# Patient Record
Sex: FEMALE | Employment: UNEMPLOYED | URBAN - METROPOLITAN AREA
[De-identification: names, ages, dates, MRNs, and addresses within clinical notes are randomized per-mention and may not be internally consistent; named-entity substitution may affect disease eponyms.]

---

## 2020-01-01 ENCOUNTER — APPOINTMENT (OUTPATIENT)
Dept: CT IMAGING | Facility: CLINIC | Age: 55
DRG: 003 | End: 2020-01-01
Attending: NURSE PRACTITIONER
Payer: COMMERCIAL

## 2020-01-01 ENCOUNTER — APPOINTMENT (OUTPATIENT)
Dept: GENERAL RADIOLOGY | Facility: CLINIC | Age: 55
DRG: 003 | End: 2020-01-01
Attending: COLON & RECTAL SURGERY
Payer: COMMERCIAL

## 2020-01-01 ENCOUNTER — ANESTHESIA (OUTPATIENT)
Dept: SURGERY | Facility: CLINIC | Age: 55
DRG: 003 | End: 2020-01-01
Payer: COMMERCIAL

## 2020-01-01 ENCOUNTER — APPOINTMENT (OUTPATIENT)
Dept: GENERAL RADIOLOGY | Facility: CLINIC | Age: 55
DRG: 003 | End: 2020-01-01
Attending: ANESTHESIOLOGY
Payer: COMMERCIAL

## 2020-01-01 ENCOUNTER — APPOINTMENT (OUTPATIENT)
Dept: GENERAL RADIOLOGY | Facility: CLINIC | Age: 55
DRG: 003 | End: 2020-01-01
Attending: INTERNAL MEDICINE
Payer: COMMERCIAL

## 2020-01-01 ENCOUNTER — APPOINTMENT (OUTPATIENT)
Dept: CT IMAGING | Facility: CLINIC | Age: 55
DRG: 003 | End: 2020-01-01
Attending: PHYSICIAN ASSISTANT
Payer: COMMERCIAL

## 2020-01-01 ENCOUNTER — ANESTHESIA EVENT (OUTPATIENT)
Dept: SURGERY | Facility: CLINIC | Age: 55
DRG: 003 | End: 2020-01-01
Payer: COMMERCIAL

## 2020-01-01 ENCOUNTER — HOSPITAL ENCOUNTER (INPATIENT)
Facility: CLINIC | Age: 55
LOS: 29 days | DRG: 003 | End: 2020-10-09
Attending: NURSE PRACTITIONER | Admitting: INTERNAL MEDICINE
Payer: COMMERCIAL

## 2020-01-01 ENCOUNTER — APPOINTMENT (OUTPATIENT)
Dept: CT IMAGING | Facility: CLINIC | Age: 55
DRG: 003 | End: 2020-01-01
Attending: HOSPITALIST
Payer: COMMERCIAL

## 2020-01-01 ENCOUNTER — APPOINTMENT (OUTPATIENT)
Dept: CT IMAGING | Facility: CLINIC | Age: 55
DRG: 003 | End: 2020-01-01
Attending: INTERNAL MEDICINE
Payer: COMMERCIAL

## 2020-01-01 ENCOUNTER — APPOINTMENT (OUTPATIENT)
Dept: CARDIOLOGY | Facility: CLINIC | Age: 55
DRG: 003 | End: 2020-01-01
Attending: INTERNAL MEDICINE
Payer: COMMERCIAL

## 2020-01-01 ENCOUNTER — APPOINTMENT (OUTPATIENT)
Dept: CT IMAGING | Facility: CLINIC | Age: 55
DRG: 003 | End: 2020-01-01
Attending: COLON & RECTAL SURGERY
Payer: COMMERCIAL

## 2020-01-01 ENCOUNTER — HOSPITAL ENCOUNTER (EMERGENCY)
Facility: CLINIC | Age: 55
Discharge: HOME | End: 2020-05-27

## 2020-01-01 ENCOUNTER — APPOINTMENT (OUTPATIENT)
Dept: GENERAL RADIOLOGY | Facility: CLINIC | Age: 55
DRG: 003 | End: 2020-01-01
Attending: HOSPITALIST
Payer: COMMERCIAL

## 2020-01-01 DIAGNOSIS — A41.9 SEVERE SEPSIS (H): ICD-10-CM

## 2020-01-01 DIAGNOSIS — R65.20 SEVERE SEPSIS (H): ICD-10-CM

## 2020-01-01 DIAGNOSIS — K66.8 PNEUMOPERITONEUM: ICD-10-CM

## 2020-01-01 DIAGNOSIS — E87.1 HYPONATREMIA: ICD-10-CM

## 2020-01-01 LAB
ABO + RH BLD: NORMAL
ACANTHOCYTES BLD QL SMEAR: SLIGHT
ALBUMIN SERPL-MCNC: 1 G/DL (ref 3.4–5)
ALBUMIN SERPL-MCNC: 1.1 G/DL (ref 3.4–5)
ALBUMIN SERPL-MCNC: 1.2 G/DL (ref 3.4–5)
ALBUMIN SERPL-MCNC: 1.3 G/DL (ref 3.4–5)
ALBUMIN SERPL-MCNC: 1.5 G/DL (ref 3.4–5)
ALBUMIN SERPL-MCNC: 1.6 G/DL (ref 3.4–5)
ALBUMIN SERPL-MCNC: 1.8 G/DL (ref 3.4–5)
ALBUMIN SERPL-MCNC: 1.9 G/DL (ref 3.4–5)
ALBUMIN SERPL-MCNC: 1.9 G/DL (ref 3.4–5)
ALBUMIN SERPL-MCNC: 2 G/DL (ref 3.4–5)
ALBUMIN SERPL-MCNC: 2.1 G/DL (ref 3.4–5)
ALBUMIN SERPL-MCNC: 2.1 G/DL (ref 3.4–5)
ALBUMIN SERPL-MCNC: 2.3 G/DL (ref 3.4–5)
ALBUMIN SERPL-MCNC: 2.3 G/DL (ref 3.4–5)
ALBUMIN SERPL-MCNC: 2.4 G/DL (ref 3.4–5)
ALBUMIN SERPL-MCNC: 2.6 G/DL (ref 3.4–5)
ALBUMIN SERPL-MCNC: 2.9 G/DL (ref 3.4–5)
ALBUMIN SERPL-MCNC: 3 G/DL (ref 3.4–5)
ALBUMIN SERPL-MCNC: 3.2 G/DL (ref 3.4–5)
ALBUMIN SERPL-MCNC: 3.3 G/DL (ref 3.4–5)
ALBUMIN SERPL-MCNC: 3.7 G/DL (ref 3.4–5)
ALBUMIN UR-MCNC: 10 MG/DL
ALBUMIN UR-MCNC: 30 MG/DL
ALP SERPL-CCNC: 120 U/L (ref 40–150)
ALP SERPL-CCNC: 136 U/L (ref 40–150)
ALP SERPL-CCNC: 154 U/L (ref 40–150)
ALP SERPL-CCNC: 162 U/L (ref 40–150)
ALP SERPL-CCNC: 162 U/L (ref 40–150)
ALP SERPL-CCNC: 198 U/L (ref 40–150)
ALP SERPL-CCNC: 200 U/L (ref 40–150)
ALP SERPL-CCNC: 203 U/L (ref 40–150)
ALP SERPL-CCNC: 204 U/L (ref 40–150)
ALP SERPL-CCNC: 204 U/L (ref 40–150)
ALP SERPL-CCNC: 210 U/L (ref 40–150)
ALP SERPL-CCNC: 217 U/L (ref 40–150)
ALP SERPL-CCNC: 232 U/L (ref 40–150)
ALP SERPL-CCNC: 232 U/L (ref 40–150)
ALP SERPL-CCNC: 239 U/L (ref 40–150)
ALP SERPL-CCNC: 259 U/L (ref 40–150)
ALP SERPL-CCNC: 261 U/L (ref 40–150)
ALP SERPL-CCNC: 268 U/L (ref 40–150)
ALP SERPL-CCNC: 290 U/L (ref 40–150)
ALP SERPL-CCNC: 325 U/L (ref 40–150)
ALP SERPL-CCNC: 338 U/L (ref 40–150)
ALP SERPL-CCNC: 355 U/L (ref 40–150)
ALP SERPL-CCNC: 355 U/L (ref 40–150)
ALP SERPL-CCNC: 49 U/L (ref 40–150)
ALP SERPL-CCNC: 52 U/L (ref 40–150)
ALP SERPL-CCNC: 60 U/L (ref 40–150)
ALP SERPL-CCNC: 64 U/L (ref 40–150)
ALP SERPL-CCNC: 81 U/L (ref 40–150)
ALT SERPL W P-5'-P-CCNC: 10 U/L (ref 0–50)
ALT SERPL W P-5'-P-CCNC: 102 U/L (ref 0–50)
ALT SERPL W P-5'-P-CCNC: 11 U/L (ref 0–50)
ALT SERPL W P-5'-P-CCNC: 11 U/L (ref 0–50)
ALT SERPL W P-5'-P-CCNC: 14 U/L (ref 0–50)
ALT SERPL W P-5'-P-CCNC: 17 U/L (ref 0–50)
ALT SERPL W P-5'-P-CCNC: 17 U/L (ref 0–50)
ALT SERPL W P-5'-P-CCNC: 18 U/L (ref 0–50)
ALT SERPL W P-5'-P-CCNC: 24 U/L (ref 0–50)
ALT SERPL W P-5'-P-CCNC: 34 U/L (ref 0–50)
ALT SERPL W P-5'-P-CCNC: 39 U/L (ref 0–50)
ALT SERPL W P-5'-P-CCNC: 40 U/L (ref 0–50)
ALT SERPL W P-5'-P-CCNC: 46 U/L (ref 0–50)
ALT SERPL W P-5'-P-CCNC: 46 U/L (ref 0–50)
ALT SERPL W P-5'-P-CCNC: 47 U/L (ref 0–50)
ALT SERPL W P-5'-P-CCNC: 50 U/L (ref 0–50)
ALT SERPL W P-5'-P-CCNC: 54 U/L (ref 0–50)
ALT SERPL W P-5'-P-CCNC: 56 U/L (ref 0–50)
ALT SERPL W P-5'-P-CCNC: 58 U/L (ref 0–50)
ALT SERPL W P-5'-P-CCNC: 61 U/L (ref 0–50)
ALT SERPL W P-5'-P-CCNC: 73 U/L (ref 0–50)
ALT SERPL W P-5'-P-CCNC: 87 U/L (ref 0–50)
ALT SERPL W P-5'-P-CCNC: 88 U/L (ref 0–50)
ALT SERPL W P-5'-P-CCNC: 96 U/L (ref 0–50)
ALT SERPL W P-5'-P-CCNC: 99 U/L (ref 0–50)
AMORPH CRY #/AREA URNS HPF: ABNORMAL /HPF
ANION GAP SERPL CALCULATED.3IONS-SCNC: 10 MMOL/L (ref 3–14)
ANION GAP SERPL CALCULATED.3IONS-SCNC: 11 MMOL/L (ref 3–14)
ANION GAP SERPL CALCULATED.3IONS-SCNC: 13 MMOL/L (ref 3–14)
ANION GAP SERPL CALCULATED.3IONS-SCNC: 14 MMOL/L (ref 3–14)
ANION GAP SERPL CALCULATED.3IONS-SCNC: 14 MMOL/L (ref 3–14)
ANION GAP SERPL CALCULATED.3IONS-SCNC: 15 MMOL/L (ref 3–14)
ANION GAP SERPL CALCULATED.3IONS-SCNC: 16 MMOL/L (ref 3–14)
ANION GAP SERPL CALCULATED.3IONS-SCNC: 19 MMOL/L (ref 3–14)
ANION GAP SERPL CALCULATED.3IONS-SCNC: 2 MMOL/L (ref 3–14)
ANION GAP SERPL CALCULATED.3IONS-SCNC: 4 MMOL/L (ref 3–14)
ANION GAP SERPL CALCULATED.3IONS-SCNC: 5 MMOL/L (ref 3–14)
ANION GAP SERPL CALCULATED.3IONS-SCNC: 5 MMOL/L (ref 3–14)
ANION GAP SERPL CALCULATED.3IONS-SCNC: 6 MMOL/L (ref 3–14)
ANION GAP SERPL CALCULATED.3IONS-SCNC: 7 MMOL/L (ref 3–14)
ANION GAP SERPL CALCULATED.3IONS-SCNC: 8 MMOL/L (ref 3–14)
ANION GAP SERPL CALCULATED.3IONS-SCNC: 8 MMOL/L (ref 3–14)
ANION GAP SERPL CALCULATED.3IONS-SCNC: 9 MMOL/L (ref 3–14)
ANISOCYTOSIS BLD QL SMEAR: SLIGHT
APPEARANCE UR: ABNORMAL
APPEARANCE UR: CLEAR
APTT PPP: 32 SEC (ref 22–37)
APTT PPP: 40 SEC (ref 22–37)
APTT PPP: 42 SEC (ref 22–37)
APTT PPP: 43 SEC (ref 22–37)
APTT PPP: <20 SEC (ref 22–37)
AST SERPL W P-5'-P-CCNC: 114 U/L (ref 0–45)
AST SERPL W P-5'-P-CCNC: 148 U/L (ref 0–45)
AST SERPL W P-5'-P-CCNC: 178 U/L (ref 0–45)
AST SERPL W P-5'-P-CCNC: 188 U/L (ref 0–45)
AST SERPL W P-5'-P-CCNC: 188 U/L (ref 0–45)
AST SERPL W P-5'-P-CCNC: 23 U/L (ref 0–45)
AST SERPL W P-5'-P-CCNC: 27 U/L (ref 0–45)
AST SERPL W P-5'-P-CCNC: 27 U/L (ref 0–45)
AST SERPL W P-5'-P-CCNC: 30 U/L (ref 0–45)
AST SERPL W P-5'-P-CCNC: 33 U/L (ref 0–45)
AST SERPL W P-5'-P-CCNC: 37 U/L (ref 0–45)
AST SERPL W P-5'-P-CCNC: 38 U/L (ref 0–45)
AST SERPL W P-5'-P-CCNC: 39 U/L (ref 0–45)
AST SERPL W P-5'-P-CCNC: 41 U/L (ref 0–45)
AST SERPL W P-5'-P-CCNC: 42 U/L (ref 0–45)
AST SERPL W P-5'-P-CCNC: 43 U/L (ref 0–45)
AST SERPL W P-5'-P-CCNC: 49 U/L (ref 0–45)
AST SERPL W P-5'-P-CCNC: 50 U/L (ref 0–45)
AST SERPL W P-5'-P-CCNC: 51 U/L (ref 0–45)
AST SERPL W P-5'-P-CCNC: 52 U/L (ref 0–45)
AST SERPL W P-5'-P-CCNC: 53 U/L (ref 0–45)
AST SERPL W P-5'-P-CCNC: 56 U/L (ref 0–45)
AST SERPL W P-5'-P-CCNC: 60 U/L (ref 0–45)
AST SERPL W P-5'-P-CCNC: 65 U/L (ref 0–45)
AST SERPL W P-5'-P-CCNC: 68 U/L (ref 0–45)
AST SERPL W P-5'-P-CCNC: 72 U/L (ref 0–45)
AST SERPL W P-5'-P-CCNC: 93 U/L (ref 0–45)
BACTERIA SPEC CULT: ABNORMAL
BACTERIA SPEC CULT: NO GROWTH
BASE DEFICIT BLDA-SCNC: 1.1 MMOL/L
BASE DEFICIT BLDA-SCNC: 1.6 MMOL/L
BASE DEFICIT BLDA-SCNC: 2.5 MMOL/L
BASE DEFICIT BLDA-SCNC: 2.8 MMOL/L
BASE DEFICIT BLDA-SCNC: 22.9 MMOL/L
BASE DEFICIT BLDA-SCNC: 3.1 MMOL/L
BASE DEFICIT BLDA-SCNC: 3.2 MMOL/L
BASE DEFICIT BLDA-SCNC: 4.6 MMOL/L
BASE DEFICIT BLDA-SCNC: 4.7 MMOL/L
BASE DEFICIT BLDA-SCNC: 4.8 MMOL/L
BASE DEFICIT BLDA-SCNC: 5.9 MMOL/L
BASE DEFICIT BLDA-SCNC: 7 MMOL/L
BASE DEFICIT BLDA-SCNC: 7 MMOL/L
BASE DEFICIT BLDA-SCNC: 7.1 MMOL/L
BASE DEFICIT BLDA-SCNC: 7.1 MMOL/L
BASE DEFICIT BLDA-SCNC: 7.3 MMOL/L
BASE DEFICIT BLDA-SCNC: 7.7 MMOL/L
BASE DEFICIT BLDA-SCNC: 8 MMOL/L
BASE DEFICIT BLDA-SCNC: 8.8 MMOL/L
BASE DEFICIT BLDV-SCNC: 1.4 MMOL/L
BASE DEFICIT BLDV-SCNC: 20 MMOL/L
BASE DEFICIT BLDV-SCNC: 3.6 MMOL/L
BASE DEFICIT BLDV-SCNC: 3.8 MMOL/L
BASE DEFICIT BLDV-SCNC: 4 MMOL/L
BASE DEFICIT BLDV-SCNC: 6.1 MMOL/L
BASE DEFICIT BLDV-SCNC: NORMAL MMOL/L
BASE EXCESS BLDV CALC-SCNC: NORMAL MMOL/L
BASOPHILS # BLD AUTO: 0 10E9/L (ref 0–0.2)
BASOPHILS NFR BLD AUTO: 0 %
BASOPHILS NFR BLD AUTO: 0.1 %
BILIRUB DIRECT SERPL-MCNC: 1.2 MG/DL (ref 0–0.2)
BILIRUB SERPL-MCNC: 0.9 MG/DL (ref 0.2–1.3)
BILIRUB SERPL-MCNC: 1 MG/DL (ref 0.2–1.3)
BILIRUB SERPL-MCNC: 1.2 MG/DL (ref 0.2–1.3)
BILIRUB SERPL-MCNC: 1.3 MG/DL (ref 0.2–1.3)
BILIRUB SERPL-MCNC: 1.6 MG/DL (ref 0.2–1.3)
BILIRUB SERPL-MCNC: 1.6 MG/DL (ref 0.2–1.3)
BILIRUB SERPL-MCNC: 1.7 MG/DL (ref 0.2–1.3)
BILIRUB SERPL-MCNC: 1.8 MG/DL (ref 0.2–1.3)
BILIRUB SERPL-MCNC: 1.9 MG/DL (ref 0.2–1.3)
BILIRUB SERPL-MCNC: 1.9 MG/DL (ref 0.2–1.3)
BILIRUB SERPL-MCNC: 2 MG/DL (ref 0.2–1.3)
BILIRUB SERPL-MCNC: 2 MG/DL (ref 0.2–1.3)
BILIRUB SERPL-MCNC: 2.2 MG/DL (ref 0.2–1.3)
BILIRUB SERPL-MCNC: 2.3 MG/DL (ref 0.2–1.3)
BILIRUB SERPL-MCNC: 2.4 MG/DL (ref 0.2–1.3)
BILIRUB SERPL-MCNC: 2.4 MG/DL (ref 0.2–1.3)
BILIRUB SERPL-MCNC: 2.6 MG/DL (ref 0.2–1.3)
BILIRUB SERPL-MCNC: 2.7 MG/DL (ref 0.2–1.3)
BILIRUB SERPL-MCNC: 2.8 MG/DL (ref 0.2–1.3)
BILIRUB SERPL-MCNC: 3 MG/DL (ref 0.2–1.3)
BILIRUB SERPL-MCNC: 3.1 MG/DL (ref 0.2–1.3)
BILIRUB SERPL-MCNC: 3.3 MG/DL (ref 0.2–1.3)
BILIRUB SERPL-MCNC: 3.4 MG/DL (ref 0.2–1.3)
BILIRUB SERPL-MCNC: 3.5 MG/DL (ref 0.2–1.3)
BILIRUB SERPL-MCNC: 3.6 MG/DL (ref 0.2–1.3)
BILIRUB SERPL-MCNC: 3.9 MG/DL (ref 0.2–1.3)
BILIRUB SERPL-MCNC: 4.1 MG/DL (ref 0.2–1.3)
BILIRUB UR QL STRIP: NEGATIVE
BILIRUB UR QL STRIP: NEGATIVE
BITE CELLS BLD QL SMEAR: SLIGHT
BLD GP AB INVEST PLASRBC-IMP: NORMAL
BLD GP AB SCN SERPL QL ELUTION: NORMAL
BLD GP AB SCN SERPL QL: NORMAL
BLD PROD TYP BPU: NORMAL
BLD UNIT ID BPU: 0
BLOOD BANK CMNT PATIENT-IMP: NORMAL
BLOOD PRODUCT CODE: NORMAL
BPU ID: NORMAL
BUN SERPL-MCNC: 100 MG/DL (ref 7–30)
BUN SERPL-MCNC: 100 MG/DL (ref 7–30)
BUN SERPL-MCNC: 101 MG/DL (ref 7–30)
BUN SERPL-MCNC: 101 MG/DL (ref 7–30)
BUN SERPL-MCNC: 102 MG/DL (ref 7–30)
BUN SERPL-MCNC: 12 MG/DL (ref 7–30)
BUN SERPL-MCNC: 15 MG/DL (ref 7–30)
BUN SERPL-MCNC: 16 MG/DL (ref 7–30)
BUN SERPL-MCNC: 19 MG/DL (ref 7–30)
BUN SERPL-MCNC: 22 MG/DL (ref 7–30)
BUN SERPL-MCNC: 27 MG/DL (ref 7–30)
BUN SERPL-MCNC: 3 MG/DL (ref 7–30)
BUN SERPL-MCNC: 3 MG/DL (ref 7–30)
BUN SERPL-MCNC: 32 MG/DL (ref 7–30)
BUN SERPL-MCNC: 4 MG/DL (ref 7–30)
BUN SERPL-MCNC: 4 MG/DL (ref 7–30)
BUN SERPL-MCNC: 47 MG/DL (ref 7–30)
BUN SERPL-MCNC: 5 MG/DL (ref 7–30)
BUN SERPL-MCNC: 5 MG/DL (ref 7–30)
BUN SERPL-MCNC: 6 MG/DL (ref 7–30)
BUN SERPL-MCNC: 63 MG/DL (ref 7–30)
BUN SERPL-MCNC: 69 MG/DL (ref 7–30)
BUN SERPL-MCNC: 7 MG/DL (ref 7–30)
BUN SERPL-MCNC: 70 MG/DL (ref 7–30)
BUN SERPL-MCNC: 75 MG/DL (ref 7–30)
BUN SERPL-MCNC: 75 MG/DL (ref 7–30)
BUN SERPL-MCNC: 82 MG/DL (ref 7–30)
BUN SERPL-MCNC: 83 MG/DL (ref 7–30)
BUN SERPL-MCNC: 84 MG/DL (ref 7–30)
BUN SERPL-MCNC: 85 MG/DL (ref 7–30)
BUN SERPL-MCNC: 9 MG/DL (ref 7–30)
BUN SERPL-MCNC: 93 MG/DL (ref 7–30)
BUN SERPL-MCNC: 93 MG/DL (ref 7–30)
BUN SERPL-MCNC: 95 MG/DL (ref 7–30)
BUN SERPL-MCNC: 99 MG/DL (ref 7–30)
BURR CELLS BLD QL SMEAR: ABNORMAL
CA-I BLD-MCNC: 4.1 MG/DL (ref 4.4–5.2)
CA-I BLD-MCNC: 4.8 MG/DL (ref 4.4–5.2)
CA-I SERPL ISE-MCNC: 4.5 MG/DL (ref 4.4–5.2)
CALCIUM SERPL-MCNC: 14 MG/DL (ref 8.5–10.1)
CALCIUM SERPL-MCNC: 6.1 MG/DL (ref 8.5–10.1)
CALCIUM SERPL-MCNC: 6.1 MG/DL (ref 8.5–10.1)
CALCIUM SERPL-MCNC: 6.2 MG/DL (ref 8.5–10.1)
CALCIUM SERPL-MCNC: 6.2 MG/DL (ref 8.5–10.1)
CALCIUM SERPL-MCNC: 6.3 MG/DL (ref 8.5–10.1)
CALCIUM SERPL-MCNC: 6.3 MG/DL (ref 8.5–10.1)
CALCIUM SERPL-MCNC: 6.4 MG/DL (ref 8.5–10.1)
CALCIUM SERPL-MCNC: 6.4 MG/DL (ref 8.5–10.1)
CALCIUM SERPL-MCNC: 6.5 MG/DL (ref 8.5–10.1)
CALCIUM SERPL-MCNC: 6.6 MG/DL (ref 8.5–10.1)
CALCIUM SERPL-MCNC: 6.7 MG/DL (ref 8.5–10.1)
CALCIUM SERPL-MCNC: 6.8 MG/DL (ref 8.5–10.1)
CALCIUM SERPL-MCNC: 6.8 MG/DL (ref 8.5–10.1)
CALCIUM SERPL-MCNC: 6.9 MG/DL (ref 8.5–10.1)
CALCIUM SERPL-MCNC: 6.9 MG/DL (ref 8.5–10.1)
CALCIUM SERPL-MCNC: 7 MG/DL (ref 8.5–10.1)
CALCIUM SERPL-MCNC: 7 MG/DL (ref 8.5–10.1)
CALCIUM SERPL-MCNC: 7.1 MG/DL (ref 8.5–10.1)
CALCIUM SERPL-MCNC: 7.2 MG/DL (ref 8.5–10.1)
CALCIUM SERPL-MCNC: 7.5 MG/DL (ref 8.5–10.1)
CALCIUM SERPL-MCNC: 7.6 MG/DL (ref 8.5–10.1)
CALCIUM SERPL-MCNC: 7.7 MG/DL (ref 8.5–10.1)
CALCIUM SERPL-MCNC: 7.9 MG/DL (ref 8.5–10.1)
CALCIUM SERPL-MCNC: 8.1 MG/DL (ref 8.5–10.1)
CALCIUM SERPL-MCNC: 8.2 MG/DL (ref 8.5–10.1)
CALCIUM SERPL-MCNC: 8.2 MG/DL (ref 8.5–10.1)
CALCIUM SERPL-MCNC: 8.3 MG/DL (ref 8.5–10.1)
CALCIUM SERPL-MCNC: 8.4 MG/DL (ref 8.5–10.1)
CALCIUM SERPL-MCNC: 8.8 MG/DL (ref 8.5–10.1)
CALCIUM SERPL-MCNC: 9 MG/DL (ref 8.5–10.1)
CALCIUM SERPL-MCNC: 9.1 MG/DL (ref 8.5–10.1)
CHLORIDE SERPL-SCNC: 102 MMOL/L (ref 94–109)
CHLORIDE SERPL-SCNC: 104 MMOL/L (ref 94–109)
CHLORIDE SERPL-SCNC: 104 MMOL/L (ref 94–109)
CHLORIDE SERPL-SCNC: 106 MMOL/L (ref 94–109)
CHLORIDE SERPL-SCNC: 108 MMOL/L (ref 94–109)
CHLORIDE SERPL-SCNC: 108 MMOL/L (ref 94–109)
CHLORIDE SERPL-SCNC: 110 MMOL/L (ref 94–109)
CHLORIDE SERPL-SCNC: 111 MMOL/L (ref 94–109)
CHLORIDE SERPL-SCNC: 112 MMOL/L (ref 94–109)
CHLORIDE SERPL-SCNC: 113 MMOL/L (ref 94–109)
CHLORIDE SERPL-SCNC: 114 MMOL/L (ref 94–109)
CHLORIDE SERPL-SCNC: 115 MMOL/L (ref 94–109)
CHLORIDE SERPL-SCNC: 115 MMOL/L (ref 94–109)
CHLORIDE SERPL-SCNC: 116 MMOL/L (ref 94–109)
CHLORIDE SERPL-SCNC: 117 MMOL/L (ref 94–109)
CHLORIDE SERPL-SCNC: 117 MMOL/L (ref 94–109)
CHLORIDE SERPL-SCNC: 118 MMOL/L (ref 94–109)
CHLORIDE SERPL-SCNC: 119 MMOL/L (ref 94–109)
CHLORIDE SERPL-SCNC: 120 MMOL/L (ref 94–109)
CHLORIDE SERPL-SCNC: 120 MMOL/L (ref 94–109)
CHLORIDE SERPL-SCNC: 122 MMOL/L (ref 94–109)
CHLORIDE SERPL-SCNC: 79 MMOL/L (ref 94–109)
CHLORIDE SERPL-SCNC: 92 MMOL/L (ref 94–109)
CHLORIDE SERPL-SCNC: 93 MMOL/L (ref 94–109)
CHLORIDE SERPL-SCNC: 93 MMOL/L (ref 94–109)
CHLORIDE SERPL-SCNC: 96 MMOL/L (ref 94–109)
CK SERPL-CCNC: 158 U/L (ref 30–225)
CO2 BLD-SCNC: 16 MMOL/L (ref 21–28)
CO2 BLD-SCNC: 20 MMOL/L (ref 21–28)
CO2 BLD-SCNC: 22 MMOL/L (ref 21–28)
CO2 SERPL-SCNC: 12 MMOL/L (ref 20–32)
CO2 SERPL-SCNC: 13 MMOL/L (ref 20–32)
CO2 SERPL-SCNC: 14 MMOL/L (ref 20–32)
CO2 SERPL-SCNC: 14 MMOL/L (ref 20–32)
CO2 SERPL-SCNC: 16 MMOL/L (ref 20–32)
CO2 SERPL-SCNC: 16 MMOL/L (ref 20–32)
CO2 SERPL-SCNC: 17 MMOL/L (ref 20–32)
CO2 SERPL-SCNC: 18 MMOL/L (ref 20–32)
CO2 SERPL-SCNC: 18 MMOL/L (ref 20–32)
CO2 SERPL-SCNC: 19 MMOL/L (ref 20–32)
CO2 SERPL-SCNC: 20 MMOL/L (ref 20–32)
CO2 SERPL-SCNC: 21 MMOL/L (ref 20–32)
CO2 SERPL-SCNC: 22 MMOL/L (ref 20–32)
CO2 SERPL-SCNC: 23 MMOL/L (ref 20–32)
CO2 SERPL-SCNC: 24 MMOL/L (ref 20–32)
CO2 SERPL-SCNC: 24 MMOL/L (ref 20–32)
CO2 SERPL-SCNC: 25 MMOL/L (ref 20–32)
CO2 SERPL-SCNC: 25 MMOL/L (ref 20–32)
CO2 SERPL-SCNC: 26 MMOL/L (ref 20–32)
COLOR UR AUTO: YELLOW
COLOR UR AUTO: YELLOW
COPATH REPORT: NORMAL
CREAT FLD-MCNC: 0.4 MG/DL
CREAT SERPL-MCNC: 0.28 MG/DL (ref 0.52–1.04)
CREAT SERPL-MCNC: 0.3 MG/DL (ref 0.52–1.04)
CREAT SERPL-MCNC: 0.35 MG/DL (ref 0.52–1.04)
CREAT SERPL-MCNC: 0.35 MG/DL (ref 0.52–1.04)
CREAT SERPL-MCNC: 0.36 MG/DL (ref 0.52–1.04)
CREAT SERPL-MCNC: 0.37 MG/DL (ref 0.52–1.04)
CREAT SERPL-MCNC: 0.39 MG/DL (ref 0.52–1.04)
CREAT SERPL-MCNC: 0.39 MG/DL (ref 0.52–1.04)
CREAT SERPL-MCNC: 0.4 MG/DL (ref 0.52–1.04)
CREAT SERPL-MCNC: 0.41 MG/DL (ref 0.52–1.04)
CREAT SERPL-MCNC: 0.42 MG/DL (ref 0.52–1.04)
CREAT SERPL-MCNC: 0.43 MG/DL (ref 0.52–1.04)
CREAT SERPL-MCNC: 0.46 MG/DL (ref 0.52–1.04)
CREAT SERPL-MCNC: 0.46 MG/DL (ref 0.52–1.04)
CREAT SERPL-MCNC: 0.52 MG/DL (ref 0.52–1.04)
CREAT SERPL-MCNC: 0.69 MG/DL (ref 0.52–1.04)
CREAT SERPL-MCNC: 0.75 MG/DL (ref 0.52–1.04)
CREAT SERPL-MCNC: 0.77 MG/DL (ref 0.52–1.04)
CREAT SERPL-MCNC: 0.77 MG/DL (ref 0.52–1.04)
CREAT SERPL-MCNC: 0.8 MG/DL (ref 0.52–1.04)
CREAT SERPL-MCNC: 0.83 MG/DL (ref 0.52–1.04)
CREAT SERPL-MCNC: 0.87 MG/DL (ref 0.52–1.04)
CREAT SERPL-MCNC: 0.88 MG/DL (ref 0.52–1.04)
CREAT SERPL-MCNC: 0.94 MG/DL (ref 0.52–1.04)
CREAT SERPL-MCNC: 0.95 MG/DL (ref 0.52–1.04)
CREAT SERPL-MCNC: 0.98 MG/DL (ref 0.52–1.04)
CREAT SERPL-MCNC: 1 MG/DL (ref 0.52–1.04)
CREAT SERPL-MCNC: 1.04 MG/DL (ref 0.52–1.04)
CREAT SERPL-MCNC: 1.06 MG/DL (ref 0.52–1.04)
CREAT SERPL-MCNC: 1.22 MG/DL (ref 0.52–1.04)
CREAT SERPL-MCNC: 1.34 MG/DL (ref 0.52–1.04)
CREAT SERPL-MCNC: 1.4 MG/DL (ref 0.52–1.04)
CREAT SERPL-MCNC: 1.63 MG/DL (ref 0.52–1.04)
CREAT SERPL-MCNC: 1.66 MG/DL (ref 0.52–1.04)
CREAT SERPL-MCNC: 1.72 MG/DL (ref 0.52–1.04)
CREAT SERPL-MCNC: 1.82 MG/DL (ref 0.52–1.04)
CREAT SERPL-MCNC: 1.97 MG/DL (ref 0.52–1.04)
CREAT SERPL-MCNC: 2.05 MG/DL (ref 0.52–1.04)
CREAT SERPL-MCNC: 2.06 MG/DL (ref 0.52–1.04)
CREAT SERPL-MCNC: 2.06 MG/DL (ref 0.52–1.04)
CREAT SERPL-MCNC: 2.09 MG/DL (ref 0.52–1.04)
CREAT SERPL-MCNC: 2.12 MG/DL (ref 0.52–1.04)
CREAT SERPL-MCNC: 2.12 MG/DL (ref 0.52–1.04)
CREAT SERPL-MCNC: 2.13 MG/DL (ref 0.52–1.04)
CREAT SERPL-MCNC: 2.14 MG/DL (ref 0.52–1.04)
CREAT UR-MCNC: 22 MG/DL
CRP SERPL-MCNC: 65.4 MG/L (ref 0–8)
DAT C3-SP REAG RBC QL: NORMAL
DAT IGG-SP REAG RBC-IMP: NORMAL
DAT IGG-SP REAG RBC-IMP: NORMAL
DAT POLY-SP REAG RBC QL: NORMAL
DIFFERENTIAL METHOD BLD: ABNORMAL
DOHLE BOD BLD QL SMEAR: PRESENT
EOSINOPHIL # BLD AUTO: 0 10E9/L (ref 0–0.7)
EOSINOPHIL # BLD AUTO: 0 10E9/L (ref 0–0.7)
EOSINOPHIL # BLD AUTO: 0.4 10E9/L (ref 0–0.7)
EOSINOPHIL # BLD AUTO: 0.9 10E9/L (ref 0–0.7)
EOSINOPHIL # BLD AUTO: 0.9 10E9/L (ref 0–0.7)
EOSINOPHIL # BLD AUTO: 1 10E9/L (ref 0–0.7)
EOSINOPHIL NFR BLD AUTO: 0 %
EOSINOPHIL NFR BLD AUTO: 0 %
EOSINOPHIL NFR BLD AUTO: 1.9 %
EOSINOPHIL NFR BLD AUTO: 10 %
EOSINOPHIL NFR BLD AUTO: 4 %
EOSINOPHIL NFR BLD AUTO: 6 %
ERYTHROCYTE [DISTWIDTH] IN BLOOD BY AUTOMATED COUNT: 12.3 % (ref 10–15)
ERYTHROCYTE [DISTWIDTH] IN BLOOD BY AUTOMATED COUNT: 12.4 % (ref 10–15)
ERYTHROCYTE [DISTWIDTH] IN BLOOD BY AUTOMATED COUNT: 12.5 % (ref 10–15)
ERYTHROCYTE [DISTWIDTH] IN BLOOD BY AUTOMATED COUNT: 12.6 % (ref 10–15)
ERYTHROCYTE [DISTWIDTH] IN BLOOD BY AUTOMATED COUNT: 13.1 % (ref 10–15)
ERYTHROCYTE [DISTWIDTH] IN BLOOD BY AUTOMATED COUNT: 13.2 % (ref 10–15)
ERYTHROCYTE [DISTWIDTH] IN BLOOD BY AUTOMATED COUNT: 13.3 % (ref 10–15)
ERYTHROCYTE [DISTWIDTH] IN BLOOD BY AUTOMATED COUNT: 14.1 % (ref 10–15)
ERYTHROCYTE [DISTWIDTH] IN BLOOD BY AUTOMATED COUNT: 14.4 % (ref 10–15)
ERYTHROCYTE [DISTWIDTH] IN BLOOD BY AUTOMATED COUNT: 14.5 % (ref 10–15)
ERYTHROCYTE [DISTWIDTH] IN BLOOD BY AUTOMATED COUNT: 14.5 % (ref 10–15)
ERYTHROCYTE [DISTWIDTH] IN BLOOD BY AUTOMATED COUNT: 14.7 % (ref 10–15)
ERYTHROCYTE [DISTWIDTH] IN BLOOD BY AUTOMATED COUNT: 14.9 % (ref 10–15)
ERYTHROCYTE [DISTWIDTH] IN BLOOD BY AUTOMATED COUNT: 15.4 % (ref 10–15)
ERYTHROCYTE [DISTWIDTH] IN BLOOD BY AUTOMATED COUNT: 15.5 % (ref 10–15)
ERYTHROCYTE [DISTWIDTH] IN BLOOD BY AUTOMATED COUNT: 15.7 % (ref 10–15)
ERYTHROCYTE [DISTWIDTH] IN BLOOD BY AUTOMATED COUNT: 15.8 % (ref 10–15)
ERYTHROCYTE [DISTWIDTH] IN BLOOD BY AUTOMATED COUNT: 15.9 % (ref 10–15)
ERYTHROCYTE [DISTWIDTH] IN BLOOD BY AUTOMATED COUNT: 16.1 % (ref 10–15)
ERYTHROCYTE [DISTWIDTH] IN BLOOD BY AUTOMATED COUNT: 16.2 % (ref 10–15)
ERYTHROCYTE [DISTWIDTH] IN BLOOD BY AUTOMATED COUNT: 16.3 % (ref 10–15)
ERYTHROCYTE [DISTWIDTH] IN BLOOD BY AUTOMATED COUNT: 16.4 % (ref 10–15)
ERYTHROCYTE [DISTWIDTH] IN BLOOD BY AUTOMATED COUNT: 16.5 % (ref 10–15)
ERYTHROCYTE [DISTWIDTH] IN BLOOD BY AUTOMATED COUNT: 16.5 % (ref 10–15)
ERYTHROCYTE [DISTWIDTH] IN BLOOD BY AUTOMATED COUNT: 16.6 % (ref 10–15)
ERYTHROCYTE [DISTWIDTH] IN BLOOD BY AUTOMATED COUNT: 16.7 % (ref 10–15)
ERYTHROCYTE [DISTWIDTH] IN BLOOD BY AUTOMATED COUNT: 16.8 % (ref 10–15)
ERYTHROCYTE [DISTWIDTH] IN BLOOD BY AUTOMATED COUNT: 17 % (ref 10–15)
ERYTHROCYTE [DISTWIDTH] IN BLOOD BY AUTOMATED COUNT: 17 % (ref 10–15)
ERYTHROCYTE [DISTWIDTH] IN BLOOD BY AUTOMATED COUNT: 17.1 % (ref 10–15)
ERYTHROCYTE [DISTWIDTH] IN BLOOD BY AUTOMATED COUNT: 17.4 % (ref 10–15)
ETHANOL SERPL-MCNC: <0.01 G/DL
FIBRINOGEN PPP-MCNC: 176 MG/DL (ref 200–420)
FIBRINOGEN PPP-MCNC: 529 MG/DL (ref 200–420)
FOLATE SERPL-MCNC: 7.6 NG/ML
FRACT EXCRET NA UR+SERPL-RTO: 0.5 %
GFR SERPL CREATININE-BSD FRML MDRD: 25 ML/MIN/{1.73_M2}
GFR SERPL CREATININE-BSD FRML MDRD: 26 ML/MIN/{1.73_M2}
GFR SERPL CREATININE-BSD FRML MDRD: 28 ML/MIN/{1.73_M2}
GFR SERPL CREATININE-BSD FRML MDRD: 31 ML/MIN/{1.73_M2}
GFR SERPL CREATININE-BSD FRML MDRD: 33 ML/MIN/{1.73_M2}
GFR SERPL CREATININE-BSD FRML MDRD: 34 ML/MIN/{1.73_M2}
GFR SERPL CREATININE-BSD FRML MDRD: 35 ML/MIN/{1.73_M2}
GFR SERPL CREATININE-BSD FRML MDRD: 42 ML/MIN/{1.73_M2}
GFR SERPL CREATININE-BSD FRML MDRD: 44 ML/MIN/{1.73_M2}
GFR SERPL CREATININE-BSD FRML MDRD: 49 ML/MIN/{1.73_M2}
GFR SERPL CREATININE-BSD FRML MDRD: 59 ML/MIN/{1.73_M2}
GFR SERPL CREATININE-BSD FRML MDRD: 60 ML/MIN/{1.73_M2}
GFR SERPL CREATININE-BSD FRML MDRD: 63 ML/MIN/{1.73_M2}
GFR SERPL CREATININE-BSD FRML MDRD: 64 ML/MIN/{1.73_M2}
GFR SERPL CREATININE-BSD FRML MDRD: 67 ML/MIN/{1.73_M2}
GFR SERPL CREATININE-BSD FRML MDRD: 68 ML/MIN/{1.73_M2}
GFR SERPL CREATININE-BSD FRML MDRD: 73 ML/MIN/{1.73_M2}
GFR SERPL CREATININE-BSD FRML MDRD: 75 ML/MIN/{1.73_M2}
GFR SERPL CREATININE-BSD FRML MDRD: 78 ML/MIN/{1.73_M2}
GFR SERPL CREATININE-BSD FRML MDRD: 82 ML/MIN/{1.73_M2}
GFR SERPL CREATININE-BSD FRML MDRD: 86 ML/MIN/{1.73_M2}
GFR SERPL CREATININE-BSD FRML MDRD: 86 ML/MIN/{1.73_M2}
GFR SERPL CREATININE-BSD FRML MDRD: 89 ML/MIN/{1.73_M2}
GFR SERPL CREATININE-BSD FRML MDRD: >90 ML/MIN/{1.73_M2}
GLUCOSE BLDC GLUCOMTR-MCNC: 100 MG/DL (ref 70–99)
GLUCOSE BLDC GLUCOMTR-MCNC: 101 MG/DL (ref 70–99)
GLUCOSE BLDC GLUCOMTR-MCNC: 102 MG/DL (ref 70–99)
GLUCOSE BLDC GLUCOMTR-MCNC: 102 MG/DL (ref 70–99)
GLUCOSE BLDC GLUCOMTR-MCNC: 103 MG/DL (ref 70–99)
GLUCOSE BLDC GLUCOMTR-MCNC: 104 MG/DL (ref 70–99)
GLUCOSE BLDC GLUCOMTR-MCNC: 104 MG/DL (ref 70–99)
GLUCOSE BLDC GLUCOMTR-MCNC: 106 MG/DL (ref 70–99)
GLUCOSE BLDC GLUCOMTR-MCNC: 106 MG/DL (ref 70–99)
GLUCOSE BLDC GLUCOMTR-MCNC: 108 MG/DL (ref 70–99)
GLUCOSE BLDC GLUCOMTR-MCNC: 108 MG/DL (ref 70–99)
GLUCOSE BLDC GLUCOMTR-MCNC: 109 MG/DL (ref 70–99)
GLUCOSE BLDC GLUCOMTR-MCNC: 110 MG/DL (ref 70–99)
GLUCOSE BLDC GLUCOMTR-MCNC: 110 MG/DL (ref 70–99)
GLUCOSE BLDC GLUCOMTR-MCNC: 111 MG/DL (ref 70–99)
GLUCOSE BLDC GLUCOMTR-MCNC: 112 MG/DL (ref 70–99)
GLUCOSE BLDC GLUCOMTR-MCNC: 113 MG/DL (ref 70–99)
GLUCOSE BLDC GLUCOMTR-MCNC: 114 MG/DL (ref 70–99)
GLUCOSE BLDC GLUCOMTR-MCNC: 115 MG/DL (ref 70–99)
GLUCOSE BLDC GLUCOMTR-MCNC: 116 MG/DL (ref 70–99)
GLUCOSE BLDC GLUCOMTR-MCNC: 117 MG/DL (ref 70–99)
GLUCOSE BLDC GLUCOMTR-MCNC: 119 MG/DL (ref 70–99)
GLUCOSE BLDC GLUCOMTR-MCNC: 121 MG/DL (ref 70–99)
GLUCOSE BLDC GLUCOMTR-MCNC: 122 MG/DL (ref 70–99)
GLUCOSE BLDC GLUCOMTR-MCNC: 122 MG/DL (ref 70–99)
GLUCOSE BLDC GLUCOMTR-MCNC: 123 MG/DL (ref 70–99)
GLUCOSE BLDC GLUCOMTR-MCNC: 124 MG/DL (ref 70–99)
GLUCOSE BLDC GLUCOMTR-MCNC: 125 MG/DL (ref 70–99)
GLUCOSE BLDC GLUCOMTR-MCNC: 126 MG/DL (ref 70–99)
GLUCOSE BLDC GLUCOMTR-MCNC: 127 MG/DL (ref 70–99)
GLUCOSE BLDC GLUCOMTR-MCNC: 128 MG/DL (ref 70–99)
GLUCOSE BLDC GLUCOMTR-MCNC: 129 MG/DL (ref 70–99)
GLUCOSE BLDC GLUCOMTR-MCNC: 130 MG/DL (ref 70–99)
GLUCOSE BLDC GLUCOMTR-MCNC: 131 MG/DL (ref 70–99)
GLUCOSE BLDC GLUCOMTR-MCNC: 132 MG/DL (ref 70–99)
GLUCOSE BLDC GLUCOMTR-MCNC: 133 MG/DL (ref 70–99)
GLUCOSE BLDC GLUCOMTR-MCNC: 134 MG/DL (ref 70–99)
GLUCOSE BLDC GLUCOMTR-MCNC: 135 MG/DL (ref 70–99)
GLUCOSE BLDC GLUCOMTR-MCNC: 136 MG/DL (ref 70–99)
GLUCOSE BLDC GLUCOMTR-MCNC: 137 MG/DL (ref 70–99)
GLUCOSE BLDC GLUCOMTR-MCNC: 138 MG/DL (ref 70–99)
GLUCOSE BLDC GLUCOMTR-MCNC: 139 MG/DL (ref 70–99)
GLUCOSE BLDC GLUCOMTR-MCNC: 140 MG/DL (ref 70–99)
GLUCOSE BLDC GLUCOMTR-MCNC: 141 MG/DL (ref 70–99)
GLUCOSE BLDC GLUCOMTR-MCNC: 142 MG/DL (ref 70–99)
GLUCOSE BLDC GLUCOMTR-MCNC: 143 MG/DL (ref 70–99)
GLUCOSE BLDC GLUCOMTR-MCNC: 144 MG/DL (ref 70–99)
GLUCOSE BLDC GLUCOMTR-MCNC: 145 MG/DL (ref 70–99)
GLUCOSE BLDC GLUCOMTR-MCNC: 146 MG/DL (ref 70–99)
GLUCOSE BLDC GLUCOMTR-MCNC: 146 MG/DL (ref 70–99)
GLUCOSE BLDC GLUCOMTR-MCNC: 147 MG/DL (ref 70–99)
GLUCOSE BLDC GLUCOMTR-MCNC: 147 MG/DL (ref 70–99)
GLUCOSE BLDC GLUCOMTR-MCNC: 149 MG/DL (ref 70–99)
GLUCOSE BLDC GLUCOMTR-MCNC: 149 MG/DL (ref 70–99)
GLUCOSE BLDC GLUCOMTR-MCNC: 150 MG/DL (ref 70–99)
GLUCOSE BLDC GLUCOMTR-MCNC: 153 MG/DL (ref 70–99)
GLUCOSE BLDC GLUCOMTR-MCNC: 154 MG/DL (ref 70–99)
GLUCOSE BLDC GLUCOMTR-MCNC: 154 MG/DL (ref 70–99)
GLUCOSE BLDC GLUCOMTR-MCNC: 157 MG/DL (ref 70–99)
GLUCOSE BLDC GLUCOMTR-MCNC: 158 MG/DL (ref 70–99)
GLUCOSE BLDC GLUCOMTR-MCNC: 159 MG/DL (ref 70–99)
GLUCOSE BLDC GLUCOMTR-MCNC: 160 MG/DL (ref 70–99)
GLUCOSE BLDC GLUCOMTR-MCNC: 161 MG/DL (ref 70–99)
GLUCOSE BLDC GLUCOMTR-MCNC: 162 MG/DL (ref 70–99)
GLUCOSE BLDC GLUCOMTR-MCNC: 163 MG/DL (ref 70–99)
GLUCOSE BLDC GLUCOMTR-MCNC: 165 MG/DL (ref 70–99)
GLUCOSE BLDC GLUCOMTR-MCNC: 166 MG/DL (ref 70–99)
GLUCOSE BLDC GLUCOMTR-MCNC: 167 MG/DL (ref 70–99)
GLUCOSE BLDC GLUCOMTR-MCNC: 168 MG/DL (ref 70–99)
GLUCOSE BLDC GLUCOMTR-MCNC: 169 MG/DL (ref 70–99)
GLUCOSE BLDC GLUCOMTR-MCNC: 170 MG/DL (ref 70–99)
GLUCOSE BLDC GLUCOMTR-MCNC: 170 MG/DL (ref 70–99)
GLUCOSE BLDC GLUCOMTR-MCNC: 171 MG/DL (ref 70–99)
GLUCOSE BLDC GLUCOMTR-MCNC: 177 MG/DL (ref 70–99)
GLUCOSE BLDC GLUCOMTR-MCNC: 178 MG/DL (ref 70–99)
GLUCOSE BLDC GLUCOMTR-MCNC: 179 MG/DL (ref 70–99)
GLUCOSE BLDC GLUCOMTR-MCNC: 179 MG/DL (ref 70–99)
GLUCOSE BLDC GLUCOMTR-MCNC: 180 MG/DL (ref 70–99)
GLUCOSE BLDC GLUCOMTR-MCNC: 182 MG/DL (ref 70–99)
GLUCOSE BLDC GLUCOMTR-MCNC: 185 MG/DL (ref 70–99)
GLUCOSE BLDC GLUCOMTR-MCNC: 196 MG/DL (ref 70–99)
GLUCOSE BLDC GLUCOMTR-MCNC: 200 MG/DL (ref 70–99)
GLUCOSE BLDC GLUCOMTR-MCNC: 202 MG/DL (ref 70–99)
GLUCOSE BLDC GLUCOMTR-MCNC: 214 MG/DL (ref 70–99)
GLUCOSE BLDC GLUCOMTR-MCNC: 229 MG/DL (ref 70–99)
GLUCOSE BLDC GLUCOMTR-MCNC: 70 MG/DL (ref 70–99)
GLUCOSE BLDC GLUCOMTR-MCNC: 71 MG/DL (ref 70–99)
GLUCOSE BLDC GLUCOMTR-MCNC: 72 MG/DL (ref 70–99)
GLUCOSE BLDC GLUCOMTR-MCNC: 81 MG/DL (ref 70–99)
GLUCOSE BLDC GLUCOMTR-MCNC: 85 MG/DL (ref 70–99)
GLUCOSE BLDC GLUCOMTR-MCNC: 85 MG/DL (ref 70–99)
GLUCOSE BLDC GLUCOMTR-MCNC: 86 MG/DL (ref 70–99)
GLUCOSE BLDC GLUCOMTR-MCNC: 88 MG/DL (ref 70–99)
GLUCOSE BLDC GLUCOMTR-MCNC: 88 MG/DL (ref 70–99)
GLUCOSE BLDC GLUCOMTR-MCNC: 89 MG/DL (ref 70–99)
GLUCOSE BLDC GLUCOMTR-MCNC: 89 MG/DL (ref 70–99)
GLUCOSE BLDC GLUCOMTR-MCNC: 90 MG/DL (ref 70–99)
GLUCOSE BLDC GLUCOMTR-MCNC: 92 MG/DL (ref 70–99)
GLUCOSE BLDC GLUCOMTR-MCNC: 92 MG/DL (ref 70–99)
GLUCOSE BLDC GLUCOMTR-MCNC: 93 MG/DL (ref 70–99)
GLUCOSE BLDC GLUCOMTR-MCNC: 94 MG/DL (ref 70–99)
GLUCOSE BLDC GLUCOMTR-MCNC: 94 MG/DL (ref 70–99)
GLUCOSE BLDC GLUCOMTR-MCNC: 95 MG/DL (ref 70–99)
GLUCOSE BLDC GLUCOMTR-MCNC: 96 MG/DL (ref 70–99)
GLUCOSE BLDC GLUCOMTR-MCNC: 97 MG/DL (ref 70–99)
GLUCOSE BLDC GLUCOMTR-MCNC: 99 MG/DL (ref 70–99)
GLUCOSE SERPL-MCNC: 103 MG/DL (ref 70–99)
GLUCOSE SERPL-MCNC: 109 MG/DL (ref 70–99)
GLUCOSE SERPL-MCNC: 110 MG/DL (ref 70–99)
GLUCOSE SERPL-MCNC: 112 MG/DL (ref 70–99)
GLUCOSE SERPL-MCNC: 116 MG/DL (ref 70–99)
GLUCOSE SERPL-MCNC: 117 MG/DL (ref 70–99)
GLUCOSE SERPL-MCNC: 120 MG/DL (ref 70–99)
GLUCOSE SERPL-MCNC: 120 MG/DL (ref 70–99)
GLUCOSE SERPL-MCNC: 121 MG/DL (ref 70–99)
GLUCOSE SERPL-MCNC: 124 MG/DL (ref 70–99)
GLUCOSE SERPL-MCNC: 125 MG/DL (ref 70–99)
GLUCOSE SERPL-MCNC: 125 MG/DL (ref 70–99)
GLUCOSE SERPL-MCNC: 126 MG/DL (ref 70–99)
GLUCOSE SERPL-MCNC: 127 MG/DL (ref 70–99)
GLUCOSE SERPL-MCNC: 129 MG/DL (ref 70–99)
GLUCOSE SERPL-MCNC: 129 MG/DL (ref 70–99)
GLUCOSE SERPL-MCNC: 130 MG/DL (ref 70–99)
GLUCOSE SERPL-MCNC: 132 MG/DL (ref 70–99)
GLUCOSE SERPL-MCNC: 135 MG/DL (ref 70–99)
GLUCOSE SERPL-MCNC: 137 MG/DL (ref 70–99)
GLUCOSE SERPL-MCNC: 137 MG/DL (ref 70–99)
GLUCOSE SERPL-MCNC: 138 MG/DL (ref 70–99)
GLUCOSE SERPL-MCNC: 140 MG/DL (ref 70–99)
GLUCOSE SERPL-MCNC: 142 MG/DL (ref 70–99)
GLUCOSE SERPL-MCNC: 142 MG/DL (ref 70–99)
GLUCOSE SERPL-MCNC: 143 MG/DL (ref 70–99)
GLUCOSE SERPL-MCNC: 145 MG/DL (ref 70–99)
GLUCOSE SERPL-MCNC: 148 MG/DL (ref 70–99)
GLUCOSE SERPL-MCNC: 156 MG/DL (ref 70–99)
GLUCOSE SERPL-MCNC: 172 MG/DL (ref 70–99)
GLUCOSE SERPL-MCNC: 183 MG/DL (ref 70–99)
GLUCOSE SERPL-MCNC: 184 MG/DL (ref 70–99)
GLUCOSE SERPL-MCNC: 189 MG/DL (ref 70–99)
GLUCOSE SERPL-MCNC: 70 MG/DL (ref 70–99)
GLUCOSE SERPL-MCNC: 85 MG/DL (ref 70–99)
GLUCOSE SERPL-MCNC: 93 MG/DL (ref 70–99)
GLUCOSE SERPL-MCNC: 94 MG/DL (ref 70–99)
GLUCOSE SERPL-MCNC: 95 MG/DL (ref 70–99)
GLUCOSE SERPL-MCNC: 98 MG/DL (ref 70–99)
GLUCOSE UR STRIP-MCNC: NEGATIVE MG/DL
GLUCOSE UR STRIP-MCNC: NEGATIVE MG/DL
GRAM STN SPEC: NORMAL
HAPTOGLOB SERPL-MCNC: 278 MG/DL (ref 32–197)
HBA1C MFR BLD: 5.1 % (ref 0–5.6)
HCO3 BLD-SCNC: 16 MMOL/L (ref 21–28)
HCO3 BLD-SCNC: 16 MMOL/L (ref 21–28)
HCO3 BLD-SCNC: 17 MMOL/L (ref 21–28)
HCO3 BLD-SCNC: 17 MMOL/L (ref 21–28)
HCO3 BLD-SCNC: 19 MMOL/L (ref 21–28)
HCO3 BLD-SCNC: 20 MMOL/L (ref 21–28)
HCO3 BLD-SCNC: 21 MMOL/L (ref 21–28)
HCO3 BLD-SCNC: 22 MMOL/L (ref 21–28)
HCO3 BLD-SCNC: 23 MMOL/L (ref 21–28)
HCO3 BLD-SCNC: 8 MMOL/L (ref 21–28)
HCO3 BLDV-SCNC: 11 MMOL/L (ref 21–28)
HCO3 BLDV-SCNC: 20 MMOL/L (ref 21–28)
HCO3 BLDV-SCNC: 20 MMOL/L (ref 21–28)
HCO3 BLDV-SCNC: 21 MMOL/L (ref 21–28)
HCO3 BLDV-SCNC: 21 MMOL/L (ref 21–28)
HCO3 BLDV-SCNC: 23 MMOL/L (ref 21–28)
HCO3 BLDV-SCNC: NORMAL MMOL/L (ref 21–28)
HCT VFR BLD AUTO: 18.6 % (ref 35–47)
HCT VFR BLD AUTO: 19.5 % (ref 35–47)
HCT VFR BLD AUTO: 20.1 % (ref 35–47)
HCT VFR BLD AUTO: 20.3 % (ref 35–47)
HCT VFR BLD AUTO: 20.9 % (ref 35–47)
HCT VFR BLD AUTO: 20.9 % (ref 35–47)
HCT VFR BLD AUTO: 21.3 % (ref 35–47)
HCT VFR BLD AUTO: 21.6 % (ref 35–47)
HCT VFR BLD AUTO: 21.6 % (ref 35–47)
HCT VFR BLD AUTO: 21.7 % (ref 35–47)
HCT VFR BLD AUTO: 21.9 % (ref 35–47)
HCT VFR BLD AUTO: 21.9 % (ref 35–47)
HCT VFR BLD AUTO: 22.2 % (ref 35–47)
HCT VFR BLD AUTO: 22.6 % (ref 35–47)
HCT VFR BLD AUTO: 22.7 % (ref 35–47)
HCT VFR BLD AUTO: 23.1 % (ref 35–47)
HCT VFR BLD AUTO: 23.9 % (ref 35–47)
HCT VFR BLD AUTO: 24.3 % (ref 35–47)
HCT VFR BLD AUTO: 24.5 % (ref 35–47)
HCT VFR BLD AUTO: 24.7 % (ref 35–47)
HCT VFR BLD AUTO: 24.8 % (ref 35–47)
HCT VFR BLD AUTO: 24.8 % (ref 35–47)
HCT VFR BLD AUTO: 25.2 % (ref 35–47)
HCT VFR BLD AUTO: 25.7 % (ref 35–47)
HCT VFR BLD AUTO: 25.7 % (ref 35–47)
HCT VFR BLD AUTO: 26.1 % (ref 35–47)
HCT VFR BLD AUTO: 26.4 % (ref 35–47)
HCT VFR BLD AUTO: 27.5 % (ref 35–47)
HCT VFR BLD AUTO: 27.9 % (ref 35–47)
HCT VFR BLD AUTO: 28 % (ref 35–47)
HCT VFR BLD AUTO: 28.8 % (ref 35–47)
HCT VFR BLD AUTO: 29.1 % (ref 35–47)
HCT VFR BLD AUTO: 29.3 % (ref 35–47)
HCT VFR BLD AUTO: 30.1 % (ref 35–47)
HCT VFR BLD AUTO: 30.1 % (ref 35–47)
HCT VFR BLD AUTO: 30.7 % (ref 35–47)
HCT VFR BLD AUTO: 30.8 % (ref 35–47)
HCT VFR BLD AUTO: 31 % (ref 35–47)
HCT VFR BLD AUTO: 31 % (ref 35–47)
HCT VFR BLD AUTO: 31.7 % (ref 35–47)
HCT VFR BLD AUTO: 32.8 % (ref 35–47)
HCT VFR BLD AUTO: 41.3 % (ref 35–47)
HCT VFR BLD CALC: 24 %PCV (ref 35–47)
HCT VFR BLD CALC: 26 %PCV (ref 35–47)
HCT VFR BLD CALC: 32 %PCV (ref 35–47)
HGB BLD CALC-MCNC: 10.9 G/DL (ref 11.7–15.7)
HGB BLD CALC-MCNC: 8.2 G/DL (ref 11.7–15.7)
HGB BLD CALC-MCNC: 8.8 G/DL (ref 11.7–15.7)
HGB BLD-MCNC: 10 G/DL (ref 11.7–15.7)
HGB BLD-MCNC: 10 G/DL (ref 11.7–15.7)
HGB BLD-MCNC: 10.1 G/DL (ref 11.7–15.7)
HGB BLD-MCNC: 10.2 G/DL (ref 11.7–15.7)
HGB BLD-MCNC: 10.5 G/DL (ref 11.7–15.7)
HGB BLD-MCNC: 10.6 G/DL (ref 11.7–15.7)
HGB BLD-MCNC: 10.6 G/DL (ref 11.7–15.7)
HGB BLD-MCNC: 11.1 G/DL (ref 11.7–15.7)
HGB BLD-MCNC: 11.1 G/DL (ref 11.7–15.7)
HGB BLD-MCNC: 11.2 G/DL (ref 11.7–15.7)
HGB BLD-MCNC: 11.4 G/DL (ref 11.7–15.7)
HGB BLD-MCNC: 15 G/DL (ref 11.7–15.7)
HGB BLD-MCNC: 6.2 G/DL (ref 11.7–15.7)
HGB BLD-MCNC: 6.7 G/DL (ref 11.7–15.7)
HGB BLD-MCNC: 6.8 G/DL (ref 11.7–15.7)
HGB BLD-MCNC: 7 G/DL (ref 11.7–15.7)
HGB BLD-MCNC: 7 G/DL (ref 11.7–15.7)
HGB BLD-MCNC: 7.1 G/DL (ref 11.7–15.7)
HGB BLD-MCNC: 7.1 G/DL (ref 11.7–15.7)
HGB BLD-MCNC: 7.2 G/DL (ref 11.7–15.7)
HGB BLD-MCNC: 7.3 G/DL (ref 11.7–15.7)
HGB BLD-MCNC: 7.4 G/DL (ref 11.7–15.7)
HGB BLD-MCNC: 7.5 G/DL (ref 11.7–15.7)
HGB BLD-MCNC: 7.5 G/DL (ref 11.7–15.7)
HGB BLD-MCNC: 7.6 G/DL (ref 11.7–15.7)
HGB BLD-MCNC: 7.7 G/DL (ref 11.7–15.7)
HGB BLD-MCNC: 7.8 G/DL (ref 11.7–15.7)
HGB BLD-MCNC: 7.8 G/DL (ref 11.7–15.7)
HGB BLD-MCNC: 8 G/DL (ref 11.7–15.7)
HGB BLD-MCNC: 8.3 G/DL (ref 11.7–15.7)
HGB BLD-MCNC: 8.4 G/DL (ref 11.7–15.7)
HGB BLD-MCNC: 8.4 G/DL (ref 11.7–15.7)
HGB BLD-MCNC: 8.5 G/DL (ref 11.7–15.7)
HGB BLD-MCNC: 8.6 G/DL (ref 11.7–15.7)
HGB BLD-MCNC: 8.7 G/DL (ref 11.7–15.7)
HGB BLD-MCNC: 8.8 G/DL (ref 11.7–15.7)
HGB BLD-MCNC: 8.8 G/DL (ref 11.7–15.7)
HGB BLD-MCNC: 8.9 G/DL (ref 11.7–15.7)
HGB BLD-MCNC: 8.9 G/DL (ref 11.7–15.7)
HGB BLD-MCNC: 9 G/DL (ref 11.7–15.7)
HGB BLD-MCNC: 9.3 G/DL (ref 11.7–15.7)
HGB BLD-MCNC: 9.5 G/DL (ref 11.7–15.7)
HGB BLD-MCNC: 9.8 G/DL (ref 11.7–15.7)
HGB UR QL STRIP: ABNORMAL
HGB UR QL STRIP: NEGATIVE
HYALINE CASTS #/AREA URNS LPF: 4 /LPF (ref 0–2)
IMM GRANULOCYTES # BLD: 0.1 10E9/L (ref 0–0.4)
IMM GRANULOCYTES # BLD: 0.1 10E9/L (ref 0–0.4)
IMM GRANULOCYTES # BLD: 0.3 10E9/L (ref 0–0.4)
IMM GRANULOCYTES NFR BLD: 0.4 %
IMM GRANULOCYTES NFR BLD: 0.6 %
IMM GRANULOCYTES NFR BLD: 1.7 %
INR PPP: 1.29 (ref 0.86–1.14)
INR PPP: 1.36 (ref 0.86–1.14)
INR PPP: 1.39 (ref 0.86–1.14)
INR PPP: 1.39 (ref 0.86–1.14)
INR PPP: 1.4 (ref 0.86–1.14)
INR PPP: 1.42 (ref 0.86–1.14)
INR PPP: 1.46 (ref 0.86–1.14)
INR PPP: 1.47 (ref 0.86–1.14)
INR PPP: 1.5 (ref 0.86–1.14)
INR PPP: 1.55 (ref 0.86–1.14)
INR PPP: 1.56 (ref 0.86–1.14)
INR PPP: 1.59 (ref 0.86–1.14)
INR PPP: 1.71 (ref 0.86–1.14)
INR PPP: 1.73 (ref 0.86–1.14)
INR PPP: 1.77 (ref 0.86–1.14)
INR PPP: 1.8 (ref 0.86–1.14)
INR PPP: 1.8 (ref 0.86–1.14)
INR PPP: 1.82 (ref 0.86–1.14)
INR PPP: 1.88 (ref 0.86–1.14)
INR PPP: 1.92 (ref 0.86–1.14)
INR PPP: 2.03 (ref 0.86–1.14)
INR PPP: 2.08 (ref 0.86–1.14)
INR PPP: 2.22 (ref 0.86–1.14)
INTERPRETATION ECG - MUSE: NORMAL
INTERPRETATION ECG - MUSE: NORMAL
KETONES BLD-SCNC: 5 MMOL/L (ref 0–0.6)
KETONES UR STRIP-MCNC: 10 MG/DL
KETONES UR STRIP-MCNC: NEGATIVE MG/DL
LABORATORY COMMENT REPORT: NORMAL
LACTATE BLD-SCNC: 0.9 MMOL/L (ref 0.7–2)
LACTATE BLD-SCNC: 1 MMOL/L (ref 0.7–2)
LACTATE BLD-SCNC: 1.2 MMOL/L (ref 0.7–2)
LACTATE BLD-SCNC: 1.2 MMOL/L (ref 0.7–2)
LACTATE BLD-SCNC: 1.4 MMOL/L (ref 0.7–2)
LACTATE BLD-SCNC: 1.7 MMOL/L (ref 0.7–2)
LACTATE BLD-SCNC: 1.9 MMOL/L (ref 0.7–2)
LACTATE BLD-SCNC: 2.4 MMOL/L (ref 0.7–2)
LACTATE BLD-SCNC: 2.6 MMOL/L (ref 0.7–2)
LACTATE BLD-SCNC: 2.6 MMOL/L (ref 0.7–2)
LACTATE BLD-SCNC: 2.8 MMOL/L (ref 0.7–2)
LACTATE BLD-SCNC: 3 MMOL/L (ref 0.7–2)
LACTATE BLD-SCNC: 3 MMOL/L (ref 0.7–2)
LACTATE BLD-SCNC: 3.4 MMOL/L (ref 0.7–2)
LACTATE BLD-SCNC: 3.5 MMOL/L (ref 0.7–2)
LACTATE BLD-SCNC: 3.6 MMOL/L (ref 0.7–2)
LACTATE BLD-SCNC: 4.1 MMOL/L (ref 0.7–2)
LACTATE BLD-SCNC: 4.3 MMOL/L (ref 0.7–2)
LACTATE BLD-SCNC: 4.8 MMOL/L (ref 0.7–2)
LACTATE BLD-SCNC: 5 MMOL/L (ref 0.7–2)
LACTATE BLD-SCNC: 5.6 MMOL/L (ref 0.7–2)
LACTATE BLD-SCNC: 7.6 MMOL/L (ref 0.7–2)
LACTATE BLD-SCNC: 8.4 MMOL/L (ref 0.7–2)
LDH SERPL L TO P-CCNC: 248 U/L (ref 81–234)
LEUKOCYTE ESTERASE UR QL STRIP: ABNORMAL
LEUKOCYTE ESTERASE UR QL STRIP: NEGATIVE
LIPASE SERPL-CCNC: 48 U/L (ref 73–393)
LMWH PPP CHRO-ACNC: 0.11 IU/ML
LMWH PPP CHRO-ACNC: 0.17 IU/ML
LMWH PPP CHRO-ACNC: 0.32 IU/ML
LMWH PPP CHRO-ACNC: 0.35 IU/ML
LMWH PPP CHRO-ACNC: <0.1 IU/ML
LYMPHOCYTES # BLD AUTO: 0.4 10E9/L (ref 0.8–5.3)
LYMPHOCYTES # BLD AUTO: 1.3 10E9/L (ref 0.8–5.3)
LYMPHOCYTES # BLD AUTO: 1.5 10E9/L (ref 0.8–5.3)
LYMPHOCYTES # BLD AUTO: 1.6 10E9/L (ref 0.8–5.3)
LYMPHOCYTES # BLD AUTO: 3.9 10E9/L (ref 0.8–5.3)
LYMPHOCYTES # BLD AUTO: 4.2 10E9/L (ref 0.8–5.3)
LYMPHOCYTES NFR BLD AUTO: 10.1 %
LYMPHOCYTES NFR BLD AUTO: 18 %
LYMPHOCYTES NFR BLD AUTO: 4 %
LYMPHOCYTES NFR BLD AUTO: 41 %
LYMPHOCYTES NFR BLD AUTO: 8 %
LYMPHOCYTES NFR BLD AUTO: 8.1 %
Lab: ABNORMAL
MAGNESIUM SERPL-MCNC: 1.4 MG/DL (ref 1.6–2.3)
MAGNESIUM SERPL-MCNC: 1.5 MG/DL (ref 1.6–2.3)
MAGNESIUM SERPL-MCNC: 1.6 MG/DL (ref 1.6–2.3)
MAGNESIUM SERPL-MCNC: 1.7 MG/DL (ref 1.6–2.3)
MAGNESIUM SERPL-MCNC: 1.7 MG/DL (ref 1.6–2.3)
MAGNESIUM SERPL-MCNC: 1.8 MG/DL (ref 1.6–2.3)
MAGNESIUM SERPL-MCNC: 2 MG/DL (ref 1.6–2.3)
MAGNESIUM SERPL-MCNC: 2 MG/DL (ref 1.6–2.3)
MAGNESIUM SERPL-MCNC: 2.1 MG/DL (ref 1.6–2.3)
MAGNESIUM SERPL-MCNC: 2.1 MG/DL (ref 1.6–2.3)
MAGNESIUM SERPL-MCNC: 2.2 MG/DL (ref 1.6–2.3)
MAGNESIUM SERPL-MCNC: 2.3 MG/DL (ref 1.6–2.3)
MAGNESIUM SERPL-MCNC: 2.4 MG/DL (ref 1.6–2.3)
MAGNESIUM SERPL-MCNC: 2.4 MG/DL (ref 1.6–2.3)
MAGNESIUM SERPL-MCNC: 2.5 MG/DL (ref 1.6–2.3)
MAGNESIUM SERPL-MCNC: 2.6 MG/DL (ref 1.6–2.3)
MAGNESIUM SERPL-MCNC: 2.9 MG/DL (ref 1.6–2.3)
MAGNESIUM SERPL-MCNC: 3.2 MG/DL (ref 1.6–2.3)
MAGNESIUM SERPL-MCNC: 3.6 MG/DL (ref 1.6–2.3)
MAGNESIUM SERPL-MCNC: 4.8 MG/DL (ref 1.6–2.3)
MAGNESIUM SERPL-MCNC: 6 MG/DL (ref 1.6–2.3)
MAGNESIUM SERPL-MCNC: NORMAL MG/DL (ref 1.6–2.3)
MCH RBC QN AUTO: 29.4 PG (ref 26.5–33)
MCH RBC QN AUTO: 29.5 PG (ref 26.5–33)
MCH RBC QN AUTO: 29.7 PG (ref 26.5–33)
MCH RBC QN AUTO: 29.7 PG (ref 26.5–33)
MCH RBC QN AUTO: 29.8 PG (ref 26.5–33)
MCH RBC QN AUTO: 30 PG (ref 26.5–33)
MCH RBC QN AUTO: 30 PG (ref 26.5–33)
MCH RBC QN AUTO: 30.1 PG (ref 26.5–33)
MCH RBC QN AUTO: 30.1 PG (ref 26.5–33)
MCH RBC QN AUTO: 30.3 PG (ref 26.5–33)
MCH RBC QN AUTO: 30.4 PG (ref 26.5–33)
MCH RBC QN AUTO: 30.5 PG (ref 26.5–33)
MCH RBC QN AUTO: 30.5 PG (ref 26.5–33)
MCH RBC QN AUTO: 30.7 PG (ref 26.5–33)
MCH RBC QN AUTO: 30.7 PG (ref 26.5–33)
MCH RBC QN AUTO: 30.9 PG (ref 26.5–33)
MCH RBC QN AUTO: 31.1 PG (ref 26.5–33)
MCH RBC QN AUTO: 31.1 PG (ref 26.5–33)
MCH RBC QN AUTO: 31.2 PG (ref 26.5–33)
MCH RBC QN AUTO: 31.2 PG (ref 26.5–33)
MCH RBC QN AUTO: 31.4 PG (ref 26.5–33)
MCH RBC QN AUTO: 31.4 PG (ref 26.5–33)
MCH RBC QN AUTO: 31.7 PG (ref 26.5–33)
MCH RBC QN AUTO: 31.8 PG (ref 26.5–33)
MCH RBC QN AUTO: 31.9 PG (ref 26.5–33)
MCH RBC QN AUTO: 31.9 PG (ref 26.5–33)
MCH RBC QN AUTO: 32.4 PG (ref 26.5–33)
MCH RBC QN AUTO: 32.4 PG (ref 26.5–33)
MCH RBC QN AUTO: 32.5 PG (ref 26.5–33)
MCH RBC QN AUTO: 32.5 PG (ref 26.5–33)
MCH RBC QN AUTO: 32.6 PG (ref 26.5–33)
MCH RBC QN AUTO: 32.7 PG (ref 26.5–33)
MCH RBC QN AUTO: 32.8 PG (ref 26.5–33)
MCH RBC QN AUTO: 32.9 PG (ref 26.5–33)
MCH RBC QN AUTO: 33 PG (ref 26.5–33)
MCH RBC QN AUTO: 33 PG (ref 26.5–33)
MCH RBC QN AUTO: 33.1 PG (ref 26.5–33)
MCH RBC QN AUTO: 33.2 PG (ref 26.5–33)
MCH RBC QN AUTO: 33.2 PG (ref 26.5–33)
MCH RBC QN AUTO: 33.9 PG (ref 26.5–33)
MCHC RBC AUTO-ENTMCNC: 32.4 G/DL (ref 31.5–36.5)
MCHC RBC AUTO-ENTMCNC: 32.5 G/DL (ref 31.5–36.5)
MCHC RBC AUTO-ENTMCNC: 32.7 G/DL (ref 31.5–36.5)
MCHC RBC AUTO-ENTMCNC: 33.2 G/DL (ref 31.5–36.5)
MCHC RBC AUTO-ENTMCNC: 33.2 G/DL (ref 31.5–36.5)
MCHC RBC AUTO-ENTMCNC: 33.3 G/DL (ref 31.5–36.5)
MCHC RBC AUTO-ENTMCNC: 33.7 G/DL (ref 31.5–36.5)
MCHC RBC AUTO-ENTMCNC: 33.8 G/DL (ref 31.5–36.5)
MCHC RBC AUTO-ENTMCNC: 33.9 G/DL (ref 31.5–36.5)
MCHC RBC AUTO-ENTMCNC: 33.9 G/DL (ref 31.5–36.5)
MCHC RBC AUTO-ENTMCNC: 34 G/DL (ref 31.5–36.5)
MCHC RBC AUTO-ENTMCNC: 34.1 G/DL (ref 31.5–36.5)
MCHC RBC AUTO-ENTMCNC: 34.2 G/DL (ref 31.5–36.5)
MCHC RBC AUTO-ENTMCNC: 34.3 G/DL (ref 31.5–36.5)
MCHC RBC AUTO-ENTMCNC: 34.4 G/DL (ref 31.5–36.5)
MCHC RBC AUTO-ENTMCNC: 34.5 G/DL (ref 31.5–36.5)
MCHC RBC AUTO-ENTMCNC: 34.5 G/DL (ref 31.5–36.5)
MCHC RBC AUTO-ENTMCNC: 34.6 G/DL (ref 31.5–36.5)
MCHC RBC AUTO-ENTMCNC: 34.7 G/DL (ref 31.5–36.5)
MCHC RBC AUTO-ENTMCNC: 34.8 G/DL (ref 31.5–36.5)
MCHC RBC AUTO-ENTMCNC: 34.9 G/DL (ref 31.5–36.5)
MCHC RBC AUTO-ENTMCNC: 35 G/DL (ref 31.5–36.5)
MCHC RBC AUTO-ENTMCNC: 35.1 G/DL (ref 31.5–36.5)
MCHC RBC AUTO-ENTMCNC: 35.1 G/DL (ref 31.5–36.5)
MCHC RBC AUTO-ENTMCNC: 35.3 G/DL (ref 31.5–36.5)
MCHC RBC AUTO-ENTMCNC: 35.6 G/DL (ref 31.5–36.5)
MCHC RBC AUTO-ENTMCNC: 35.7 G/DL (ref 31.5–36.5)
MCHC RBC AUTO-ENTMCNC: 35.8 G/DL (ref 31.5–36.5)
MCHC RBC AUTO-ENTMCNC: 36 G/DL (ref 31.5–36.5)
MCHC RBC AUTO-ENTMCNC: 36.3 G/DL (ref 31.5–36.5)
MCV RBC AUTO: 100 FL (ref 78–100)
MCV RBC AUTO: 100 FL (ref 78–100)
MCV RBC AUTO: 85 FL (ref 78–100)
MCV RBC AUTO: 86 FL (ref 78–100)
MCV RBC AUTO: 87 FL (ref 78–100)
MCV RBC AUTO: 88 FL (ref 78–100)
MCV RBC AUTO: 89 FL (ref 78–100)
MCV RBC AUTO: 90 FL (ref 78–100)
MCV RBC AUTO: 91 FL (ref 78–100)
MCV RBC AUTO: 92 FL (ref 78–100)
MCV RBC AUTO: 93 FL (ref 78–100)
MCV RBC AUTO: 93 FL (ref 78–100)
MCV RBC AUTO: 94 FL (ref 78–100)
MCV RBC AUTO: 94 FL (ref 78–100)
MCV RBC AUTO: 95 FL (ref 78–100)
MCV RBC AUTO: 96 FL (ref 78–100)
MCV RBC AUTO: 97 FL (ref 78–100)
MCV RBC AUTO: 98 FL (ref 78–100)
MCV RBC AUTO: 99 FL (ref 78–100)
METAMYELOCYTES # BLD: 0.2 10E9/L
METAMYELOCYTES # BLD: 0.4 10E9/L
METAMYELOCYTES # BLD: 2.3 10E9/L
METAMYELOCYTES NFR BLD MANUAL: 2 %
METAMYELOCYTES NFR BLD MANUAL: 2 %
METAMYELOCYTES NFR BLD MANUAL: 22 %
MONOCYTES # BLD AUTO: 0 10E9/L (ref 0–1.3)
MONOCYTES # BLD AUTO: 0.2 10E9/L (ref 0–1.3)
MONOCYTES # BLD AUTO: 1.1 10E9/L (ref 0–1.3)
MONOCYTES # BLD AUTO: 1.2 10E9/L (ref 0–1.3)
MONOCYTES # BLD AUTO: 1.2 10E9/L (ref 0–1.3)
MONOCYTES # BLD AUTO: 1.9 10E9/L (ref 0–1.3)
MONOCYTES NFR BLD AUTO: 0 %
MONOCYTES NFR BLD AUTO: 11.9 %
MONOCYTES NFR BLD AUTO: 2 %
MONOCYTES NFR BLD AUTO: 5 %
MONOCYTES NFR BLD AUTO: 6.1 %
MONOCYTES NFR BLD AUTO: 8 %
MUCOUS THREADS #/AREA URNS LPF: PRESENT /LPF
NEUTROPHILS # BLD AUTO: 11 10E9/L (ref 1.6–8.3)
NEUTROPHILS # BLD AUTO: 12.6 10E9/L (ref 1.6–8.3)
NEUTROPHILS # BLD AUTO: 15.2 10E9/L (ref 1.6–8.3)
NEUTROPHILS # BLD AUTO: 16.5 10E9/L (ref 1.6–8.3)
NEUTROPHILS # BLD AUTO: 4.6 10E9/L (ref 1.6–8.3)
NEUTROPHILS # BLD AUTO: 7.8 10E9/L (ref 1.6–8.3)
NEUTROPHILS NFR BLD AUTO: 45 %
NEUTROPHILS NFR BLD AUTO: 71 %
NEUTROPHILS NFR BLD AUTO: 74 %
NEUTROPHILS NFR BLD AUTO: 75.4 %
NEUTROPHILS NFR BLD AUTO: 78.2 %
NEUTROPHILS NFR BLD AUTO: 83.3 %
NITRATE UR QL: NEGATIVE
NITRATE UR QL: NEGATIVE
NRBC # BLD AUTO: 0 10*3/UL
NRBC # BLD AUTO: 0.1 10*3/UL
NRBC BLD AUTO-RTO: 0 /100
NRBC BLD AUTO-RTO: 1 /100
NUM BPU REQUESTED: 1
NUM BPU REQUESTED: 1
NUM BPU REQUESTED: 4
O2/TOTAL GAS SETTING VFR VENT: 40 %
O2/TOTAL GAS SETTING VFR VENT: 60 %
O2/TOTAL GAS SETTING VFR VENT: 60 %
O2/TOTAL GAS SETTING VFR VENT: ABNORMAL %
O2/TOTAL GAS SETTING VFR VENT: NORMAL %
OXYHGB MFR BLD: 90 % (ref 92–100)
OXYHGB MFR BLD: 94 % (ref 92–100)
OXYHGB MFR BLD: 96 % (ref 92–100)
OXYHGB MFR BLD: 97 % (ref 92–100)
OXYHGB MFR BLD: 98 % (ref 92–100)
OXYHGB MFR BLD: 99 % (ref 92–100)
OXYHGB MFR BLDV: 56 %
OXYHGB MFR BLDV: 66 %
OXYHGB MFR BLDV: 70 %
OXYHGB MFR BLDV: 73 %
OXYHGB MFR BLDV: 75 %
OXYHGB MFR BLDV: 84 %
OXYHGB MFR BLDV: NORMAL %
PCO2 BLD: 22 MM HG (ref 35–45)
PCO2 BLD: 25 MM HG (ref 35–45)
PCO2 BLD: 25 MM HG (ref 35–45)
PCO2 BLD: 26 MM HG (ref 35–45)
PCO2 BLD: 26 MM HG (ref 35–45)
PCO2 BLD: 29 MM HG (ref 35–45)
PCO2 BLD: 29 MM HG (ref 35–45)
PCO2 BLD: 30 MM HG (ref 35–45)
PCO2 BLD: 31 MM HG (ref 35–45)
PCO2 BLD: 31 MM HG (ref 35–45)
PCO2 BLD: 33 MM HG (ref 35–45)
PCO2 BLD: 33 MM HG (ref 35–45)
PCO2 BLD: 34 MM HG (ref 35–45)
PCO2 BLD: 34 MM HG (ref 35–45)
PCO2 BLD: 35 MM HG (ref 35–45)
PCO2 BLD: 36 MM HG (ref 35–45)
PCO2 BLD: 36 MM HG (ref 35–45)
PCO2 BLD: 38 MM HG (ref 35–45)
PCO2 BLD: 38 MM HG (ref 35–45)
PCO2 BLD: 46 MM HG (ref 35–45)
PCO2 BLD: 47 MM HG (ref 35–45)
PCO2 BLD: 60 MM HG (ref 35–45)
PCO2 BLDV: 29 MM HG (ref 40–50)
PCO2 BLDV: 30 MM HG (ref 40–50)
PCO2 BLDV: 35 MM HG (ref 40–50)
PCO2 BLDV: 37 MM HG (ref 40–50)
PCO2 BLDV: 44 MM HG (ref 40–50)
PCO2 BLDV: 48 MM HG (ref 40–50)
PCO2 BLDV: NORMAL MM HG (ref 40–50)
PH BLD: 6.97 PH (ref 7.35–7.45)
PH BLD: 7.13 PH (ref 7.35–7.45)
PH BLD: 7.24 PH (ref 7.35–7.45)
PH BLD: 7.24 PH (ref 7.35–7.45)
PH BLD: 7.29 PH (ref 7.35–7.45)
PH BLD: 7.3 PH (ref 7.35–7.45)
PH BLD: 7.3 PH (ref 7.35–7.45)
PH BLD: 7.33 PH (ref 7.35–7.45)
PH BLD: 7.34 PH (ref 7.35–7.45)
PH BLD: 7.36 PH (ref 7.35–7.45)
PH BLD: 7.38 PH (ref 7.35–7.45)
PH BLD: 7.39 PH (ref 7.35–7.45)
PH BLD: 7.39 PH (ref 7.35–7.45)
PH BLD: 7.4 PH (ref 7.35–7.45)
PH BLD: 7.42 PH (ref 7.35–7.45)
PH BLD: 7.43 PH (ref 7.35–7.45)
PH BLD: 7.45 PH (ref 7.35–7.45)
PH BLD: 7.47 PH (ref 7.35–7.45)
PH BLD: 7.47 PH (ref 7.35–7.45)
PH BLD: 7.48 PH (ref 7.35–7.45)
PH BLD: 7.5 PH (ref 7.35–7.45)
PH BLD: 7.5 PH (ref 7.35–7.45)
PH BLDV: 6.98 PH (ref 7.32–7.43)
PH BLDV: 7.25 PH (ref 7.32–7.43)
PH BLDV: 7.37 PH (ref 7.32–7.43)
PH BLDV: 7.42 PH (ref 7.32–7.43)
PH BLDV: 7.43 PH (ref 7.32–7.43)
PH BLDV: 7.44 PH (ref 7.32–7.43)
PH BLDV: NORMAL PH (ref 7.32–7.43)
PH UR STRIP: 5 PH (ref 5–7)
PH UR STRIP: 5.5 PH (ref 5–7)
PHOSPHATE SERPL-MCNC: 1.4 MG/DL (ref 2.5–4.5)
PHOSPHATE SERPL-MCNC: 1.7 MG/DL (ref 2.5–4.5)
PHOSPHATE SERPL-MCNC: 1.8 MG/DL (ref 2.5–4.5)
PHOSPHATE SERPL-MCNC: 1.9 MG/DL (ref 2.5–4.5)
PHOSPHATE SERPL-MCNC: 2 MG/DL (ref 2.5–4.5)
PHOSPHATE SERPL-MCNC: 2.1 MG/DL (ref 2.5–4.5)
PHOSPHATE SERPL-MCNC: 2.2 MG/DL (ref 2.5–4.5)
PHOSPHATE SERPL-MCNC: 2.3 MG/DL (ref 2.5–4.5)
PHOSPHATE SERPL-MCNC: 2.3 MG/DL (ref 2.5–4.5)
PHOSPHATE SERPL-MCNC: 2.6 MG/DL (ref 2.5–4.5)
PHOSPHATE SERPL-MCNC: 2.6 MG/DL (ref 2.5–4.5)
PHOSPHATE SERPL-MCNC: 2.7 MG/DL (ref 2.5–4.5)
PHOSPHATE SERPL-MCNC: 2.9 MG/DL (ref 2.5–4.5)
PHOSPHATE SERPL-MCNC: 3 MG/DL (ref 2.5–4.5)
PHOSPHATE SERPL-MCNC: 3.5 MG/DL (ref 2.5–4.5)
PHOSPHATE SERPL-MCNC: 3.5 MG/DL (ref 2.5–4.5)
PHOSPHATE SERPL-MCNC: 3.8 MG/DL (ref 2.5–4.5)
PHOSPHATE SERPL-MCNC: 4.2 MG/DL (ref 2.5–4.5)
PHOSPHATE SERPL-MCNC: 4.7 MG/DL (ref 2.5–4.5)
PHOSPHATE SERPL-MCNC: 5.1 MG/DL (ref 2.5–4.5)
PHOSPHATE SERPL-MCNC: 5.4 MG/DL (ref 2.5–4.5)
PHOSPHATE SERPL-MCNC: 5.9 MG/DL (ref 2.5–4.5)
PHOSPHATE SERPL-MCNC: 6.7 MG/DL (ref 2.5–4.5)
PHOSPHATE SERPL-MCNC: NORMAL MG/DL (ref 2.5–4.5)
PLATELET # BLD AUTO: 106 10E9/L (ref 150–450)
PLATELET # BLD AUTO: 108 10E9/L (ref 150–450)
PLATELET # BLD AUTO: 125 10E9/L (ref 150–450)
PLATELET # BLD AUTO: 134 10E9/L (ref 150–450)
PLATELET # BLD AUTO: 139 10E9/L (ref 150–450)
PLATELET # BLD AUTO: 142 10E9/L (ref 150–450)
PLATELET # BLD AUTO: 142 10E9/L (ref 150–450)
PLATELET # BLD AUTO: 156 10E9/L (ref 150–450)
PLATELET # BLD AUTO: 157 10E9/L (ref 150–450)
PLATELET # BLD AUTO: 161 10E9/L (ref 150–450)
PLATELET # BLD AUTO: 164 10E9/L (ref 150–450)
PLATELET # BLD AUTO: 167 10E9/L (ref 150–450)
PLATELET # BLD AUTO: 181 10E9/L (ref 150–450)
PLATELET # BLD AUTO: 184 10E9/L (ref 150–450)
PLATELET # BLD AUTO: 186 10E9/L (ref 150–450)
PLATELET # BLD AUTO: 187 10E9/L (ref 150–450)
PLATELET # BLD AUTO: 192 10E9/L (ref 150–450)
PLATELET # BLD AUTO: 195 10E9/L (ref 150–450)
PLATELET # BLD AUTO: 196 10E9/L (ref 150–450)
PLATELET # BLD AUTO: 196 10E9/L (ref 150–450)
PLATELET # BLD AUTO: 200 10E9/L (ref 150–450)
PLATELET # BLD AUTO: 205 10E9/L (ref 150–450)
PLATELET # BLD AUTO: 209 10E9/L (ref 150–450)
PLATELET # BLD AUTO: 210 10E9/L (ref 150–450)
PLATELET # BLD AUTO: 211 10E9/L (ref 150–450)
PLATELET # BLD AUTO: 214 10E9/L (ref 150–450)
PLATELET # BLD AUTO: 216 10E9/L (ref 150–450)
PLATELET # BLD AUTO: 217 10E9/L (ref 150–450)
PLATELET # BLD AUTO: 217 10E9/L (ref 150–450)
PLATELET # BLD AUTO: 218 10E9/L (ref 150–450)
PLATELET # BLD AUTO: 224 10E9/L (ref 150–450)
PLATELET # BLD AUTO: 239 10E9/L (ref 150–450)
PLATELET # BLD AUTO: 242 10E9/L (ref 150–450)
PLATELET # BLD AUTO: 246 10E9/L (ref 150–450)
PLATELET # BLD AUTO: 253 10E9/L (ref 150–450)
PLATELET # BLD AUTO: 255 10E9/L (ref 150–450)
PLATELET # BLD AUTO: 267 10E9/L (ref 150–450)
PLATELET # BLD AUTO: 278 10E9/L (ref 150–450)
PLATELET # BLD AUTO: 312 10E9/L (ref 150–450)
PLATELET # BLD AUTO: 341 10E9/L (ref 150–450)
PLATELET # BLD AUTO: 378 10E9/L (ref 150–450)
PLATELET # BLD AUTO: 465 10E9/L (ref 150–450)
PLATELET # BLD EST: ABNORMAL 10*3/UL
PO2 BLD: 102 MM HG (ref 80–105)
PO2 BLD: 106 MM HG (ref 80–105)
PO2 BLD: 109 MM HG (ref 80–105)
PO2 BLD: 122 MM HG (ref 80–105)
PO2 BLD: 127 MM HG (ref 80–105)
PO2 BLD: 150 MM HG (ref 80–105)
PO2 BLD: 157 MM HG (ref 80–105)
PO2 BLD: 170 MM HG (ref 80–105)
PO2 BLD: 180 MM HG (ref 80–105)
PO2 BLD: 183 MM HG (ref 80–105)
PO2 BLD: 185 MM HG (ref 80–105)
PO2 BLD: 192 MM HG (ref 80–105)
PO2 BLD: 209 MM HG (ref 80–105)
PO2 BLD: 219 MM HG (ref 80–105)
PO2 BLD: 365 MM HG (ref 80–105)
PO2 BLD: 407 MM HG (ref 80–105)
PO2 BLD: 409 MM HG (ref 80–105)
PO2 BLD: 61 MM HG (ref 80–105)
PO2 BLD: 82 MM HG (ref 80–105)
PO2 BLD: 86 MM HG (ref 80–105)
PO2 BLD: 91 MM HG (ref 80–105)
PO2 BLD: 99 MM HG (ref 80–105)
PO2 BLDV: 28 MM HG (ref 25–47)
PO2 BLDV: 39 MM HG (ref 25–47)
PO2 BLDV: 40 MM HG (ref 25–47)
PO2 BLDV: 68 MM HG (ref 25–47)
PO2 BLDV: NORMAL MM HG (ref 25–47)
POTASSIUM BLD-SCNC: 2.7 MMOL/L (ref 3.4–5.3)
POTASSIUM BLD-SCNC: 3.5 MMOL/L (ref 3.4–5.3)
POTASSIUM BLD-SCNC: 3.5 MMOL/L (ref 3.4–5.3)
POTASSIUM SERPL-SCNC: 2.8 MMOL/L (ref 3.4–5.3)
POTASSIUM SERPL-SCNC: 2.9 MMOL/L (ref 3.4–5.3)
POTASSIUM SERPL-SCNC: 3 MMOL/L (ref 3.4–5.3)
POTASSIUM SERPL-SCNC: 3.1 MMOL/L (ref 3.4–5.3)
POTASSIUM SERPL-SCNC: 3.1 MMOL/L (ref 3.4–5.3)
POTASSIUM SERPL-SCNC: 3.2 MMOL/L (ref 3.4–5.3)
POTASSIUM SERPL-SCNC: 3.2 MMOL/L (ref 3.4–5.3)
POTASSIUM SERPL-SCNC: 3.3 MMOL/L (ref 3.4–5.3)
POTASSIUM SERPL-SCNC: 3.4 MMOL/L (ref 3.4–5.3)
POTASSIUM SERPL-SCNC: 3.5 MMOL/L (ref 3.4–5.3)
POTASSIUM SERPL-SCNC: 3.6 MMOL/L (ref 3.4–5.3)
POTASSIUM SERPL-SCNC: 3.6 MMOL/L (ref 3.4–5.3)
POTASSIUM SERPL-SCNC: 3.7 MMOL/L (ref 3.4–5.3)
POTASSIUM SERPL-SCNC: 3.7 MMOL/L (ref 3.4–5.3)
POTASSIUM SERPL-SCNC: 3.8 MMOL/L (ref 3.4–5.3)
POTASSIUM SERPL-SCNC: 3.9 MMOL/L (ref 3.4–5.3)
POTASSIUM SERPL-SCNC: 4 MMOL/L (ref 3.4–5.3)
POTASSIUM SERPL-SCNC: 4.1 MMOL/L (ref 3.4–5.3)
POTASSIUM SERPL-SCNC: 4.1 MMOL/L (ref 3.4–5.3)
POTASSIUM SERPL-SCNC: 4.2 MMOL/L (ref 3.4–5.3)
POTASSIUM SERPL-SCNC: 4.3 MMOL/L (ref 3.4–5.3)
POTASSIUM SERPL-SCNC: 4.3 MMOL/L (ref 3.4–5.3)
POTASSIUM SERPL-SCNC: 4.4 MMOL/L (ref 3.4–5.3)
POTASSIUM SERPL-SCNC: 4.4 MMOL/L (ref 3.4–5.3)
POTASSIUM SERPL-SCNC: 4.7 MMOL/L (ref 3.4–5.3)
POTASSIUM SERPL-SCNC: 5.2 MMOL/L (ref 3.4–5.3)
POTASSIUM SERPL-SCNC: 5.4 MMOL/L (ref 3.4–5.3)
POTASSIUM SERPL-SCNC: 5.5 MMOL/L (ref 3.4–5.3)
POTASSIUM SERPL-SCNC: 5.8 MMOL/L (ref 3.4–5.3)
POTASSIUM SERPL-SCNC: NORMAL MMOL/L (ref 3.4–5.3)
PREALB SERPL IA-MCNC: 17 MG/DL (ref 15–45)
PREALB SERPL IA-MCNC: 4 MG/DL (ref 15–45)
PREALB SERPL IA-MCNC: 7 MG/DL (ref 15–45)
PREALB SERPL IA-MCNC: 7 MG/DL (ref 15–45)
PROCALCITONIN SERPL-MCNC: 1.4 NG/ML
PROCALCITONIN SERPL-MCNC: 15.9 NG/ML
PROCALCITONIN SERPL-MCNC: 4.69 NG/ML
PROT SERPL-MCNC: 3.4 G/DL (ref 6.8–8.8)
PROT SERPL-MCNC: 3.8 G/DL (ref 6.8–8.8)
PROT SERPL-MCNC: 4 G/DL (ref 6.8–8.8)
PROT SERPL-MCNC: 4 G/DL (ref 6.8–8.8)
PROT SERPL-MCNC: 4.1 G/DL (ref 6.8–8.8)
PROT SERPL-MCNC: 4.2 G/DL (ref 6.8–8.8)
PROT SERPL-MCNC: 4.7 G/DL (ref 6.8–8.8)
PROT SERPL-MCNC: 4.9 G/DL (ref 6.8–8.8)
PROT SERPL-MCNC: 5 G/DL (ref 6.8–8.8)
PROT SERPL-MCNC: 5.2 G/DL (ref 6.8–8.8)
PROT SERPL-MCNC: 5.4 G/DL (ref 6.8–8.8)
PROT SERPL-MCNC: 5.5 G/DL (ref 6.8–8.8)
PROT SERPL-MCNC: 5.6 G/DL (ref 6.8–8.8)
PROT SERPL-MCNC: 5.6 G/DL (ref 6.8–8.8)
PROT SERPL-MCNC: 5.7 G/DL (ref 6.8–8.8)
PROT SERPL-MCNC: 5.7 G/DL (ref 6.8–8.8)
PROT SERPL-MCNC: 9.3 G/DL (ref 6.8–8.8)
RBC # BLD AUTO: 1.88 10E12/L (ref 3.8–5.2)
RBC # BLD AUTO: 2.1 10E12/L (ref 3.8–5.2)
RBC # BLD AUTO: 2.2 10E12/L (ref 3.8–5.2)
RBC # BLD AUTO: 2.26 10E12/L (ref 3.8–5.2)
RBC # BLD AUTO: 2.28 10E12/L (ref 3.8–5.2)
RBC # BLD AUTO: 2.31 10E12/L (ref 3.8–5.2)
RBC # BLD AUTO: 2.35 10E12/L (ref 3.8–5.2)
RBC # BLD AUTO: 2.37 10E12/L (ref 3.8–5.2)
RBC # BLD AUTO: 2.37 10E12/L (ref 3.8–5.2)
RBC # BLD AUTO: 2.38 10E12/L (ref 3.8–5.2)
RBC # BLD AUTO: 2.39 10E12/L (ref 3.8–5.2)
RBC # BLD AUTO: 2.42 10E12/L (ref 3.8–5.2)
RBC # BLD AUTO: 2.51 10E12/L (ref 3.8–5.2)
RBC # BLD AUTO: 2.57 10E12/L (ref 3.8–5.2)
RBC # BLD AUTO: 2.64 10E12/L (ref 3.8–5.2)
RBC # BLD AUTO: 2.64 10E12/L (ref 3.8–5.2)
RBC # BLD AUTO: 2.68 10E12/L (ref 3.8–5.2)
RBC # BLD AUTO: 2.69 10E12/L (ref 3.8–5.2)
RBC # BLD AUTO: 2.73 10E12/L (ref 3.8–5.2)
RBC # BLD AUTO: 2.75 10E12/L (ref 3.8–5.2)
RBC # BLD AUTO: 2.79 10E12/L (ref 3.8–5.2)
RBC # BLD AUTO: 2.84 10E12/L (ref 3.8–5.2)
RBC # BLD AUTO: 2.87 10E12/L (ref 3.8–5.2)
RBC # BLD AUTO: 2.93 10E12/L (ref 3.8–5.2)
RBC # BLD AUTO: 2.96 10E12/L (ref 3.8–5.2)
RBC # BLD AUTO: 3 10E12/L (ref 3.8–5.2)
RBC # BLD AUTO: 3.04 10E12/L (ref 3.8–5.2)
RBC # BLD AUTO: 3.05 10E12/L (ref 3.8–5.2)
RBC # BLD AUTO: 3.15 10E12/L (ref 3.8–5.2)
RBC # BLD AUTO: 3.2 10E12/L (ref 3.8–5.2)
RBC # BLD AUTO: 3.32 10E12/L (ref 3.8–5.2)
RBC # BLD AUTO: 3.34 10E12/L (ref 3.8–5.2)
RBC # BLD AUTO: 3.35 10E12/L (ref 3.8–5.2)
RBC # BLD AUTO: 3.37 10E12/L (ref 3.8–5.2)
RBC # BLD AUTO: 3.45 10E12/L (ref 3.8–5.2)
RBC # BLD AUTO: 3.47 10E12/L (ref 3.8–5.2)
RBC # BLD AUTO: 3.5 10E12/L (ref 3.8–5.2)
RBC # BLD AUTO: 3.57 10E12/L (ref 3.8–5.2)
RBC # BLD AUTO: 3.62 10E12/L (ref 3.8–5.2)
RBC # BLD AUTO: 4.43 10E12/L (ref 3.8–5.2)
RBC #/AREA URNS AUTO: 2 /HPF (ref 0–2)
RBC #/AREA URNS AUTO: 4 /HPF (ref 0–2)
RBC MORPH BLD: NORMAL
RETICS # AUTO: 27.6 10E9/L (ref 25–95)
RETICS/RBC NFR AUTO: 1 % (ref 0.5–2)
SAO2 % BLDA FROM PO2: 100 % (ref 92–100)
SARS-COV-2 RNA SPEC QL NAA+PROBE: NEGATIVE
SARS-COV-2 RNA SPEC QL NAA+PROBE: NORMAL
SODIUM BLD-SCNC: 124 MMOL/L (ref 133–144)
SODIUM BLD-SCNC: 139 MMOL/L (ref 133–144)
SODIUM BLD-SCNC: 139 MMOL/L (ref 133–144)
SODIUM SERPL-SCNC: 116 MMOL/L (ref 133–144)
SODIUM SERPL-SCNC: 121 MMOL/L (ref 133–144)
SODIUM SERPL-SCNC: 122 MMOL/L (ref 133–144)
SODIUM SERPL-SCNC: 123 MMOL/L (ref 133–144)
SODIUM SERPL-SCNC: 127 MMOL/L (ref 133–144)
SODIUM SERPL-SCNC: 131 MMOL/L (ref 133–144)
SODIUM SERPL-SCNC: 133 MMOL/L (ref 133–144)
SODIUM SERPL-SCNC: 134 MMOL/L (ref 133–144)
SODIUM SERPL-SCNC: 136 MMOL/L (ref 133–144)
SODIUM SERPL-SCNC: 137 MMOL/L (ref 133–144)
SODIUM SERPL-SCNC: 137 MMOL/L (ref 133–144)
SODIUM SERPL-SCNC: 138 MMOL/L (ref 133–144)
SODIUM SERPL-SCNC: 138 MMOL/L (ref 133–144)
SODIUM SERPL-SCNC: 139 MMOL/L (ref 133–144)
SODIUM SERPL-SCNC: 140 MMOL/L (ref 133–144)
SODIUM SERPL-SCNC: 141 MMOL/L (ref 133–144)
SODIUM SERPL-SCNC: 142 MMOL/L (ref 133–144)
SODIUM SERPL-SCNC: 143 MMOL/L (ref 133–144)
SODIUM SERPL-SCNC: 144 MMOL/L (ref 133–144)
SODIUM SERPL-SCNC: 145 MMOL/L (ref 133–144)
SODIUM SERPL-SCNC: 146 MMOL/L (ref 133–144)
SODIUM SERPL-SCNC: 148 MMOL/L (ref 133–144)
SODIUM SERPL-SCNC: 148 MMOL/L (ref 133–144)
SODIUM SERPL-SCNC: 149 MMOL/L (ref 133–144)
SODIUM SERPL-SCNC: 150 MMOL/L (ref 133–144)
SODIUM SERPL-SCNC: 152 MMOL/L (ref 133–144)
SODIUM UR-SCNC: 16 MMOL/L
SOURCE: ABNORMAL
SOURCE: ABNORMAL
SP GR UR STRIP: 1.01 (ref 1–1.03)
SP GR UR STRIP: 1.02 (ref 1–1.03)
SPECIMEN EXP DATE BLD: NORMAL
SPECIMEN SOURCE FLD: NORMAL
SPECIMEN SOURCE: ABNORMAL
SPECIMEN SOURCE: NORMAL
SQUAMOUS #/AREA URNS AUTO: 3 /HPF (ref 0–1)
TARGETS BLD QL SMEAR: SLIGHT
TOXIC GRANULES BLD QL SMEAR: PRESENT
TRANSFUSION STATUS PATIENT QL: NORMAL
TRIGL SERPL-MCNC: 244 MG/DL
TROPONIN I SERPL-MCNC: 0.1 UG/L (ref 0–0.04)
TROPONIN I SERPL-MCNC: <0.015 UG/L (ref 0–0.04)
UPPER GI ENDOSCOPY: NORMAL
UROBILINOGEN UR STRIP-MCNC: NORMAL MG/DL (ref 0–2)
UROBILINOGEN UR STRIP-MCNC: NORMAL MG/DL (ref 0–2)
VANCOMYCIN SERPL-MCNC: 21.6 MG/L
VANCOMYCIN SERPL-MCNC: 26.8 MG/L
VANCOMYCIN SERPL-MCNC: 31.1 MG/L
VIT B12 SERPL-MCNC: 649 PG/ML (ref 193–986)
WBC # BLD AUTO: 10.3 10E9/L (ref 4–11)
WBC # BLD AUTO: 10.6 10E9/L (ref 4–11)
WBC # BLD AUTO: 10.7 10E9/L (ref 4–11)
WBC # BLD AUTO: 12.7 10E9/L (ref 4–11)
WBC # BLD AUTO: 12.7 10E9/L (ref 4–11)
WBC # BLD AUTO: 12.8 10E9/L (ref 4–11)
WBC # BLD AUTO: 13.1 10E9/L (ref 4–11)
WBC # BLD AUTO: 13.5 10E9/L (ref 4–11)
WBC # BLD AUTO: 13.6 10E9/L (ref 4–11)
WBC # BLD AUTO: 14.3 10E9/L (ref 4–11)
WBC # BLD AUTO: 14.5 10E9/L (ref 4–11)
WBC # BLD AUTO: 15.4 10E9/L (ref 4–11)
WBC # BLD AUTO: 15.8 10E9/L (ref 4–11)
WBC # BLD AUTO: 16.1 10E9/L (ref 4–11)
WBC # BLD AUTO: 17.6 10E9/L (ref 4–11)
WBC # BLD AUTO: 17.9 10E9/L (ref 4–11)
WBC # BLD AUTO: 17.9 10E9/L (ref 4–11)
WBC # BLD AUTO: 18.2 10E9/L (ref 4–11)
WBC # BLD AUTO: 18.8 10E9/L (ref 4–11)
WBC # BLD AUTO: 19.2 10E9/L (ref 4–11)
WBC # BLD AUTO: 19.8 10E9/L (ref 4–11)
WBC # BLD AUTO: 20.1 10E9/L (ref 4–11)
WBC # BLD AUTO: 20.2 10E9/L (ref 4–11)
WBC # BLD AUTO: 20.7 10E9/L (ref 4–11)
WBC # BLD AUTO: 21.4 10E9/L (ref 4–11)
WBC # BLD AUTO: 21.9 10E9/L (ref 4–11)
WBC # BLD AUTO: 23.2 10E9/L (ref 4–11)
WBC # BLD AUTO: 23.9 10E9/L (ref 4–11)
WBC # BLD AUTO: 24.1 10E9/L (ref 4–11)
WBC # BLD AUTO: 24.3 10E9/L (ref 4–11)
WBC # BLD AUTO: 24.4 10E9/L (ref 4–11)
WBC # BLD AUTO: 25 10E9/L (ref 4–11)
WBC # BLD AUTO: 26.8 10E9/L (ref 4–11)
WBC # BLD AUTO: 27.3 10E9/L (ref 4–11)
WBC # BLD AUTO: 28.2 10E9/L (ref 4–11)
WBC # BLD AUTO: 32.2 10E9/L (ref 4–11)
WBC # BLD AUTO: 36.5 10E9/L (ref 4–11)
WBC # BLD AUTO: 37.3 10E9/L (ref 4–11)
WBC # BLD AUTO: 38.8 10E9/L (ref 4–11)
WBC # BLD AUTO: 4.5 10E9/L (ref 4–11)
WBC # BLD AUTO: 9.4 10E9/L (ref 4–11)
WBC # BLD AUTO: 9.6 10E9/L (ref 4–11)
WBC #/AREA URNS AUTO: 3 /HPF (ref 0–5)
WBC #/AREA URNS AUTO: 3 /HPF (ref 0–5)

## 2020-01-01 PROCEDURE — 84132 ASSAY OF SERUM POTASSIUM: CPT | Performed by: COLON & RECTAL SURGERY

## 2020-01-01 PROCEDURE — 74018 RADEX ABDOMEN 1 VIEW: CPT

## 2020-01-01 PROCEDURE — 36000063 ZZH SURGERY LEVEL 4 EA 15 ADDTL MIN: Performed by: COLON & RECTAL SURGERY

## 2020-01-01 PROCEDURE — 25000128 H RX IP 250 OP 636: Performed by: PHYSICIAN ASSISTANT

## 2020-01-01 PROCEDURE — 86850 RBC ANTIBODY SCREEN: CPT | Performed by: INTERNAL MEDICINE

## 2020-01-01 PROCEDURE — 25000128 H RX IP 250 OP 636: Performed by: INTERNAL MEDICINE

## 2020-01-01 PROCEDURE — 83735 ASSAY OF MAGNESIUM: CPT | Performed by: INTERNAL MEDICINE

## 2020-01-01 PROCEDURE — 74176 CT ABD & PELVIS W/O CONTRAST: CPT

## 2020-01-01 PROCEDURE — C9113 INJ PANTOPRAZOLE SODIUM, VIA: HCPCS | Performed by: COLON & RECTAL SURGERY

## 2020-01-01 PROCEDURE — 80053 COMPREHEN METABOLIC PANEL: CPT | Performed by: INTERNAL MEDICINE

## 2020-01-01 PROCEDURE — 85520 HEPARIN ASSAY: CPT | Performed by: COLON & RECTAL SURGERY

## 2020-01-01 PROCEDURE — 82607 VITAMIN B-12: CPT | Performed by: INTERNAL MEDICINE

## 2020-01-01 PROCEDURE — 999N000157 HC STATISTIC RCP TIME EA 10 MIN

## 2020-01-01 PROCEDURE — 00000146 ZZHCL STATISTIC GLUCOSE BY METER IP

## 2020-01-01 PROCEDURE — 80048 BASIC METABOLIC PNL TOTAL CA: CPT | Performed by: INTERNAL MEDICINE

## 2020-01-01 PROCEDURE — 40000257 ZZH STATISTIC CONSULT NO CHARGE VASC ACCESS

## 2020-01-01 PROCEDURE — 85027 COMPLETE CBC AUTOMATED: CPT | Performed by: COLON & RECTAL SURGERY

## 2020-01-01 PROCEDURE — 25800030 ZZH RX IP 258 OP 636: Performed by: INTERNAL MEDICINE

## 2020-01-01 PROCEDURE — P9041 ALBUMIN (HUMAN),5%, 50ML: HCPCS | Performed by: NURSE ANESTHETIST, CERTIFIED REGISTERED

## 2020-01-01 PROCEDURE — 250N000011 HC RX IP 250 OP 636: Performed by: PHYSICIAN ASSISTANT

## 2020-01-01 PROCEDURE — 25800030 ZZH RX IP 258 OP 636: Performed by: PHYSICIAN ASSISTANT

## 2020-01-01 PROCEDURE — 96365 THER/PROPH/DIAG IV INF INIT: CPT | Mod: 59

## 2020-01-01 PROCEDURE — 25000128 H RX IP 250 OP 636: Performed by: COLON & RECTAL SURGERY

## 2020-01-01 PROCEDURE — 25800029 ZZH RX IP 258 OP 250: Performed by: NURSE ANESTHETIST, CERTIFIED REGISTERED

## 2020-01-01 PROCEDURE — 25800030 ZZH RX IP 258 OP 636: Performed by: COLON & RECTAL SURGERY

## 2020-01-01 PROCEDURE — 25000125 ZZHC RX 250: Performed by: COLON & RECTAL SURGERY

## 2020-01-01 PROCEDURE — 999N000065 XR CHEST PORT 1 VW

## 2020-01-01 PROCEDURE — 40000239 ZZH STATISTIC VAT ROUNDS

## 2020-01-01 PROCEDURE — 999N000190 HC STATISTIC VAT ROUNDS

## 2020-01-01 PROCEDURE — P9047 ALBUMIN (HUMAN), 25%, 50ML: HCPCS | Performed by: INTERNAL MEDICINE

## 2020-01-01 PROCEDURE — 84100 ASSAY OF PHOSPHORUS: CPT | Performed by: COLON & RECTAL SURGERY

## 2020-01-01 PROCEDURE — 20000003 ZZH R&B ICU

## 2020-01-01 PROCEDURE — 25000128 H RX IP 250 OP 636

## 2020-01-01 PROCEDURE — 83605 ASSAY OF LACTIC ACID: CPT | Performed by: NURSE PRACTITIONER

## 2020-01-01 PROCEDURE — 250N000009 HC RX 250: Performed by: INTERNAL MEDICINE

## 2020-01-01 PROCEDURE — C9113 INJ PANTOPRAZOLE SODIUM, VIA: HCPCS | Performed by: PHYSICIAN ASSISTANT

## 2020-01-01 PROCEDURE — 83605 ASSAY OF LACTIC ACID: CPT | Performed by: INTERNAL MEDICINE

## 2020-01-01 PROCEDURE — 999N001017 HC STATISTIC GLUCOSE BY METER IP

## 2020-01-01 PROCEDURE — 25000125 ZZHC RX 250: Performed by: ANESTHESIOLOGY

## 2020-01-01 PROCEDURE — 85520 HEPARIN ASSAY: CPT | Performed by: INTERNAL MEDICINE

## 2020-01-01 PROCEDURE — 40000275 ZZH STATISTIC RCP TIME EA 10 MIN

## 2020-01-01 PROCEDURE — 99291 CRITICAL CARE FIRST HOUR: CPT | Performed by: ANESTHESIOLOGY

## 2020-01-01 PROCEDURE — P9041 ALBUMIN (HUMAN),5%, 50ML: HCPCS | Performed by: ANESTHESIOLOGY

## 2020-01-01 PROCEDURE — 85025 COMPLETE CBC W/AUTO DIFF WBC: CPT | Performed by: NURSE PRACTITIONER

## 2020-01-01 PROCEDURE — 25000125 ZZHC RX 250: Performed by: PHYSICIAN ASSISTANT

## 2020-01-01 PROCEDURE — 258N000003 HC RX IP 258 OP 636: Performed by: PHYSICIAN ASSISTANT

## 2020-01-01 PROCEDURE — 25000132 ZZH RX MED GY IP 250 OP 250 PS 637: Performed by: INTERNAL MEDICINE

## 2020-01-01 PROCEDURE — 250N000011 HC RX IP 250 OP 636: Performed by: COLON & RECTAL SURGERY

## 2020-01-01 PROCEDURE — 99291 CRITICAL CARE FIRST HOUR: CPT | Performed by: INTERNAL MEDICINE

## 2020-01-01 PROCEDURE — 94003 VENT MGMT INPAT SUBQ DAY: CPT

## 2020-01-01 PROCEDURE — 83735 ASSAY OF MAGNESIUM: CPT | Performed by: COLON & RECTAL SURGERY

## 2020-01-01 PROCEDURE — 250N000011 HC RX IP 250 OP 636: Performed by: INTERNAL MEDICINE

## 2020-01-01 PROCEDURE — 87075 CULTR BACTERIA EXCEPT BLOOD: CPT | Performed by: RADIOLOGY

## 2020-01-01 PROCEDURE — 25000128 H RX IP 250 OP 636: Performed by: NURSE PRACTITIONER

## 2020-01-01 PROCEDURE — 25000131 ZZH RX MED GY IP 250 OP 636 PS 637: Performed by: INTERNAL MEDICINE

## 2020-01-01 PROCEDURE — 84132 ASSAY OF SERUM POTASSIUM: CPT | Performed by: ANESTHESIOLOGY

## 2020-01-01 PROCEDURE — 82805 BLOOD GASES W/O2 SATURATION: CPT | Performed by: STUDENT IN AN ORGANIZED HEALTH CARE EDUCATION/TRAINING PROGRAM

## 2020-01-01 PROCEDURE — 87040 BLOOD CULTURE FOR BACTERIA: CPT | Performed by: NURSE PRACTITIONER

## 2020-01-01 PROCEDURE — 25000132 ZZH RX MED GY IP 250 OP 250 PS 637: Performed by: PHYSICIAN ASSISTANT

## 2020-01-01 PROCEDURE — 25000125 ZZHC RX 250: Performed by: INTERNAL MEDICINE

## 2020-01-01 PROCEDURE — 25000128 H RX IP 250 OP 636: Performed by: NURSE ANESTHETIST, CERTIFIED REGISTERED

## 2020-01-01 PROCEDURE — C9113 INJ PANTOPRAZOLE SODIUM, VIA: HCPCS | Performed by: INTERNAL MEDICINE

## 2020-01-01 PROCEDURE — 82805 BLOOD GASES W/O2 SATURATION: CPT | Performed by: INTERNAL MEDICINE

## 2020-01-01 PROCEDURE — 86900 BLOOD TYPING SEROLOGIC ABO: CPT | Performed by: NURSE PRACTITIONER

## 2020-01-01 PROCEDURE — 84100 ASSAY OF PHOSPHORUS: CPT | Performed by: INTERNAL MEDICINE

## 2020-01-01 PROCEDURE — 272N000083 HC NUTRITION PRODUCT SEMIELEM INTERMED LITER

## 2020-01-01 PROCEDURE — 93308 TTE F-UP OR LMTD: CPT

## 2020-01-01 PROCEDURE — 85027 COMPLETE CBC AUTOMATED: CPT | Performed by: ANESTHESIOLOGY

## 2020-01-01 PROCEDURE — 25000128 H RX IP 250 OP 636: Performed by: ANESTHESIOLOGY

## 2020-01-01 PROCEDURE — 80053 COMPREHEN METABOLIC PANEL: CPT | Performed by: COLON & RECTAL SURGERY

## 2020-01-01 PROCEDURE — 84132 ASSAY OF SERUM POTASSIUM: CPT | Performed by: INTERNAL MEDICINE

## 2020-01-01 PROCEDURE — 81001 URINALYSIS AUTO W/SCOPE: CPT | Performed by: INTERNAL MEDICINE

## 2020-01-01 PROCEDURE — 999N000009 HC STATISTIC AIRWAY CARE

## 2020-01-01 PROCEDURE — 80048 BASIC METABOLIC PNL TOTAL CA: CPT | Performed by: HOSPITALIST

## 2020-01-01 PROCEDURE — 200N000001 HC R&B ICU

## 2020-01-01 PROCEDURE — 25800030 ZZH RX IP 258 OP 636: Performed by: ANESTHESIOLOGY

## 2020-01-01 PROCEDURE — 85730 THROMBOPLASTIN TIME PARTIAL: CPT | Performed by: INTERNAL MEDICINE

## 2020-01-01 PROCEDURE — 258N000001 HC RX 258: Performed by: INTERNAL MEDICINE

## 2020-01-01 PROCEDURE — 25000125 ZZHC RX 250: Performed by: STUDENT IN AN ORGANIZED HEALTH CARE EDUCATION/TRAINING PROGRAM

## 2020-01-01 PROCEDURE — 94640 AIRWAY INHALATION TREATMENT: CPT

## 2020-01-01 PROCEDURE — 84295 ASSAY OF SERUM SODIUM: CPT | Performed by: INTERNAL MEDICINE

## 2020-01-01 PROCEDURE — 85018 HEMOGLOBIN: CPT | Performed by: COLON & RECTAL SURGERY

## 2020-01-01 PROCEDURE — P9016 RBC LEUKOCYTES REDUCED: HCPCS | Performed by: INTERNAL MEDICINE

## 2020-01-01 PROCEDURE — 40000281 ZZH STATISTIC TRANSPORT TIME EA 15 MIN

## 2020-01-01 PROCEDURE — 86901 BLOOD TYPING SEROLOGIC RH(D): CPT | Performed by: INTERNAL MEDICINE

## 2020-01-01 PROCEDURE — 25000131 ZZH RX MED GY IP 250 OP 636 PS 637: Performed by: COLON & RECTAL SURGERY

## 2020-01-01 PROCEDURE — 999N000185 HC STATISTIC TRANSPORT TIME EA 15 MIN

## 2020-01-01 PROCEDURE — 43235 EGD DIAGNOSTIC BRUSH WASH: CPT | Performed by: INTERNAL MEDICINE

## 2020-01-01 PROCEDURE — 88307 TISSUE EXAM BY PATHOLOGIST: CPT | Mod: 26 | Performed by: COLON & RECTAL SURGERY

## 2020-01-01 PROCEDURE — 31645 BRNCHSC W/THER ASPIR 1ST: CPT | Mod: GC | Performed by: INTERNAL MEDICINE

## 2020-01-01 PROCEDURE — 36569 INSJ PICC 5 YR+ W/O IMAGING: CPT

## 2020-01-01 PROCEDURE — 250N000009 HC RX 250

## 2020-01-01 PROCEDURE — 258N000003 HC RX IP 258 OP 636: Performed by: INTERNAL MEDICINE

## 2020-01-01 PROCEDURE — 88307 TISSUE EXAM BY PATHOLOGIST: CPT | Performed by: COLON & RECTAL SURGERY

## 2020-01-01 PROCEDURE — 87106 FUNGI IDENTIFICATION YEAST: CPT | Performed by: INTERNAL MEDICINE

## 2020-01-01 PROCEDURE — 87077 CULTURE AEROBIC IDENTIFY: CPT | Performed by: COLON & RECTAL SURGERY

## 2020-01-01 PROCEDURE — 25800025 ZZH RX 258: Performed by: PHYSICIAN ASSISTANT

## 2020-01-01 PROCEDURE — 93325 DOPPLER ECHO COLOR FLOW MAPG: CPT | Mod: 26 | Performed by: INTERNAL MEDICINE

## 2020-01-01 PROCEDURE — 86923 COMPATIBILITY TEST ELECTRIC: CPT | Performed by: INTERNAL MEDICINE

## 2020-01-01 PROCEDURE — 40000008 ZZH STATISTIC AIRWAY CARE

## 2020-01-01 PROCEDURE — 0W9J30Z DRAINAGE OF PELVIC CAVITY WITH DRAINAGE DEVICE, PERCUTANEOUS APPROACH: ICD-10-PCS | Performed by: RADIOLOGY

## 2020-01-01 PROCEDURE — 25000125 ZZHC RX 250: Performed by: NURSE PRACTITIONER

## 2020-01-01 PROCEDURE — 250N000009 HC RX 250: Performed by: NURSE ANESTHETIST, CERTIFIED REGISTERED

## 2020-01-01 PROCEDURE — 86860 RBC ANTIBODY ELUTION: CPT | Performed by: INTERNAL MEDICINE

## 2020-01-01 PROCEDURE — 83605 ASSAY OF LACTIC ACID: CPT | Performed by: ANESTHESIOLOGY

## 2020-01-01 PROCEDURE — 80048 BASIC METABOLIC PNL TOTAL CA: CPT | Performed by: COLON & RECTAL SURGERY

## 2020-01-01 PROCEDURE — 25800030 ZZH RX IP 258 OP 636: Performed by: HOSPITALIST

## 2020-01-01 PROCEDURE — 85027 COMPLETE CBC AUTOMATED: CPT | Performed by: INTERNAL MEDICINE

## 2020-01-01 PROCEDURE — G0463 HOSPITAL OUTPT CLINIC VISIT: HCPCS

## 2020-01-01 PROCEDURE — 99292 CRITICAL CARE ADDL 30 MIN: CPT | Performed by: INTERNAL MEDICINE

## 2020-01-01 PROCEDURE — 37000008 ZZH ANESTHESIA TECHNICAL FEE, 1ST 30 MIN: Performed by: COLON & RECTAL SURGERY

## 2020-01-01 PROCEDURE — 25000125 ZZHC RX 250: Performed by: NURSE ANESTHETIST, CERTIFIED REGISTERED

## 2020-01-01 PROCEDURE — 37000009 ZZH ANESTHESIA TECHNICAL FEE, EACH ADDTL 15 MIN: Performed by: COLON & RECTAL SURGERY

## 2020-01-01 PROCEDURE — 250N000009 HC RX 250: Performed by: PHYSICIAN ASSISTANT

## 2020-01-01 PROCEDURE — 82565 ASSAY OF CREATININE: CPT | Performed by: COLON & RECTAL SURGERY

## 2020-01-01 PROCEDURE — 0W9J00Z DRAINAGE OF PELVIC CAVITY WITH DRAINAGE DEVICE, OPEN APPROACH: ICD-10-PCS | Performed by: COLON & RECTAL SURGERY

## 2020-01-01 PROCEDURE — 81001 URINALYSIS AUTO W/SCOPE: CPT | Performed by: NURSE PRACTITIONER

## 2020-01-01 PROCEDURE — 87040 BLOOD CULTURE FOR BACTERIA: CPT | Performed by: INTERNAL MEDICINE

## 2020-01-01 PROCEDURE — 85060 BLOOD SMEAR INTERPRETATION: CPT | Performed by: PATHOLOGY

## 2020-01-01 PROCEDURE — 258N000003 HC RX IP 258 OP 636: Performed by: NURSE ANESTHETIST, CERTIFIED REGISTERED

## 2020-01-01 PROCEDURE — 360N000014 HC SURGERY LEVEL 2 1ST 30 MIN: Performed by: THORACIC SURGERY (CARDIOTHORACIC VASCULAR SURGERY)

## 2020-01-01 PROCEDURE — P9041 ALBUMIN (HUMAN),5%, 50ML: HCPCS | Performed by: COLON & RECTAL SURGERY

## 2020-01-01 PROCEDURE — 25000132 ZZH RX MED GY IP 250 OP 250 PS 637: Performed by: COLON & RECTAL SURGERY

## 2020-01-01 PROCEDURE — 25000125 ZZHC RX 250: Performed by: HOSPITALIST

## 2020-01-01 PROCEDURE — 999N000026 HC STATISTIC CARDIOPULM RESUSCITATION

## 2020-01-01 PROCEDURE — 250N000009 HC RX 250: Performed by: COLON & RECTAL SURGERY

## 2020-01-01 PROCEDURE — 83010 ASSAY OF HAPTOGLOBIN QUANT: CPT | Performed by: INTERNAL MEDICINE

## 2020-01-01 PROCEDURE — 25000128 H RX IP 250 OP 636: Performed by: HOSPITALIST

## 2020-01-01 PROCEDURE — 258N000003 HC RX IP 258 OP 636: Performed by: STUDENT IN AN ORGANIZED HEALTH CARE EDUCATION/TRAINING PROGRAM

## 2020-01-01 PROCEDURE — 0BJ08ZZ INSPECTION OF TRACHEOBRONCHIAL TREE, VIA NATURAL OR ARTIFICIAL OPENING ENDOSCOPIC: ICD-10-PCS | Performed by: INTERNAL MEDICINE

## 2020-01-01 PROCEDURE — 94640 AIRWAY INHALATION TREATMENT: CPT | Mod: 76

## 2020-01-01 PROCEDURE — 83605 ASSAY OF LACTIC ACID: CPT | Performed by: HOSPITALIST

## 2020-01-01 PROCEDURE — 258N000003 HC RX IP 258 OP 636: Performed by: COLON & RECTAL SURGERY

## 2020-01-01 PROCEDURE — 25800030 ZZH RX IP 258 OP 636: Performed by: NURSE ANESTHETIST, CERTIFIED REGISTERED

## 2020-01-01 PROCEDURE — 85610 PROTHROMBIN TIME: CPT | Performed by: COLON & RECTAL SURGERY

## 2020-01-01 PROCEDURE — 84134 ASSAY OF PREALBUMIN: CPT | Performed by: INTERNAL MEDICINE

## 2020-01-01 PROCEDURE — 250N000009 HC RX 250: Performed by: RADIOLOGY

## 2020-01-01 PROCEDURE — 258N000003 HC RX IP 258 OP 636: Performed by: SURGERY

## 2020-01-01 PROCEDURE — 86901 BLOOD TYPING SEROLOGIC RH(D): CPT | Performed by: NURSE PRACTITIONER

## 2020-01-01 PROCEDURE — 258N000002 HC RX IP 258 OP 250: Performed by: INTERNAL MEDICINE

## 2020-01-01 PROCEDURE — 36000093 ZZH SURGERY LEVEL 4 1ST 30 MIN: Performed by: COLON & RECTAL SURGERY

## 2020-01-01 PROCEDURE — 71045 X-RAY EXAM CHEST 1 VIEW: CPT

## 2020-01-01 PROCEDURE — 82570 ASSAY OF URINE CREATININE: CPT | Performed by: INTERNAL MEDICINE

## 2020-01-01 PROCEDURE — 93005 ELECTROCARDIOGRAM TRACING: CPT

## 2020-01-01 PROCEDURE — 87106 FUNGI IDENTIFICATION YEAST: CPT | Performed by: RADIOLOGY

## 2020-01-01 PROCEDURE — 85014 HEMATOCRIT: CPT

## 2020-01-01 PROCEDURE — 250N000009 HC RX 250: Performed by: ANESTHESIOLOGY

## 2020-01-01 PROCEDURE — 94002 VENT MGMT INPAT INIT DAY: CPT

## 2020-01-01 PROCEDURE — P9041 ALBUMIN (HUMAN),5%, 50ML: HCPCS

## 2020-01-01 PROCEDURE — 25800025 ZZH RX 258: Performed by: INTERNAL MEDICINE

## 2020-01-01 PROCEDURE — 87075 CULTR BACTERIA EXCEPT BLOOD: CPT | Performed by: COLON & RECTAL SURGERY

## 2020-01-01 PROCEDURE — 36000056 ZZH SURGERY LEVEL 3 1ST 30 MIN: Performed by: COLON & RECTAL SURGERY

## 2020-01-01 PROCEDURE — 85025 COMPLETE CBC W/AUTO DIFF WBC: CPT | Performed by: INTERNAL MEDICINE

## 2020-01-01 PROCEDURE — 82565 ASSAY OF CREATININE: CPT | Performed by: INTERNAL MEDICINE

## 2020-01-01 PROCEDURE — 85610 PROTHROMBIN TIME: CPT | Performed by: INTERNAL MEDICINE

## 2020-01-01 PROCEDURE — 27210437 ZZH NUTRITION PRODUCT SEMIELEM INTERMED LITER

## 2020-01-01 PROCEDURE — 85018 HEMOGLOBIN: CPT | Performed by: INTERNAL MEDICINE

## 2020-01-01 PROCEDURE — 25000132 ZZH RX MED GY IP 250 OP 250 PS 637: Performed by: HOSPITALIST

## 2020-01-01 PROCEDURE — 80320 DRUG SCREEN QUANTALCOHOLS: CPT | Performed by: NURSE PRACTITIONER

## 2020-01-01 PROCEDURE — 86900 BLOOD TYPING SEROLOGIC ABO: CPT | Performed by: COLON & RECTAL SURGERY

## 2020-01-01 PROCEDURE — 27210794 ZZH OR GENERAL SUPPLY STERILE: Performed by: COLON & RECTAL SURGERY

## 2020-01-01 PROCEDURE — 25000566 ZZH SEVOFLURANE, EA 15 MIN: Performed by: COLON & RECTAL SURGERY

## 2020-01-01 PROCEDURE — 85520 HEPARIN ASSAY: CPT | Performed by: PHYSICIAN ASSISTANT

## 2020-01-01 PROCEDURE — 84100 ASSAY OF PHOSPHORUS: CPT | Performed by: HOSPITALIST

## 2020-01-01 PROCEDURE — 85027 COMPLETE CBC AUTOMATED: CPT | Performed by: HOSPITALIST

## 2020-01-01 PROCEDURE — 0W9J30Z DRAINAGE OF PELVIC CAVITY WITH DRAINAGE DEVICE, PERCUTANEOUS APPROACH: ICD-10-PCS | Performed by: COLON & RECTAL SURGERY

## 2020-01-01 PROCEDURE — 250N000011 HC RX IP 250 OP 636

## 2020-01-01 PROCEDURE — 74177 CT ABD & PELVIS W/CONTRAST: CPT

## 2020-01-01 PROCEDURE — 250N000013 HC RX MED GY IP 250 OP 250 PS 637: Performed by: INTERNAL MEDICINE

## 2020-01-01 PROCEDURE — 96375 TX/PRO/DX INJ NEW DRUG ADDON: CPT

## 2020-01-01 PROCEDURE — 36415 COLL VENOUS BLD VENIPUNCTURE: CPT | Performed by: HOSPITALIST

## 2020-01-01 PROCEDURE — 36000058 ZZH SURGERY LEVEL 3 EA 15 ADDTL MIN: Performed by: COLON & RECTAL SURGERY

## 2020-01-01 PROCEDURE — 250N000013 HC RX MED GY IP 250 OP 250 PS 637: Performed by: PHYSICIAN ASSISTANT

## 2020-01-01 PROCEDURE — 82746 ASSAY OF FOLIC ACID SERUM: CPT | Performed by: INTERNAL MEDICINE

## 2020-01-01 PROCEDURE — 80202 ASSAY OF VANCOMYCIN: CPT | Performed by: COLON & RECTAL SURGERY

## 2020-01-01 PROCEDURE — 87205 SMEAR GRAM STAIN: CPT | Performed by: RADIOLOGY

## 2020-01-01 PROCEDURE — 40000901 ZZH STATISTIC WOC PT EDUCATION, 0-15 MIN

## 2020-01-01 PROCEDURE — 83036 HEMOGLOBIN GLYCOSYLATED A1C: CPT | Performed by: INTERNAL MEDICINE

## 2020-01-01 PROCEDURE — 84300 ASSAY OF URINE SODIUM: CPT | Performed by: INTERNAL MEDICINE

## 2020-01-01 PROCEDURE — 85384 FIBRINOGEN ACTIVITY: CPT | Performed by: COLON & RECTAL SURGERY

## 2020-01-01 PROCEDURE — 86870 RBC ANTIBODY IDENTIFICATION: CPT | Performed by: INTERNAL MEDICINE

## 2020-01-01 PROCEDURE — U0003 INFECTIOUS AGENT DETECTION BY NUCLEIC ACID (DNA OR RNA); SEVERE ACUTE RESPIRATORY SYNDROME CORONAVIRUS 2 (SARS-COV-2) (CORONAVIRUS DISEASE [COVID-19]), AMPLIFIED PROBE TECHNIQUE, MAKING USE OF HIGH THROUGHPUT TECHNOLOGIES AS DESCRIBED BY CMS-2020-01-R: HCPCS | Performed by: NURSE PRACTITIONER

## 2020-01-01 PROCEDURE — 27211441 ZZ H KIT SHRLOCK 5FR POWER PICC TRIPLE LUMEN

## 2020-01-01 PROCEDURE — 25000131 ZZH RX MED GY IP 250 OP 636 PS 637: Performed by: PHYSICIAN ASSISTANT

## 2020-01-01 PROCEDURE — 82805 BLOOD GASES W/O2 SATURATION: CPT | Performed by: ANESTHESIOLOGY

## 2020-01-01 PROCEDURE — 97605 NEG PRS WND THER DME<=50SQCM: CPT

## 2020-01-01 PROCEDURE — 250N000012 HC RX MED GY IP 250 OP 636 PS 637: Performed by: STUDENT IN AN ORGANIZED HEALTH CARE EDUCATION/TRAINING PROGRAM

## 2020-01-01 PROCEDURE — 86850 RBC ANTIBODY SCREEN: CPT | Performed by: NURSE PRACTITIONER

## 2020-01-01 PROCEDURE — C1769 GUIDE WIRE: HCPCS

## 2020-01-01 PROCEDURE — 85027 COMPLETE CBC AUTOMATED: CPT | Performed by: PHYSICIAN ASSISTANT

## 2020-01-01 PROCEDURE — 83615 LACTATE (LD) (LDH) ENZYME: CPT | Performed by: INTERNAL MEDICINE

## 2020-01-01 PROCEDURE — 87070 CULTURE OTHR SPECIMN AEROBIC: CPT | Performed by: RADIOLOGY

## 2020-01-01 PROCEDURE — 84145 PROCALCITONIN (PCT): CPT | Performed by: INTERNAL MEDICINE

## 2020-01-01 PROCEDURE — 36620 INSERTION CATHETER ARTERY: CPT | Mod: GC | Performed by: INTERNAL MEDICINE

## 2020-01-01 PROCEDURE — 3E1M38Z IRRIGATION OF PERITONEAL CAVITY USING IRRIGATING SUBSTANCE, PERCUTANEOUS APPROACH: ICD-10-PCS | Performed by: COLON & RECTAL SURGERY

## 2020-01-01 PROCEDURE — 99291 CRITICAL CARE FIRST HOUR: CPT | Mod: 25 | Performed by: INTERNAL MEDICINE

## 2020-01-01 PROCEDURE — 36415 COLL VENOUS BLD VENIPUNCTURE: CPT | Performed by: INTERNAL MEDICINE

## 2020-01-01 PROCEDURE — 82947 ASSAY GLUCOSE BLOOD QUANT: CPT | Performed by: INTERNAL MEDICINE

## 2020-01-01 PROCEDURE — 0DTN0ZZ RESECTION OF SIGMOID COLON, OPEN APPROACH: ICD-10-PCS | Performed by: COLON & RECTAL SURGERY

## 2020-01-01 PROCEDURE — 82040 ASSAY OF SERUM ALBUMIN: CPT | Performed by: INTERNAL MEDICINE

## 2020-01-01 PROCEDURE — 86900 BLOOD TYPING SEROLOGIC ABO: CPT | Performed by: INTERNAL MEDICINE

## 2020-01-01 PROCEDURE — 82247 BILIRUBIN TOTAL: CPT | Performed by: ANESTHESIOLOGY

## 2020-01-01 PROCEDURE — 84134 ASSAY OF PREALBUMIN: CPT | Performed by: COLON & RECTAL SURGERY

## 2020-01-01 PROCEDURE — 82803 BLOOD GASES ANY COMBINATION: CPT

## 2020-01-01 PROCEDURE — 80053 COMPREHEN METABOLIC PANEL: CPT | Performed by: ANESTHESIOLOGY

## 2020-01-01 PROCEDURE — 99291 CRITICAL CARE FIRST HOUR: CPT

## 2020-01-01 PROCEDURE — 87106 FUNGI IDENTIFICATION YEAST: CPT | Performed by: COLON & RECTAL SURGERY

## 2020-01-01 PROCEDURE — 87076 CULTURE ANAEROBE IDENT EACH: CPT | Performed by: COLON & RECTAL SURGERY

## 2020-01-01 PROCEDURE — 0B110F4 BYPASS TRACHEA TO CUTANEOUS WITH TRACHEOSTOMY DEVICE, OPEN APPROACH: ICD-10-PCS | Performed by: THORACIC SURGERY (CARDIOTHORACIC VASCULAR SURGERY)

## 2020-01-01 PROCEDURE — 86901 BLOOD TYPING SEROLOGIC RH(D): CPT | Performed by: COLON & RECTAL SURGERY

## 2020-01-01 PROCEDURE — 83605 ASSAY OF LACTIC ACID: CPT | Performed by: COLON & RECTAL SURGERY

## 2020-01-01 PROCEDURE — 272N000001 HC OR GENERAL SUPPLY STERILE: Performed by: THORACIC SURGERY (CARDIOTHORACIC VASCULAR SURGERY)

## 2020-01-01 PROCEDURE — 83690 ASSAY OF LIPASE: CPT | Performed by: NURSE PRACTITIONER

## 2020-01-01 PROCEDURE — 84132 ASSAY OF SERUM POTASSIUM: CPT

## 2020-01-01 PROCEDURE — 250N000009 HC RX 250: Performed by: STUDENT IN AN ORGANIZED HEALTH CARE EDUCATION/TRAINING PROGRAM

## 2020-01-01 PROCEDURE — 96372 THER/PROPH/DIAG INJ SC/IM: CPT | Performed by: COLON & RECTAL SURGERY

## 2020-01-01 PROCEDURE — 00000159 ZZHCL STATISTIC H-SEND OUTS PREP: Performed by: COLON & RECTAL SURGERY

## 2020-01-01 PROCEDURE — 0D1B0Z4 BYPASS ILEUM TO CUTANEOUS, OPEN APPROACH: ICD-10-PCS | Performed by: COLON & RECTAL SURGERY

## 2020-01-01 PROCEDURE — 5A1955Z RESPIRATORY VENTILATION, GREATER THAN 96 CONSECUTIVE HOURS: ICD-10-PCS | Performed by: THORACIC SURGERY (CARDIOTHORACIC VASCULAR SURGERY)

## 2020-01-01 PROCEDURE — 85027 COMPLETE CBC AUTOMATED: CPT | Performed by: STUDENT IN AN ORGANIZED HEALTH CARE EDUCATION/TRAINING PROGRAM

## 2020-01-01 PROCEDURE — 85018 HEMOGLOBIN: CPT | Performed by: ANESTHESIOLOGY

## 2020-01-01 PROCEDURE — 99292 CRITICAL CARE ADDL 30 MIN: CPT

## 2020-01-01 PROCEDURE — P9041 ALBUMIN (HUMAN),5%, 50ML: HCPCS | Performed by: INTERNAL MEDICINE

## 2020-01-01 PROCEDURE — 3E0436Z INTRODUCTION OF NUTRITIONAL SUBSTANCE INTO CENTRAL VEIN, PERCUTANEOUS APPROACH: ICD-10-PCS | Performed by: ANESTHESIOLOGY

## 2020-01-01 PROCEDURE — 36415 COLL VENOUS BLD VENIPUNCTURE: CPT | Performed by: PATHOLOGY

## 2020-01-01 PROCEDURE — 0DBP0ZZ EXCISION OF RECTUM, OPEN APPROACH: ICD-10-PCS | Performed by: COLON & RECTAL SURGERY

## 2020-01-01 PROCEDURE — 31500 INSERT EMERGENCY AIRWAY: CPT | Mod: GC | Performed by: INTERNAL MEDICINE

## 2020-01-01 PROCEDURE — 87070 CULTURE OTHR SPECIMN AEROBIC: CPT | Performed by: COLON & RECTAL SURGERY

## 2020-01-01 PROCEDURE — 99233 SBSQ HOSP IP/OBS HIGH 50: CPT | Performed by: HOSPITALIST

## 2020-01-01 PROCEDURE — P9047 ALBUMIN (HUMAN), 25%, 50ML: HCPCS

## 2020-01-01 PROCEDURE — 86850 RBC ANTIBODY SCREEN: CPT | Performed by: COLON & RECTAL SURGERY

## 2020-01-01 PROCEDURE — 250N000011 HC RX IP 250 OP 636: Performed by: NURSE ANESTHETIST, CERTIFIED REGISTERED

## 2020-01-01 PROCEDURE — 87186 SC STD MICRODIL/AGAR DIL: CPT | Performed by: COLON & RECTAL SURGERY

## 2020-01-01 PROCEDURE — 83735 ASSAY OF MAGNESIUM: CPT | Performed by: PHYSICIAN ASSISTANT

## 2020-01-01 PROCEDURE — 40000170 ZZH STATISTIC PRE-PROCEDURE ASSESSMENT II: Performed by: COLON & RECTAL SURGERY

## 2020-01-01 PROCEDURE — 40000986 XR CHEST PORT 1 VW

## 2020-01-01 PROCEDURE — 84478 ASSAY OF TRIGLYCERIDES: CPT | Performed by: INTERNAL MEDICINE

## 2020-01-01 PROCEDURE — 12000047 ZZH R&B IMCU

## 2020-01-01 PROCEDURE — 86140 C-REACTIVE PROTEIN: CPT | Performed by: INTERNAL MEDICINE

## 2020-01-01 PROCEDURE — 82550 ASSAY OF CK (CPK): CPT | Performed by: INTERNAL MEDICINE

## 2020-01-01 PROCEDURE — 74174 CTA ABD&PLVS W/CONTRAST: CPT

## 2020-01-01 PROCEDURE — 84132 ASSAY OF SERUM POTASSIUM: CPT | Performed by: HOSPITALIST

## 2020-01-01 PROCEDURE — 82330 ASSAY OF CALCIUM: CPT | Performed by: INTERNAL MEDICINE

## 2020-01-01 PROCEDURE — 84450 TRANSFERASE (AST) (SGOT): CPT | Performed by: ANESTHESIOLOGY

## 2020-01-01 PROCEDURE — 25800030 ZZH RX IP 258 OP 636: Performed by: NURSE PRACTITIONER

## 2020-01-01 PROCEDURE — 25800025 ZZH RX 258: Performed by: COLON & RECTAL SURGERY

## 2020-01-01 PROCEDURE — 86880 COOMBS TEST DIRECT: CPT | Performed by: INTERNAL MEDICINE

## 2020-01-01 PROCEDURE — 83735 ASSAY OF MAGNESIUM: CPT | Performed by: ANESTHESIOLOGY

## 2020-01-01 PROCEDURE — 80053 COMPREHEN METABOLIC PANEL: CPT | Performed by: NURSE PRACTITIONER

## 2020-01-01 PROCEDURE — 85045 AUTOMATED RETICULOCYTE COUNT: CPT | Performed by: INTERNAL MEDICINE

## 2020-01-01 PROCEDURE — 71275 CT ANGIOGRAPHY CHEST: CPT

## 2020-01-01 PROCEDURE — 80053 COMPREHEN METABOLIC PANEL: CPT | Performed by: PHYSICIAN ASSISTANT

## 2020-01-01 PROCEDURE — C9803 HOPD COVID-19 SPEC COLLECT: HCPCS

## 2020-01-01 PROCEDURE — 0DJD8ZZ INSPECTION OF LOWER INTESTINAL TRACT, VIA NATURAL OR ARTIFICIAL OPENING ENDOSCOPIC: ICD-10-PCS | Performed by: COLON & RECTAL SURGERY

## 2020-01-01 PROCEDURE — 96361 HYDRATE IV INFUSION ADD-ON: CPT

## 2020-01-01 PROCEDURE — 84295 ASSAY OF SERUM SODIUM: CPT

## 2020-01-01 PROCEDURE — 87070 CULTURE OTHR SPECIMN AEROBIC: CPT | Performed by: INTERNAL MEDICINE

## 2020-01-01 PROCEDURE — 36415 COLL VENOUS BLD VENIPUNCTURE: CPT | Performed by: COLON & RECTAL SURGERY

## 2020-01-01 PROCEDURE — 25000128 H RX IP 250 OP 636: Performed by: STUDENT IN AN ORGANIZED HEALTH CARE EDUCATION/TRAINING PROGRAM

## 2020-01-01 PROCEDURE — 84100 ASSAY OF PHOSPHORUS: CPT | Performed by: PHYSICIAN ASSISTANT

## 2020-01-01 PROCEDURE — 84460 ALANINE AMINO (ALT) (SGPT): CPT | Performed by: ANESTHESIOLOGY

## 2020-01-01 PROCEDURE — 80202 ASSAY OF VANCOMYCIN: CPT | Performed by: ANESTHESIOLOGY

## 2020-01-01 PROCEDURE — 84484 ASSAY OF TROPONIN QUANT: CPT | Performed by: NURSE PRACTITIONER

## 2020-01-01 PROCEDURE — 99232 SBSQ HOSP IP/OBS MODERATE 35: CPT | Performed by: HOSPITALIST

## 2020-01-01 PROCEDURE — 370N000002 HC ANESTHESIA TECHNICAL FEE, EACH ADDTL 15 MIN: Performed by: THORACIC SURGERY (CARDIOTHORACIC VASCULAR SURGERY)

## 2020-01-01 PROCEDURE — 83605 ASSAY OF LACTIC ACID: CPT | Performed by: STUDENT IN AN ORGANIZED HEALTH CARE EDUCATION/TRAINING PROGRAM

## 2020-01-01 PROCEDURE — 87205 SMEAR GRAM STAIN: CPT | Performed by: INTERNAL MEDICINE

## 2020-01-01 PROCEDURE — 93308 TTE F-UP OR LMTD: CPT | Mod: 26 | Performed by: INTERNAL MEDICINE

## 2020-01-01 PROCEDURE — 82570 ASSAY OF URINE CREATININE: CPT | Performed by: PHYSICIAN ASSISTANT

## 2020-01-01 PROCEDURE — 999N001109 HC STATISTIC MORPHOLOGY W/INTERP HISTOLOGY TC 85060: Performed by: INTERNAL MEDICINE

## 2020-01-01 PROCEDURE — 3E043XZ INTRODUCTION OF VASOPRESSOR INTO CENTRAL VEIN, PERCUTANEOUS APPROACH: ICD-10-PCS | Performed by: INTERNAL MEDICINE

## 2020-01-01 PROCEDURE — 85610 PROTHROMBIN TIME: CPT | Performed by: NURSE PRACTITIONER

## 2020-01-01 PROCEDURE — 250N000013 HC RX MED GY IP 250 OP 250 PS 637: Performed by: COLON & RECTAL SURGERY

## 2020-01-01 PROCEDURE — 85610 PROTHROMBIN TIME: CPT | Performed by: ANESTHESIOLOGY

## 2020-01-01 PROCEDURE — 84100 ASSAY OF PHOSPHORUS: CPT | Performed by: ANESTHESIOLOGY

## 2020-01-01 PROCEDURE — 93321 DOPPLER ECHO F-UP/LMTD STD: CPT | Mod: 26 | Performed by: INTERNAL MEDICINE

## 2020-01-01 PROCEDURE — 84484 ASSAY OF TROPONIN QUANT: CPT | Performed by: INTERNAL MEDICINE

## 2020-01-01 PROCEDURE — 0DTF0ZZ RESECTION OF RIGHT LARGE INTESTINE, OPEN APPROACH: ICD-10-PCS | Performed by: COLON & RECTAL SURGERY

## 2020-01-01 PROCEDURE — 82248 BILIRUBIN DIRECT: CPT | Performed by: COLON & RECTAL SURGERY

## 2020-01-01 PROCEDURE — 82010 KETONE BODYS QUAN: CPT | Performed by: INTERNAL MEDICINE

## 2020-01-01 PROCEDURE — 0DSM0ZZ REPOSITION DESCENDING COLON, OPEN APPROACH: ICD-10-PCS | Performed by: COLON & RECTAL SURGERY

## 2020-01-01 PROCEDURE — 258N000001 HC RX 258: Performed by: STUDENT IN AN ORGANIZED HEALTH CARE EDUCATION/TRAINING PROGRAM

## 2020-01-01 PROCEDURE — 0DJ08ZZ INSPECTION OF UPPER INTESTINAL TRACT, VIA NATURAL OR ARTIFICIAL OPENING ENDOSCOPIC: ICD-10-PCS | Performed by: INTERNAL MEDICINE

## 2020-01-01 PROCEDURE — 0WPJ03Z REMOVAL OF INFUSION DEVICE FROM PELVIC CAVITY, OPEN APPROACH: ICD-10-PCS | Performed by: COLON & RECTAL SURGERY

## 2020-01-01 PROCEDURE — 250N000011 HC RX IP 250 OP 636: Performed by: STUDENT IN AN ORGANIZED HEALTH CARE EDUCATION/TRAINING PROGRAM

## 2020-01-01 PROCEDURE — 93010 ELECTROCARDIOGRAM REPORT: CPT | Performed by: INTERNAL MEDICINE

## 2020-01-01 PROCEDURE — 82330 ASSAY OF CALCIUM: CPT | Performed by: SURGERY

## 2020-01-01 PROCEDURE — 250N000003 HC SEVOFLURANE, EA 15 MIN: Performed by: THORACIC SURGERY (CARDIOTHORACIC VASCULAR SURGERY)

## 2020-01-01 PROCEDURE — 0D1M0Z4 BYPASS DESCENDING COLON TO CUTANEOUS, OPEN APPROACH: ICD-10-PCS | Performed by: COLON & RECTAL SURGERY

## 2020-01-01 PROCEDURE — 80053 COMPREHEN METABOLIC PANEL: CPT | Performed by: STUDENT IN AN ORGANIZED HEALTH CARE EDUCATION/TRAINING PROGRAM

## 2020-01-01 PROCEDURE — 85730 THROMBOPLASTIN TIME PARTIAL: CPT | Performed by: COLON & RECTAL SURGERY

## 2020-01-01 PROCEDURE — 25800030 ZZH RX IP 258 OP 636: Performed by: STUDENT IN AN ORGANIZED HEALTH CARE EDUCATION/TRAINING PROGRAM

## 2020-01-01 PROCEDURE — 370N000001 HC ANESTHESIA TECHNICAL FEE, 1ST 30 MIN: Performed by: THORACIC SURGERY (CARDIOTHORACIC VASCULAR SURGERY)

## 2020-01-01 PROCEDURE — 85730 THROMBOPLASTIN TIME PARTIAL: CPT | Performed by: NURSE PRACTITIONER

## 2020-01-01 RX ORDER — QUETIAPINE FUMARATE 25 MG/1
25 TABLET, FILM COATED ORAL AT BEDTIME
Status: DISCONTINUED | OUTPATIENT
Start: 2020-01-01 | End: 2020-01-01

## 2020-01-01 RX ORDER — DEXTROSE MONOHYDRATE 25 G/50ML
25-50 INJECTION, SOLUTION INTRAVENOUS
Status: DISCONTINUED | OUTPATIENT
Start: 2020-01-01 | End: 2020-01-01

## 2020-01-01 RX ORDER — SODIUM CHLORIDE, SODIUM LACTATE, POTASSIUM CHLORIDE, CALCIUM CHLORIDE 600; 310; 30; 20 MG/100ML; MG/100ML; MG/100ML; MG/100ML
INJECTION, SOLUTION INTRAVENOUS CONTINUOUS
Status: DISCONTINUED | OUTPATIENT
Start: 2020-01-01 | End: 2020-01-01

## 2020-01-01 RX ORDER — FENTANYL CITRATE 50 UG/ML
25 INJECTION, SOLUTION INTRAMUSCULAR; INTRAVENOUS EVERY 5 MIN PRN
Status: ACTIVE | OUTPATIENT
Start: 2020-01-01 | End: 2020-01-01

## 2020-01-01 RX ORDER — ALBUMIN, HUMAN INJ 5% 5 %
250 SOLUTION INTRAVENOUS EVERY 8 HOURS
Status: COMPLETED | OUTPATIENT
Start: 2020-01-01 | End: 2020-01-01

## 2020-01-01 RX ORDER — LIDOCAINE HYDROCHLORIDE AND EPINEPHRINE 10; 10 MG/ML; UG/ML
10 INJECTION, SOLUTION INFILTRATION; PERINEURAL ONCE
Status: COMPLETED | OUTPATIENT
Start: 2020-01-01 | End: 2020-01-01

## 2020-01-01 RX ORDER — IOPAMIDOL 755 MG/ML
80 INJECTION, SOLUTION INTRAVASCULAR ONCE
Status: COMPLETED | OUTPATIENT
Start: 2020-01-01 | End: 2020-01-01

## 2020-01-01 RX ORDER — NALOXONE HYDROCHLORIDE 0.4 MG/ML
.1-.4 INJECTION, SOLUTION INTRAMUSCULAR; INTRAVENOUS; SUBCUTANEOUS
Status: DISCONTINUED | OUTPATIENT
Start: 2020-01-01 | End: 2020-10-09 | Stop reason: HOSPADM

## 2020-01-01 RX ORDER — LIDOCAINE HYDROCHLORIDE 10 MG/ML
INJECTION, SOLUTION INFILTRATION; PERINEURAL
Status: DISCONTINUED
Start: 2020-01-01 | End: 2020-01-01 | Stop reason: HOSPADM

## 2020-01-01 RX ORDER — SODIUM CHLORIDE 450 MG/100ML
INJECTION, SOLUTION INTRAVENOUS CONTINUOUS
Status: DISCONTINUED | OUTPATIENT
Start: 2020-01-01 | End: 2020-10-09 | Stop reason: HOSPADM

## 2020-01-01 RX ORDER — SODIUM CHLORIDE 9 MG/ML
INJECTION, SOLUTION INTRAVENOUS CONTINUOUS
Status: DISCONTINUED | OUTPATIENT
Start: 2020-01-01 | End: 2020-01-01

## 2020-01-01 RX ORDER — NALOXONE HYDROCHLORIDE 0.4 MG/ML
.1-.4 INJECTION, SOLUTION INTRAMUSCULAR; INTRAVENOUS; SUBCUTANEOUS
Status: DISCONTINUED | OUTPATIENT
Start: 2020-01-01 | End: 2020-01-01

## 2020-01-01 RX ORDER — IPRATROPIUM BROMIDE AND ALBUTEROL SULFATE 2.5; .5 MG/3ML; MG/3ML
3 SOLUTION RESPIRATORY (INHALATION)
Status: DISCONTINUED | OUTPATIENT
Start: 2020-01-01 | End: 2020-01-01

## 2020-01-01 RX ORDER — FENTANYL CITRATE 50 UG/ML
50-100 INJECTION, SOLUTION INTRAMUSCULAR; INTRAVENOUS
Status: DISCONTINUED | OUTPATIENT
Start: 2020-01-01 | End: 2020-01-01 | Stop reason: HOSPADM

## 2020-01-01 RX ORDER — NALOXONE HYDROCHLORIDE 0.4 MG/ML
.1-.4 INJECTION, SOLUTION INTRAMUSCULAR; INTRAVENOUS; SUBCUTANEOUS
Status: ACTIVE | OUTPATIENT
Start: 2020-01-01 | End: 2020-01-01

## 2020-01-01 RX ORDER — HYDRALAZINE HYDROCHLORIDE 20 MG/ML
2.5-5 INJECTION INTRAMUSCULAR; INTRAVENOUS EVERY 10 MIN PRN
Status: DISCONTINUED | OUTPATIENT
Start: 2020-01-01 | End: 2020-01-01 | Stop reason: CLARIF

## 2020-01-01 RX ORDER — PROPOFOL 10 MG/ML
INJECTION, EMULSION INTRAVENOUS CONTINUOUS PRN
Status: DISCONTINUED | OUTPATIENT
Start: 2020-01-01 | End: 2020-01-01

## 2020-01-01 RX ORDER — DEXTROSE MONOHYDRATE 100 MG/ML
INJECTION, SOLUTION INTRAVENOUS CONTINUOUS PRN
Status: DISCONTINUED | OUTPATIENT
Start: 2020-01-01 | End: 2020-01-01

## 2020-01-01 RX ORDER — POTASSIUM CHLORIDE 7.45 MG/ML
INJECTION INTRAVENOUS PRN
Status: DISCONTINUED | OUTPATIENT
Start: 2020-01-01 | End: 2020-01-01

## 2020-01-01 RX ORDER — ONDANSETRON 2 MG/ML
4 INJECTION INTRAMUSCULAR; INTRAVENOUS EVERY 6 HOURS PRN
Status: DISCONTINUED | OUTPATIENT
Start: 2020-01-01 | End: 2020-01-01

## 2020-01-01 RX ORDER — ALBUMIN (HUMAN) 12.5 G/50ML
50 SOLUTION INTRAVENOUS ONCE
Status: COMPLETED | OUTPATIENT
Start: 2020-01-01 | End: 2020-01-01

## 2020-01-01 RX ORDER — PROPOFOL 10 MG/ML
10-20 INJECTION, EMULSION INTRAVENOUS EVERY 30 MIN PRN
Status: DISCONTINUED | OUTPATIENT
Start: 2020-01-01 | End: 2020-01-01

## 2020-01-01 RX ORDER — DEXAMETHASONE SODIUM PHOSPHATE 4 MG/ML
INJECTION, SOLUTION INTRA-ARTICULAR; INTRALESIONAL; INTRAMUSCULAR; INTRAVENOUS; SOFT TISSUE PRN
Status: DISCONTINUED | OUTPATIENT
Start: 2020-01-01 | End: 2020-01-01

## 2020-01-01 RX ORDER — ALBUMIN, HUMAN INJ 5% 5 %
SOLUTION INTRAVENOUS CONTINUOUS PRN
Status: DISCONTINUED | OUTPATIENT
Start: 2020-01-01 | End: 2020-01-01

## 2020-01-01 RX ORDER — MAGNESIUM SULFATE HEPTAHYDRATE 40 MG/ML
4 INJECTION, SOLUTION INTRAVENOUS EVERY 4 HOURS PRN
Status: DISCONTINUED | OUTPATIENT
Start: 2020-01-01 | End: 2020-10-09 | Stop reason: HOSPADM

## 2020-01-01 RX ORDER — PIPERACILLIN SODIUM, TAZOBACTAM SODIUM 4; .5 G/20ML; G/20ML
4.5 INJECTION, POWDER, LYOPHILIZED, FOR SOLUTION INTRAVENOUS EVERY 6 HOURS
Status: DISCONTINUED | OUTPATIENT
Start: 2020-01-01 | End: 2020-01-01

## 2020-01-01 RX ORDER — DEXTROSE MONOHYDRATE 100 MG/ML
INJECTION, SOLUTION INTRAVENOUS CONTINUOUS PRN
Status: DISCONTINUED | OUTPATIENT
Start: 2020-01-01 | End: 2020-10-09 | Stop reason: HOSPADM

## 2020-01-01 RX ORDER — DEXTROSE MONOHYDRATE 100 MG/ML
INJECTION, SOLUTION INTRAVENOUS CONTINUOUS
Status: ACTIVE | OUTPATIENT
Start: 2020-01-01 | End: 2020-01-01

## 2020-01-01 RX ORDER — PIPERACILLIN SODIUM, TAZOBACTAM SODIUM 4; .5 G/20ML; G/20ML
4.5 INJECTION, POWDER, LYOPHILIZED, FOR SOLUTION INTRAVENOUS EVERY 6 HOURS
Status: DISCONTINUED | OUTPATIENT
Start: 2020-01-01 | End: 2020-01-01 | Stop reason: ALTCHOICE

## 2020-01-01 RX ORDER — MAGNESIUM HYDROXIDE 1200 MG/15ML
LIQUID ORAL PRN
Status: DISCONTINUED | OUTPATIENT
Start: 2020-01-01 | End: 2020-01-01 | Stop reason: HOSPADM

## 2020-01-01 RX ORDER — POTASSIUM CHLORIDE 29.8 MG/ML
20 INJECTION INTRAVENOUS
Status: DISCONTINUED | OUTPATIENT
Start: 2020-01-01 | End: 2020-10-09 | Stop reason: HOSPADM

## 2020-01-01 RX ORDER — NOREPINEPHRINE BITARTRATE 0.06 MG/ML
.03-.4 INJECTION, SOLUTION INTRAVENOUS CONTINUOUS
Status: DISCONTINUED | OUTPATIENT
Start: 2020-01-01 | End: 2020-01-01

## 2020-01-01 RX ORDER — SODIUM CHLORIDE, SODIUM LACTATE, POTASSIUM CHLORIDE, CALCIUM CHLORIDE 600; 310; 30; 20 MG/100ML; MG/100ML; MG/100ML; MG/100ML
INJECTION, SOLUTION INTRAVENOUS CONTINUOUS PRN
Status: DISCONTINUED | OUTPATIENT
Start: 2020-01-01 | End: 2020-01-01

## 2020-01-01 RX ORDER — MAGNESIUM SULFATE HEPTAHYDRATE 40 MG/ML
2 INJECTION, SOLUTION INTRAVENOUS ONCE
Status: COMPLETED | OUTPATIENT
Start: 2020-01-01 | End: 2020-01-01

## 2020-01-01 RX ORDER — FLUMAZENIL 0.1 MG/ML
0.2 INJECTION, SOLUTION INTRAVENOUS
Status: ACTIVE | OUTPATIENT
Start: 2020-01-01 | End: 2020-01-01

## 2020-01-01 RX ORDER — ALBUMIN, HUMAN INJ 5% 5 %
12.5 SOLUTION INTRAVENOUS ONCE
Status: COMPLETED | OUTPATIENT
Start: 2020-01-01 | End: 2020-01-01

## 2020-01-01 RX ORDER — ALBUMIN, HUMAN INJ 5% 5 %
25 SOLUTION INTRAVENOUS ONCE
Status: DISCONTINUED | OUTPATIENT
Start: 2020-01-01 | End: 2020-01-01 | Stop reason: DRUGHIGH

## 2020-01-01 RX ORDER — ALBUMIN (HUMAN) 12.5 G/50ML
50 SOLUTION INTRAVENOUS ONCE
Status: DISCONTINUED | OUTPATIENT
Start: 2020-01-01 | End: 2020-01-01

## 2020-01-01 RX ORDER — PROCHLORPERAZINE 25 MG
25 SUPPOSITORY, RECTAL RECTAL EVERY 12 HOURS PRN
Status: DISCONTINUED | OUTPATIENT
Start: 2020-01-01 | End: 2020-01-01

## 2020-01-01 RX ORDER — NOREPINEPHRINE BITARTRATE 0.06 MG/ML
0.03-0.4 INJECTION, SOLUTION INTRAVENOUS CONTINUOUS
Status: DISCONTINUED | OUTPATIENT
Start: 2020-01-01 | End: 2020-10-09 | Stop reason: HOSPADM

## 2020-01-01 RX ORDER — CARBOXYMETHYLCELLULOSE SODIUM 5 MG/ML
2 SOLUTION/ DROPS OPHTHALMIC
Status: DISCONTINUED | OUTPATIENT
Start: 2020-01-01 | End: 2020-01-01

## 2020-01-01 RX ORDER — CALCIUM CHLORIDE 100 MG/ML
1 INJECTION INTRAVENOUS; INTRAVENTRICULAR ONCE
Status: COMPLETED | OUTPATIENT
Start: 2020-01-01 | End: 2020-01-01

## 2020-01-01 RX ORDER — PROPOFOL 10 MG/ML
INJECTION, EMULSION INTRAVENOUS PRN
Status: DISCONTINUED | OUTPATIENT
Start: 2020-01-01 | End: 2020-01-01

## 2020-01-01 RX ORDER — POTASSIUM CL/LIDO/0.9 % NACL 10MEQ/0.1L
10 INTRAVENOUS SOLUTION, PIGGYBACK (ML) INTRAVENOUS
Status: DISCONTINUED | OUTPATIENT
Start: 2020-01-01 | End: 2020-10-09 | Stop reason: HOSPADM

## 2020-01-01 RX ORDER — HEPARIN SODIUM 10000 [USP'U]/100ML
650 INJECTION, SOLUTION INTRAVENOUS CONTINUOUS
Status: DISCONTINUED | OUTPATIENT
Start: 2020-01-01 | End: 2020-01-01

## 2020-01-01 RX ORDER — PROPOFOL 10 MG/ML
5-75 INJECTION, EMULSION INTRAVENOUS CONTINUOUS
Status: DISCONTINUED | OUTPATIENT
Start: 2020-01-01 | End: 2020-01-01

## 2020-01-01 RX ORDER — PHENYLEPHRINE HCL IN 0.9% NACL 50MG/250ML
.5-6 PLASTIC BAG, INJECTION (ML) INTRAVENOUS CONTINUOUS
Status: DISCONTINUED | OUTPATIENT
Start: 2020-01-01 | End: 2020-01-01

## 2020-01-01 RX ORDER — METHYLPREDNISOLONE SODIUM SUCCINATE 125 MG/2ML
INJECTION, POWDER, LYOPHILIZED, FOR SOLUTION INTRAMUSCULAR; INTRAVENOUS PRN
Status: DISCONTINUED | OUTPATIENT
Start: 2020-01-01 | End: 2020-01-01

## 2020-01-01 RX ORDER — ALBUMIN, HUMAN INJ 5% 5 %
SOLUTION INTRAVENOUS
Status: COMPLETED
Start: 2020-01-01 | End: 2020-01-01

## 2020-01-01 RX ORDER — NOREPINEPHRINE BITARTRATE 0.06 MG/ML
INJECTION, SOLUTION INTRAVENOUS
Status: DISCONTINUED
Start: 2020-01-01 | End: 2020-01-01 | Stop reason: HOSPADM

## 2020-01-01 RX ORDER — VANCOMYCIN HYDROCHLORIDE 1 G/200ML
1000 INJECTION, SOLUTION INTRAVENOUS EVERY 8 HOURS
Status: DISCONTINUED | OUTPATIENT
Start: 2020-01-01 | End: 2020-01-01

## 2020-01-01 RX ORDER — IOPAMIDOL 755 MG/ML
53 INJECTION, SOLUTION INTRAVASCULAR ONCE
Status: COMPLETED | OUTPATIENT
Start: 2020-01-01 | End: 2020-01-01

## 2020-01-01 RX ORDER — PROCHLORPERAZINE MALEATE 10 MG
10 TABLET ORAL EVERY 6 HOURS PRN
Status: DISCONTINUED | OUTPATIENT
Start: 2020-01-01 | End: 2020-10-09 | Stop reason: HOSPADM

## 2020-01-01 RX ORDER — FUROSEMIDE 10 MG/ML
20 INJECTION INTRAMUSCULAR; INTRAVENOUS ONCE
Status: COMPLETED | OUTPATIENT
Start: 2020-01-01 | End: 2020-01-01

## 2020-01-01 RX ORDER — ONDANSETRON 4 MG/1
4 TABLET, ORALLY DISINTEGRATING ORAL EVERY 6 HOURS PRN
Status: DISCONTINUED | OUTPATIENT
Start: 2020-01-01 | End: 2020-01-01

## 2020-01-01 RX ORDER — CARBOXYMETHYLCELLULOSE SODIUM 5 MG/ML
1-2 SOLUTION/ DROPS OPHTHALMIC EVERY 8 HOURS PRN
Status: DISCONTINUED | OUTPATIENT
Start: 2020-01-01 | End: 2020-10-09 | Stop reason: HOSPADM

## 2020-01-01 RX ORDER — PIPERACILLIN SODIUM, TAZOBACTAM SODIUM 3; .375 G/15ML; G/15ML
3.38 INJECTION, POWDER, LYOPHILIZED, FOR SOLUTION INTRAVENOUS EVERY 6 HOURS
Status: DISCONTINUED | OUTPATIENT
Start: 2020-01-01 | End: 2020-10-09 | Stop reason: HOSPADM

## 2020-01-01 RX ORDER — PROCHLORPERAZINE MALEATE 10 MG
10 TABLET ORAL EVERY 6 HOURS PRN
Status: DISCONTINUED | OUTPATIENT
Start: 2020-01-01 | End: 2020-01-01

## 2020-01-01 RX ORDER — IOPAMIDOL 755 MG/ML
71 INJECTION, SOLUTION INTRAVASCULAR ONCE
Status: COMPLETED | OUTPATIENT
Start: 2020-01-01 | End: 2020-01-01

## 2020-01-01 RX ORDER — ALBUMIN, HUMAN INJ 5% 5 %
500 SOLUTION INTRAVENOUS EVERY 12 HOURS
Status: DISCONTINUED | OUTPATIENT
Start: 2020-01-01 | End: 2020-01-01

## 2020-01-01 RX ORDER — PIPERACILLIN SODIUM, TAZOBACTAM SODIUM 3; .375 G/15ML; G/15ML
3.38 INJECTION, POWDER, LYOPHILIZED, FOR SOLUTION INTRAVENOUS EVERY 6 HOURS
Status: DISCONTINUED | OUTPATIENT
Start: 2020-01-01 | End: 2020-01-01

## 2020-01-01 RX ORDER — METOCLOPRAMIDE 5 MG/1
10 TABLET ORAL EVERY 6 HOURS PRN
Status: DISCONTINUED | OUTPATIENT
Start: 2020-01-01 | End: 2020-01-01

## 2020-01-01 RX ORDER — ALBUMIN, HUMAN INJ 5% 5 %
500 SOLUTION INTRAVENOUS ONCE
Status: DISCONTINUED | OUTPATIENT
Start: 2020-01-01 | End: 2020-01-01

## 2020-01-01 RX ORDER — FENTANYL CITRATE 50 UG/ML
INJECTION, SOLUTION INTRAMUSCULAR; INTRAVENOUS PRN
Status: DISCONTINUED | OUTPATIENT
Start: 2020-01-01 | End: 2020-01-01

## 2020-01-01 RX ORDER — LIDOCAINE 40 MG/G
CREAM TOPICAL
Status: DISCONTINUED | OUTPATIENT
Start: 2020-01-01 | End: 2020-01-01 | Stop reason: HOSPADM

## 2020-01-01 RX ORDER — DEXTROSE MONOHYDRATE 25 G/50ML
50 INJECTION, SOLUTION INTRAVENOUS ONCE
Status: DISCONTINUED | OUTPATIENT
Start: 2020-01-01 | End: 2020-01-01

## 2020-01-01 RX ORDER — PROCHLORPERAZINE 25 MG
25 SUPPOSITORY, RECTAL RECTAL EVERY 12 HOURS PRN
Status: DISCONTINUED | OUTPATIENT
Start: 2020-01-01 | End: 2020-10-09 | Stop reason: HOSPADM

## 2020-01-01 RX ORDER — IOPAMIDOL 755 MG/ML
75 INJECTION, SOLUTION INTRAVASCULAR ONCE
Status: COMPLETED | OUTPATIENT
Start: 2020-01-01 | End: 2020-01-01

## 2020-01-01 RX ORDER — SODIUM CHLORIDE 450 MG/100ML
INJECTION, SOLUTION INTRAVENOUS CONTINUOUS PRN
Status: DISCONTINUED | OUTPATIENT
Start: 2020-01-01 | End: 2020-01-01

## 2020-01-01 RX ORDER — MIDAZOLAM (PF) 1 MG/ML IN 0.9 % SODIUM CHLORIDE INTRAVENOUS SOLUTION
1-8 CONTINUOUS
Status: DISCONTINUED | OUTPATIENT
Start: 2020-01-01 | End: 2020-01-01

## 2020-01-01 RX ORDER — FUROSEMIDE 10 MG/ML
20 INJECTION INTRAMUSCULAR; INTRAVENOUS EVERY 12 HOURS
Status: DISCONTINUED | OUTPATIENT
Start: 2020-01-01 | End: 2020-10-09 | Stop reason: HOSPADM

## 2020-01-01 RX ORDER — ALBUMIN, HUMAN INJ 5% 5 %
25 SOLUTION INTRAVENOUS ONCE
Status: COMPLETED | OUTPATIENT
Start: 2020-01-01 | End: 2020-01-01

## 2020-01-01 RX ORDER — ALBUMIN (HUMAN) 12.5 G/50ML
SOLUTION INTRAVENOUS
Status: DISCONTINUED
Start: 2020-01-01 | End: 2020-01-01 | Stop reason: HOSPADM

## 2020-01-01 RX ORDER — ONDANSETRON 2 MG/ML
4 INJECTION INTRAMUSCULAR; INTRAVENOUS
Status: COMPLETED | OUTPATIENT
Start: 2020-01-01 | End: 2020-01-01

## 2020-01-01 RX ORDER — LABETALOL HYDROCHLORIDE 5 MG/ML
10 INJECTION, SOLUTION INTRAVENOUS
Status: DISCONTINUED | OUTPATIENT
Start: 2020-01-01 | End: 2020-01-01

## 2020-01-01 RX ORDER — THIAMINE HYDROCHLORIDE 100 MG/ML
100 INJECTION, SOLUTION INTRAMUSCULAR; INTRAVENOUS DAILY
Status: DISCONTINUED | OUTPATIENT
Start: 2020-01-01 | End: 2020-01-01

## 2020-01-01 RX ORDER — VASOPRESSIN 20 U/ML
INJECTION PARENTERAL PRN
Status: DISCONTINUED | OUTPATIENT
Start: 2020-01-01 | End: 2020-01-01

## 2020-01-01 RX ORDER — DEXMEDETOMIDINE HYDROCHLORIDE 4 UG/ML
0.2-0.7 INJECTION, SOLUTION INTRAVENOUS CONTINUOUS
Status: DISCONTINUED | OUTPATIENT
Start: 2020-01-01 | End: 2020-10-09 | Stop reason: HOSPADM

## 2020-01-01 RX ORDER — HYDROMORPHONE HYDROCHLORIDE 1 MG/ML
0.5 INJECTION, SOLUTION INTRAMUSCULAR; INTRAVENOUS; SUBCUTANEOUS
Status: DISCONTINUED | OUTPATIENT
Start: 2020-01-01 | End: 2020-10-09 | Stop reason: HOSPADM

## 2020-01-01 RX ORDER — METOPROLOL TARTRATE 1 MG/ML
INJECTION, SOLUTION INTRAVENOUS PRN
Status: DISCONTINUED | OUTPATIENT
Start: 2020-01-01 | End: 2020-01-01

## 2020-01-01 RX ORDER — VANCOMYCIN HYDROCHLORIDE 1 G/200ML
1000 INJECTION, SOLUTION INTRAVENOUS EVERY 12 HOURS
Status: DISCONTINUED | OUTPATIENT
Start: 2020-01-01 | End: 2020-01-01

## 2020-01-01 RX ORDER — HYDROMORPHONE HYDROCHLORIDE 1 MG/ML
.3-.5 INJECTION, SOLUTION INTRAMUSCULAR; INTRAVENOUS; SUBCUTANEOUS
Status: DISCONTINUED | OUTPATIENT
Start: 2020-01-01 | End: 2020-01-01

## 2020-01-01 RX ORDER — HEPARIN SODIUM 5000 [USP'U]/.5ML
5000 INJECTION, SOLUTION INTRAVENOUS; SUBCUTANEOUS EVERY 8 HOURS
Status: DISCONTINUED | OUTPATIENT
Start: 2020-01-01 | End: 2020-01-01

## 2020-01-01 RX ORDER — GUAR GUM
1 PACKET (EA) ORAL 3 TIMES DAILY
Status: DISCONTINUED | OUTPATIENT
Start: 2020-01-01 | End: 2020-01-01 | Stop reason: CLARIF

## 2020-01-01 RX ORDER — DEXTROSE, SODIUM CHLORIDE, SODIUM LACTATE, POTASSIUM CHLORIDE, AND CALCIUM CHLORIDE 5; .6; .31; .03; .02 G/100ML; G/100ML; G/100ML; G/100ML; G/100ML
INJECTION, SOLUTION INTRAVENOUS CONTINUOUS
Status: DISCONTINUED | OUTPATIENT
Start: 2020-01-01 | End: 2020-01-01

## 2020-01-01 RX ORDER — HEPARIN SODIUM 10000 [USP'U]/100ML
600 INJECTION, SOLUTION INTRAVENOUS CONTINUOUS
Status: DISCONTINUED | OUTPATIENT
Start: 2020-01-01 | End: 2020-01-01

## 2020-01-01 RX ORDER — IOPAMIDOL 755 MG/ML
51 INJECTION, SOLUTION INTRAVASCULAR ONCE
Status: COMPLETED | OUTPATIENT
Start: 2020-01-01 | End: 2020-01-01

## 2020-01-01 RX ORDER — SODIUM CHLORIDE 9 MG/ML
INJECTION, SOLUTION INTRAVENOUS CONTINUOUS PRN
Status: DISCONTINUED | OUTPATIENT
Start: 2020-01-01 | End: 2020-01-01

## 2020-01-01 RX ORDER — HEPARIN SODIUM 10000 [USP'U]/100ML
650 INJECTION, SOLUTION INTRAVENOUS
Status: DISCONTINUED | OUTPATIENT
Start: 2020-01-01 | End: 2020-01-01

## 2020-01-01 RX ORDER — CHLORHEXIDINE GLUCONATE ORAL RINSE 1.2 MG/ML
15 SOLUTION DENTAL EVERY 12 HOURS
Status: DISCONTINUED | OUTPATIENT
Start: 2020-01-01 | End: 2020-01-01

## 2020-01-01 RX ORDER — FUROSEMIDE 10 MG/ML
INJECTION INTRAMUSCULAR; INTRAVENOUS
Status: COMPLETED
Start: 2020-01-01 | End: 2020-01-01

## 2020-01-01 RX ORDER — ERTAPENEM 1 G/1
1 INJECTION, POWDER, LYOPHILIZED, FOR SOLUTION INTRAMUSCULAR; INTRAVENOUS EVERY 24 HOURS
Status: COMPLETED | OUTPATIENT
Start: 2020-01-01 | End: 2020-01-01

## 2020-01-01 RX ORDER — DEXTROSE MONOHYDRATE 25 G/50ML
25-50 INJECTION, SOLUTION INTRAVENOUS
Status: DISCONTINUED | OUTPATIENT
Start: 2020-01-01 | End: 2020-10-09 | Stop reason: HOSPADM

## 2020-01-01 RX ORDER — NICOTINE POLACRILEX 4 MG
15-30 LOZENGE BUCCAL
Status: DISCONTINUED | OUTPATIENT
Start: 2020-01-01 | End: 2020-10-09 | Stop reason: HOSPADM

## 2020-01-01 RX ORDER — FENTANYL CITRATE 50 UG/ML
50 INJECTION, SOLUTION INTRAMUSCULAR; INTRAVENOUS
Status: ACTIVE | OUTPATIENT
Start: 2020-01-01 | End: 2020-01-01

## 2020-01-01 RX ORDER — METOCLOPRAMIDE HYDROCHLORIDE 5 MG/ML
10 INJECTION INTRAMUSCULAR; INTRAVENOUS EVERY 6 HOURS PRN
Status: DISCONTINUED | OUTPATIENT
Start: 2020-01-01 | End: 2020-01-01

## 2020-01-01 RX ORDER — ONDANSETRON 2 MG/ML
4 INJECTION INTRAMUSCULAR; INTRAVENOUS EVERY 30 MIN PRN
Status: DISCONTINUED | OUTPATIENT
Start: 2020-01-01 | End: 2020-01-01

## 2020-01-01 RX ORDER — HYDROMORPHONE HYDROCHLORIDE 1 MG/ML
0.3 INJECTION, SOLUTION INTRAMUSCULAR; INTRAVENOUS; SUBCUTANEOUS ONCE
Status: DISCONTINUED | OUTPATIENT
Start: 2020-01-01 | End: 2020-10-09 | Stop reason: HOSPADM

## 2020-01-01 RX ORDER — NOREPINEPHRINE BITARTRATE 0.06 MG/ML
0.03-0.4 INJECTION, SOLUTION INTRAVENOUS CONTINUOUS
Status: DISCONTINUED | OUTPATIENT
Start: 2020-01-01 | End: 2020-01-01

## 2020-01-01 RX ORDER — ONDANSETRON 2 MG/ML
4 INJECTION INTRAMUSCULAR; INTRAVENOUS EVERY 6 HOURS PRN
Status: DISCONTINUED | OUTPATIENT
Start: 2020-01-01 | End: 2020-10-09 | Stop reason: HOSPADM

## 2020-01-01 RX ORDER — DEXMEDETOMIDINE HYDROCHLORIDE 4 UG/ML
.2-.7 INJECTION, SOLUTION INTRAVENOUS CONTINUOUS
Status: DISCONTINUED | OUTPATIENT
Start: 2020-01-01 | End: 2020-01-01

## 2020-01-01 RX ORDER — DEXMEDETOMIDINE HYDROCHLORIDE 4 UG/ML
0.2-0.7 INJECTION, SOLUTION INTRAVENOUS CONTINUOUS
Status: DISCONTINUED | OUTPATIENT
Start: 2020-01-01 | End: 2020-01-01

## 2020-01-01 RX ORDER — METOLAZONE 5 MG/1
5 TABLET ORAL ONCE
Status: COMPLETED | OUTPATIENT
Start: 2020-01-01 | End: 2020-01-01

## 2020-01-01 RX ORDER — PHENYLEPHRINE HCL IN 0.9% NACL 50MG/250ML
0.5-6 PLASTIC BAG, INJECTION (ML) INTRAVENOUS CONTINUOUS
Status: DISCONTINUED | OUTPATIENT
Start: 2020-01-01 | End: 2020-01-01

## 2020-01-01 RX ORDER — DEXTROSE MONOHYDRATE 50 MG/ML
INJECTION, SOLUTION INTRAVENOUS CONTINUOUS
Status: DISCONTINUED | OUTPATIENT
Start: 2020-01-01 | End: 2020-01-01

## 2020-01-01 RX ORDER — LANOLIN ALCOHOL/MO/W.PET/CERES
3 CREAM (GRAM) TOPICAL
Status: DISCONTINUED | OUTPATIENT
Start: 2020-01-01 | End: 2020-01-01

## 2020-01-01 RX ORDER — LIDOCAINE HYDROCHLORIDE 20 MG/ML
INJECTION, SOLUTION INFILTRATION; PERINEURAL PRN
Status: DISCONTINUED | OUTPATIENT
Start: 2020-01-01 | End: 2020-01-01

## 2020-01-01 RX ORDER — PROPOFOL 10 MG/ML
INJECTION, EMULSION INTRAVENOUS
Status: COMPLETED
Start: 2020-01-01 | End: 2020-01-01

## 2020-01-01 RX ORDER — ALBUMIN (HUMAN) 12.5 G/50ML
12.5 SOLUTION INTRAVENOUS ONCE
Status: COMPLETED | OUTPATIENT
Start: 2020-01-01 | End: 2020-01-01

## 2020-01-01 RX ORDER — FENTANYL CITRATE 50 UG/ML
25-50 INJECTION, SOLUTION INTRAMUSCULAR; INTRAVENOUS
Status: DISCONTINUED | OUTPATIENT
Start: 2020-01-01 | End: 2020-01-01

## 2020-01-01 RX ORDER — DEXTROSE MONOHYDRATE 25 G/50ML
50 INJECTION, SOLUTION INTRAVENOUS ONCE
Status: COMPLETED | OUTPATIENT
Start: 2020-01-01 | End: 2020-01-01

## 2020-01-01 RX ORDER — NITROGLYCERIN 0.4 MG/1
0.4 TABLET SUBLINGUAL EVERY 5 MIN PRN
Status: DISCONTINUED | OUTPATIENT
Start: 2020-01-01 | End: 2020-01-01

## 2020-01-01 RX ORDER — MIDAZOLAM (PF) 1 MG/ML IN 0.9 % SODIUM CHLORIDE INTRAVENOUS SOLUTION
1-8 CONTINUOUS
Status: DISCONTINUED | OUTPATIENT
Start: 2020-01-01 | End: 2020-10-09 | Stop reason: HOSPADM

## 2020-01-01 RX ORDER — LIDOCAINE 40 MG/G
CREAM TOPICAL
Status: DISCONTINUED | OUTPATIENT
Start: 2020-01-01 | End: 2020-10-09 | Stop reason: HOSPADM

## 2020-01-01 RX ORDER — POTASSIUM CHLORIDE 1500 MG/1
20-40 TABLET, EXTENDED RELEASE ORAL
Status: DISCONTINUED | OUTPATIENT
Start: 2020-01-01 | End: 2020-10-09 | Stop reason: HOSPADM

## 2020-01-01 RX ORDER — DEXTROSE MONOHYDRATE 25 G/50ML
25 INJECTION, SOLUTION INTRAVENOUS ONCE
Status: COMPLETED | OUTPATIENT
Start: 2020-01-01 | End: 2020-01-01

## 2020-01-01 RX ORDER — HYDROMORPHONE HYDROCHLORIDE 1 MG/ML
.3-.5 INJECTION, SOLUTION INTRAMUSCULAR; INTRAVENOUS; SUBCUTANEOUS EVERY 5 MIN PRN
Status: DISCONTINUED | OUTPATIENT
Start: 2020-01-01 | End: 2020-01-01

## 2020-01-01 RX ORDER — ONDANSETRON 4 MG/1
4 TABLET, ORALLY DISINTEGRATING ORAL EVERY 6 HOURS PRN
Status: DISCONTINUED | OUTPATIENT
Start: 2020-01-01 | End: 2020-10-09 | Stop reason: HOSPADM

## 2020-01-01 RX ORDER — PIPERACILLIN SODIUM, TAZOBACTAM SODIUM 4; .5 G/20ML; G/20ML
4.5 INJECTION, POWDER, LYOPHILIZED, FOR SOLUTION INTRAVENOUS ONCE
Status: COMPLETED | OUTPATIENT
Start: 2020-01-01 | End: 2020-01-01

## 2020-01-01 RX ORDER — POTASSIUM CHLORIDE 1.5 G/1.58G
20-40 POWDER, FOR SOLUTION ORAL
Status: DISCONTINUED | OUTPATIENT
Start: 2020-01-01 | End: 2020-10-09 | Stop reason: HOSPADM

## 2020-01-01 RX ORDER — VANCOMYCIN HYDROCHLORIDE 1 G/200ML
1000 INJECTION, SOLUTION INTRAVENOUS ONCE
Status: COMPLETED | OUTPATIENT
Start: 2020-01-01 | End: 2020-01-01

## 2020-01-01 RX ORDER — NICOTINE POLACRILEX 4 MG
15-30 LOZENGE BUCCAL
Status: DISCONTINUED | OUTPATIENT
Start: 2020-01-01 | End: 2020-01-01

## 2020-01-01 RX ORDER — MAGNESIUM SULFATE IN WATER 40 MG/ML
1 INJECTION, SOLUTION INTRAVENOUS CONTINUOUS
Status: DISCONTINUED | OUTPATIENT
Start: 2020-01-01 | End: 2020-10-09 | Stop reason: HOSPADM

## 2020-01-01 RX ORDER — CHLORHEXIDINE GLUCONATE ORAL RINSE 1.2 MG/ML
15 SOLUTION DENTAL EVERY 12 HOURS
Status: DISCONTINUED | OUTPATIENT
Start: 2020-01-01 | End: 2020-10-09 | Stop reason: HOSPADM

## 2020-01-01 RX ORDER — SODIUM CHLORIDE 9 MG/ML
INJECTION, SOLUTION INTRAVENOUS CONTINUOUS
Status: DISCONTINUED | OUTPATIENT
Start: 2020-01-01 | End: 2020-01-01 | Stop reason: CLARIF

## 2020-01-01 RX ORDER — METOPROLOL TARTRATE 1 MG/ML
5 INJECTION, SOLUTION INTRAVENOUS EVERY 4 HOURS PRN
Status: DISCONTINUED | OUTPATIENT
Start: 2020-01-01 | End: 2020-10-09 | Stop reason: HOSPADM

## 2020-01-01 RX ORDER — POTASSIUM CHLORIDE 7.45 MG/ML
10 INJECTION INTRAVENOUS
Status: DISCONTINUED | OUTPATIENT
Start: 2020-01-01 | End: 2020-10-09 | Stop reason: HOSPADM

## 2020-01-01 RX ORDER — FUROSEMIDE 10 MG/ML
40 INJECTION INTRAMUSCULAR; INTRAVENOUS ONCE
Status: COMPLETED | OUTPATIENT
Start: 2020-01-01 | End: 2020-01-01

## 2020-01-01 RX ORDER — ONDANSETRON 4 MG/1
4 TABLET, ORALLY DISINTEGRATING ORAL EVERY 30 MIN PRN
Status: DISCONTINUED | OUTPATIENT
Start: 2020-01-01 | End: 2020-01-01

## 2020-01-01 RX ORDER — MAGNESIUM SULFATE HEPTAHYDRATE 40 MG/ML
2 INJECTION, SOLUTION INTRAVENOUS DAILY PRN
Status: DISCONTINUED | OUTPATIENT
Start: 2020-01-01 | End: 2020-10-09 | Stop reason: HOSPADM

## 2020-01-01 RX ORDER — ALBUTEROL SULFATE 0.83 MG/ML
2.5 SOLUTION RESPIRATORY (INHALATION)
Status: DISCONTINUED | OUTPATIENT
Start: 2020-01-01 | End: 2020-10-09 | Stop reason: HOSPADM

## 2020-01-01 RX ORDER — HEPARIN SODIUM 10000 [USP'U]/100ML
800 INJECTION, SOLUTION INTRAVENOUS CONTINUOUS
Status: DISCONTINUED | OUTPATIENT
Start: 2020-01-01 | End: 2020-01-01

## 2020-01-01 RX ORDER — MEROPENEM 1 G/1
1 INJECTION, POWDER, FOR SOLUTION INTRAVENOUS EVERY 8 HOURS
Status: DISCONTINUED | OUTPATIENT
Start: 2020-01-01 | End: 2020-01-01

## 2020-01-01 RX ADMIN — MICAFUNGIN SODIUM 100 MG: 50 INJECTION, POWDER, LYOPHILIZED, FOR SOLUTION INTRAVENOUS at 22:23

## 2020-01-01 RX ADMIN — PROPOFOL 30 MCG/KG/MIN: 10 INJECTION, EMULSION INTRAVENOUS at 06:04

## 2020-01-01 RX ADMIN — DEXTROSE AND SODIUM CHLORIDE: 5; 900 INJECTION, SOLUTION INTRAVENOUS at 23:25

## 2020-01-01 RX ADMIN — DEXMEDETOMIDINE HYDROCHLORIDE 0.2 MCG/KG/HR: 100 INJECTION, SOLUTION, CONCENTRATE INTRAVENOUS at 20:57

## 2020-01-01 RX ADMIN — CHLORHEXIDINE GLUCONATE 15 ML: 1.2 RINSE ORAL at 01:31

## 2020-01-01 RX ADMIN — DEXTROSE AND SODIUM CHLORIDE: 5; 900 INJECTION, SOLUTION INTRAVENOUS at 23:48

## 2020-01-01 RX ADMIN — THIAMINE HYDROCHLORIDE 100 MG: 100 INJECTION, SOLUTION INTRAMUSCULAR; INTRAVENOUS at 08:26

## 2020-01-01 RX ADMIN — SODIUM CHLORIDE 4 UNITS/HR: 9 INJECTION, SOLUTION INTRAVENOUS at 02:43

## 2020-01-01 RX ADMIN — DEXMEDETOMIDINE HYDROCHLORIDE 0.7 MCG/KG/HR: 100 INJECTION, SOLUTION, CONCENTRATE INTRAVENOUS at 23:09

## 2020-01-01 RX ADMIN — HYDROMORPHONE HYDROCHLORIDE 0.5 MG: 1 INJECTION, SOLUTION INTRAMUSCULAR; INTRAVENOUS; SUBCUTANEOUS at 21:02

## 2020-01-01 RX ADMIN — HEPARIN SODIUM 5000 UNITS: 5000 INJECTION, SOLUTION INTRAVENOUS; SUBCUTANEOUS at 22:20

## 2020-01-01 RX ADMIN — FUROSEMIDE 20 MG: 10 INJECTION, SOLUTION INTRAVENOUS at 10:55

## 2020-01-01 RX ADMIN — MICAFUNGIN SODIUM 100 MG: 50 INJECTION, POWDER, LYOPHILIZED, FOR SOLUTION INTRAVENOUS at 23:09

## 2020-01-01 RX ADMIN — INSULIN ASPART 1 UNITS: 100 INJECTION, SOLUTION INTRAVENOUS; SUBCUTANEOUS at 16:40

## 2020-01-01 RX ADMIN — PIPERACILLIN SODIUM AND TAZOBACTAM SODIUM 3.38 G: 3; .375 INJECTION, POWDER, LYOPHILIZED, FOR SOLUTION INTRAVENOUS at 04:44

## 2020-01-01 RX ADMIN — VASOPRESSIN 2.4 UNITS/HR: 20 INJECTION INTRAVENOUS at 00:10

## 2020-01-01 RX ADMIN — PIPERACILLIN AND TAZOBACTAM 4.5 G: 4; .5 INJECTION, POWDER, FOR SOLUTION INTRAVENOUS at 22:08

## 2020-01-01 RX ADMIN — CHLORHEXIDINE GLUCONATE 15 ML: 1.2 RINSE ORAL at 19:50

## 2020-01-01 RX ADMIN — SODIUM CHLORIDE 60 ML: 9 INJECTION, SOLUTION INTRAVENOUS at 10:13

## 2020-01-01 RX ADMIN — INSULIN ASPART 1 UNITS: 100 INJECTION, SOLUTION INTRAVENOUS; SUBCUTANEOUS at 16:25

## 2020-01-01 RX ADMIN — PIPERACILLIN AND TAZOBACTAM 4.5 G: 4; .5 INJECTION, POWDER, FOR SOLUTION INTRAVENOUS at 16:30

## 2020-01-01 RX ADMIN — SODIUM CHLORIDE, SODIUM LACTATE, POTASSIUM CHLORIDE, CALCIUM CHLORIDE AND DEXTROSE MONOHYDRATE: 5; 600; 310; 30; 20 INJECTION, SOLUTION INTRAVENOUS at 05:22

## 2020-01-01 RX ADMIN — POTASSIUM PHOSPHATE, MONOBASIC AND POTASSIUM PHOSPHATE, DIBASIC 20 MMOL: 224; 236 INJECTION, SOLUTION, CONCENTRATE INTRAVENOUS at 06:42

## 2020-01-01 RX ADMIN — POTASSIUM PHOSPHATE, MONOBASIC AND POTASSIUM PHOSPHATE, DIBASIC: 224; 236 INJECTION, SOLUTION INTRAVENOUS at 19:23

## 2020-01-01 RX ADMIN — PIPERACILLIN AND TAZOBACTAM 4.5 G: 4; .5 INJECTION, POWDER, FOR SOLUTION INTRAVENOUS at 16:37

## 2020-01-01 RX ADMIN — POTASSIUM CHLORIDE 20 MEQ: 29.8 INJECTION, SOLUTION INTRAVENOUS at 07:18

## 2020-01-01 RX ADMIN — ALBUMIN HUMAN 25 G: 0.05 INJECTION, SOLUTION INTRAVENOUS at 22:04

## 2020-01-01 RX ADMIN — SODIUM CHLORIDE: 9 INJECTION, SOLUTION INTRAVENOUS at 16:26

## 2020-01-01 RX ADMIN — MAGNESIUM SULFATE IN WATER 2 G: 40 INJECTION, SOLUTION INTRAVENOUS at 16:02

## 2020-01-01 RX ADMIN — FUROSEMIDE 20 MG: 10 INJECTION, SOLUTION INTRAVENOUS at 09:42

## 2020-01-01 RX ADMIN — SODIUM CHLORIDE: 9 INJECTION, SOLUTION INTRAVENOUS at 19:54

## 2020-01-01 RX ADMIN — PROPOFOL 40 MCG/KG/MIN: 10 INJECTION, EMULSION INTRAVENOUS at 13:49

## 2020-01-01 RX ADMIN — PANTOPRAZOLE SODIUM 40 MG: 40 INJECTION, POWDER, FOR SOLUTION INTRAVENOUS at 08:17

## 2020-01-01 RX ADMIN — MEROPENEM 1 G: 1 INJECTION, POWDER, FOR SOLUTION INTRAVENOUS at 22:33

## 2020-01-01 RX ADMIN — PHENYLEPHRINE HYDROCHLORIDE 200 MCG: 10 INJECTION INTRAVENOUS at 21:12

## 2020-01-01 RX ADMIN — CHLORHEXIDINE GLUCONATE 15 ML: 1.2 RINSE ORAL at 07:45

## 2020-01-01 RX ADMIN — PHENYLEPHRINE HYDROCHLORIDE 100 MCG: 10 INJECTION INTRAVENOUS at 16:16

## 2020-01-01 RX ADMIN — IPRATROPIUM BROMIDE AND ALBUTEROL SULFATE 3 ML: .5; 3 SOLUTION RESPIRATORY (INHALATION) at 11:27

## 2020-01-01 RX ADMIN — ROCURONIUM BROMIDE 30 MG: 10 INJECTION INTRAVENOUS at 14:29

## 2020-01-01 RX ADMIN — MICAFUNGIN SODIUM 100 MG: 50 INJECTION, POWDER, LYOPHILIZED, FOR SOLUTION INTRAVENOUS at 22:43

## 2020-01-01 RX ADMIN — POTASSIUM CHLORIDE 40 MEQ: 1.5 POWDER, FOR SOLUTION ORAL at 12:22

## 2020-01-01 RX ADMIN — SODIUM CHLORIDE 2 UNITS/HR: 9 INJECTION, SOLUTION INTRAVENOUS at 14:16

## 2020-01-01 RX ADMIN — DEXMEDETOMIDINE HYDROCHLORIDE 0.7 MCG/KG/HR: 100 INJECTION, SOLUTION, CONCENTRATE INTRAVENOUS at 13:43

## 2020-01-01 RX ADMIN — METRONIDAZOLE 500 MG: 500 INJECTION, SOLUTION INTRAVENOUS at 12:49

## 2020-01-01 RX ADMIN — Medication 1 PACKET: at 22:21

## 2020-01-01 RX ADMIN — HYDROCORTISONE SODIUM SUCCINATE 50 MG: 100 INJECTION, POWDER, FOR SOLUTION INTRAMUSCULAR; INTRAVENOUS at 13:43

## 2020-01-01 RX ADMIN — PROPOFOL 10 MCG/KG/MIN: 10 INJECTION, EMULSION INTRAVENOUS at 09:26

## 2020-01-01 RX ADMIN — HYDROCORTISONE SODIUM SUCCINATE 50 MG: 100 INJECTION, POWDER, FOR SOLUTION INTRAMUSCULAR; INTRAVENOUS at 14:37

## 2020-01-01 RX ADMIN — SODIUM CHLORIDE: 9 INJECTION, SOLUTION INTRAVENOUS at 15:38

## 2020-01-01 RX ADMIN — MICAFUNGIN SODIUM 100 MG: 50 INJECTION, POWDER, LYOPHILIZED, FOR SOLUTION INTRAVENOUS at 22:02

## 2020-01-01 RX ADMIN — SODIUM CHLORIDE, POTASSIUM CHLORIDE, SODIUM LACTATE AND CALCIUM CHLORIDE: 600; 310; 30; 20 INJECTION, SOLUTION INTRAVENOUS at 14:04

## 2020-01-01 RX ADMIN — VANCOMYCIN HYDROCHLORIDE 1000 MG: 1 INJECTION, SOLUTION INTRAVENOUS at 00:00

## 2020-01-01 RX ADMIN — Medication 125 MCG/HR: at 01:54

## 2020-01-01 RX ADMIN — SODIUM CHLORIDE, SODIUM LACTATE, POTASSIUM CHLORIDE, CALCIUM CHLORIDE AND DEXTROSE MONOHYDRATE: 5; 600; 310; 30; 20 INJECTION, SOLUTION INTRAVENOUS at 06:00

## 2020-01-01 RX ADMIN — HYDROMORPHONE HYDROCHLORIDE 0.5 MG: 1 INJECTION, SOLUTION INTRAMUSCULAR; INTRAVENOUS; SUBCUTANEOUS at 10:23

## 2020-01-01 RX ADMIN — ALBUTEROL SULFATE 2.5 MG: 2.5 SOLUTION RESPIRATORY (INHALATION) at 14:59

## 2020-01-01 RX ADMIN — HYDROMORPHONE HYDROCHLORIDE 0.5 MG: 1 INJECTION, SOLUTION INTRAMUSCULAR; INTRAVENOUS; SUBCUTANEOUS at 08:41

## 2020-01-01 RX ADMIN — SODIUM CHLORIDE 1.5 UNITS/HR: 9 INJECTION, SOLUTION INTRAVENOUS at 02:22

## 2020-01-01 RX ADMIN — CHLORHEXIDINE GLUCONATE 15 ML: 1.2 RINSE ORAL at 07:51

## 2020-01-01 RX ADMIN — Medication 1 UNITS: at 21:58

## 2020-01-01 RX ADMIN — Medication 1 UNITS: at 21:47

## 2020-01-01 RX ADMIN — POTASSIUM CHLORIDE 40 MEQ: 1.5 POWDER, FOR SOLUTION ORAL at 18:45

## 2020-01-01 RX ADMIN — PANTOPRAZOLE SODIUM 40 MG: 40 INJECTION, POWDER, FOR SOLUTION INTRAVENOUS at 09:38

## 2020-01-01 RX ADMIN — MIDAZOLAM HYDROCHLORIDE 2 MG: 1 INJECTION, SOLUTION INTRAMUSCULAR; INTRAVENOUS at 23:06

## 2020-01-01 RX ADMIN — INSULIN ASPART 1 UNITS: 100 INJECTION, SOLUTION INTRAVENOUS; SUBCUTANEOUS at 08:08

## 2020-01-01 RX ADMIN — THIAMINE HYDROCHLORIDE 100 MG: 100 INJECTION, SOLUTION INTRAMUSCULAR; INTRAVENOUS at 08:31

## 2020-01-01 RX ADMIN — METRONIDAZOLE 500 MG: 500 INJECTION, SOLUTION INTRAVENOUS at 00:34

## 2020-01-01 RX ADMIN — CHLORHEXIDINE GLUCONATE 15 ML: 1.2 RINSE ORAL at 08:43

## 2020-01-01 RX ADMIN — PHENYLEPHRINE HYDROCHLORIDE 100 MCG: 10 INJECTION INTRAVENOUS at 01:57

## 2020-01-01 RX ADMIN — SODIUM CHLORIDE, SODIUM LACTATE, POTASSIUM CHLORIDE, CALCIUM CHLORIDE AND DEXTROSE MONOHYDRATE: 5; 600; 310; 30; 20 INJECTION, SOLUTION INTRAVENOUS at 14:26

## 2020-01-01 RX ADMIN — CHLORHEXIDINE GLUCONATE 15 ML: 1.2 RINSE ORAL at 08:09

## 2020-01-01 RX ADMIN — METRONIDAZOLE 500 MG: 500 INJECTION, SOLUTION INTRAVENOUS at 11:27

## 2020-01-01 RX ADMIN — PIPERACILLIN SODIUM AND TAZOBACTAM SODIUM 3.38 G: 3; .375 INJECTION, POWDER, LYOPHILIZED, FOR SOLUTION INTRAVENOUS at 10:23

## 2020-01-01 RX ADMIN — PHENYLEPHRINE HYDROCHLORIDE 100 MCG: 10 INJECTION INTRAVENOUS at 16:13

## 2020-01-01 RX ADMIN — DEXTROSE AND SODIUM CHLORIDE: 5; 900 INJECTION, SOLUTION INTRAVENOUS at 10:49

## 2020-01-01 RX ADMIN — CHLORHEXIDINE GLUCONATE 15 ML: 1.2 RINSE ORAL at 09:33

## 2020-01-01 RX ADMIN — CHLORHEXIDINE GLUCONATE 15 ML: 1.2 RINSE ORAL at 08:30

## 2020-01-01 RX ADMIN — THIAMINE HYDROCHLORIDE 100 MG: 100 INJECTION, SOLUTION INTRAMUSCULAR; INTRAVENOUS at 09:22

## 2020-01-01 RX ADMIN — IOPAMIDOL 75 ML: 755 INJECTION, SOLUTION INTRAVENOUS at 10:29

## 2020-01-01 RX ADMIN — PIPERACILLIN SODIUM AND TAZOBACTAM SODIUM 4.5 G: 4; .5 INJECTION, POWDER, LYOPHILIZED, FOR SOLUTION INTRAVENOUS at 02:27

## 2020-01-01 RX ADMIN — PHENYLEPHRINE HYDROCHLORIDE 200 MCG: 10 INJECTION INTRAVENOUS at 12:34

## 2020-01-01 RX ADMIN — POTASSIUM PHOSPHATE, MONOBASIC AND POTASSIUM PHOSPHATE, DIBASIC: 224; 236 INJECTION, SOLUTION INTRAVENOUS at 20:33

## 2020-01-01 RX ADMIN — HYDROCORTISONE SODIUM SUCCINATE 50 MG: 100 INJECTION, POWDER, FOR SOLUTION INTRAMUSCULAR; INTRAVENOUS at 09:23

## 2020-01-01 RX ADMIN — CHLORHEXIDINE GLUCONATE 15 ML: 1.2 RINSE ORAL at 08:34

## 2020-01-01 RX ADMIN — HYDROCORTISONE SODIUM SUCCINATE 50 MG: 100 INJECTION, POWDER, FOR SOLUTION INTRAMUSCULAR; INTRAVENOUS at 01:20

## 2020-01-01 RX ADMIN — ALBUMIN HUMAN 250 ML: 0.05 INJECTION, SOLUTION INTRAVENOUS at 04:46

## 2020-01-01 RX ADMIN — CHLORHEXIDINE GLUCONATE 15 ML: 1.2 RINSE ORAL at 19:27

## 2020-01-01 RX ADMIN — PIPERACILLIN SODIUM AND TAZOBACTAM SODIUM 3.38 G: 3; .375 INJECTION, POWDER, LYOPHILIZED, FOR SOLUTION INTRAVENOUS at 22:15

## 2020-01-01 RX ADMIN — NOREPINEPHRINE BITARTRATE 0.03 MCG/KG/MIN: 1 INJECTION, SOLUTION, CONCENTRATE INTRAVENOUS at 13:54

## 2020-01-01 RX ADMIN — PIPERACILLIN SODIUM AND TAZOBACTAM SODIUM 4.5 G: 4; .5 INJECTION, POWDER, LYOPHILIZED, FOR SOLUTION INTRAVENOUS at 17:51

## 2020-01-01 RX ADMIN — IOPAMIDOL 51 ML: 755 INJECTION, SOLUTION INTRAVENOUS at 07:44

## 2020-01-01 RX ADMIN — PIPERACILLIN SODIUM AND TAZOBACTAM SODIUM 3.38 G: 3; .375 INJECTION, POWDER, LYOPHILIZED, FOR SOLUTION INTRAVENOUS at 10:32

## 2020-01-01 RX ADMIN — SODIUM CHLORIDE: 9 INJECTION, SOLUTION INTRAVENOUS at 16:20

## 2020-01-01 RX ADMIN — MICAFUNGIN SODIUM 100 MG: 50 INJECTION, POWDER, LYOPHILIZED, FOR SOLUTION INTRAVENOUS at 00:03

## 2020-01-01 RX ADMIN — PIPERACILLIN SODIUM AND TAZOBACTAM SODIUM 3.38 G: 3; .375 INJECTION, POWDER, LYOPHILIZED, FOR SOLUTION INTRAVENOUS at 04:35

## 2020-01-01 RX ADMIN — METRONIDAZOLE 500 MG: 500 INJECTION, SOLUTION INTRAVENOUS at 19:29

## 2020-01-01 RX ADMIN — IPRATROPIUM BROMIDE AND ALBUTEROL SULFATE 3 ML: .5; 3 SOLUTION RESPIRATORY (INHALATION) at 12:09

## 2020-01-01 RX ADMIN — PIPERACILLIN SODIUM AND TAZOBACTAM SODIUM 4.5 G: 4; .5 INJECTION, POWDER, LYOPHILIZED, FOR SOLUTION INTRAVENOUS at 10:09

## 2020-01-01 RX ADMIN — SODIUM CHLORIDE, POTASSIUM CHLORIDE, SODIUM LACTATE AND CALCIUM CHLORIDE 500 ML: 600; 310; 30; 20 INJECTION, SOLUTION INTRAVENOUS at 14:31

## 2020-01-01 RX ADMIN — FENTANYL CITRATE 50 MCG: 50 INJECTION, SOLUTION INTRAMUSCULAR; INTRAVENOUS at 22:34

## 2020-01-01 RX ADMIN — CHLORHEXIDINE GLUCONATE 15 ML: 1.2 RINSE ORAL at 08:10

## 2020-01-01 RX ADMIN — CHLORHEXIDINE GLUCONATE 15 ML: 1.2 RINSE ORAL at 20:10

## 2020-01-01 RX ADMIN — SODIUM CHLORIDE 4 UNITS/HR: 9 INJECTION, SOLUTION INTRAVENOUS at 08:58

## 2020-01-01 RX ADMIN — PROPOFOL 30 MG: 10 INJECTION, EMULSION INTRAVENOUS at 14:18

## 2020-01-01 RX ADMIN — PIPERACILLIN AND TAZOBACTAM 4.5 G: 4; .5 INJECTION, POWDER, FOR SOLUTION INTRAVENOUS at 04:31

## 2020-01-01 RX ADMIN — MEROPENEM 1 G: 1 INJECTION, POWDER, FOR SOLUTION INTRAVENOUS at 06:06

## 2020-01-01 RX ADMIN — SODIUM CHLORIDE: 9 INJECTION, SOLUTION INTRAVENOUS at 13:19

## 2020-01-01 RX ADMIN — PIPERACILLIN SODIUM AND TAZOBACTAM SODIUM 3.38 G: 3; .375 INJECTION, POWDER, LYOPHILIZED, FOR SOLUTION INTRAVENOUS at 17:08

## 2020-01-01 RX ADMIN — VASOPRESSIN 2.4 UNITS/HR: 20 INJECTION INTRAVENOUS at 21:54

## 2020-01-01 RX ADMIN — SODIUM BICARBONATE 50 MEQ: 84 INJECTION, SOLUTION INTRAVENOUS at 14:52

## 2020-01-01 RX ADMIN — DEXTROSE MONOHYDRATE: 50 INJECTION, SOLUTION INTRAVENOUS at 08:16

## 2020-01-01 RX ADMIN — DEXTROSE AND SODIUM CHLORIDE: 5; 900 INJECTION, SOLUTION INTRAVENOUS at 20:56

## 2020-01-01 RX ADMIN — ALBUMIN HUMAN 250 ML: 0.05 INJECTION, SOLUTION INTRAVENOUS at 13:10

## 2020-01-01 RX ADMIN — IOPAMIDOL 71 ML: 755 INJECTION, SOLUTION INTRAVENOUS at 10:12

## 2020-01-01 RX ADMIN — PANTOPRAZOLE SODIUM 40 MG: 40 INJECTION, POWDER, FOR SOLUTION INTRAVENOUS at 08:45

## 2020-01-01 RX ADMIN — CHLORHEXIDINE GLUCONATE 15 ML: 1.2 RINSE ORAL at 20:06

## 2020-01-01 RX ADMIN — PANTOPRAZOLE SODIUM 40 MG: 40 INJECTION, POWDER, FOR SOLUTION INTRAVENOUS at 20:36

## 2020-01-01 RX ADMIN — PHENYLEPHRINE HYDROCHLORIDE 100 MCG: 10 INJECTION INTRAVENOUS at 15:25

## 2020-01-01 RX ADMIN — SODIUM CHLORIDE: 4.5 INJECTION, SOLUTION INTRAVENOUS at 22:17

## 2020-01-01 RX ADMIN — Medication 0.04 MCG/KG/MIN: at 06:19

## 2020-01-01 RX ADMIN — FAMOTIDINE 20 MG: 10 INJECTION, SOLUTION INTRAVENOUS at 11:10

## 2020-01-01 RX ADMIN — CHLORHEXIDINE GLUCONATE 15 ML: 1.2 RINSE ORAL at 20:37

## 2020-01-01 RX ADMIN — HYDROMORPHONE HYDROCHLORIDE 0.5 MG: 1 INJECTION, SOLUTION INTRAMUSCULAR; INTRAVENOUS; SUBCUTANEOUS at 22:27

## 2020-01-01 RX ADMIN — SODIUM CHLORIDE 4 UNITS/HR: 9 INJECTION, SOLUTION INTRAVENOUS at 05:30

## 2020-01-01 RX ADMIN — Medication 10 MEQ: at 05:09

## 2020-01-01 RX ADMIN — CALCIUM GLUCONATE: 98 INJECTION, SOLUTION INTRAVENOUS at 20:16

## 2020-01-01 RX ADMIN — ALBUMIN HUMAN 50 G: 0.25 SOLUTION INTRAVENOUS at 12:24

## 2020-01-01 RX ADMIN — PANTOPRAZOLE SODIUM 40 MG: 40 INJECTION, POWDER, FOR SOLUTION INTRAVENOUS at 20:38

## 2020-01-01 RX ADMIN — PHENYLEPHRINE HYDROCHLORIDE 100 MCG: 10 INJECTION INTRAVENOUS at 16:28

## 2020-01-01 RX ADMIN — SODIUM CHLORIDE, POTASSIUM CHLORIDE, SODIUM LACTATE AND CALCIUM CHLORIDE: 600; 310; 30; 20 INJECTION, SOLUTION INTRAVENOUS at 21:43

## 2020-01-01 RX ADMIN — PHENYLEPHRINE HYDROCHLORIDE 200 MCG: 10 INJECTION INTRAVENOUS at 02:01

## 2020-01-01 RX ADMIN — VANCOMYCIN HYDROCHLORIDE 1000 MG: 1 INJECTION, SOLUTION INTRAVENOUS at 04:58

## 2020-01-01 RX ADMIN — Medication 50 MCG/HR: at 02:57

## 2020-01-01 RX ADMIN — SODIUM CHLORIDE 60 ML: 9 INJECTION, SOLUTION INTRAVENOUS at 17:17

## 2020-01-01 RX ADMIN — METOPROLOL TARTRATE 5 MG: 5 INJECTION INTRAVENOUS at 14:58

## 2020-01-01 RX ADMIN — ROCURONIUM BROMIDE 50 MG: 10 INJECTION INTRAVENOUS at 20:56

## 2020-01-01 RX ADMIN — CHLORHEXIDINE GLUCONATE 15 ML: 1.2 RINSE ORAL at 20:59

## 2020-01-01 RX ADMIN — DEXTROSE AND SODIUM CHLORIDE: 5; 900 INJECTION, SOLUTION INTRAVENOUS at 11:32

## 2020-01-01 RX ADMIN — Medication 125 MCG/HR: at 21:55

## 2020-01-01 RX ADMIN — PHENYLEPHRINE HYDROCHLORIDE 100 MCG: 10 INJECTION INTRAVENOUS at 15:44

## 2020-01-01 RX ADMIN — PIPERACILLIN AND TAZOBACTAM 4.5 G: 4; .5 INJECTION, POWDER, FOR SOLUTION INTRAVENOUS at 17:09

## 2020-01-01 RX ADMIN — I.V. FAT EMULSION 250 ML: 20 EMULSION INTRAVENOUS at 20:08

## 2020-01-01 RX ADMIN — PHENYLEPHRINE HYDROCHLORIDE 100 MCG: 10 INJECTION INTRAVENOUS at 16:38

## 2020-01-01 RX ADMIN — SODIUM CHLORIDE, POTASSIUM CHLORIDE, SODIUM LACTATE AND CALCIUM CHLORIDE 1000 ML: 600; 310; 30; 20 INJECTION, SOLUTION INTRAVENOUS at 01:58

## 2020-01-01 RX ADMIN — SODIUM CHLORIDE, POTASSIUM CHLORIDE, SODIUM LACTATE AND CALCIUM CHLORIDE 1000 ML: 600; 310; 30; 20 INJECTION, SOLUTION INTRAVENOUS at 23:45

## 2020-01-01 RX ADMIN — SODIUM CHLORIDE: 9 INJECTION, SOLUTION INTRAVENOUS at 15:34

## 2020-01-01 RX ADMIN — PIPERACILLIN SODIUM AND TAZOBACTAM SODIUM 3.38 G: 3; .375 INJECTION, POWDER, LYOPHILIZED, FOR SOLUTION INTRAVENOUS at 04:19

## 2020-01-01 RX ADMIN — HYDROCORTISONE SODIUM SUCCINATE 50 MG: 100 INJECTION, POWDER, FOR SOLUTION INTRAMUSCULAR; INTRAVENOUS at 08:09

## 2020-01-01 RX ADMIN — HYDROCORTISONE SODIUM SUCCINATE 50 MG: 100 INJECTION, POWDER, FOR SOLUTION INTRAMUSCULAR; INTRAVENOUS at 08:42

## 2020-01-01 RX ADMIN — HYDROCORTISONE SODIUM SUCCINATE 100 MG: 100 INJECTION, POWDER, FOR SOLUTION INTRAMUSCULAR; INTRAVENOUS at 20:03

## 2020-01-01 RX ADMIN — MICONAZOLE NITRATE: 20 POWDER TOPICAL at 05:58

## 2020-01-01 RX ADMIN — PIPERACILLIN SODIUM AND TAZOBACTAM SODIUM 4.5 G: 4; .5 INJECTION, POWDER, LYOPHILIZED, FOR SOLUTION INTRAVENOUS at 21:13

## 2020-01-01 RX ADMIN — HYDROCORTISONE SODIUM SUCCINATE 50 MG: 100 INJECTION, POWDER, FOR SOLUTION INTRAMUSCULAR; INTRAVENOUS at 07:50

## 2020-01-01 RX ADMIN — PROPOFOL 40 MCG/KG/MIN: 10 INJECTION, EMULSION INTRAVENOUS at 02:38

## 2020-01-01 RX ADMIN — PIPERACILLIN AND TAZOBACTAM 4.5 G: 4; .5 INJECTION, POWDER, FOR SOLUTION INTRAVENOUS at 09:40

## 2020-01-01 RX ADMIN — ALBUMIN HUMAN: 0.05 INJECTION, SOLUTION INTRAVENOUS at 02:15

## 2020-01-01 RX ADMIN — PROPOFOL 30 MCG/KG/MIN: 10 INJECTION, EMULSION INTRAVENOUS at 00:48

## 2020-01-01 RX ADMIN — SODIUM CHLORIDE 6 UNITS/HR: 9 INJECTION, SOLUTION INTRAVENOUS at 16:52

## 2020-01-01 RX ADMIN — VECURONIUM BROMIDE 0.8 MCG/KG/MIN: 1 INJECTION, POWDER, LYOPHILIZED, FOR SOLUTION INTRAVENOUS at 07:06

## 2020-01-01 RX ADMIN — PIPERACILLIN SODIUM AND TAZOBACTAM SODIUM 3.38 G: 3; .375 INJECTION, POWDER, LYOPHILIZED, FOR SOLUTION INTRAVENOUS at 16:34

## 2020-01-01 RX ADMIN — IPRATROPIUM BROMIDE AND ALBUTEROL SULFATE 3 ML: .5; 3 SOLUTION RESPIRATORY (INHALATION) at 07:50

## 2020-01-01 RX ADMIN — HYDROCORTISONE SODIUM SUCCINATE 50 MG: 100 INJECTION, POWDER, FOR SOLUTION INTRAMUSCULAR; INTRAVENOUS at 02:20

## 2020-01-01 RX ADMIN — PIPERACILLIN AND TAZOBACTAM 4.5 G: 4; .5 INJECTION, POWDER, FOR SOLUTION INTRAVENOUS at 04:17

## 2020-01-01 RX ADMIN — PANTOPRAZOLE SODIUM 40 MG: 40 INJECTION, POWDER, FOR SOLUTION INTRAVENOUS at 09:26

## 2020-01-01 RX ADMIN — MAGNESIUM SULFATE HEPTAHYDRATE: 500 INJECTION, SOLUTION INTRAMUSCULAR; INTRAVENOUS at 19:52

## 2020-01-01 RX ADMIN — ALBUMIN HUMAN 12.5 G: 0.25 SOLUTION INTRAVENOUS at 15:27

## 2020-01-01 RX ADMIN — PANTOPRAZOLE SODIUM 40 MG: 40 INJECTION, POWDER, FOR SOLUTION INTRAVENOUS at 09:00

## 2020-01-01 RX ADMIN — CHLORHEXIDINE GLUCONATE 15 ML: 1.2 RINSE ORAL at 08:36

## 2020-01-01 RX ADMIN — INSULIN GLARGINE 12 UNITS: 100 INJECTION, SOLUTION SUBCUTANEOUS at 20:19

## 2020-01-01 RX ADMIN — Medication 0.06 MCG/KG/MIN: at 03:02

## 2020-01-01 RX ADMIN — CHLORHEXIDINE GLUCONATE 15 ML: 1.2 RINSE ORAL at 08:29

## 2020-01-01 RX ADMIN — HYDROCORTISONE SODIUM SUCCINATE 100 MG: 100 INJECTION, POWDER, FOR SOLUTION INTRAMUSCULAR; INTRAVENOUS at 02:19

## 2020-01-01 RX ADMIN — PIPERACILLIN SODIUM AND TAZOBACTAM SODIUM 3.38 G: 3; .375 INJECTION, POWDER, LYOPHILIZED, FOR SOLUTION INTRAVENOUS at 11:41

## 2020-01-01 RX ADMIN — POTASSIUM CHLORIDE 20 MEQ: 29.8 INJECTION, SOLUTION INTRAVENOUS at 05:51

## 2020-01-01 RX ADMIN — POTASSIUM CHLORIDE 20 MEQ: 1.5 POWDER, FOR SOLUTION ORAL at 20:49

## 2020-01-01 RX ADMIN — PANTOPRAZOLE SODIUM 40 MG: 40 INJECTION, POWDER, FOR SOLUTION INTRAVENOUS at 08:41

## 2020-01-01 RX ADMIN — ALBUMIN HUMAN 50 G: 0.25 SOLUTION INTRAVENOUS at 10:05

## 2020-01-01 RX ADMIN — SODIUM CHLORIDE: 9 INJECTION, SOLUTION INTRAVENOUS at 23:10

## 2020-01-01 RX ADMIN — MICONAZOLE NITRATE: 20 POWDER TOPICAL at 19:51

## 2020-01-01 RX ADMIN — HYDROCORTISONE SODIUM SUCCINATE 100 MG: 100 INJECTION, POWDER, FOR SOLUTION INTRAMUSCULAR; INTRAVENOUS at 08:40

## 2020-01-01 RX ADMIN — MAGNESIUM SULFATE IN WATER 2 G: 40 INJECTION, SOLUTION INTRAVENOUS at 09:25

## 2020-01-01 RX ADMIN — ASCORBIC ACID, VITAMIN A PALMITATE, CHOLECALCIFEROL, THIAMINE HYDROCHLORIDE, RIBOFLAVIN-5 PHOSPHATE SODIUM, PYRIDOXINE HYDROCHLORIDE, NIACINAMIDE, DEXPANTHENOL, ALPHA-TOCOPHEROL ACETATE, VITAMIN K1, FOLIC ACID, BIOTIN, CYANOCOBALAMIN: 200; 3300; 200; 6; 3.6; 6; 40; 15; 10; 150; 600; 60; 5 INJECTION, SOLUTION INTRAVENOUS at 23:26

## 2020-01-01 RX ADMIN — SODIUM CHLORIDE: 9 INJECTION, SOLUTION INTRAVENOUS at 11:46

## 2020-01-01 RX ADMIN — CHLORHEXIDINE GLUCONATE 15 ML: 1.2 RINSE ORAL at 20:38

## 2020-01-01 RX ADMIN — POTASSIUM CHLORIDE 20 MEQ: 29.8 INJECTION, SOLUTION INTRAVENOUS at 07:44

## 2020-01-01 RX ADMIN — THIAMINE HYDROCHLORIDE 100 MG: 100 INJECTION, SOLUTION INTRAMUSCULAR; INTRAVENOUS at 08:09

## 2020-01-01 RX ADMIN — ENOXAPARIN SODIUM 40 MG: 40 INJECTION SUBCUTANEOUS at 18:40

## 2020-01-01 RX ADMIN — MICAFUNGIN SODIUM 100 MG: 50 INJECTION, POWDER, LYOPHILIZED, FOR SOLUTION INTRAVENOUS at 00:35

## 2020-01-01 RX ADMIN — INSULIN ASPART 1 UNITS: 100 INJECTION, SOLUTION INTRAVENOUS; SUBCUTANEOUS at 07:41

## 2020-01-01 RX ADMIN — POTASSIUM CHLORIDE 20 MEQ: 29.8 INJECTION, SOLUTION INTRAVENOUS at 06:19

## 2020-01-01 RX ADMIN — PIPERACILLIN SODIUM AND TAZOBACTAM SODIUM 4.5 G: 4; .5 INJECTION, POWDER, LYOPHILIZED, FOR SOLUTION INTRAVENOUS at 09:20

## 2020-01-01 RX ADMIN — PIPERACILLIN SODIUM AND TAZOBACTAM SODIUM 4.5 G: 4; .5 INJECTION, POWDER, LYOPHILIZED, FOR SOLUTION INTRAVENOUS at 15:22

## 2020-01-01 RX ADMIN — MIDAZOLAM HYDROCHLORIDE 1 MG: 1 INJECTION, SOLUTION INTRAMUSCULAR; INTRAVENOUS at 12:35

## 2020-01-01 RX ADMIN — PHENYLEPHRINE HYDROCHLORIDE 0.5 MCG/KG/MIN: 10 INJECTION INTRAVENOUS at 14:19

## 2020-01-01 RX ADMIN — HYDROMORPHONE HYDROCHLORIDE 0.3 MG: 1 INJECTION, SOLUTION INTRAMUSCULAR; INTRAVENOUS; SUBCUTANEOUS at 17:52

## 2020-01-01 RX ADMIN — INSULIN ASPART 1 UNITS: 100 INJECTION, SOLUTION INTRAVENOUS; SUBCUTANEOUS at 04:18

## 2020-01-01 RX ADMIN — I.V. FAT EMULSION 250 ML: 20 EMULSION INTRAVENOUS at 20:57

## 2020-01-01 RX ADMIN — INSULIN GLARGINE 12 UNITS: 100 INJECTION, SOLUTION SUBCUTANEOUS at 19:57

## 2020-01-01 RX ADMIN — POTASSIUM CHLORIDE 40 MEQ: 1.5 POWDER, FOR SOLUTION ORAL at 22:46

## 2020-01-01 RX ADMIN — EPINEPHRINE 0.3 MCG/KG/MIN: 1 INJECTION INTRAMUSCULAR; INTRAVENOUS; SUBCUTANEOUS at 19:53

## 2020-01-01 RX ADMIN — PHENYLEPHRINE HYDROCHLORIDE 100 MCG: 10 INJECTION INTRAVENOUS at 21:04

## 2020-01-01 RX ADMIN — POTASSIUM CHLORIDE 20 MEQ: 29.8 INJECTION, SOLUTION INTRAVENOUS at 06:50

## 2020-01-01 RX ADMIN — SODIUM CHLORIDE: 4.5 INJECTION, SOLUTION INTRAVENOUS at 11:53

## 2020-01-01 RX ADMIN — CHLORHEXIDINE GLUCONATE 15 ML: 1.2 RINSE ORAL at 10:25

## 2020-01-01 RX ADMIN — DEXMEDETOMIDINE HYDROCHLORIDE 0.7 MCG/KG/HR: 100 INJECTION, SOLUTION, CONCENTRATE INTRAVENOUS at 15:41

## 2020-01-01 RX ADMIN — MIDAZOLAM HYDROCHLORIDE 2 MG: 1 INJECTION, SOLUTION INTRAMUSCULAR; INTRAVENOUS at 09:21

## 2020-01-01 RX ADMIN — DEXMEDETOMIDINE HYDROCHLORIDE 0.7 MCG/KG/HR: 100 INJECTION, SOLUTION, CONCENTRATE INTRAVENOUS at 17:34

## 2020-01-01 RX ADMIN — CHLORHEXIDINE GLUCONATE 15 ML: 1.2 RINSE ORAL at 09:20

## 2020-01-01 RX ADMIN — PIPERACILLIN AND TAZOBACTAM 4.5 G: 4; .5 INJECTION, POWDER, FOR SOLUTION INTRAVENOUS at 03:29

## 2020-01-01 RX ADMIN — VASOPRESSIN 2.4 UNITS/HR: 20 INJECTION INTRAVENOUS at 08:01

## 2020-01-01 RX ADMIN — METRONIDAZOLE 500 MG: 500 INJECTION, SOLUTION INTRAVENOUS at 12:35

## 2020-01-01 RX ADMIN — INSULIN GLARGINE 12 UNITS: 100 INJECTION, SOLUTION SUBCUTANEOUS at 20:39

## 2020-01-01 RX ADMIN — HYDRALAZINE HYDROCHLORIDE 5 MG: 20 INJECTION INTRAMUSCULAR; INTRAVENOUS at 16:14

## 2020-01-01 RX ADMIN — HYDROCORTISONE SODIUM SUCCINATE 50 MG: 100 INJECTION, POWDER, FOR SOLUTION INTRAMUSCULAR; INTRAVENOUS at 08:29

## 2020-01-01 RX ADMIN — PANTOPRAZOLE SODIUM 40 MG: 40 INJECTION, POWDER, FOR SOLUTION INTRAVENOUS at 08:06

## 2020-01-01 RX ADMIN — POTASSIUM CHLORIDE 20 MEQ: 29.8 INJECTION, SOLUTION INTRAVENOUS at 05:31

## 2020-01-01 RX ADMIN — DEXMEDETOMIDINE HYDROCHLORIDE 0.4 MCG/KG/HR: 100 INJECTION, SOLUTION, CONCENTRATE INTRAVENOUS at 17:20

## 2020-01-01 RX ADMIN — Medication 125 MCG/HR: at 09:49

## 2020-01-01 RX ADMIN — MICONAZOLE NITRATE: 20 POWDER TOPICAL at 19:54

## 2020-01-01 RX ADMIN — MELATONIN TAB 3 MG 3 MG: 3 TAB at 01:59

## 2020-01-01 RX ADMIN — SODIUM CHLORIDE 3 UNITS/HR: 9 INJECTION, SOLUTION INTRAVENOUS at 11:23

## 2020-01-01 RX ADMIN — HYDROCORTISONE SODIUM SUCCINATE 50 MG: 100 INJECTION, POWDER, FOR SOLUTION INTRAMUSCULAR; INTRAVENOUS at 14:19

## 2020-01-01 RX ADMIN — PROPOFOL 40 MG: 10 INJECTION, EMULSION INTRAVENOUS at 14:15

## 2020-01-01 RX ADMIN — SODIUM CHLORIDE 1.5 UNITS/HR: 9 INJECTION, SOLUTION INTRAVENOUS at 09:58

## 2020-01-01 RX ADMIN — HYDROMORPHONE HYDROCHLORIDE 0.25 MG: 1 INJECTION, SOLUTION INTRAMUSCULAR; INTRAVENOUS; SUBCUTANEOUS at 15:31

## 2020-01-01 RX ADMIN — PIPERACILLIN SODIUM AND TAZOBACTAM SODIUM 4.5 G: 4; .5 INJECTION, POWDER, LYOPHILIZED, FOR SOLUTION INTRAVENOUS at 15:12

## 2020-01-01 RX ADMIN — DEXAMETHASONE SODIUM PHOSPHATE 4 MG: 4 INJECTION, SOLUTION INTRA-ARTICULAR; INTRALESIONAL; INTRAMUSCULAR; INTRAVENOUS; SOFT TISSUE at 21:03

## 2020-01-01 RX ADMIN — METRONIDAZOLE 500 MG: 500 INJECTION, SOLUTION INTRAVENOUS at 04:56

## 2020-01-01 RX ADMIN — PANTOPRAZOLE SODIUM 40 MG: 40 INJECTION, POWDER, FOR SOLUTION INTRAVENOUS at 22:05

## 2020-01-01 RX ADMIN — POTASSIUM PHOSPHATE, MONOBASIC AND POTASSIUM PHOSPHATE, DIBASIC: 224; 236 INJECTION, SOLUTION INTRAVENOUS at 19:43

## 2020-01-01 RX ADMIN — THIAMINE HYDROCHLORIDE 100 MG: 100 INJECTION, SOLUTION INTRAMUSCULAR; INTRAVENOUS at 08:29

## 2020-01-01 RX ADMIN — PIPERACILLIN AND TAZOBACTAM 4.5 G: 4; .5 INJECTION, POWDER, FOR SOLUTION INTRAVENOUS at 22:10

## 2020-01-01 RX ADMIN — MICAFUNGIN SODIUM 100 MG: 50 INJECTION, POWDER, LYOPHILIZED, FOR SOLUTION INTRAVENOUS at 00:00

## 2020-01-01 RX ADMIN — CALCIUM CHLORIDE 1 G: 100 INJECTION INTRAVENOUS; INTRAVENTRICULAR at 11:01

## 2020-01-01 RX ADMIN — FUROSEMIDE 20 MG: 10 INJECTION, SOLUTION INTRAVENOUS at 21:24

## 2020-01-01 RX ADMIN — HYDRALAZINE HYDROCHLORIDE 5 MG: 20 INJECTION INTRAMUSCULAR; INTRAVENOUS at 23:25

## 2020-01-01 RX ADMIN — PIPERACILLIN AND TAZOBACTAM 4.5 G: 4; .5 INJECTION, POWDER, FOR SOLUTION INTRAVENOUS at 22:43

## 2020-01-01 RX ADMIN — DEXTROSE MONOHYDRATE: 50 INJECTION, SOLUTION INTRAVENOUS at 07:43

## 2020-01-01 RX ADMIN — PIPERACILLIN AND TAZOBACTAM 4.5 G: 4; .5 INJECTION, POWDER, FOR SOLUTION INTRAVENOUS at 10:00

## 2020-01-01 RX ADMIN — SODIUM CHLORIDE, SODIUM LACTATE, POTASSIUM CHLORIDE, CALCIUM CHLORIDE: 600; 310; 30; 20 INJECTION, SOLUTION INTRAVENOUS at 20:50

## 2020-01-01 RX ADMIN — ASCORBIC ACID, VITAMIN A PALMITATE, CHOLECALCIFEROL, THIAMINE HYDROCHLORIDE, RIBOFLAVIN-5 PHOSPHATE SODIUM, PYRIDOXINE HYDROCHLORIDE, NIACINAMIDE, DEXPANTHENOL, ALPHA-TOCOPHEROL ACETATE, VITAMIN K1, FOLIC ACID, BIOTIN, CYANOCOBALAMIN: 200; 3300; 200; 6; 3.6; 6; 40; 15; 10; 150; 600; 60; 5 INJECTION, SOLUTION INTRAVENOUS at 20:34

## 2020-01-01 RX ADMIN — PHENYLEPHRINE HYDROCHLORIDE 200 MCG: 10 INJECTION INTRAVENOUS at 12:44

## 2020-01-01 RX ADMIN — HYDROMORPHONE HYDROCHLORIDE 0.3 MG: 1 INJECTION, SOLUTION INTRAMUSCULAR; INTRAVENOUS; SUBCUTANEOUS at 09:31

## 2020-01-01 RX ADMIN — THIAMINE HYDROCHLORIDE 100 MG: 100 INJECTION, SOLUTION INTRAMUSCULAR; INTRAVENOUS at 09:20

## 2020-01-01 RX ADMIN — CHLORHEXIDINE GLUCONATE 15 ML: 1.2 RINSE ORAL at 21:14

## 2020-01-01 RX ADMIN — SODIUM CHLORIDE, SODIUM LACTATE, POTASSIUM CHLORIDE, CALCIUM CHLORIDE AND DEXTROSE MONOHYDRATE: 5; 600; 310; 30; 20 INJECTION, SOLUTION INTRAVENOUS at 21:52

## 2020-01-01 RX ADMIN — ALBUMIN HUMAN 250 ML: 0.05 INJECTION, SOLUTION INTRAVENOUS at 20:54

## 2020-01-01 RX ADMIN — SODIUM CHLORIDE 0.2 UNITS/HR: 9 INJECTION, SOLUTION INTRAVENOUS at 15:35

## 2020-01-01 RX ADMIN — MIDAZOLAM HYDROCHLORIDE 0.5 MG: 1 INJECTION, SOLUTION INTRAMUSCULAR; INTRAVENOUS at 13:11

## 2020-01-01 RX ADMIN — HYDROCORTISONE SODIUM SUCCINATE 50 MG: 100 INJECTION, POWDER, FOR SOLUTION INTRAMUSCULAR; INTRAVENOUS at 14:01

## 2020-01-01 RX ADMIN — PHYTONADIONE 5 MG: 10 INJECTION, EMULSION INTRAMUSCULAR; INTRAVENOUS; SUBCUTANEOUS at 12:56

## 2020-01-01 RX ADMIN — CHLORHEXIDINE GLUCONATE 15 ML: 1.2 RINSE ORAL at 19:58

## 2020-01-01 RX ADMIN — SODIUM CHLORIDE, SODIUM LACTATE, POTASSIUM CHLORIDE, CALCIUM CHLORIDE AND DEXTROSE MONOHYDRATE: 5; 600; 310; 30; 20 INJECTION, SOLUTION INTRAVENOUS at 00:45

## 2020-01-01 RX ADMIN — THIAMINE HYDROCHLORIDE 100 MG: 100 INJECTION, SOLUTION INTRAMUSCULAR; INTRAVENOUS at 08:36

## 2020-01-01 RX ADMIN — PIPERACILLIN SODIUM AND TAZOBACTAM SODIUM 3.38 G: 3; .375 INJECTION, POWDER, LYOPHILIZED, FOR SOLUTION INTRAVENOUS at 04:11

## 2020-01-01 RX ADMIN — PANTOPRAZOLE SODIUM 40 MG: 40 INJECTION, POWDER, FOR SOLUTION INTRAVENOUS at 08:19

## 2020-01-01 RX ADMIN — POTASSIUM CHLORIDE 20 MEQ: 29.8 INJECTION, SOLUTION INTRAVENOUS at 02:27

## 2020-01-01 RX ADMIN — PIPERACILLIN AND TAZOBACTAM 4.5 G: 4; .5 INJECTION, POWDER, FOR SOLUTION INTRAVENOUS at 22:05

## 2020-01-01 RX ADMIN — PIPERACILLIN SODIUM AND TAZOBACTAM SODIUM 3.38 G: 3; .375 INJECTION, POWDER, LYOPHILIZED, FOR SOLUTION INTRAVENOUS at 01:47

## 2020-01-01 RX ADMIN — MIDAZOLAM HYDROCHLORIDE 1 MG: 1 INJECTION, SOLUTION INTRAMUSCULAR; INTRAVENOUS at 16:37

## 2020-01-01 RX ADMIN — Medication 100 MCG/HR: at 14:07

## 2020-01-01 RX ADMIN — MIDAZOLAM (PF) 1 MG/ML IN 0.9 % SODIUM CHLORIDE INTRAVENOUS SOLUTION 3 MG/HR: at 17:23

## 2020-01-01 RX ADMIN — MAGNESIUM SULFATE HEPTAHYDRATE 4 G: 40 INJECTION, SOLUTION INTRAVENOUS at 23:45

## 2020-01-01 RX ADMIN — PIPERACILLIN SODIUM AND TAZOBACTAM SODIUM 3.38 G: 3; .375 INJECTION, POWDER, LYOPHILIZED, FOR SOLUTION INTRAVENOUS at 15:47

## 2020-01-01 RX ADMIN — IPRATROPIUM BROMIDE AND ALBUTEROL SULFATE 3 ML: .5; 3 SOLUTION RESPIRATORY (INHALATION) at 07:18

## 2020-01-01 RX ADMIN — PIPERACILLIN AND TAZOBACTAM 4.5 G: 4; .5 INJECTION, POWDER, FOR SOLUTION INTRAVENOUS at 12:27

## 2020-01-01 RX ADMIN — HYDROCORTISONE SODIUM SUCCINATE 50 MG: 100 INJECTION, POWDER, FOR SOLUTION INTRAMUSCULAR; INTRAVENOUS at 03:29

## 2020-01-01 RX ADMIN — POTASSIUM CHLORIDE 20 MEQ: 29.8 INJECTION, SOLUTION INTRAVENOUS at 06:24

## 2020-01-01 RX ADMIN — VASOPRESSIN 1 UNITS: 20 INJECTION INTRAVENOUS at 16:10

## 2020-01-01 RX ADMIN — DEXTROSE AND SODIUM CHLORIDE: 5; 900 INJECTION, SOLUTION INTRAVENOUS at 18:24

## 2020-01-01 RX ADMIN — PANTOPRAZOLE SODIUM 40 MG: 40 INJECTION, POWDER, FOR SOLUTION INTRAVENOUS at 09:20

## 2020-01-01 RX ADMIN — PIPERACILLIN SODIUM AND TAZOBACTAM SODIUM 3.38 G: 3; .375 INJECTION, POWDER, LYOPHILIZED, FOR SOLUTION INTRAVENOUS at 22:05

## 2020-01-01 RX ADMIN — ALBUMIN HUMAN 50 G: 0.25 SOLUTION INTRAVENOUS at 13:10

## 2020-01-01 RX ADMIN — HYDRALAZINE HYDROCHLORIDE 5 MG: 20 INJECTION INTRAMUSCULAR; INTRAVENOUS at 23:11

## 2020-01-01 RX ADMIN — SODIUM CHLORIDE 4 UNITS/HR: 9 INJECTION, SOLUTION INTRAVENOUS at 04:17

## 2020-01-01 RX ADMIN — MINERAL OIL AND PETROLATUM: 150; 830 OINTMENT OPHTHALMIC at 20:10

## 2020-01-01 RX ADMIN — SODIUM CHLORIDE, POTASSIUM CHLORIDE, SODIUM LACTATE AND CALCIUM CHLORIDE: 600; 310; 30; 20 INJECTION, SOLUTION INTRAVENOUS at 14:58

## 2020-01-01 RX ADMIN — PIPERACILLIN AND TAZOBACTAM 4.5 G: 4; .5 INJECTION, POWDER, FOR SOLUTION INTRAVENOUS at 10:22

## 2020-01-01 RX ADMIN — Medication 10 MEQ: at 07:28

## 2020-01-01 RX ADMIN — PROPOFOL 40 MCG/KG/MIN: 10 INJECTION, EMULSION INTRAVENOUS at 20:21

## 2020-01-01 RX ADMIN — IPRATROPIUM BROMIDE AND ALBUTEROL SULFATE 3 ML: .5; 3 SOLUTION RESPIRATORY (INHALATION) at 10:44

## 2020-01-01 RX ADMIN — CALCIUM GLUCONATE: 98 INJECTION, SOLUTION INTRAVENOUS at 19:53

## 2020-01-01 RX ADMIN — DEXMEDETOMIDINE HYDROCHLORIDE 0.7 MCG/KG/HR: 100 INJECTION, SOLUTION, CONCENTRATE INTRAVENOUS at 18:26

## 2020-01-01 RX ADMIN — PIPERACILLIN AND TAZOBACTAM 4.5 G: 4; .5 INJECTION, POWDER, FOR SOLUTION INTRAVENOUS at 21:47

## 2020-01-01 RX ADMIN — THIAMINE HYDROCHLORIDE 100 MG: 100 INJECTION, SOLUTION INTRAMUSCULAR; INTRAVENOUS at 09:30

## 2020-01-01 RX ADMIN — MICONAZOLE NITRATE: 20 POWDER TOPICAL at 20:37

## 2020-01-01 RX ADMIN — FUROSEMIDE 20 MG: 10 INJECTION, SOLUTION INTRAVENOUS at 04:43

## 2020-01-01 RX ADMIN — PANTOPRAZOLE SODIUM 40 MG: 40 INJECTION, POWDER, FOR SOLUTION INTRAVENOUS at 10:24

## 2020-01-01 RX ADMIN — SODIUM CHLORIDE: 4.5 INJECTION, SOLUTION INTRAVENOUS at 20:07

## 2020-01-01 RX ADMIN — Medication 125 MCG/HR: at 18:05

## 2020-01-01 RX ADMIN — SODIUM CHLORIDE 1000 ML: 9 INJECTION, SOLUTION INTRAVENOUS at 19:11

## 2020-01-01 RX ADMIN — METRONIDAZOLE 500 MG: 500 INJECTION, SOLUTION INTRAVENOUS at 00:24

## 2020-01-01 RX ADMIN — PIPERACILLIN AND TAZOBACTAM 4.5 G: 4; .5 INJECTION, POWDER, FOR SOLUTION INTRAVENOUS at 04:30

## 2020-01-01 RX ADMIN — MIDAZOLAM HYDROCHLORIDE 2 MG: 1 INJECTION, SOLUTION INTRAMUSCULAR; INTRAVENOUS at 02:39

## 2020-01-01 RX ADMIN — CARBOXYMETHYLCELLULOSE SODIUM 2 DROP: 5 SOLUTION/ DROPS OPHTHALMIC at 13:09

## 2020-01-01 RX ADMIN — HYDROCORTISONE SODIUM SUCCINATE 50 MG: 100 INJECTION, POWDER, FOR SOLUTION INTRAMUSCULAR; INTRAVENOUS at 19:36

## 2020-01-01 RX ADMIN — DEXTROSE AND SODIUM CHLORIDE: 5; 900 INJECTION, SOLUTION INTRAVENOUS at 08:30

## 2020-01-01 RX ADMIN — Medication 1 UNITS: at 21:55

## 2020-01-01 RX ADMIN — VASOPRESSIN 0.5 UNITS: 20 INJECTION INTRAVENOUS at 14:50

## 2020-01-01 RX ADMIN — SODIUM CHLORIDE 80 ML: 9 INJECTION, SOLUTION INTRAVENOUS at 08:25

## 2020-01-01 RX ADMIN — ALBUMIN HUMAN: 0.05 INJECTION, SOLUTION INTRAVENOUS at 14:42

## 2020-01-01 RX ADMIN — DEXTROSE AND SODIUM CHLORIDE: 5; 900 INJECTION, SOLUTION INTRAVENOUS at 08:56

## 2020-01-01 RX ADMIN — CHLORHEXIDINE GLUCONATE 15 ML: 1.2 RINSE ORAL at 09:41

## 2020-01-01 RX ADMIN — PIPERACILLIN SODIUM AND TAZOBACTAM SODIUM 4.5 G: 4; .5 INJECTION, POWDER, LYOPHILIZED, FOR SOLUTION INTRAVENOUS at 20:15

## 2020-01-01 RX ADMIN — HYDROMORPHONE HYDROCHLORIDE 0.3 MG: 1 INJECTION, SOLUTION INTRAMUSCULAR; INTRAVENOUS; SUBCUTANEOUS at 01:34

## 2020-01-01 RX ADMIN — MIDAZOLAM HYDROCHLORIDE 0.25 MG: 1 INJECTION, SOLUTION INTRAMUSCULAR; INTRAVENOUS at 13:12

## 2020-01-01 RX ADMIN — PANTOPRAZOLE SODIUM 40 MG: 40 INJECTION, POWDER, FOR SOLUTION INTRAVENOUS at 08:00

## 2020-01-01 RX ADMIN — I.V. FAT EMULSION 250 ML: 20 EMULSION INTRAVENOUS at 21:36

## 2020-01-01 RX ADMIN — PHENYLEPHRINE HYDROCHLORIDE 150 MCG: 10 INJECTION INTRAVENOUS at 21:57

## 2020-01-01 RX ADMIN — MIDAZOLAM HYDROCHLORIDE 2 MG: 1 INJECTION, SOLUTION INTRAMUSCULAR; INTRAVENOUS at 23:13

## 2020-01-01 RX ADMIN — HYDRALAZINE HYDROCHLORIDE 5 MG: 20 INJECTION INTRAMUSCULAR; INTRAVENOUS at 00:15

## 2020-01-01 RX ADMIN — IPRATROPIUM BROMIDE AND ALBUTEROL SULFATE 3 ML: .5; 3 SOLUTION RESPIRATORY (INHALATION) at 19:41

## 2020-01-01 RX ADMIN — PIPERACILLIN SODIUM AND TAZOBACTAM SODIUM 3.38 G: 3; .375 INJECTION, POWDER, LYOPHILIZED, FOR SOLUTION INTRAVENOUS at 22:20

## 2020-01-01 RX ADMIN — PHENYLEPHRINE HYDROCHLORIDE 200 MCG: 10 INJECTION INTRAVENOUS at 01:59

## 2020-01-01 RX ADMIN — MAGNESIUM SULFATE IN WATER 2 G: 40 INJECTION, SOLUTION INTRAVENOUS at 10:40

## 2020-01-01 RX ADMIN — I.V. FAT EMULSION 250 ML: 20 EMULSION INTRAVENOUS at 19:40

## 2020-01-01 RX ADMIN — IPRATROPIUM BROMIDE AND ALBUTEROL SULFATE 3 ML: .5; 3 SOLUTION RESPIRATORY (INHALATION) at 07:38

## 2020-01-01 RX ADMIN — SODIUM CHLORIDE 500 ML: 9 INJECTION, SOLUTION INTRAVENOUS at 19:07

## 2020-01-01 RX ADMIN — VASOPRESSIN 2.4 UNITS/HR: 20 INJECTION INTRAVENOUS at 01:16

## 2020-01-01 RX ADMIN — ENOXAPARIN SODIUM 40 MG: 40 INJECTION SUBCUTANEOUS at 18:33

## 2020-01-01 RX ADMIN — PIPERACILLIN AND TAZOBACTAM 4.5 G: 4; .5 INJECTION, POWDER, FOR SOLUTION INTRAVENOUS at 22:46

## 2020-01-01 RX ADMIN — SODIUM CHLORIDE, POTASSIUM CHLORIDE, SODIUM LACTATE AND CALCIUM CHLORIDE 500 ML: 600; 310; 30; 20 INJECTION, SOLUTION INTRAVENOUS at 15:34

## 2020-01-01 RX ADMIN — Medication 0.4 MCG/KG/MIN: at 14:21

## 2020-01-01 RX ADMIN — NOREPINEPHRINE BITARTRATE 0.24 MCG/KG/MIN: 1 INJECTION, SOLUTION, CONCENTRATE INTRAVENOUS at 08:04

## 2020-01-01 RX ADMIN — HYDROCORTISONE SODIUM SUCCINATE 25 MG: 100 INJECTION, POWDER, FOR SOLUTION INTRAMUSCULAR; INTRAVENOUS at 03:09

## 2020-01-01 RX ADMIN — I.V. FAT EMULSION 250 ML: 20 EMULSION INTRAVENOUS at 19:53

## 2020-01-01 RX ADMIN — Medication 1 PACKET: at 16:46

## 2020-01-01 RX ADMIN — SODIUM CHLORIDE 2 UNITS/HR: 9 INJECTION, SOLUTION INTRAVENOUS at 18:09

## 2020-01-01 RX ADMIN — PIPERACILLIN AND TAZOBACTAM 4.5 G: 4; .5 INJECTION, POWDER, FOR SOLUTION INTRAVENOUS at 04:09

## 2020-01-01 RX ADMIN — HYDROCORTISONE SODIUM SUCCINATE 50 MG: 100 INJECTION, POWDER, FOR SOLUTION INTRAMUSCULAR; INTRAVENOUS at 14:33

## 2020-01-01 RX ADMIN — SODIUM CHLORIDE: 9 INJECTION, SOLUTION INTRAVENOUS at 00:10

## 2020-01-01 RX ADMIN — DEXTROSE AND SODIUM CHLORIDE: 5; 900 INJECTION, SOLUTION INTRAVENOUS at 14:32

## 2020-01-01 RX ADMIN — THIAMINE HYDROCHLORIDE 100 MG: 100 INJECTION, SOLUTION INTRAMUSCULAR; INTRAVENOUS at 08:43

## 2020-01-01 RX ADMIN — PIPERACILLIN SODIUM AND TAZOBACTAM SODIUM 3.38 G: 3; .375 INJECTION, POWDER, LYOPHILIZED, FOR SOLUTION INTRAVENOUS at 22:10

## 2020-01-01 RX ADMIN — SODIUM CHLORIDE, SODIUM LACTATE, POTASSIUM CHLORIDE, CALCIUM CHLORIDE AND DEXTROSE MONOHYDRATE: 5; 600; 310; 30; 20 INJECTION, SOLUTION INTRAVENOUS at 18:40

## 2020-01-01 RX ADMIN — HYDROCORTISONE SODIUM SUCCINATE 100 MG: 100 INJECTION, POWDER, FOR SOLUTION INTRAMUSCULAR; INTRAVENOUS at 14:14

## 2020-01-01 RX ADMIN — LIDOCAINE HYDROCHLORIDE,EPINEPHRINE BITARTRATE 10 ML: 10; .01 INJECTION, SOLUTION INFILTRATION; PERINEURAL at 13:16

## 2020-01-01 RX ADMIN — MICAFUNGIN SODIUM 100 MG: 50 INJECTION, POWDER, LYOPHILIZED, FOR SOLUTION INTRAVENOUS at 22:30

## 2020-01-01 RX ADMIN — INSULIN ASPART 1 UNITS: 100 INJECTION, SOLUTION INTRAVENOUS; SUBCUTANEOUS at 05:21

## 2020-01-01 RX ADMIN — MICAFUNGIN SODIUM 100 MG: 50 INJECTION, POWDER, LYOPHILIZED, FOR SOLUTION INTRAVENOUS at 21:31

## 2020-01-01 RX ADMIN — HYDRALAZINE HYDROCHLORIDE 5 MG: 20 INJECTION INTRAMUSCULAR; INTRAVENOUS at 02:17

## 2020-01-01 RX ADMIN — ERTAPENEM SODIUM 1 G: 1 INJECTION, POWDER, LYOPHILIZED, FOR SOLUTION INTRAMUSCULAR; INTRAVENOUS at 01:28

## 2020-01-01 RX ADMIN — HYDROCORTISONE SODIUM SUCCINATE 50 MG: 100 INJECTION, POWDER, FOR SOLUTION INTRAMUSCULAR; INTRAVENOUS at 20:16

## 2020-01-01 RX ADMIN — METRONIDAZOLE 500 MG: 500 INJECTION, SOLUTION INTRAVENOUS at 06:03

## 2020-01-01 RX ADMIN — METHYLPREDNISOLONE 125 MG: 125 INJECTION, POWDER, LYOPHILIZED, FOR SOLUTION INTRAMUSCULAR; INTRAVENOUS at 01:49

## 2020-01-01 RX ADMIN — HYDROCORTISONE SODIUM SUCCINATE 50 MG: 100 INJECTION, POWDER, FOR SOLUTION INTRAMUSCULAR; INTRAVENOUS at 14:43

## 2020-01-01 RX ADMIN — HEPARIN SODIUM 5000 UNITS: 5000 INJECTION, SOLUTION INTRAVENOUS; SUBCUTANEOUS at 15:00

## 2020-01-01 RX ADMIN — I.V. FAT EMULSION 250 ML: 20 EMULSION INTRAVENOUS at 20:14

## 2020-01-01 RX ADMIN — PIPERACILLIN SODIUM AND TAZOBACTAM SODIUM 3.38 G: 3; .375 INJECTION, POWDER, LYOPHILIZED, FOR SOLUTION INTRAVENOUS at 06:07

## 2020-01-01 RX ADMIN — PIPERACILLIN SODIUM AND TAZOBACTAM SODIUM 3.38 G: 3; .375 INJECTION, POWDER, LYOPHILIZED, FOR SOLUTION INTRAVENOUS at 20:10

## 2020-01-01 RX ADMIN — Medication 150 MCG/HR: at 20:17

## 2020-01-01 RX ADMIN — POTASSIUM CHLORIDE 20 MEQ: 1.5 POWDER, FOR SOLUTION ORAL at 01:17

## 2020-01-01 RX ADMIN — IPRATROPIUM BROMIDE AND ALBUTEROL SULFATE 3 ML: .5; 3 SOLUTION RESPIRATORY (INHALATION) at 11:18

## 2020-01-01 RX ADMIN — CHLORHEXIDINE GLUCONATE 15 ML: 1.2 RINSE ORAL at 07:42

## 2020-01-01 RX ADMIN — SODIUM CHLORIDE 1000 ML: 9 INJECTION, SOLUTION INTRAVENOUS at 15:38

## 2020-01-01 RX ADMIN — MAGNESIUM SULFATE HEPTAHYDRATE 4 G: 40 INJECTION, SOLUTION INTRAVENOUS at 12:58

## 2020-01-01 RX ADMIN — HYDROCORTISONE SODIUM SUCCINATE 50 MG: 100 INJECTION, POWDER, FOR SOLUTION INTRAMUSCULAR; INTRAVENOUS at 20:54

## 2020-01-01 RX ADMIN — DEXTROSE AND SODIUM CHLORIDE: 5; 900 INJECTION, SOLUTION INTRAVENOUS at 06:26

## 2020-01-01 RX ADMIN — QUETIAPINE 25 MG: 25 TABLET ORAL at 00:04

## 2020-01-01 RX ADMIN — MICAFUNGIN SODIUM 100 MG: 50 INJECTION, POWDER, LYOPHILIZED, FOR SOLUTION INTRAVENOUS at 23:23

## 2020-01-01 RX ADMIN — CHLORHEXIDINE GLUCONATE 15 ML: 1.2 RINSE ORAL at 20:11

## 2020-01-01 RX ADMIN — CHLORHEXIDINE GLUCONATE 15 ML: 1.2 RINSE ORAL at 07:53

## 2020-01-01 RX ADMIN — DEXTROSE MONOHYDRATE: 50 INJECTION, SOLUTION INTRAVENOUS at 06:25

## 2020-01-01 RX ADMIN — PIPERACILLIN SODIUM AND TAZOBACTAM SODIUM 3.38 G: 3; .375 INJECTION, POWDER, LYOPHILIZED, FOR SOLUTION INTRAVENOUS at 15:49

## 2020-01-01 RX ADMIN — Medication 10 MEQ: at 08:30

## 2020-01-01 RX ADMIN — HYDRALAZINE HYDROCHLORIDE 5 MG: 20 INJECTION INTRAMUSCULAR; INTRAVENOUS at 13:25

## 2020-01-01 RX ADMIN — MICONAZOLE NITRATE: 20 POWDER TOPICAL at 04:36

## 2020-01-01 RX ADMIN — METRONIDAZOLE 500 MG: 500 INJECTION, SOLUTION INTRAVENOUS at 05:25

## 2020-01-01 RX ADMIN — ALBUMIN HUMAN 250 ML: 0.05 INJECTION, SOLUTION INTRAVENOUS at 20:16

## 2020-01-01 RX ADMIN — MIDAZOLAM 2 MG: 1 INJECTION INTRAMUSCULAR; INTRAVENOUS at 12:20

## 2020-01-01 RX ADMIN — POTASSIUM PHOSPHATE, MONOBASIC AND POTASSIUM PHOSPHATE, DIBASIC 15 MMOL: 224; 236 INJECTION, SOLUTION, CONCENTRATE INTRAVENOUS at 15:21

## 2020-01-01 RX ADMIN — MEROPENEM 1 G: 1 INJECTION, POWDER, FOR SOLUTION INTRAVENOUS at 06:03

## 2020-01-01 RX ADMIN — FUROSEMIDE 20 MG: 10 INJECTION, SOLUTION INTRAVENOUS at 10:00

## 2020-01-01 RX ADMIN — METRONIDAZOLE 500 MG: 500 INJECTION, SOLUTION INTRAVENOUS at 06:36

## 2020-01-01 RX ADMIN — PHENYLEPHRINE HYDROCHLORIDE 200 MCG: 10 INJECTION INTRAVENOUS at 12:38

## 2020-01-01 RX ADMIN — ALBUMIN HUMAN: 0.05 INJECTION, SOLUTION INTRAVENOUS at 21:38

## 2020-01-01 RX ADMIN — ALBUMIN HUMAN 12.5 G: 0.05 INJECTION, SOLUTION INTRAVENOUS at 15:49

## 2020-01-01 RX ADMIN — MICAFUNGIN SODIUM 100 MG: 50 INJECTION, POWDER, LYOPHILIZED, FOR SOLUTION INTRAVENOUS at 00:04

## 2020-01-01 RX ADMIN — THIAMINE HYDROCHLORIDE 100 MG: 100 INJECTION, SOLUTION INTRAMUSCULAR; INTRAVENOUS at 09:25

## 2020-01-01 RX ADMIN — DEXMEDETOMIDINE HYDROCHLORIDE 0.7 MCG/KG/HR: 100 INJECTION, SOLUTION, CONCENTRATE INTRAVENOUS at 01:41

## 2020-01-01 RX ADMIN — PIPERACILLIN SODIUM AND TAZOBACTAM SODIUM 3.38 G: 3; .375 INJECTION, POWDER, LYOPHILIZED, FOR SOLUTION INTRAVENOUS at 11:51

## 2020-01-01 RX ADMIN — PHENYLEPHRINE HYDROCHLORIDE 200 MCG: 10 INJECTION INTRAVENOUS at 14:17

## 2020-01-01 RX ADMIN — POTASSIUM CHLORIDE 20 MEQ: 29.8 INJECTION, SOLUTION INTRAVENOUS at 03:40

## 2020-01-01 RX ADMIN — FOLIC ACID: 5 INJECTION, SOLUTION INTRAMUSCULAR; INTRAVENOUS; SUBCUTANEOUS at 11:36

## 2020-01-01 RX ADMIN — METRONIDAZOLE 500 MG: 500 INJECTION, SOLUTION INTRAVENOUS at 18:35

## 2020-01-01 RX ADMIN — IPRATROPIUM BROMIDE AND ALBUTEROL SULFATE 3 ML: .5; 3 SOLUTION RESPIRATORY (INHALATION) at 07:51

## 2020-01-01 RX ADMIN — PIPERACILLIN SODIUM AND TAZOBACTAM SODIUM 3.38 G: 3; .375 INJECTION, POWDER, LYOPHILIZED, FOR SOLUTION INTRAVENOUS at 04:26

## 2020-01-01 RX ADMIN — PIPERACILLIN SODIUM AND TAZOBACTAM SODIUM 3.38 G: 3; .375 INJECTION, POWDER, LYOPHILIZED, FOR SOLUTION INTRAVENOUS at 21:07

## 2020-01-01 RX ADMIN — HYDRALAZINE HYDROCHLORIDE 2.5 MG: 20 INJECTION INTRAMUSCULAR; INTRAVENOUS at 04:17

## 2020-01-01 RX ADMIN — FENTANYL CITRATE 50 MCG: 50 INJECTION, SOLUTION INTRAMUSCULAR; INTRAVENOUS at 20:56

## 2020-01-01 RX ADMIN — SODIUM CHLORIDE 1 UNITS/HR: 9 INJECTION, SOLUTION INTRAVENOUS at 10:29

## 2020-01-01 RX ADMIN — HYDROMORPHONE HYDROCHLORIDE 0.3 MG: 1 INJECTION, SOLUTION INTRAMUSCULAR; INTRAVENOUS; SUBCUTANEOUS at 11:35

## 2020-01-01 RX ADMIN — MEROPENEM 1 G: 1 INJECTION, POWDER, FOR SOLUTION INTRAVENOUS at 05:34

## 2020-01-01 RX ADMIN — PIPERACILLIN AND TAZOBACTAM 4.5 G: 4; .5 INJECTION, POWDER, FOR SOLUTION INTRAVENOUS at 23:09

## 2020-01-01 RX ADMIN — PHENYLEPHRINE HYDROCHLORIDE 100 MCG: 10 INJECTION INTRAVENOUS at 15:15

## 2020-01-01 RX ADMIN — SODIUM CHLORIDE: 9 INJECTION, SOLUTION INTRAVENOUS at 14:04

## 2020-01-01 RX ADMIN — PIPERACILLIN AND TAZOBACTAM 4.5 G: 4; .5 INJECTION, POWDER, FOR SOLUTION INTRAVENOUS at 10:55

## 2020-01-01 RX ADMIN — FUROSEMIDE 40 MG: 10 INJECTION, SOLUTION INTRAVENOUS at 16:02

## 2020-01-01 RX ADMIN — VASOPRESSIN 2.4 UNITS/HR: 20 INJECTION INTRAVENOUS at 14:45

## 2020-01-01 RX ADMIN — MEROPENEM 1 G: 1 INJECTION, POWDER, FOR SOLUTION INTRAVENOUS at 21:52

## 2020-01-01 RX ADMIN — ALBUMIN HUMAN 250 ML: 0.05 INJECTION, SOLUTION INTRAVENOUS at 12:33

## 2020-01-01 RX ADMIN — MEROPENEM 1 G: 1 INJECTION, POWDER, FOR SOLUTION INTRAVENOUS at 21:32

## 2020-01-01 RX ADMIN — NOREPINEPHRINE BITARTRATE 0.05 MCG: 1 INJECTION, SOLUTION, CONCENTRATE INTRAVENOUS at 14:04

## 2020-01-01 RX ADMIN — IPRATROPIUM BROMIDE AND ALBUTEROL SULFATE 3 ML: .5; 3 SOLUTION RESPIRATORY (INHALATION) at 19:58

## 2020-01-01 RX ADMIN — CHLORHEXIDINE GLUCONATE 15 ML: 1.2 RINSE ORAL at 20:36

## 2020-01-01 RX ADMIN — HYDROMORPHONE HYDROCHLORIDE 0.5 MG: 1 INJECTION, SOLUTION INTRAMUSCULAR; INTRAVENOUS; SUBCUTANEOUS at 04:11

## 2020-01-01 RX ADMIN — ALBUMIN HUMAN 250 ML: 0.05 INJECTION, SOLUTION INTRAVENOUS at 04:17

## 2020-01-01 RX ADMIN — CHLORHEXIDINE GLUCONATE 15 ML: 1.2 RINSE ORAL at 20:03

## 2020-01-01 RX ADMIN — PIPERACILLIN AND TAZOBACTAM 4.5 G: 4; .5 INJECTION, POWDER, FOR SOLUTION INTRAVENOUS at 06:11

## 2020-01-01 RX ADMIN — ROCURONIUM BROMIDE 50 MG: 10 INJECTION INTRAVENOUS at 01:39

## 2020-01-01 RX ADMIN — MEROPENEM 1 G: 1 INJECTION, POWDER, FOR SOLUTION INTRAVENOUS at 21:48

## 2020-01-01 RX ADMIN — IPRATROPIUM BROMIDE AND ALBUTEROL SULFATE 3 ML: .5; 3 SOLUTION RESPIRATORY (INHALATION) at 19:37

## 2020-01-01 RX ADMIN — INSULIN ASPART 1 UNITS: 100 INJECTION, SOLUTION INTRAVENOUS; SUBCUTANEOUS at 17:43

## 2020-01-01 RX ADMIN — MEROPENEM 1 G: 1 INJECTION, POWDER, FOR SOLUTION INTRAVENOUS at 14:23

## 2020-01-01 RX ADMIN — THIAMINE HYDROCHLORIDE 100 MG: 100 INJECTION, SOLUTION INTRAMUSCULAR; INTRAVENOUS at 08:53

## 2020-01-01 RX ADMIN — PIPERACILLIN AND TAZOBACTAM 4.5 G: 4; .5 INJECTION, POWDER, FOR SOLUTION INTRAVENOUS at 16:53

## 2020-01-01 RX ADMIN — PHENYLEPHRINE HYDROCHLORIDE 100 MCG: 10 INJECTION INTRAVENOUS at 12:32

## 2020-01-01 RX ADMIN — PIPERACILLIN AND TAZOBACTAM 4.5 G: 4; .5 INJECTION, POWDER, FOR SOLUTION INTRAVENOUS at 03:56

## 2020-01-01 RX ADMIN — PANTOPRAZOLE SODIUM 40 MG: 40 INJECTION, POWDER, FOR SOLUTION INTRAVENOUS at 08:28

## 2020-01-01 RX ADMIN — HYDROMORPHONE HYDROCHLORIDE 0.3 MG: 1 INJECTION, SOLUTION INTRAMUSCULAR; INTRAVENOUS; SUBCUTANEOUS at 12:02

## 2020-01-01 RX ADMIN — CALCIUM CHLORIDE 1 G: 100 INJECTION, SOLUTION INTRAVENOUS at 20:10

## 2020-01-01 RX ADMIN — Medication 125 MCG/HR: at 05:56

## 2020-01-01 RX ADMIN — MICAFUNGIN SODIUM 100 MG: 50 INJECTION, POWDER, LYOPHILIZED, FOR SOLUTION INTRAVENOUS at 22:57

## 2020-01-01 RX ADMIN — PROPOFOL 30 MCG/KG/MIN: 10 INJECTION, EMULSION INTRAVENOUS at 22:48

## 2020-01-01 RX ADMIN — POTASSIUM PHOSPHATE, MONOBASIC AND POTASSIUM PHOSPHATE, DIBASIC: 224; 236 INJECTION, SOLUTION INTRAVENOUS at 19:45

## 2020-01-01 RX ADMIN — DEXTROSE MONOHYDRATE: 50 INJECTION, SOLUTION INTRAVENOUS at 11:16

## 2020-01-01 RX ADMIN — FAMOTIDINE 20 MG: 10 INJECTION, SOLUTION INTRAVENOUS at 00:43

## 2020-01-01 RX ADMIN — METOPROLOL TARTRATE 5 MG: 5 INJECTION INTRAVENOUS at 19:51

## 2020-01-01 RX ADMIN — HYDROMORPHONE HYDROCHLORIDE 0.3 MG: 1 INJECTION, SOLUTION INTRAMUSCULAR; INTRAVENOUS; SUBCUTANEOUS at 11:14

## 2020-01-01 RX ADMIN — CHLORHEXIDINE GLUCONATE 15 ML: 1.2 RINSE ORAL at 08:16

## 2020-01-01 RX ADMIN — DEXMEDETOMIDINE HYDROCHLORIDE 0.4 MCG/KG/HR: 100 INJECTION, SOLUTION, CONCENTRATE INTRAVENOUS at 11:08

## 2020-01-01 RX ADMIN — SODIUM CHLORIDE: 4.5 INJECTION, SOLUTION INTRAVENOUS at 19:36

## 2020-01-01 RX ADMIN — PIPERACILLIN SODIUM AND TAZOBACTAM SODIUM 3.38 G: 3; .375 INJECTION, POWDER, LYOPHILIZED, FOR SOLUTION INTRAVENOUS at 23:47

## 2020-01-01 RX ADMIN — POTASSIUM CHLORIDE 40 MEQ: 1.5 POWDER, FOR SOLUTION ORAL at 08:23

## 2020-01-01 RX ADMIN — METOPROLOL TARTRATE 3 MG: 5 INJECTION INTRAVENOUS at 20:59

## 2020-01-01 RX ADMIN — METOPROLOL TARTRATE 5 MG: 5 INJECTION INTRAVENOUS at 12:28

## 2020-01-01 RX ADMIN — SODIUM CHLORIDE: 9 INJECTION, SOLUTION INTRAVENOUS at 02:24

## 2020-01-01 RX ADMIN — PIPERACILLIN SODIUM AND TAZOBACTAM SODIUM 3.38 G: 3; .375 INJECTION, POWDER, LYOPHILIZED, FOR SOLUTION INTRAVENOUS at 12:26

## 2020-01-01 RX ADMIN — IPRATROPIUM BROMIDE AND ALBUTEROL SULFATE 3 ML: .5; 3 SOLUTION RESPIRATORY (INHALATION) at 15:08

## 2020-01-01 RX ADMIN — VASOPRESSIN 1 UNITS: 20 INJECTION INTRAVENOUS at 14:24

## 2020-01-01 RX ADMIN — ALBUMIN HUMAN 12.5 G: 0.05 INJECTION, SOLUTION INTRAVENOUS at 10:45

## 2020-01-01 RX ADMIN — DEXTROSE AND SODIUM CHLORIDE: 5; 900 INJECTION, SOLUTION INTRAVENOUS at 11:36

## 2020-01-01 RX ADMIN — CHLORHEXIDINE GLUCONATE 15 ML: 1.2 RINSE ORAL at 20:28

## 2020-01-01 RX ADMIN — PHENYLEPHRINE HYDROCHLORIDE 100 MCG: 10 INJECTION INTRAVENOUS at 16:08

## 2020-01-01 RX ADMIN — DEXTROSE MONOHYDRATE: 50 INJECTION, SOLUTION INTRAVENOUS at 20:01

## 2020-01-01 RX ADMIN — ALBUMIN HUMAN 25 G: 50 SOLUTION INTRAVENOUS at 22:04

## 2020-01-01 RX ADMIN — MIDAZOLAM (PF) 1 MG/ML IN 0.9 % SODIUM CHLORIDE INTRAVENOUS SOLUTION 3 MG/HR: at 06:22

## 2020-01-01 RX ADMIN — HYDROMORPHONE HYDROCHLORIDE 0.3 MG: 1 INJECTION, SOLUTION INTRAMUSCULAR; INTRAVENOUS; SUBCUTANEOUS at 00:56

## 2020-01-01 RX ADMIN — HYDROMORPHONE HYDROCHLORIDE 0.3 MG: 1 INJECTION, SOLUTION INTRAMUSCULAR; INTRAVENOUS; SUBCUTANEOUS at 08:23

## 2020-01-01 RX ADMIN — SODIUM CHLORIDE 2 UNITS/HR: 9 INJECTION, SOLUTION INTRAVENOUS at 05:03

## 2020-01-01 RX ADMIN — FUROSEMIDE 20 MG: 10 INJECTION, SOLUTION INTRAVENOUS at 21:37

## 2020-01-01 RX ADMIN — ALBUMIN HUMAN 12.5 G: 0.05 INJECTION, SOLUTION INTRAVENOUS at 11:02

## 2020-01-01 RX ADMIN — PIPERACILLIN SODIUM AND TAZOBACTAM SODIUM 3.38 G: 3; .375 INJECTION, POWDER, LYOPHILIZED, FOR SOLUTION INTRAVENOUS at 03:54

## 2020-01-01 RX ADMIN — MAGNESIUM SULFATE IN WATER 2 G: 40 INJECTION, SOLUTION INTRAVENOUS at 05:39

## 2020-01-01 RX ADMIN — HYDRALAZINE HYDROCHLORIDE 5 MG: 20 INJECTION INTRAMUSCULAR; INTRAVENOUS at 00:42

## 2020-01-01 RX ADMIN — SODIUM CHLORIDE 2 UNITS/HR: 9 INJECTION, SOLUTION INTRAVENOUS at 22:25

## 2020-01-01 RX ADMIN — HYDROMORPHONE HYDROCHLORIDE 0.5 MG: 1 INJECTION, SOLUTION INTRAMUSCULAR; INTRAVENOUS; SUBCUTANEOUS at 20:15

## 2020-01-01 RX ADMIN — POTASSIUM PHOSPHATE, MONOBASIC AND POTASSIUM PHOSPHATE, DIBASIC: 224; 236 INJECTION, SOLUTION INTRAVENOUS at 20:22

## 2020-01-01 RX ADMIN — PIPERACILLIN AND TAZOBACTAM 4.5 G: 4; .5 INJECTION, POWDER, FOR SOLUTION INTRAVENOUS at 17:02

## 2020-01-01 RX ADMIN — CHLORHEXIDINE GLUCONATE 15 ML: 1.2 RINSE ORAL at 19:36

## 2020-01-01 RX ADMIN — PIPERACILLIN AND TAZOBACTAM 4.5 G: 4; .5 INJECTION, POWDER, FOR SOLUTION INTRAVENOUS at 21:23

## 2020-01-01 RX ADMIN — THIAMINE HYDROCHLORIDE 100 MG: 100 INJECTION, SOLUTION INTRAMUSCULAR; INTRAVENOUS at 08:13

## 2020-01-01 RX ADMIN — PIPERACILLIN SODIUM AND TAZOBACTAM SODIUM 3.38 G: 3; .375 INJECTION, POWDER, LYOPHILIZED, FOR SOLUTION INTRAVENOUS at 10:09

## 2020-01-01 RX ADMIN — PANTOPRAZOLE SODIUM 40 MG: 40 INJECTION, POWDER, FOR SOLUTION INTRAVENOUS at 09:28

## 2020-01-01 RX ADMIN — SODIUM CHLORIDE 10 UNITS: 9 INJECTION, SOLUTION INTRAVENOUS at 14:39

## 2020-01-01 RX ADMIN — DEXMEDETOMIDINE HYDROCHLORIDE 0.7 MCG/KG/HR: 100 INJECTION, SOLUTION, CONCENTRATE INTRAVENOUS at 08:40

## 2020-01-01 RX ADMIN — HEPARIN SODIUM 650 UNITS/HR: 10000 INJECTION, SOLUTION INTRAVENOUS at 23:51

## 2020-01-01 RX ADMIN — ALBUMIN HUMAN 50 G: 0.25 SOLUTION INTRAVENOUS at 11:46

## 2020-01-01 RX ADMIN — HYDRALAZINE HYDROCHLORIDE 5 MG: 20 INJECTION INTRAMUSCULAR; INTRAVENOUS at 00:11

## 2020-01-01 RX ADMIN — VASOPRESSIN 0.6 UNITS/HR: 20 INJECTION INTRAVENOUS at 03:04

## 2020-01-01 RX ADMIN — HYDROCORTISONE SODIUM SUCCINATE 100 MG: 100 INJECTION, POWDER, FOR SOLUTION INTRAMUSCULAR; INTRAVENOUS at 08:03

## 2020-01-01 RX ADMIN — POTASSIUM PHOSPHATE, MONOBASIC AND POTASSIUM PHOSPHATE, DIBASIC 15 MMOL: 224; 236 INJECTION, SOLUTION, CONCENTRATE INTRAVENOUS at 06:02

## 2020-01-01 RX ADMIN — PIPERACILLIN SODIUM AND TAZOBACTAM SODIUM 3.38 G: 3; .375 INJECTION, POWDER, LYOPHILIZED, FOR SOLUTION INTRAVENOUS at 18:31

## 2020-01-01 RX ADMIN — MIDAZOLAM HYDROCHLORIDE 2 MG: 1 INJECTION, SOLUTION INTRAMUSCULAR; INTRAVENOUS at 21:21

## 2020-01-01 RX ADMIN — HYDROCORTISONE SODIUM SUCCINATE 50 MG: 100 INJECTION, POWDER, FOR SOLUTION INTRAMUSCULAR; INTRAVENOUS at 19:34

## 2020-01-01 RX ADMIN — Medication 100 MCG/HR: at 12:22

## 2020-01-01 RX ADMIN — HYDROMORPHONE HYDROCHLORIDE 0.5 MG: 1 INJECTION, SOLUTION INTRAMUSCULAR; INTRAVENOUS; SUBCUTANEOUS at 04:19

## 2020-01-01 RX ADMIN — POTASSIUM PHOSPHATE, MONOBASIC AND POTASSIUM PHOSPHATE, DIBASIC 15 MMOL: 224; 236 INJECTION, SOLUTION, CONCENTRATE INTRAVENOUS at 04:27

## 2020-01-01 RX ADMIN — FENTANYL CITRATE 100 MCG: 50 INJECTION, SOLUTION INTRAMUSCULAR; INTRAVENOUS at 14:15

## 2020-01-01 RX ADMIN — POTASSIUM CHLORIDE 40 MEQ: 1.5 POWDER, FOR SOLUTION ORAL at 05:55

## 2020-01-01 RX ADMIN — THIAMINE HYDROCHLORIDE 100 MG: 100 INJECTION, SOLUTION INTRAMUSCULAR; INTRAVENOUS at 08:41

## 2020-01-01 RX ADMIN — MIDAZOLAM HYDROCHLORIDE 2 MG: 1 INJECTION, SOLUTION INTRAMUSCULAR; INTRAVENOUS at 11:37

## 2020-01-01 RX ADMIN — PHENYLEPHRINE HYDROCHLORIDE 200 MCG: 10 INJECTION INTRAVENOUS at 21:15

## 2020-01-01 RX ADMIN — IPRATROPIUM BROMIDE AND ALBUTEROL SULFATE 3 ML: .5; 3 SOLUTION RESPIRATORY (INHALATION) at 18:17

## 2020-01-01 RX ADMIN — MAGNESIUM SULFATE HEPTAHYDRATE: 500 INJECTION, SOLUTION INTRAMUSCULAR; INTRAVENOUS at 20:13

## 2020-01-01 RX ADMIN — SODIUM CHLORIDE, POTASSIUM CHLORIDE, SODIUM LACTATE AND CALCIUM CHLORIDE: 600; 310; 30; 20 INJECTION, SOLUTION INTRAVENOUS at 01:03

## 2020-01-01 RX ADMIN — PHYTONADIONE 5 MG: 10 INJECTION, EMULSION INTRAMUSCULAR; INTRAVENOUS; SUBCUTANEOUS at 10:45

## 2020-01-01 RX ADMIN — VANCOMYCIN HYDROCHLORIDE 1000 MG: 1 INJECTION, SOLUTION INTRAVENOUS at 17:09

## 2020-01-01 RX ADMIN — Medication 0.03 MCG/KG/MIN: at 07:20

## 2020-01-01 RX ADMIN — IOPAMIDOL 53 ML: 755 INJECTION, SOLUTION INTRAVENOUS at 17:17

## 2020-01-01 RX ADMIN — PIPERACILLIN SODIUM AND TAZOBACTAM SODIUM 3.38 G: 3; .375 INJECTION, POWDER, LYOPHILIZED, FOR SOLUTION INTRAVENOUS at 00:50

## 2020-01-01 RX ADMIN — INSULIN ASPART 4 UNITS: 100 INJECTION, SOLUTION INTRAVENOUS; SUBCUTANEOUS at 14:54

## 2020-01-01 RX ADMIN — Medication 50 MCG/HR: at 03:08

## 2020-01-01 RX ADMIN — PANTOPRAZOLE SODIUM 40 MG: 40 INJECTION, POWDER, FOR SOLUTION INTRAVENOUS at 08:03

## 2020-01-01 RX ADMIN — HYDROMORPHONE HYDROCHLORIDE 0.5 MG: 1 INJECTION, SOLUTION INTRAMUSCULAR; INTRAVENOUS; SUBCUTANEOUS at 21:09

## 2020-01-01 RX ADMIN — DEXMEDETOMIDINE HYDROCHLORIDE 0.7 MCG/KG/HR: 100 INJECTION, SOLUTION, CONCENTRATE INTRAVENOUS at 07:47

## 2020-01-01 RX ADMIN — PANTOPRAZOLE SODIUM 40 MG: 40 INJECTION, POWDER, FOR SOLUTION INTRAVENOUS at 09:42

## 2020-01-01 RX ADMIN — CHLORHEXIDINE GLUCONATE 15 ML: 1.2 RINSE ORAL at 08:07

## 2020-01-01 RX ADMIN — CALCIUM GLUCONATE: 98 INJECTION, SOLUTION INTRAVENOUS at 20:28

## 2020-01-01 RX ADMIN — DEXMEDETOMIDINE HYDROCHLORIDE 0.2 MCG/KG/HR: 100 INJECTION, SOLUTION, CONCENTRATE INTRAVENOUS at 15:34

## 2020-01-01 RX ADMIN — FUROSEMIDE 20 MG: 10 INJECTION, SOLUTION INTRAVENOUS at 10:25

## 2020-01-01 RX ADMIN — IPRATROPIUM BROMIDE AND ALBUTEROL SULFATE 3 ML: .5; 3 SOLUTION RESPIRATORY (INHALATION) at 20:33

## 2020-01-01 RX ADMIN — FENTANYL CITRATE 100 MCG: 50 INJECTION, SOLUTION INTRAMUSCULAR; INTRAVENOUS at 01:43

## 2020-01-01 RX ADMIN — PANTOPRAZOLE SODIUM 40 MG: 40 INJECTION, POWDER, FOR SOLUTION INTRAVENOUS at 20:10

## 2020-01-01 RX ADMIN — IPRATROPIUM BROMIDE AND ALBUTEROL SULFATE 3 ML: .5; 3 SOLUTION RESPIRATORY (INHALATION) at 15:41

## 2020-01-01 RX ADMIN — Medication 150 MCG/HR: at 02:27

## 2020-01-01 RX ADMIN — PIPERACILLIN SODIUM AND TAZOBACTAM SODIUM 3.38 G: 3; .375 INJECTION, POWDER, LYOPHILIZED, FOR SOLUTION INTRAVENOUS at 09:33

## 2020-01-01 RX ADMIN — PANTOPRAZOLE SODIUM 40 MG: 40 INJECTION, POWDER, FOR SOLUTION INTRAVENOUS at 09:33

## 2020-01-01 RX ADMIN — HYDROCORTISONE SODIUM SUCCINATE 100 MG: 100 INJECTION, POWDER, FOR SOLUTION INTRAMUSCULAR; INTRAVENOUS at 19:59

## 2020-01-01 RX ADMIN — MICAFUNGIN SODIUM 100 MG: 50 INJECTION, POWDER, LYOPHILIZED, FOR SOLUTION INTRAVENOUS at 22:17

## 2020-01-01 RX ADMIN — POTASSIUM CHLORIDE 20 MEQ: 29.8 INJECTION, SOLUTION INTRAVENOUS at 16:25

## 2020-01-01 RX ADMIN — PIPERACILLIN SODIUM AND TAZOBACTAM SODIUM 3.38 G: 3; .375 INJECTION, POWDER, LYOPHILIZED, FOR SOLUTION INTRAVENOUS at 16:21

## 2020-01-01 RX ADMIN — DEXTROSE MONOHYDRATE 25 ML: 25 INJECTION, SOLUTION INTRAVENOUS at 11:19

## 2020-01-01 RX ADMIN — HYDROCORTISONE SODIUM SUCCINATE 50 MG: 100 INJECTION, POWDER, FOR SOLUTION INTRAMUSCULAR; INTRAVENOUS at 08:25

## 2020-01-01 RX ADMIN — Medication 0.04 MCG/KG/MIN: at 00:10

## 2020-01-01 RX ADMIN — Medication 50 MCG/HR: at 20:43

## 2020-01-01 RX ADMIN — PIPERACILLIN SODIUM AND TAZOBACTAM SODIUM 4.5 G: 4; .5 INJECTION, POWDER, LYOPHILIZED, FOR SOLUTION INTRAVENOUS at 09:01

## 2020-01-01 RX ADMIN — PIPERACILLIN SODIUM AND TAZOBACTAM SODIUM 3.38 G: 3; .375 INJECTION, POWDER, LYOPHILIZED, FOR SOLUTION INTRAVENOUS at 16:16

## 2020-01-01 RX ADMIN — METRONIDAZOLE 500 MG: 500 INJECTION, SOLUTION INTRAVENOUS at 18:24

## 2020-01-01 RX ADMIN — CHLORHEXIDINE GLUCONATE 15 ML: 1.2 RINSE ORAL at 19:35

## 2020-01-01 RX ADMIN — FUROSEMIDE 20 MG: 10 INJECTION, SOLUTION INTRAVENOUS at 04:42

## 2020-01-01 RX ADMIN — PANTOPRAZOLE SODIUM 40 MG: 40 INJECTION, POWDER, FOR SOLUTION INTRAVENOUS at 08:42

## 2020-01-01 RX ADMIN — I.V. FAT EMULSION 250 ML: 20 EMULSION INTRAVENOUS at 19:51

## 2020-01-01 RX ADMIN — MIDAZOLAM HYDROCHLORIDE 2 MG: 1 INJECTION, SOLUTION INTRAMUSCULAR; INTRAVENOUS at 17:26

## 2020-01-01 RX ADMIN — SODIUM CHLORIDE, POTASSIUM CHLORIDE, SODIUM LACTATE AND CALCIUM CHLORIDE 1000 ML: 600; 310; 30; 20 INJECTION, SOLUTION INTRAVENOUS at 06:09

## 2020-01-01 RX ADMIN — CALCIUM GLUCONATE: 98 INJECTION, SOLUTION INTRAVENOUS at 21:05

## 2020-01-01 RX ADMIN — VASOPRESSIN 2 UNITS/HR: 20 INJECTION INTRAVENOUS at 14:55

## 2020-01-01 RX ADMIN — ALBUMIN HUMAN: 0.05 INJECTION, SOLUTION INTRAVENOUS at 14:19

## 2020-01-01 RX ADMIN — HYDROMORPHONE HYDROCHLORIDE 0.5 MG: 1 INJECTION, SOLUTION INTRAMUSCULAR; INTRAVENOUS; SUBCUTANEOUS at 17:22

## 2020-01-01 RX ADMIN — MIDAZOLAM 2 MG: 1 INJECTION INTRAMUSCULAR; INTRAVENOUS at 20:56

## 2020-01-01 RX ADMIN — PROPOFOL 100 MG: 10 INJECTION, EMULSION INTRAVENOUS at 20:56

## 2020-01-01 RX ADMIN — METRONIDAZOLE 500 MG: 500 INJECTION, SOLUTION INTRAVENOUS at 00:41

## 2020-01-01 RX ADMIN — DEXTROSE AND SODIUM CHLORIDE: 5; 900 INJECTION, SOLUTION INTRAVENOUS at 08:16

## 2020-01-01 RX ADMIN — HYDROCORTISONE SODIUM SUCCINATE 50 MG: 100 INJECTION, POWDER, FOR SOLUTION INTRAMUSCULAR; INTRAVENOUS at 20:31

## 2020-01-01 RX ADMIN — MIDAZOLAM (PF) 1 MG/ML IN 0.9 % SODIUM CHLORIDE INTRAVENOUS SOLUTION 2 MG/HR: at 22:21

## 2020-01-01 RX ADMIN — VASOPRESSIN 1 UNITS/HR: 20 INJECTION INTRAVENOUS at 22:06

## 2020-01-01 RX ADMIN — SODIUM CHLORIDE, POTASSIUM CHLORIDE, SODIUM LACTATE AND CALCIUM CHLORIDE: 600; 310; 30; 20 INJECTION, SOLUTION INTRAVENOUS at 15:41

## 2020-01-01 RX ADMIN — MICONAZOLE NITRATE: 20 POWDER TOPICAL at 02:01

## 2020-01-01 RX ADMIN — PHENYLEPHRINE HYDROCHLORIDE 200 MCG: 10 INJECTION INTRAVENOUS at 16:50

## 2020-01-01 RX ADMIN — VASOPRESSIN 1 UNITS: 20 INJECTION INTRAVENOUS at 16:19

## 2020-01-01 RX ADMIN — HYDROMORPHONE HYDROCHLORIDE 0.3 MG: 1 INJECTION, SOLUTION INTRAMUSCULAR; INTRAVENOUS; SUBCUTANEOUS at 03:55

## 2020-01-01 RX ADMIN — DEXAMETHASONE SODIUM PHOSPHATE 4 MG: 4 INJECTION, SOLUTION INTRA-ARTICULAR; INTRALESIONAL; INTRAMUSCULAR; INTRAVENOUS; SOFT TISSUE at 14:58

## 2020-01-01 RX ADMIN — PIPERACILLIN AND TAZOBACTAM 4.5 G: 4; .5 INJECTION, POWDER, FOR SOLUTION INTRAVENOUS at 23:57

## 2020-01-01 RX ADMIN — DEXTROSE AND SODIUM CHLORIDE: 5; 900 INJECTION, SOLUTION INTRAVENOUS at 10:58

## 2020-01-01 RX ADMIN — METOLAZONE 5 MG: 5 TABLET ORAL at 15:26

## 2020-01-01 RX ADMIN — PIPERACILLIN AND TAZOBACTAM 4.5 G: 4; .5 INJECTION, POWDER, FOR SOLUTION INTRAVENOUS at 21:18

## 2020-01-01 RX ADMIN — PHENYLEPHRINE HYDROCHLORIDE 200 MCG: 10 INJECTION INTRAVENOUS at 21:53

## 2020-01-01 RX ADMIN — CHLORHEXIDINE GLUCONATE 15 ML: 1.2 RINSE ORAL at 19:54

## 2020-01-01 RX ADMIN — CALCIUM GLUCONATE: 98 INJECTION, SOLUTION INTRAVENOUS at 19:48

## 2020-01-01 RX ADMIN — PIPERACILLIN AND TAZOBACTAM 4.5 G: 4; .5 INJECTION, POWDER, FOR SOLUTION INTRAVENOUS at 10:24

## 2020-01-01 RX ADMIN — DEXTROSE AND SODIUM CHLORIDE: 5; 900 INJECTION, SOLUTION INTRAVENOUS at 20:10

## 2020-01-01 RX ADMIN — Medication 100 MCG/HR: at 14:02

## 2020-01-01 RX ADMIN — POTASSIUM CHLORIDE 20 MEQ: 29.8 INJECTION, SOLUTION INTRAVENOUS at 08:07

## 2020-01-01 RX ADMIN — POTASSIUM CHLORIDE 20 MEQ: 29.8 INJECTION, SOLUTION INTRAVENOUS at 05:26

## 2020-01-01 RX ADMIN — PROPOFOL 30 MCG/KG/MIN: 10 INJECTION, EMULSION INTRAVENOUS at 13:08

## 2020-01-01 RX ADMIN — MEROPENEM 1 G: 1 INJECTION, POWDER, FOR SOLUTION INTRAVENOUS at 06:20

## 2020-01-01 RX ADMIN — VASOPRESSIN 2.4 UNITS/HR: 20 INJECTION INTRAVENOUS at 09:11

## 2020-01-01 RX ADMIN — POTASSIUM CHLORIDE 20 MEQ: 29.8 INJECTION, SOLUTION INTRAVENOUS at 07:36

## 2020-01-01 RX ADMIN — I.V. FAT EMULSION 250 ML: 20 EMULSION INTRAVENOUS at 20:34

## 2020-01-01 RX ADMIN — PANTOPRAZOLE SODIUM 40 MG: 40 INJECTION, POWDER, FOR SOLUTION INTRAVENOUS at 10:32

## 2020-01-01 RX ADMIN — MIDAZOLAM HYDROCHLORIDE 2 MG: 1 INJECTION, SOLUTION INTRAMUSCULAR; INTRAVENOUS at 08:15

## 2020-01-01 RX ADMIN — METOPROLOL TARTRATE 2.5 MG: 5 INJECTION INTRAVENOUS at 16:07

## 2020-01-01 RX ADMIN — FUROSEMIDE 20 MG: 10 INJECTION, SOLUTION INTRAVENOUS at 22:05

## 2020-01-01 RX ADMIN — CHLORHEXIDINE GLUCONATE 15 ML: 1.2 RINSE ORAL at 19:57

## 2020-01-01 RX ADMIN — POTASSIUM CHLORIDE 20 MEQ: 29.8 INJECTION, SOLUTION INTRAVENOUS at 04:43

## 2020-01-01 RX ADMIN — POTASSIUM CHLORIDE 20 MEQ: 29.8 INJECTION, SOLUTION INTRAVENOUS at 11:20

## 2020-01-01 RX ADMIN — LIDOCAINE HYDROCHLORIDE 40 MG: 20 INJECTION, SOLUTION INFILTRATION; PERINEURAL at 20:56

## 2020-01-01 RX ADMIN — HYDROMORPHONE HYDROCHLORIDE 0.3 MG: 1 INJECTION, SOLUTION INTRAMUSCULAR; INTRAVENOUS; SUBCUTANEOUS at 16:01

## 2020-01-01 RX ADMIN — SODIUM CHLORIDE 1 UNITS/HR: 9 INJECTION, SOLUTION INTRAVENOUS at 02:21

## 2020-01-01 RX ADMIN — INSULIN GLARGINE 12 UNITS: 100 INJECTION, SOLUTION SUBCUTANEOUS at 19:29

## 2020-01-01 RX ADMIN — HYDROMORPHONE HYDROCHLORIDE 0.3 MG: 1 INJECTION, SOLUTION INTRAMUSCULAR; INTRAVENOUS; SUBCUTANEOUS at 12:23

## 2020-01-01 RX ADMIN — SODIUM CHLORIDE: 9 INJECTION, SOLUTION INTRAVENOUS at 19:44

## 2020-01-01 RX ADMIN — ALBUMIN HUMAN 12.5 G: 0.05 INJECTION, SOLUTION INTRAVENOUS at 12:46

## 2020-01-01 RX ADMIN — PHENYLEPHRINE HYDROCHLORIDE 0.25 MCG/KG/MIN: 10 INJECTION INTRAVENOUS at 21:06

## 2020-01-01 RX ADMIN — Medication 150 MCG/HR: at 12:14

## 2020-01-01 RX ADMIN — VASOPRESSIN 2.4 UNITS/HR: 20 INJECTION INTRAVENOUS at 15:44

## 2020-01-01 RX ADMIN — DEXMEDETOMIDINE HYDROCHLORIDE 0.7 MCG/KG/HR: 100 INJECTION, SOLUTION, CONCENTRATE INTRAVENOUS at 04:17

## 2020-01-01 RX ADMIN — THIAMINE HYDROCHLORIDE 100 MG: 100 INJECTION, SOLUTION INTRAMUSCULAR; INTRAVENOUS at 09:39

## 2020-01-01 RX ADMIN — DEXTROSE AND SODIUM CHLORIDE: 5; 900 INJECTION, SOLUTION INTRAVENOUS at 06:36

## 2020-01-01 RX ADMIN — PHENYLEPHRINE HYDROCHLORIDE 200 MCG: 10 INJECTION INTRAVENOUS at 16:19

## 2020-01-01 RX ADMIN — MEROPENEM 1 G: 1 INJECTION, POWDER, FOR SOLUTION INTRAVENOUS at 14:13

## 2020-01-01 RX ADMIN — POTASSIUM CHLORIDE 20 MEQ: 29.8 INJECTION, SOLUTION INTRAVENOUS at 06:31

## 2020-01-01 RX ADMIN — Medication 1 UNITS: at 21:23

## 2020-01-01 RX ADMIN — HEPARIN SODIUM 800 UNITS/HR: 10000 INJECTION, SOLUTION INTRAVENOUS at 20:57

## 2020-01-01 RX ADMIN — ROCURONIUM BROMIDE 30 MG: 10 INJECTION INTRAVENOUS at 16:04

## 2020-01-01 RX ADMIN — SODIUM CHLORIDE: 9 INJECTION, SOLUTION INTRAVENOUS at 21:10

## 2020-01-01 RX ADMIN — MAGNESIUM SULFATE IN WATER 1 G/HR: 40 INJECTION, SOLUTION INTRAVENOUS at 16:50

## 2020-01-01 RX ADMIN — POTASSIUM CHLORIDE 20 MEQ: 29.8 INJECTION, SOLUTION INTRAVENOUS at 08:41

## 2020-01-01 RX ADMIN — PIPERACILLIN SODIUM AND TAZOBACTAM SODIUM 3.38 G: 3; .375 INJECTION, POWDER, LYOPHILIZED, FOR SOLUTION INTRAVENOUS at 11:55

## 2020-01-01 RX ADMIN — MICAFUNGIN SODIUM 100 MG: 50 INJECTION, POWDER, LYOPHILIZED, FOR SOLUTION INTRAVENOUS at 23:41

## 2020-01-01 RX ADMIN — VASOPRESSIN 2.4 UNITS/HR: 20 INJECTION INTRAVENOUS at 05:22

## 2020-01-01 RX ADMIN — PIPERACILLIN SODIUM AND TAZOBACTAM SODIUM 3.38 G: 3; .375 INJECTION, POWDER, LYOPHILIZED, FOR SOLUTION INTRAVENOUS at 04:23

## 2020-01-01 RX ADMIN — PHENYLEPHRINE HYDROCHLORIDE 100 MCG: 10 INJECTION INTRAVENOUS at 21:49

## 2020-01-01 RX ADMIN — INSULIN ASPART 2 UNITS: 100 INJECTION, SOLUTION INTRAVENOUS; SUBCUTANEOUS at 08:27

## 2020-01-01 RX ADMIN — PROPOFOL 50 MCG/KG/MIN: 10 INJECTION, EMULSION INTRAVENOUS at 17:13

## 2020-01-01 RX ADMIN — Medication 1 PACKET: at 08:21

## 2020-01-01 RX ADMIN — LIDOCAINE HYDROCHLORIDE 10 ML: 10 INJECTION, SOLUTION EPIDURAL; INFILTRATION; INTRACAUDAL; PERINEURAL at 13:54

## 2020-01-01 RX ADMIN — PIPERACILLIN AND TAZOBACTAM 4.5 G: 4; .5 INJECTION, POWDER, FOR SOLUTION INTRAVENOUS at 17:17

## 2020-01-01 RX ADMIN — MIDAZOLAM HYDROCHLORIDE 2 MG: 1 INJECTION, SOLUTION INTRAMUSCULAR; INTRAVENOUS at 14:54

## 2020-01-01 RX ADMIN — ALBUMIN HUMAN 25 G: 0.05 INJECTION, SOLUTION INTRAVENOUS at 23:13

## 2020-01-01 RX ADMIN — SODIUM CHLORIDE 20 ML/HR: 9 INJECTION, SOLUTION INTRAVENOUS at 21:31

## 2020-01-01 RX ADMIN — SODIUM CHLORIDE: 9 INJECTION, SOLUTION INTRAVENOUS at 20:29

## 2020-01-01 RX ADMIN — Medication 150 MCG/HR: at 09:41

## 2020-01-01 RX ADMIN — ALBUMIN HUMAN 12.5 G: 0.05 INJECTION, SOLUTION INTRAVENOUS at 22:37

## 2020-01-01 RX ADMIN — POTASSIUM PHOSPHATE, MONOBASIC AND POTASSIUM PHOSPHATE, DIBASIC: 224; 236 INJECTION, SOLUTION INTRAVENOUS at 20:17

## 2020-01-01 RX ADMIN — ALBUMIN HUMAN 25 G: 0.05 INJECTION, SOLUTION INTRAVENOUS at 09:30

## 2020-01-01 RX ADMIN — DEXMEDETOMIDINE HYDROCHLORIDE 0.2 MCG/KG/HR: 100 INJECTION, SOLUTION, CONCENTRATE INTRAVENOUS at 12:10

## 2020-01-01 RX ADMIN — CARBOXYMETHYLCELLULOSE SODIUM 2 DROP: 5 SOLUTION/ DROPS OPHTHALMIC at 07:52

## 2020-01-01 RX ADMIN — FUROSEMIDE 20 MG: 10 INJECTION, SOLUTION INTRAVENOUS at 19:37

## 2020-01-01 RX ADMIN — PHENYLEPHRINE HYDROCHLORIDE 100 MCG: 10 INJECTION INTRAVENOUS at 21:01

## 2020-01-01 RX ADMIN — ONDANSETRON 4 MG: 2 INJECTION INTRAMUSCULAR; INTRAVENOUS at 15:58

## 2020-01-01 RX ADMIN — POTASSIUM PHOSPHATE, MONOBASIC AND POTASSIUM PHOSPHATE, DIBASIC 20 MMOL: 224; 236 INJECTION, SOLUTION, CONCENTRATE INTRAVENOUS at 07:06

## 2020-01-01 RX ADMIN — SODIUM CHLORIDE 3000 ML: 900 IRRIGANT IRRIGATION at 08:18

## 2020-01-01 RX ADMIN — SODIUM CHLORIDE 2 UNITS/HR: 9 INJECTION, SOLUTION INTRAVENOUS at 10:26

## 2020-01-01 RX ADMIN — VASOPRESSIN 0.5 UNITS: 20 INJECTION INTRAVENOUS at 14:47

## 2020-01-01 RX ADMIN — INSULIN GLARGINE 12 UNITS: 100 INJECTION, SOLUTION SUBCUTANEOUS at 20:50

## 2020-01-01 RX ADMIN — SODIUM CHLORIDE, SODIUM LACTATE, POTASSIUM CHLORIDE, CALCIUM CHLORIDE AND DEXTROSE MONOHYDRATE: 5; 600; 310; 30; 20 INJECTION, SOLUTION INTRAVENOUS at 09:55

## 2020-01-01 RX ADMIN — PIPERACILLIN SODIUM AND TAZOBACTAM SODIUM 4.5 G: 4; .5 INJECTION, POWDER, LYOPHILIZED, FOR SOLUTION INTRAVENOUS at 20:55

## 2020-01-01 RX ADMIN — PHENYLEPHRINE HYDROCHLORIDE 100 MCG: 10 INJECTION INTRAVENOUS at 16:15

## 2020-01-01 RX ADMIN — PIPERACILLIN AND TAZOBACTAM 4.5 G: 4; .5 INJECTION, POWDER, FOR SOLUTION INTRAVENOUS at 09:21

## 2020-01-01 RX ADMIN — Medication 75 MCG/HR: at 08:50

## 2020-01-01 RX ADMIN — INSULIN ASPART 2 UNITS: 100 INJECTION, SOLUTION INTRAVENOUS; SUBCUTANEOUS at 00:13

## 2020-01-01 RX ADMIN — VASOPRESSIN 2.4 UNITS/HR: 20 INJECTION INTRAVENOUS at 00:11

## 2020-01-01 RX ADMIN — HYDROCORTISONE SODIUM SUCCINATE 50 MG: 100 INJECTION, POWDER, FOR SOLUTION INTRAMUSCULAR; INTRAVENOUS at 01:19

## 2020-01-01 RX ADMIN — CHLORHEXIDINE GLUCONATE 15 ML: 1.2 RINSE ORAL at 19:59

## 2020-01-01 RX ADMIN — PIPERACILLIN AND TAZOBACTAM 4.5 G: 4; .5 INJECTION, POWDER, FOR SOLUTION INTRAVENOUS at 16:13

## 2020-01-01 RX ADMIN — PANTOPRAZOLE SODIUM 40 MG: 40 INJECTION, POWDER, FOR SOLUTION INTRAVENOUS at 08:09

## 2020-01-01 RX ADMIN — PIPERACILLIN SODIUM AND TAZOBACTAM SODIUM 3.38 G: 3; .375 INJECTION, POWDER, LYOPHILIZED, FOR SOLUTION INTRAVENOUS at 18:40

## 2020-01-01 RX ADMIN — PANTOPRAZOLE SODIUM 40 MG: 40 INJECTION, POWDER, FOR SOLUTION INTRAVENOUS at 20:12

## 2020-01-01 RX ADMIN — MIDAZOLAM HYDROCHLORIDE 1 MG: 1 INJECTION, SOLUTION INTRAMUSCULAR; INTRAVENOUS at 19:35

## 2020-01-01 RX ADMIN — DEXMEDETOMIDINE HYDROCHLORIDE 0.7 MCG/KG/HR: 100 INJECTION, SOLUTION, CONCENTRATE INTRAVENOUS at 20:17

## 2020-01-01 RX ADMIN — PROPOFOL 20 MCG/KG/MIN: 10 INJECTION, EMULSION INTRAVENOUS at 16:19

## 2020-01-01 RX ADMIN — PIPERACILLIN SODIUM AND TAZOBACTAM SODIUM 3.38 G: 3; .375 INJECTION, POWDER, LYOPHILIZED, FOR SOLUTION INTRAVENOUS at 05:47

## 2020-01-01 RX ADMIN — IPRATROPIUM BROMIDE AND ALBUTEROL SULFATE 3 ML: .5; 3 SOLUTION RESPIRATORY (INHALATION) at 18:11

## 2020-01-01 RX ADMIN — CHLORHEXIDINE GLUCONATE 15 ML: 1.2 RINSE ORAL at 07:48

## 2020-01-01 RX ADMIN — I.V. FAT EMULSION 250 ML: 20 EMULSION INTRAVENOUS at 19:56

## 2020-01-01 RX ADMIN — SODIUM CHLORIDE, SODIUM LACTATE, POTASSIUM CHLORIDE, CALCIUM CHLORIDE AND DEXTROSE MONOHYDRATE: 5; 600; 310; 30; 20 INJECTION, SOLUTION INTRAVENOUS at 17:55

## 2020-01-01 RX ADMIN — PIPERACILLIN SODIUM AND TAZOBACTAM SODIUM 3.38 G: 3; .375 INJECTION, POWDER, LYOPHILIZED, FOR SOLUTION INTRAVENOUS at 16:13

## 2020-01-01 RX ADMIN — POTASSIUM PHOSPHATE, MONOBASIC AND POTASSIUM PHOSPHATE, DIBASIC: 224; 236 INJECTION, SOLUTION INTRAVENOUS at 19:37

## 2020-01-01 RX ADMIN — VANCOMYCIN HYDROCHLORIDE 1000 MG: 1 INJECTION, SOLUTION INTRAVENOUS at 02:04

## 2020-01-01 RX ADMIN — PHENYLEPHRINE HYDROCHLORIDE 200 MCG: 10 INJECTION INTRAVENOUS at 21:09

## 2020-01-01 RX ADMIN — SODIUM CHLORIDE 1 UNITS/HR: 9 INJECTION, SOLUTION INTRAVENOUS at 11:05

## 2020-01-01 RX ADMIN — PIPERACILLIN SODIUM AND TAZOBACTAM SODIUM 4.5 G: 4; .5 INJECTION, POWDER, LYOPHILIZED, FOR SOLUTION INTRAVENOUS at 14:52

## 2020-01-01 RX ADMIN — HEPARIN SODIUM 700 UNITS/HR: 10000 INJECTION, SOLUTION INTRAVENOUS at 15:48

## 2020-01-01 RX ADMIN — PIPERACILLIN AND TAZOBACTAM 4.5 G: 4; .5 INJECTION, POWDER, FOR SOLUTION INTRAVENOUS at 11:34

## 2020-01-01 RX ADMIN — VANCOMYCIN HYDROCHLORIDE 1000 MG: 1 INJECTION, SOLUTION INTRAVENOUS at 01:03

## 2020-01-01 RX ADMIN — CHLORHEXIDINE GLUCONATE 15 ML: 1.2 RINSE ORAL at 19:03

## 2020-01-01 RX ADMIN — MICAFUNGIN SODIUM 100 MG: 50 INJECTION, POWDER, LYOPHILIZED, FOR SOLUTION INTRAVENOUS at 00:27

## 2020-01-01 RX ADMIN — IPRATROPIUM BROMIDE AND ALBUTEROL SULFATE 3 ML: .5; 3 SOLUTION RESPIRATORY (INHALATION) at 15:30

## 2020-01-01 RX ADMIN — VANCOMYCIN HYDROCHLORIDE 1000 MG: 1 INJECTION, SOLUTION INTRAVENOUS at 09:02

## 2020-01-01 RX ADMIN — DEXMEDETOMIDINE HYDROCHLORIDE 0.7 MCG/KG/HR: 100 INJECTION, SOLUTION, CONCENTRATE INTRAVENOUS at 20:57

## 2020-01-01 RX ADMIN — PIPERACILLIN AND TAZOBACTAM 4.5 G: 4; .5 INJECTION, POWDER, FOR SOLUTION INTRAVENOUS at 03:58

## 2020-01-01 RX ADMIN — EPINEPHRINE 0.2 MCG/KG/MIN: 1 INJECTION INTRAMUSCULAR; INTRAVENOUS; SUBCUTANEOUS at 13:30

## 2020-01-01 RX ADMIN — MAGNESIUM SULFATE HEPTAHYDRATE: 500 INJECTION, SOLUTION INTRAMUSCULAR; INTRAVENOUS at 20:54

## 2020-01-01 RX ADMIN — SODIUM CHLORIDE: 9 INJECTION, SOLUTION INTRAVENOUS at 18:36

## 2020-01-01 RX ADMIN — NOREPINEPHRINE BITARTRATE 0.06 MCG/KG/MIN: 1 INJECTION, SOLUTION, CONCENTRATE INTRAVENOUS at 22:06

## 2020-01-01 RX ADMIN — HYDROCORTISONE SODIUM SUCCINATE 50 MG: 100 INJECTION, POWDER, FOR SOLUTION INTRAMUSCULAR; INTRAVENOUS at 01:51

## 2020-01-01 RX ADMIN — LIDOCAINE HYDROCHLORIDE 1 ML: 10 INJECTION, SOLUTION INFILTRATION; PERINEURAL at 11:45

## 2020-01-01 RX ADMIN — PROPOFOL 40 MCG/KG/MIN: 10 INJECTION, EMULSION INTRAVENOUS at 23:51

## 2020-01-01 RX ADMIN — PANTOPRAZOLE SODIUM 40 MG: 40 INJECTION, POWDER, FOR SOLUTION INTRAVENOUS at 08:31

## 2020-01-01 RX ADMIN — MICAFUNGIN SODIUM 100 MG: 50 INJECTION, POWDER, LYOPHILIZED, FOR SOLUTION INTRAVENOUS at 21:52

## 2020-01-01 RX ADMIN — CHLORHEXIDINE GLUCONATE 15 ML: 1.2 RINSE ORAL at 08:12

## 2020-01-01 RX ADMIN — DEXMEDETOMIDINE HYDROCHLORIDE 0.4 MCG/KG/HR: 100 INJECTION, SOLUTION, CONCENTRATE INTRAVENOUS at 04:26

## 2020-01-01 RX ADMIN — HYDROCORTISONE SODIUM SUCCINATE 50 MG: 100 INJECTION, POWDER, FOR SOLUTION INTRAMUSCULAR; INTRAVENOUS at 14:10

## 2020-01-01 RX ADMIN — SODIUM BICARBONATE 100 MEQ: 84 INJECTION INTRAVENOUS at 14:34

## 2020-01-01 RX ADMIN — PHENYLEPHRINE HYDROCHLORIDE 200 MCG: 10 INJECTION INTRAVENOUS at 21:46

## 2020-01-01 RX ADMIN — CALCIUM GLUCONATE: 98 INJECTION, SOLUTION INTRAVENOUS at 20:34

## 2020-01-01 RX ADMIN — HYDRALAZINE HYDROCHLORIDE 5 MG: 20 INJECTION INTRAMUSCULAR; INTRAVENOUS at 17:28

## 2020-01-01 RX ADMIN — SODIUM CHLORIDE 1 UNITS/HR: 9 INJECTION, SOLUTION INTRAVENOUS at 08:19

## 2020-01-01 RX ADMIN — SODIUM CHLORIDE 500 ML: 9 INJECTION, SOLUTION INTRAVENOUS at 09:50

## 2020-01-01 RX ADMIN — POTASSIUM CHLORIDE 20 MEQ: 29.8 INJECTION, SOLUTION INTRAVENOUS at 04:29

## 2020-01-01 RX ADMIN — ALBUMIN HUMAN 50 G: 0.25 SOLUTION INTRAVENOUS at 12:47

## 2020-01-01 RX ADMIN — PHENYLEPHRINE HYDROCHLORIDE 200 MCG: 10 INJECTION INTRAVENOUS at 02:02

## 2020-01-01 RX ADMIN — FENTANYL CITRATE 100 MCG: 50 INJECTION, SOLUTION INTRAMUSCULAR; INTRAVENOUS at 12:31

## 2020-01-01 RX ADMIN — THIAMINE HYDROCHLORIDE 100 MG: 100 INJECTION, SOLUTION INTRAMUSCULAR; INTRAVENOUS at 08:04

## 2020-01-01 RX ADMIN — PIPERACILLIN AND TAZOBACTAM 4.5 G: 4; .5 INJECTION, POWDER, FOR SOLUTION INTRAVENOUS at 15:56

## 2020-01-01 RX ADMIN — SODIUM CHLORIDE 62 ML: 9 INJECTION, SOLUTION INTRAVENOUS at 10:30

## 2020-01-01 RX ADMIN — HYDROCORTISONE SODIUM SUCCINATE 100 MG: 100 INJECTION, POWDER, FOR SOLUTION INTRAMUSCULAR; INTRAVENOUS at 01:56

## 2020-01-01 RX ADMIN — PROPOFOL 40 MCG/KG/MIN: 10 INJECTION, EMULSION INTRAVENOUS at 06:27

## 2020-01-01 RX ADMIN — MIDAZOLAM HYDROCHLORIDE 4 MG: 1 INJECTION, SOLUTION INTRAMUSCULAR; INTRAVENOUS at 15:25

## 2020-01-01 RX ADMIN — MIDAZOLAM HYDROCHLORIDE 2 MG: 1 INJECTION, SOLUTION INTRAMUSCULAR; INTRAVENOUS at 20:31

## 2020-01-01 RX ADMIN — PANTOPRAZOLE SODIUM 40 MG: 40 INJECTION, POWDER, FOR SOLUTION INTRAVENOUS at 08:30

## 2020-01-01 RX ADMIN — IPRATROPIUM BROMIDE AND ALBUTEROL SULFATE 3 ML: .5; 3 SOLUTION RESPIRATORY (INHALATION) at 11:28

## 2020-01-01 RX ADMIN — PANTOPRAZOLE SODIUM 40 MG: 40 INJECTION, POWDER, FOR SOLUTION INTRAVENOUS at 08:25

## 2020-01-01 RX ADMIN — MAGNESIUM SULFATE IN WATER 2 G: 40 INJECTION, SOLUTION INTRAVENOUS at 06:03

## 2020-01-01 RX ADMIN — PIPERACILLIN AND TAZOBACTAM 4.5 G: 4; .5 INJECTION, POWDER, FOR SOLUTION INTRAVENOUS at 10:05

## 2020-01-01 RX ADMIN — VANCOMYCIN HYDROCHLORIDE 1000 MG: 1 INJECTION, SOLUTION INTRAVENOUS at 15:51

## 2020-01-01 RX ADMIN — Medication 1 UNITS: at 22:14

## 2020-01-01 RX ADMIN — CHLORHEXIDINE GLUCONATE 15 ML: 1.2 RINSE ORAL at 08:27

## 2020-01-01 RX ADMIN — DEXTROSE AND SODIUM CHLORIDE: 5; 900 INJECTION, SOLUTION INTRAVENOUS at 20:30

## 2020-01-01 RX ADMIN — CHLORHEXIDINE GLUCONATE 15 ML: 1.2 RINSE ORAL at 19:51

## 2020-01-01 RX ADMIN — PANTOPRAZOLE SODIUM 40 MG: 40 INJECTION, POWDER, FOR SOLUTION INTRAVENOUS at 10:00

## 2020-01-01 RX ADMIN — DEXTROSE AND SODIUM CHLORIDE: 5; 900 INJECTION, SOLUTION INTRAVENOUS at 03:55

## 2020-01-01 RX ADMIN — DEXMEDETOMIDINE HYDROCHLORIDE 0.7 MCG/KG/HR: 100 INJECTION, SOLUTION, CONCENTRATE INTRAVENOUS at 02:23

## 2020-01-01 RX ADMIN — CHLORHEXIDINE GLUCONATE 15 ML: 1.2 RINSE ORAL at 20:31

## 2020-01-01 RX ADMIN — HYDROCORTISONE SODIUM SUCCINATE 50 MG: 100 INJECTION, POWDER, FOR SOLUTION INTRAMUSCULAR; INTRAVENOUS at 02:43

## 2020-01-01 RX ADMIN — HEPARIN SODIUM 5000 UNITS: 5000 INJECTION, SOLUTION INTRAVENOUS; SUBCUTANEOUS at 22:06

## 2020-01-01 RX ADMIN — MIDAZOLAM HYDROCHLORIDE 1 MG: 1 INJECTION, SOLUTION INTRAMUSCULAR; INTRAVENOUS at 16:50

## 2020-01-01 RX ADMIN — PROPOFOL: 10 INJECTION, EMULSION INTRAVENOUS at 15:30

## 2020-01-01 RX ADMIN — MEROPENEM 1 G: 1 INJECTION, POWDER, FOR SOLUTION INTRAVENOUS at 14:18

## 2020-01-01 RX ADMIN — ROCURONIUM BROMIDE 20 MG: 10 INJECTION INTRAVENOUS at 15:12

## 2020-01-01 RX ADMIN — HYDROCORTISONE SODIUM SUCCINATE 50 MG: 100 INJECTION, POWDER, FOR SOLUTION INTRAMUSCULAR; INTRAVENOUS at 14:08

## 2020-01-01 RX ADMIN — PIPERACILLIN AND TAZOBACTAM 4.5 G: 4; .5 INJECTION, POWDER, FOR SOLUTION INTRAVENOUS at 17:33

## 2020-01-01 RX ADMIN — ALBUMIN HUMAN 50 G: 0.25 SOLUTION INTRAVENOUS at 12:27

## 2020-01-01 RX ADMIN — ALBUMIN HUMAN 25 G: 0.05 INJECTION, SOLUTION INTRAVENOUS at 21:17

## 2020-01-01 RX ADMIN — Medication 10 MEQ: at 06:12

## 2020-01-01 RX ADMIN — SODIUM CHLORIDE, SODIUM LACTATE, POTASSIUM CHLORIDE, CALCIUM CHLORIDE AND DEXTROSE MONOHYDRATE: 5; 600; 310; 30; 20 INJECTION, SOLUTION INTRAVENOUS at 21:59

## 2020-01-01 RX ADMIN — ALBUMIN HUMAN 50 G: 0.25 SOLUTION INTRAVENOUS at 06:19

## 2020-01-01 RX ADMIN — FUROSEMIDE 20 MG: 10 INJECTION, SOLUTION INTRAVENOUS at 10:04

## 2020-01-01 RX ADMIN — HYDROCORTISONE SODIUM SUCCINATE 50 MG: 100 INJECTION, POWDER, FOR SOLUTION INTRAMUSCULAR; INTRAVENOUS at 19:52

## 2020-01-01 RX ADMIN — PHENYLEPHRINE HYDROCHLORIDE 200 MCG: 10 INJECTION INTRAVENOUS at 14:21

## 2020-01-01 RX ADMIN — SODIUM CHLORIDE: 9 INJECTION, SOLUTION INTRAVENOUS at 18:34

## 2020-01-01 RX ADMIN — POTASSIUM PHOSPHATE, MONOBASIC AND POTASSIUM PHOSPHATE, DIBASIC 15 MMOL: 224; 236 INJECTION, SOLUTION, CONCENTRATE INTRAVENOUS at 09:07

## 2020-01-01 RX ADMIN — PIPERACILLIN AND TAZOBACTAM 4.5 G: 4; .5 INJECTION, POWDER, FOR SOLUTION INTRAVENOUS at 04:56

## 2020-01-01 RX ADMIN — POTASSIUM PHOSPHATE, MONOBASIC AND POTASSIUM PHOSPHATE, DIBASIC 15 MMOL: 224; 236 INJECTION, SOLUTION, CONCENTRATE INTRAVENOUS at 13:26

## 2020-01-01 RX ADMIN — FUROSEMIDE 20 MG: 10 INJECTION, SOLUTION INTRAVENOUS at 22:03

## 2020-01-01 RX ADMIN — VASOPRESSIN 2.4 UNITS/HR: 20 INJECTION INTRAVENOUS at 10:59

## 2020-01-01 RX ADMIN — HYDRALAZINE HYDROCHLORIDE 5 MG: 20 INJECTION INTRAMUSCULAR; INTRAVENOUS at 10:07

## 2020-01-01 RX ADMIN — MICAFUNGIN SODIUM 100 MG: 50 INJECTION, POWDER, LYOPHILIZED, FOR SOLUTION INTRAVENOUS at 22:36

## 2020-01-01 RX ADMIN — POTASSIUM CHLORIDE 10 MEQ: 7.46 INJECTION, SOLUTION INTRAVENOUS at 22:15

## 2020-01-01 RX ADMIN — HEPARIN SODIUM 5000 UNITS: 5000 INJECTION, SOLUTION INTRAVENOUS; SUBCUTANEOUS at 05:50

## 2020-01-01 RX ADMIN — PHYTONADIONE 5 MG: 10 INJECTION, EMULSION INTRAMUSCULAR; INTRAVENOUS; SUBCUTANEOUS at 20:47

## 2020-01-01 RX ADMIN — MIDAZOLAM (PF) 1 MG/ML IN 0.9 % SODIUM CHLORIDE INTRAVENOUS SOLUTION 3 MG/HR: at 00:09

## 2020-01-01 RX ADMIN — CHLORHEXIDINE GLUCONATE 15 ML: 1.2 RINSE ORAL at 08:53

## 2020-01-01 RX ADMIN — HYDROCORTISONE SODIUM SUCCINATE 50 MG: 100 INJECTION, POWDER, FOR SOLUTION INTRAMUSCULAR; INTRAVENOUS at 02:00

## 2020-01-01 RX ADMIN — POTASSIUM CHLORIDE 20 MEQ: 29.8 INJECTION, SOLUTION INTRAVENOUS at 06:33

## 2020-01-01 RX ADMIN — CALCIUM GLUCONATE: 98 INJECTION, SOLUTION INTRAVENOUS at 20:04

## 2020-01-01 RX ADMIN — CHLORHEXIDINE GLUCONATE 15 ML: 1.2 RINSE ORAL at 08:22

## 2020-01-01 RX ADMIN — POTASSIUM PHOSPHATE, MONOBASIC AND POTASSIUM PHOSPHATE, DIBASIC: 224; 236 INJECTION, SOLUTION INTRAVENOUS at 19:38

## 2020-01-01 RX ADMIN — MIDAZOLAM HYDROCHLORIDE 2 MG: 1 INJECTION, SOLUTION INTRAMUSCULAR; INTRAVENOUS at 06:08

## 2020-01-01 RX ADMIN — SODIUM CHLORIDE: 9 INJECTION, SOLUTION INTRAVENOUS at 08:35

## 2020-01-01 RX ADMIN — HYDROCORTISONE SODIUM SUCCINATE 50 MG: 100 INJECTION, POWDER, FOR SOLUTION INTRAMUSCULAR; INTRAVENOUS at 08:21

## 2020-01-01 RX ADMIN — HYDRALAZINE HYDROCHLORIDE 5 MG: 20 INJECTION INTRAMUSCULAR; INTRAVENOUS at 00:30

## 2020-01-01 RX ADMIN — POTASSIUM CHLORIDE 20 MEQ: 29.8 INJECTION, SOLUTION INTRAVENOUS at 01:19

## 2020-01-01 RX ADMIN — VASOPRESSIN 2.4 UNITS/HR: 20 INJECTION INTRAVENOUS at 05:02

## 2020-01-01 RX ADMIN — SUCCINYLCHOLINE CHLORIDE 80 MG: 20 INJECTION, SOLUTION INTRAMUSCULAR; INTRAVENOUS; PARENTERAL at 14:16

## 2020-01-01 RX ADMIN — PIPERACILLIN AND TAZOBACTAM 4.5 G: 4; .5 INJECTION, POWDER, FOR SOLUTION INTRAVENOUS at 22:35

## 2020-01-01 RX ADMIN — INSULIN ASPART 2 UNITS: 100 INJECTION, SOLUTION INTRAVENOUS; SUBCUTANEOUS at 13:03

## 2020-01-01 RX ADMIN — ALBUMIN HUMAN 50 G: 0.25 SOLUTION INTRAVENOUS at 06:41

## 2020-01-01 RX ADMIN — METOPROLOL TARTRATE 5 MG: 5 INJECTION INTRAVENOUS at 01:40

## 2020-01-01 RX ADMIN — ROCURONIUM BROMIDE 20 MG: 10 INJECTION INTRAVENOUS at 17:18

## 2020-01-01 RX ADMIN — METRONIDAZOLE 500 MG: 500 INJECTION, SOLUTION INTRAVENOUS at 23:19

## 2020-01-01 RX ADMIN — CHLORHEXIDINE GLUCONATE 15 ML: 1.2 RINSE ORAL at 20:30

## 2020-01-01 RX ADMIN — PANTOPRAZOLE SODIUM 40 MG: 40 INJECTION, POWDER, FOR SOLUTION INTRAVENOUS at 20:18

## 2020-01-01 RX ADMIN — ALBUMIN HUMAN 12.5 G: 0.05 INJECTION, SOLUTION INTRAVENOUS at 10:56

## 2020-01-01 RX ADMIN — HYDROMORPHONE HYDROCHLORIDE 0.5 MG: 1 INJECTION, SOLUTION INTRAMUSCULAR; INTRAVENOUS; SUBCUTANEOUS at 19:15

## 2020-01-01 RX ADMIN — ONDANSETRON 4 MG: 2 INJECTION INTRAMUSCULAR; INTRAVENOUS at 08:38

## 2020-01-01 RX ADMIN — VECURONIUM BROMIDE 0.8 MCG/KG/MIN: 1 INJECTION, POWDER, LYOPHILIZED, FOR SOLUTION INTRAVENOUS at 19:45

## 2020-01-01 RX ADMIN — PHENYLEPHRINE HYDROCHLORIDE 0.5 MCG/KG/MIN: 10 INJECTION INTRAVENOUS at 16:04

## 2020-01-01 RX ADMIN — POTASSIUM PHOSPHATE, MONOBASIC AND POTASSIUM PHOSPHATE, DIBASIC 20 MMOL: 224; 236 INJECTION, SOLUTION, CONCENTRATE INTRAVENOUS at 06:33

## 2020-01-01 RX ADMIN — PIPERACILLIN SODIUM AND TAZOBACTAM SODIUM 3.38 G: 3; .375 INJECTION, POWDER, LYOPHILIZED, FOR SOLUTION INTRAVENOUS at 00:20

## 2020-01-01 RX ADMIN — HYDRALAZINE HYDROCHLORIDE 5 MG: 20 INJECTION INTRAMUSCULAR; INTRAVENOUS at 00:04

## 2020-01-01 RX ADMIN — PIPERACILLIN AND TAZOBACTAM 4.5 G: 4; .5 INJECTION, POWDER, FOR SOLUTION INTRAVENOUS at 15:53

## 2020-01-01 RX ADMIN — IOPAMIDOL 80 ML: 755 INJECTION, SOLUTION INTRAVENOUS at 08:25

## 2020-01-01 RX ADMIN — CHLORHEXIDINE GLUCONATE 15 ML: 1.2 RINSE ORAL at 20:16

## 2020-01-01 RX ADMIN — DEXMEDETOMIDINE HYDROCHLORIDE 0.2 MCG/KG/HR: 100 INJECTION, SOLUTION, CONCENTRATE INTRAVENOUS at 09:22

## 2020-01-01 RX ADMIN — SODIUM BICARBONATE 50 MEQ/HR: 84 INJECTION, SOLUTION INTRAVENOUS at 15:02

## 2020-01-01 RX ADMIN — HYDROMORPHONE HYDROCHLORIDE 0.5 MG: 1 INJECTION, SOLUTION INTRAMUSCULAR; INTRAVENOUS; SUBCUTANEOUS at 04:29

## 2020-01-01 RX ADMIN — PIPERACILLIN SODIUM AND TAZOBACTAM SODIUM 3.38 G: 3; .375 INJECTION, POWDER, LYOPHILIZED, FOR SOLUTION INTRAVENOUS at 10:28

## 2020-01-01 RX ADMIN — PHYTONADIONE 5 MG: 10 INJECTION, EMULSION INTRAMUSCULAR; INTRAVENOUS; SUBCUTANEOUS at 11:02

## 2020-01-01 RX ADMIN — DEXTROSE MONOHYDRATE 50 ML: 25 INJECTION, SOLUTION INTRAVENOUS at 14:37

## 2020-01-01 RX ADMIN — PIPERACILLIN SODIUM AND TAZOBACTAM SODIUM 3.38 G: 3; .375 INJECTION, POWDER, LYOPHILIZED, FOR SOLUTION INTRAVENOUS at 06:42

## 2020-01-01 RX ADMIN — MIDAZOLAM HYDROCHLORIDE 2 MG: 1 INJECTION, SOLUTION INTRAMUSCULAR; INTRAVENOUS at 01:59

## 2020-01-01 RX ADMIN — MINERAL OIL AND PETROLATUM: 150; 830 OINTMENT OPHTHALMIC at 03:50

## 2020-01-01 RX ADMIN — MIDAZOLAM HYDROCHLORIDE 2 MG: 1 INJECTION, SOLUTION INTRAMUSCULAR; INTRAVENOUS at 22:28

## 2020-01-01 RX ADMIN — MAGNESIUM SULFATE IN WATER 2 G: 40 INJECTION, SOLUTION INTRAVENOUS at 12:21

## 2020-01-01 RX ADMIN — PIPERACILLIN AND TAZOBACTAM 4.5 G: 4; .5 INJECTION, POWDER, FOR SOLUTION INTRAVENOUS at 16:04

## 2020-01-01 RX ADMIN — Medication 75 MCG/HR: at 04:58

## 2020-01-01 RX ADMIN — SODIUM CHLORIDE: 9 INJECTION, SOLUTION INTRAVENOUS at 16:32

## 2020-01-01 RX ADMIN — PIPERACILLIN SODIUM AND TAZOBACTAM SODIUM 4.5 G: 4; .5 INJECTION, POWDER, LYOPHILIZED, FOR SOLUTION INTRAVENOUS at 02:57

## 2020-01-01 RX ADMIN — PIPERACILLIN AND TAZOBACTAM 4.5 G: 4; .5 INJECTION, POWDER, FOR SOLUTION INTRAVENOUS at 10:17

## 2020-01-01 RX ADMIN — INSULIN ASPART 2 UNITS: 100 INJECTION, SOLUTION INTRAVENOUS; SUBCUTANEOUS at 00:07

## 2020-01-01 RX ADMIN — SODIUM CHLORIDE, SODIUM LACTATE, POTASSIUM CHLORIDE, CALCIUM CHLORIDE AND DEXTROSE MONOHYDRATE: 5; 600; 310; 30; 20 INJECTION, SOLUTION INTRAVENOUS at 11:56

## 2020-01-01 RX ADMIN — PANTOPRAZOLE SODIUM 40 MG: 40 INJECTION, POWDER, FOR SOLUTION INTRAVENOUS at 09:34

## 2020-01-01 RX ADMIN — MIDAZOLAM HYDROCHLORIDE 0.5 MG: 1 INJECTION, SOLUTION INTRAMUSCULAR; INTRAVENOUS at 13:34

## 2020-01-01 RX ADMIN — I.V. FAT EMULSION 250 ML: 20 EMULSION INTRAVENOUS at 20:16

## 2020-01-01 RX ADMIN — HYDRALAZINE HYDROCHLORIDE 5 MG: 20 INJECTION INTRAMUSCULAR; INTRAVENOUS at 00:21

## 2020-01-01 RX ADMIN — ROCURONIUM BROMIDE 30 MG: 10 INJECTION INTRAVENOUS at 12:28

## 2020-01-01 RX ADMIN — HEPARIN SODIUM 5000 UNITS: 5000 INJECTION, SOLUTION INTRAVENOUS; SUBCUTANEOUS at 13:48

## 2020-01-01 RX ADMIN — HYDROMORPHONE HYDROCHLORIDE 0.3 MG: 1 INJECTION, SOLUTION INTRAMUSCULAR; INTRAVENOUS; SUBCUTANEOUS at 03:03

## 2020-01-01 RX ADMIN — PIPERACILLIN AND TAZOBACTAM 4.5 G: 4; .5 INJECTION, POWDER, FOR SOLUTION INTRAVENOUS at 10:25

## 2020-01-01 RX ADMIN — PHENYLEPHRINE HYDROCHLORIDE 0.5 MCG/KG/MIN: 10 INJECTION INTRAVENOUS at 23:23

## 2020-01-01 RX ADMIN — SODIUM CHLORIDE 3 UNITS/HR: 9 INJECTION, SOLUTION INTRAVENOUS at 16:32

## 2020-01-01 RX ADMIN — MICAFUNGIN SODIUM 100 MG: 50 INJECTION, POWDER, LYOPHILIZED, FOR SOLUTION INTRAVENOUS at 22:44

## 2020-01-01 RX ADMIN — HEPARIN SODIUM 5000 UNITS: 5000 INJECTION, SOLUTION INTRAVENOUS; SUBCUTANEOUS at 05:51

## 2020-01-01 RX ADMIN — MIDAZOLAM (PF) 1 MG/ML IN 0.9 % SODIUM CHLORIDE INTRAVENOUS SOLUTION 3 MG/HR: at 08:32

## 2020-01-01 RX ADMIN — HYDROCORTISONE SODIUM SUCCINATE 50 MG: 100 INJECTION, POWDER, FOR SOLUTION INTRAMUSCULAR; INTRAVENOUS at 02:32

## 2020-01-01 RX ADMIN — DEXTROSE MONOHYDRATE: 100 INJECTION, SOLUTION INTRAVENOUS at 18:00

## 2020-01-01 RX ADMIN — PROPOFOL 75 MCG/KG/MIN: 10 INJECTION, EMULSION INTRAVENOUS at 12:20

## 2020-01-01 RX ADMIN — Medication 150 MCG/HR: at 19:25

## 2020-01-01 RX ADMIN — MAGNESIUM SULFATE HEPTAHYDRATE: 500 INJECTION, SOLUTION INTRAMUSCULAR; INTRAVENOUS at 20:37

## 2020-01-01 RX ADMIN — SODIUM CHLORIDE: 4.5 INJECTION, SOLUTION INTRAVENOUS at 03:33

## 2020-01-01 RX ADMIN — MIDAZOLAM (PF) 1 MG/ML IN 0.9 % SODIUM CHLORIDE INTRAVENOUS SOLUTION 3 MG/HR: at 17:32

## 2020-01-01 RX ADMIN — INSULIN ASPART 1 UNITS: 100 INJECTION, SOLUTION INTRAVENOUS; SUBCUTANEOUS at 04:31

## 2020-01-01 RX ADMIN — SODIUM CHLORIDE 79 ML: 9 INJECTION, SOLUTION INTRAVENOUS at 07:45

## 2020-01-01 RX ADMIN — MAGNESIUM SULFATE IN WATER 2 G: 40 INJECTION, SOLUTION INTRAVENOUS at 05:45

## 2020-01-01 SDOH — HEALTH STABILITY: MENTAL HEALTH: HOW MANY STANDARD DRINKS CONTAINING ALCOHOL DO YOU HAVE ON A TYPICAL DAY?: NOT ASKED

## 2020-01-01 SDOH — HEALTH STABILITY: MENTAL HEALTH: HOW OFTEN DO YOU HAVE A DRINK CONTAINING ALCOHOL?: NOT ASKED

## 2020-01-01 SDOH — HEALTH STABILITY: MENTAL HEALTH: HOW OFTEN DO YOU HAVE 6 OR MORE DRINKS ON ONE OCCASION?: NOT ASKED

## 2020-01-01 ASSESSMENT — ACTIVITIES OF DAILY LIVING (ADL)
ADLS_ACUITY_SCORE: 19
ADLS_ACUITY_SCORE: 21
ADLS_ACUITY_SCORE: 13
ADLS_ACUITY_SCORE: 19
ADLS_ACUITY_SCORE: 21
ADLS_ACUITY_SCORE: 12
FALL_HISTORY_WITHIN_LAST_SIX_MONTHS: NO
ADLS_ACUITY_SCORE: 19
SWALLOWING: 0-->SWALLOWS FOODS/LIQUIDS WITHOUT DIFFICULTY
ADLS_ACUITY_SCORE: 17
ADLS_ACUITY_SCORE: 21
ADLS_ACUITY_SCORE: 19
ADLS_ACUITY_SCORE: 21
ADLS_ACUITY_SCORE: 19
ADLS_ACUITY_SCORE: 15
ADLS_ACUITY_SCORE: 19
ADLS_ACUITY_SCORE: 19
ADLS_ACUITY_SCORE: 21
ADLS_ACUITY_SCORE: 19
RETIRED_EATING: 0-->INDEPENDENT
ADLS_ACUITY_SCORE: 21
ADLS_ACUITY_SCORE: 19
ADLS_ACUITY_SCORE: 21
ADLS_ACUITY_SCORE: 19
ADLS_ACUITY_SCORE: 21
ADLS_ACUITY_SCORE: 19
ADLS_ACUITY_SCORE: 16
ADLS_ACUITY_SCORE: 17
ADLS_ACUITY_SCORE: 19
ADLS_ACUITY_SCORE: 21
ADLS_ACUITY_SCORE: 19
TOILETING: 0-->INDEPENDENT
ADLS_ACUITY_SCORE: 21
ADLS_ACUITY_SCORE: 19
ADLS_ACUITY_SCORE: 17
ADLS_ACUITY_SCORE: 19
ADLS_ACUITY_SCORE: 19
ADLS_ACUITY_SCORE: 21
ADLS_ACUITY_SCORE: 19
ADLS_ACUITY_SCORE: 19
ADLS_ACUITY_SCORE: 21
ADLS_ACUITY_SCORE: 19
ADLS_ACUITY_SCORE: 21
ADLS_ACUITY_SCORE: 19
ADLS_ACUITY_SCORE: 12
ADLS_ACUITY_SCORE: 17
ADLS_ACUITY_SCORE: 19
ADLS_ACUITY_SCORE: 17
ADLS_ACUITY_SCORE: 17
ADLS_ACUITY_SCORE: 19
ADLS_ACUITY_SCORE: 19
DRESS: 0-->INDEPENDENT
ADLS_ACUITY_SCORE: 19
ADLS_ACUITY_SCORE: 19
TRANSFERRING: 0-->INDEPENDENT
ADLS_ACUITY_SCORE: 19
ADLS_ACUITY_SCORE: 17
ADLS_ACUITY_SCORE: 19
ADLS_ACUITY_SCORE: 12
ADLS_ACUITY_SCORE: 19
ADLS_ACUITY_SCORE: 21
ADLS_ACUITY_SCORE: 19
ADLS_ACUITY_SCORE: 17
ADLS_ACUITY_SCORE: 19
ADLS_ACUITY_SCORE: 16
ADLS_ACUITY_SCORE: 19
ADLS_ACUITY_SCORE: 19
ADLS_ACUITY_SCORE: 21
ADLS_ACUITY_SCORE: 19
ADLS_ACUITY_SCORE: 21
ADLS_ACUITY_SCORE: 19
ADLS_ACUITY_SCORE: 19
ADLS_ACUITY_SCORE: 17
ADLS_ACUITY_SCORE: 19
ADLS_ACUITY_SCORE: 21
ADLS_ACUITY_SCORE: 19
ADLS_ACUITY_SCORE: 16
ADLS_ACUITY_SCORE: 19
ADLS_ACUITY_SCORE: 21
ADLS_ACUITY_SCORE: 21
ADLS_ACUITY_SCORE: 19
ADLS_ACUITY_SCORE: 21
ADLS_ACUITY_SCORE: 19
ADLS_ACUITY_SCORE: 17
ADLS_ACUITY_SCORE: 19
ADLS_ACUITY_SCORE: 12
ADLS_ACUITY_SCORE: 21
ADLS_ACUITY_SCORE: 21
ADLS_ACUITY_SCORE: 17
ADLS_ACUITY_SCORE: 19
ADLS_ACUITY_SCORE: 14
ADLS_ACUITY_SCORE: 19
ADLS_ACUITY_SCORE: 11
ADLS_ACUITY_SCORE: 19
RETIRED_COMMUNICATION: 0-->UNDERSTANDS/COMMUNICATES WITHOUT DIFFICULTY
ADLS_ACUITY_SCORE: 19
ADLS_ACUITY_SCORE: 12
ADLS_ACUITY_SCORE: 19
ADLS_ACUITY_SCORE: 17
COGNITION: 0 - NO COGNITION ISSUES REPORTED
AMBULATION: 0-->INDEPENDENT
ADLS_ACUITY_SCORE: 21
ADLS_ACUITY_SCORE: 17
ADLS_ACUITY_SCORE: 19
ADLS_ACUITY_SCORE: 19
ADLS_ACUITY_SCORE: 17
ADLS_ACUITY_SCORE: 12
ADLS_ACUITY_SCORE: 21
ADLS_ACUITY_SCORE: 21
ADLS_ACUITY_SCORE: 19
ADLS_ACUITY_SCORE: 19
BATHING: 0-->INDEPENDENT
ADLS_ACUITY_SCORE: 19
ADLS_ACUITY_SCORE: 21
ADLS_ACUITY_SCORE: 17
ADLS_ACUITY_SCORE: 17
ADLS_ACUITY_SCORE: 14
ADLS_ACUITY_SCORE: 21
ADLS_ACUITY_SCORE: 17
ADLS_ACUITY_SCORE: 19
ADLS_ACUITY_SCORE: 17
ADLS_ACUITY_SCORE: 19
ADLS_ACUITY_SCORE: 11
ADLS_ACUITY_SCORE: 17
ADLS_ACUITY_SCORE: 21
ADLS_ACUITY_SCORE: 19
ADLS_ACUITY_SCORE: 21
ADLS_ACUITY_SCORE: 12

## 2020-01-01 ASSESSMENT — MIFFLIN-ST. JEOR
SCORE: 1198.88
SCORE: 1233.88
SCORE: 1238.88
SCORE: 1067.62
SCORE: 1275.88
SCORE: 1126.88
SCORE: 1263.88
SCORE: 1139.88
SCORE: 1050.88
SCORE: 1225.88
SCORE: 1089.88
SCORE: 1213.88
SCORE: 1213.88
SCORE: 1080.88
SCORE: 1054.92
SCORE: 1239.88
SCORE: 1059.88
SCORE: 1088.88
SCORE: 1164.88
SCORE: 1245.88
SCORE: 1239.88
SCORE: 1251.88
SCORE: 956.88
SCORE: 1090.88
SCORE: 1109.88
SCORE: 1238.88
SCORE: 1060.88
SCORE: 1122.88
SCORE: 1236.88
SCORE: 1087.88
SCORE: 1120.88
SCORE: 1263.88

## 2020-01-01 ASSESSMENT — ENCOUNTER SYMPTOMS
NAUSEA: 0
CONSTIPATION: 1
ABDOMINAL PAIN: 1
FEVER: 0
COLOR CHANGE: 1
DIARRHEA: 0
VOMITING: 0
ABDOMINAL DISTENTION: 1

## 2020-01-01 ASSESSMENT — LIFESTYLE VARIABLES
TOBACCO_USE: 1

## 2020-09-10 PROBLEM — K66.8 PNEUMOPERITONEUM: Status: ACTIVE | Noted: 2020-01-01

## 2020-09-10 PROBLEM — R65.20 SEVERE SEPSIS (H): Status: ACTIVE | Noted: 2020-01-01

## 2020-09-10 PROBLEM — K63.1 COLON PERFORATION (H): Status: ACTIVE | Noted: 2020-01-01

## 2020-09-10 PROBLEM — K66.8 FREE INTRAPERITONEAL AIR: Status: ACTIVE | Noted: 2020-01-01

## 2020-09-10 PROBLEM — A41.9 SEVERE SEPSIS (H): Status: ACTIVE | Noted: 2020-01-01

## 2020-09-10 PROBLEM — K63.1 PERFORATION OF COLON (H): Status: ACTIVE | Noted: 2020-01-01

## 2020-09-10 NOTE — ED PROVIDER NOTES
History   Chief Complaint:  Abdominal Pain     The history is provided by the patient and the spouse.      Evonne Martel is a 54 year old female who presents with diffuse abdominal pain and bloating. The patient reports that the pain started about a week ago Wednesday(9/2) and has continued since that time. It was starting to get better over the weekend, but then three days ago the pain became suddenly worse and has not resolved. She also notes acute onset of abdominal bloating 3 days ago and constipation but no nausea, vomiting, or diarrhea. She has not had a fever. Her spouse feels that she appears a bit more yellow than her baseline. She reports that she typically drinks 3 hard liquor beverages daily though has not had any for the last week and a half.     Allergies:  No Known Drug Allergies    Medications:    Medications reviewed. No current medications.     Past Medical History:    Medical history reviewed. No pertinent medical history.    Past Surgical History:    Knee surgery     Family History:    Family history reviewed. No pertinent family history.     Social History:  The patient was accompanied to the ED by spouse.  Smoking Status: Current Every Day Smoker   Smokeless Tobacco: Never Used  Alcohol Use: Yes, 3 alcoholic beverages a day    Review of Systems   Constitutional: Negative for fever.   Gastrointestinal: Positive for abdominal distention, abdominal pain and constipation. Negative for diarrhea, nausea and vomiting.   Skin: Positive for color change.   All other systems reviewed and are negative.    Physical Exam     Patient Vitals for the past 24 hrs:   BP Temp Temp src Pulse Resp SpO2 Height Weight   09/10/20 1947 (!) 158/100 100.3  F (37.9  C) Temporal -- 20 99 % -- --   09/10/20 1912 -- -- -- 98 22 99 % -- --   09/10/20 1900 130/84 -- -- 93 28 98 % -- --   09/10/20 1830 125/89 -- -- 97 29 98 % -- --   09/10/20 1800 131/86 -- -- 97 30 98 % -- --   09/10/20 1730 (!) 146/90 -- -- 95 26 99 % --  "--   09/10/20 1700 (!) 143/91 -- -- 96 22 99 % -- --   09/10/20 1640 -- -- -- 100 23 99 % -- --   09/10/20 1630 136/86 -- -- 98 22 99 % -- --   09/10/20 1600 128/87 -- -- 104 -- 98 % -- --   09/10/20 1540 122/80 -- -- 107 -- 99 % -- --   09/10/20 1451 117/77 98.5  F (36.9  C) Temporal 124 18 99 % 1.651 m (5' 5\") 47.2 kg (104 lb)       Physical Exam  Nursing notes reviewed. Vitals reviewed.  General: Alert. Well kept.  Eyes:  Conjunctiva non-injected. Scleral icterus.   Neck/Throat: Moist mucous membranes.  Normal voice.  Cardiac: Regular rhythm. Normal heart sounds with no murmur/rubs/click.   Pulmonary: Clear and equal breath sounds bilaterally. No crackles/rales. No wheezing  Abdomen: Diffuse abdominal tenderness. Distention with tympany. No masses. No guarding or rebound.  Musculoskeletal: Normal gross range of motion of all 4 extremities.    Neurological: Alert and oriented x4.   Skin: Warm and dry without rashes or petechiae. Normal appearance of visualized exposed skin.  Psych: Affect normal. Good eye contact.    Emergency Department Course   ECG:  ECG taken at 1622, ECG read at 1646  Sinus tachycardia  ST elevation, consider early repolarization, pericarditis, or injury  T wave abnormality, consider anterior ischemia  Abnormal ECG  Rate 101 bpm. AK interval 140 ms. QRS duration 76 ms. QT/QTc 384/497 ms. P-R-T axes -3 58 79.    Imaging:  Radiology findings were communicated with the patient and spouse who voiced understanding of the findings.    CT Abdomen Pelvis  IMPRESSION:   1.  Large pneumoperitoneum concerning for hollow viscus perforation.   The site of the perforation is likely in the sigmoid colon, likely   secondary to perforated diverticulitis. Moderate sized collection of   fecal material adjacent to the sigmoid colon.   2.  Diffuse wall thickening of the small bowel and remainder of the   colon may be secondary to \"shock bowel\" as can be seen after   hypovolemic shock. Flattened IVC.  Less likely, " these findings may be   related to diffuse enterocolitis.     Findings were discussed with DEAN Lennon CNP at 5:50 pm   Reading per radiology     Laboratory:  Laboratory findings were communicated with the patient and spouse who voiced understanding of the findings.    CBC: WBC 16.1(H), HGB 15.0, (H)  CMP: (LL), chloride 79(L), glucose 124(H), albumin 2.9(L), protein total 9.3(H) o/w WNL (Creatinine 0.77)  Lipase: 48(L)  Lactic acid whole blood(result time 1706) 3.0(H)   Troponin (Collected 1538): <0.015  INR: 1.42(H)  PTT: 32  Alcohol ethyl: <0.01  ABO/RH type and screen: A+, antibody negative  Blood culture pending x2    Asymptomatic COVID19 Virus PCR by nasopharyngeal swab pending     Interventions:  1538 NS 1000 ml IV  1558 Zofran 4 mg IV  1751 Zosyn 4.5 g IV  1911 NS 1000 mL IV    Emergency Department Course:  Nursing notes and vitals reviewed.    1530 I performed an exam of the patient as documented above.     IV was inserted and blood was drawn for laboratory testing, results above.     1655 I staffed the patient with my colleague Dr. Martin    The patient was sent for a CT Abdomen Pelvis while in the emergency department, results above.      1733 I viewed the patient's CT scan and requested we contact radiology to requested a read.     1735 Dr. Martin came to evaluate the patient.     1740 I rechecked the patient. Explained findings to the Patient.    1744 I spoke with Dr. Young Gutierrez of the Radiology service regarding patient's presentation, findings, and plan of care. She notes that it is likely a ruptured sigmoid.     1751 I spoke with Dr. Young Gutierrez of the Radiology service regarding patient's presentation, findings, and plan of care. She notes concern for small bowel hypovolemic shock.     1803 I spoke with Dr. Shell of the Colorectal service regarding patient's presentation, findings, and plan of care. She will come see the patient.     1807  I spoke with Dr. Martin regarding the patient's  findings and plan of care.     1859 I spoke with Dr. Shell after she evaluated the patient. She requested another liter of normal saline. She also requested a hospitalist admit the patient.     1904 I spoke with Dr. Bosch of the Hospitalist service from [facility] regarding patient's presentation, findings, and plan of care.     1912 The patient was transferred to the OR.     2006 I spoke with Dr. Sahu of the Intensivist regarding the patient's presentation and plan of care.     Findings and plan explained to the Patient and spouse who consents to admission. Discussed the patient with Dr. Bosch, who will admit the patient to ICU bed for further monitoring, evaluation, and treatment.     Impression & Plan    Covid-19  Evonne Martel was evaluated during a global COVID-19 pandemic, which necessitated consideration that the patient might be at risk for infection with the SARS-CoV-2 virus that causes COVID-19.   Applicable protocols for evaluation were followed during the patient's care.   COVID-19 was considered as part of the patient's evaluation. The plan for testing is:  a test was obtained during this visit.    CMS Diagnoses:   The patient has signs of Severe Sepsis        If one the following conditions is present, a 30 mL/kg bolus is recommended as part of the 6 hour bundle (IBW can be used for BMI >30, or document refusal/contraindication):      1.   Initial hypotension  defined as 2 bps < 90 or map < 65 in the 6hrs before or 6hrs after time zero.     2.  Lactate >4.     The patient has signs of Severe Sepsis as evidenced by:    1. 2 SIRS criteria, AND  2. Suspected infection, AND   3. Organ dysfunction: Lactic Acidosis with value >2.0    Time severe sepsis diagnosis confirmed: 1706  09/10/20 as this was the time when Lactate resulted, and the level was > 2.0    3 Hour Severe Sepsis Bundle Completion:    1. Initial Lactic Acid Result:   Recent Labs   Lab Test 09/10/20  1604   LACT 3.0*     2.  Blood Cultures before Antibiotics: Yes  3. Broad Spectrum Antibiotics Administered:  yes       Anti-infectives (From admission through now)    Start     Dose/Rate Route Frequency Ordered Stop    09/10/20 1740  piperacillin-tazobactam (ZOSYN) 4.5 g vial to attach to  mL bag      4.5 g  over 30 Minutes Intravenous ONCE 09/10/20 1735 09/10/20 1816          4. Fluid volume administered in ED:  Full 30 mL/kg bolus given (see amount below).    BMI Readings from Last 1 Encounters:   09/10/20 17.31 kg/m      30 mL/kg fluids based on weight: 1,420 mL  30 mL/kg fluids based on IBW (must be >= 60 inches tall): 1,710 mL                Severe Sepsis reassessment:  1. Repeat Lactic Acid Level: patient brought emergently to OR    Medical Decision Making:  Evonne Martel is a 54 year old female with no known medical history who presents for evaluation of abdominal swelling and abdominal pain. On exam, the patient has a large, distended, tympanic abdomen. She is currently afebrile and has mild tachycardia with heart rate of 124.  EKG shows ST elevation concerning early repolarization and T wave abnormality in the anterior leads. The patient denies chest pain and troponin is negative.  Initial blood tests returned with sodium of 116 and she was initially given 1 liter of IV fluids.  Her lactic acid also returned at 3.0. Her white count was elevated at 16.1. Blood cultures were drawn and she was initiated on Zosyn. Her bilirubin and hepatic panel showed low albumin but otherwise was unremarkable. Her lipase is 48.  CT of the abdomen shows a large pneumoperitoneum with concern for ruptured sigmoid colon. I spoke with colorectal surgery Dr. Shell who will take the patient to the OR [facility].  I spoke with radiology again who noted also concern for hypovolemic shock to the small bowel with a flat IVC 6.  This was discussed with Dr. Shell who evaluated in the patient in the ED and requested additional 1 L normal saline.  The  patient was not hypotensive and did not require pressors.  She was taken emergently to the operating room.  Repeat lactate not obtained as patient taken to the OR.  I spoke with Dr. Bosch of the Hospitalist service and Dr. Sahu, Intensivist, who accepted patient for admission.    Critical Care time was 30 minutes for this patient excluding procedures.     Diagnosis:    ICD-10-CM    1. Hyponatremia  E87.1 Lactic acid whole blood     UA with Microscopic reflex to Culture     Asymptomatic COVID-19 Virus (Coronavirus) by PCR     Troponin I     Troponin I     INR     INR     Partial thromboplastin time     Partial thromboplastin time     Blood culture     Blood culture     Alcohol ethyl     Alcohol ethyl     ABO/Rh type and screen     CANCELED: CBC with platelets differential     CANCELED: Comprehensive metabolic panel     CANCELED: Lipase     CANCELED: Troponin I     CANCELED: INR     CANCELED: Partial thromboplastin time     CANCELED: Alcohol level blood     CANCELED: ABO/Rh type and screen   2. Pneumoperitoneum  K66.8 Case Request: EXPLORATORY LAPAROTOMY, possible sigmoid colectomy, possible colostomy.  Plan to find hole in bowel, remove diseased segment, clean out all infection     Case Request: EXPLORATORY LAPAROTOMY, possible sigmoid colectomy, possible colostomy.  Plan to find hole in bowel, remove diseased segment, clean out all infection   3. Severe sepsis (H)  A41.9 Case Request: EXPLORATORY LAPAROTOMY, possible sigmoid colectomy, possible colostomy.  Plan to find hole in bowel, remove diseased segment, clean out all infection    R65.20 Case Request: EXPLORATORY LAPAROTOMY, possible sigmoid colectomy, possible colostomy.  Plan to find hole in bowel, remove diseased segment, clean out all infection   4. Severe sepsis (H)  A41.9 Case Request: EXPLORATORY LAPAROTOMY, possible sigmoid colectomy, possible colostomy.  Plan to find hole in bowel, remove diseased segment, clean out all infection    R65.20 Case  Request: EXPLORATORY LAPAROTOMY, possible sigmoid colectomy, possible colostomy.  Plan to find hole in bowel, remove diseased segment, clean out all infection    Added automatically from request for surgery [id number]   5. Pneumoperitoneum  K66.8 Case Request: EXPLORATORY LAPAROTOMY, possible sigmoid colectomy, possible colostomy.  Plan to find hole in bowel, remove diseased segment, clean out all infection     Case Request: EXPLORATORY LAPAROTOMY, possible sigmoid colectomy, possible colostomy.  Plan to find hole in bowel, remove diseased segment, clean out all infection    Added automatically from request for surgery [id number]       Disposition:   The patient is admitted into the care of Dr. Bosch.    Scribe Disclosure:  I, Sonia Vaca, am serving as a scribe at 3:26 PM on 9/10/2020 to document services personally performed by Susan Lennon DNP based on my observations and the provider's statements to me.   [facility] EMERGENCY DEPARTMENT       Susan Lennon CNP  09/10/20 2045

## 2020-09-10 NOTE — PHARMACY-ADMISSION MEDICATION HISTORY
Pharmacy Medication History  Admission medication history interview status for the 9/10/2020  admission is complete. See EPIC admission navigator for prior to admission medications     Medication history sources: Patient's family/friend (Spoke with patient's  - Ollie)  Medication history source reliability: Good - Ollie states that patient does not take any Rx or OTC medications at home.       Medication reconciliation completed by provider prior to medication history? No    Time spent in this activity: 5 minutes

## 2020-09-10 NOTE — ED NOTES
[facility]  ED Nurse Handoff Report    ED Chief complaint: Abdominal Pain      ED Diagnosis:   Final diagnoses:   Hyponatremia       Code Status: Full Code    Allergies: No Known Allergies    Patient Story: Pt presents to the ER with c/o abd pain and bloating.   Focused Assessment:  Pt reports that her pain started about a week ago Wednesday(9/2) and has continued since that time. It was starting to get better over the weekend, but then three days ago the pain became suddenly worse and has not resolved. She also notes abdominal bloating and constipation but no nausea, vomiting, or diarrhea. Pt reports to drinking 3 hard liquor beverages daily but none the last 1-2 wks.   Results for orders placed or performed during the hospital encounter of 09/10/20   Comprehensive metabolic panel     Status: Abnormal   Result Value Ref Range    Sodium 116 (LL) 133 - 144 mmol/L    Potassium 4.0 3.4 - 5.3 mmol/L    Chloride 79 (L) 94 - 109 mmol/L    Carbon Dioxide 26 20 - 32 mmol/L    Anion Gap 11 3 - 14 mmol/L    Glucose 125 (H) 70 - 99 mg/dL    Urea Nitrogen 27 7 - 30 mg/dL    Creatinine 0.77 0.52 - 1.04 mg/dL    GFR Estimate 86 >60 mL/min/[1.73_m2]    GFR Estimate If Black >90 >60 mL/min/[1.73_m2]    Calcium 9.1 8.5 - 10.1 mg/dL    Bilirubin Total 1.0 0.2 - 1.3 mg/dL    Albumin 2.9 (L) 3.4 - 5.0 g/dL    Protein Total 9.3 (H) 6.8 - 8.8 g/dL    Alkaline Phosphatase 120 40 - 150 U/L    ALT 17 0 - 50 U/L    AST 39 0 - 45 U/L   CBC with platelets differential     Status: Abnormal   Result Value Ref Range    WBC 16.1 (H) 4.0 - 11.0 10e9/L    RBC Count 4.43 3.8 - 5.2 10e12/L    Hemoglobin 15.0 11.7 - 15.7 g/dL    Hematocrit 41.3 35.0 - 47.0 %    MCV 93 78 - 100 fl    MCH 33.9 (H) 26.5 - 33.0 pg    MCHC 36.3 31.5 - 36.5 g/dL    RDW 12.5 10.0 - 15.0 %    Platelet Count 465 (H) 150 - 450 10e9/L    Diff Method Automated Method     % Neutrophils 78.2 %    % Lymphocytes 8.1 %    % Monocytes 11.9 %    % Eosinophils 0.0 %    %  Basophils 0.1 %    % Immature Granulocytes 1.7 %    Nucleated RBCs 0 0 /100    Absolute Neutrophil 12.6 (H) 1.6 - 8.3 10e9/L    Absolute Lymphocytes 1.3 0.8 - 5.3 10e9/L    Absolute Monocytes 1.9 (H) 0.0 - 1.3 10e9/L    Absolute Eosinophils 0.0 0.0 - 0.7 10e9/L    Absolute Basophils 0.0 0.0 - 0.2 10e9/L    Abs Immature Granulocytes 0.3 0 - 0.4 10e9/L    Absolute Nucleated RBC 0.0    Lipase     Status: Abnormal   Result Value Ref Range    Lipase 48 (L) 73 - 393 U/L   Lactic acid whole blood     Status: Abnormal   Result Value Ref Range    Lactic Acid 3.0 (H) 0.7 - 2.0 mmol/L   EKG 12-lead, tracing only     Status: None   Result Value Ref Range    Interpretation ECG Click View Image link to view waveform and result        Treatments and/or interventions provided: Monitor/ IVF  Patient's response to treatments and/or interventions: Tolerating    To be done/followed up on inpatient unit:      Does this patient have any cognitive concerns?: None    Activity level - Baseline/Home:  Independent  Activity Level - Current:   Stand with Assist    Patient's Preferred language: English   Needed?: No    Isolation: None  Infection: Not Applicable  Bariatric?: No    Vital Signs:   Vitals:    09/10/20 1600 09/10/20 1630 09/10/20 1640 09/10/20 1700   BP: 128/87 136/86     Pulse: 104 98 100 96   Resp:  22 23 22   Temp:       TempSrc:       SpO2: 98% 99% 99% 99%   Weight:       Height:           Cardiac Rhythm:     Was the PSS-3 completed:   Yes  What interventions are required if any?               Family Comments: SO at bedside  OBS brochure/video discussed/provided to patient/family: N/A              Name of person given brochure if not patient:               Relationship to patient:     For the majority of the shift this patient's behavior was Green.   Behavioral interventions performed were   .    ED NURSE PHONE NUMBER: *[id number]

## 2020-09-11 NOTE — OP NOTE
Procedure Date: 09/10/2020      PREOPERATIVE DIAGNOSIS:  Perforated viscus.      POSTOPERATIVE DIAGNOSIS:  Cecal perforation as well as a sigmoid phlegmon and contained pelvic perforation.      PROCEDURES:  Exploratory laparotomy, right colectomy with end ileostomy and transverse mucous fistula, rigid proctoscopy, drainage of pelvis.      SURGEON:  Chery Morrison MD.      ASSISTANT:  Sukhjinder Chavez MD, PhD, who is our fellow.      ANESTHESIA:  General.      ESTIMATED BLOOD LOSS:  75 mL.      FINDINGS:  The patient had purulent and feculent peritonitis, most of the feculent peritonitis was in the right gutter related to the right colon.  My thought is that the cecum perforated from chronic obstruction, although the rest of the colon did not look overly dilated.  The patient was very unstable during the procedure, which is why we did not proceed with a sigmoid colectomy and just drained the pelvis.      INDICATIONS:  The patient is a 54-year-old female smoker with a history of some alcohol use and otherwise no known history who presented to the emergency room today with worsening abdominal pain and distention.  A CT showed extensive pneumoperitoneum.  It was thought that this was related to sigmoid diverticulitis because of peritoneum abscess in the pelvis.  Based on this, I have recommended surgical treatment.  I have discussed the procedure, the recovery, the risks and benefits with the patient.  She understands and agrees to proceed.      DESCRIPTION OF PROCEDURE:  The patient was brought to the operating room.  She was placed under general anesthesia.  She was placed in lithotomy position.  Abdomen was prepped and draped in the usual sterile manner after pause for the cause was performed.  A lower midline laparotomy was made and this eventually was extended up above the umbilicus.  The skin incision was deepened with cautery.  We entered the abdomen bluntly given the extensive amount of pneumoperitoneum and then  opened up the rest of the fascia with cautery.  Upon generalized laparotomy, we found a rush of air.  We found a fair amount of purulent peritonitis initially.  We ran the small bowel, found a small bowel diverticulum, but did not find any injuries of the small bowel.  We went to the colon and could feel in the pelvis and there seemed to be a phlegmon between the sigmoid colon, the left adnexa and the uterus, and we are going to free this up, but then I went to look at the cecum to run the bowel and I found that this was very distended and with just a little bit of mobilization, there was feculent drainage.  Based on this, we performed the right colectomy.  We mobilized the lateral peritoneal attachments from the line of Toldt with electrocautery.  This was able to be a fairly easily mobilization, but the patient's tissues were really frail, bled very easily, were a bit edematous and just did not look overall healthy.  Once we had the hepatic flexure fully mobilized, the duodenum down and could get into the retroperitoneum and separate this from the mesentery, we divided the bowel at the distal aspect of the proximal transverse colon with a JAKE-75 mm stapler.  We subsequently took off the ligament of Treves from the ileum and fired another stapler here.  We then took the mesentery with the LigaSure, staying fairly high on this.  Once this was done, we found that there was still some bleeding along the peritoneal edges that we mobilized and this was controlled with cautery.  This is where most of the blood loss was during the procedure.  We then copiously irrigated the side of the abdomen, made sure that there was no bleeding.  We irrigated the other portions of the upper abdomen and rinsed out any purulence.  I could feel the pelvis and I was not sure if I was feeling an abscess cavity, a hole in the colon or what.  I went below and performed a proctoscopy and this was very unsatisfying given that the rectum was  full of stool, so I really could not see anything.  We did have this huge gush of air came out and because our constant communication with anesthesia was showing that the patient was on 3 pressors, she was still having trouble maintaining her blood pressure, we decided that although the sigmoid colon did not look horribly bad, we were not sure what we were getting into, we did not know if this was a cancer or not and I felt that probably the safest thing to do at this point would be just to divert the patient and drain the pelvis.  So, we placed a 19-[place] large bore drain into the pelvis, exited this out the right lower quadrant stab wound.  We made an incision over the right side of the rectus sheath, took out a core of skin and subcutaneous tissue, split the rectus muscle after getting into the fascia, divided the peritoneum.  We brought up the end ileum with the mesentery facing cephalad.  We brought out the antimesenteric corner of the transverse colon as well.  We made sure that there had been good irrigation, no evidence of bleeding in the abdomen.  We then closed the fascia with a running #1 PDS suture as well as some interrupted 0 Vicryls to help prevent a fascial dehiscence.  We copiously irrigated the subcutaneous tissue with saline solution and closed the wound with staples.  We then took off the corner of the transverse colon and then with 3 sutures fixed this up to the skin on the cephalad aspect of the ileostomy site.  We then removed the staple line on the ileostomy and created the usual Daria ileostomy with interrupted 3-0 Vicryls.  There were about 2-3 sutures between this and the mucous fistula.  Everything was then cleaned.  An appropriate dressing was applied to the wound and an appliance was placed to the ileostomy with mucous fistula.  The patient tolerated the procedure and was a little bit more stable by the end.  She was transferred to the ICU on multiple pressors at the end of the  procedure.         CHERY PATEL MD             D: 2020   T: 2020   MT: JAQUAN      Name:     DREA RASMUSSEN   MRN:      [id number]        Account:        [id number]   :      1965           Procedure Date: 09/10/2020      Document: [id number]       cc: Chery Patel MD

## 2020-09-11 NOTE — PROGRESS NOTES
[facility] ICU RESPIRATORY NOTE        Date of Admission: 9/10/2020    Date of Intubation (most recent): 09/10/10    Reason for Mechanical Ventilation: AW Protection    Number of Days on Mechanical Ventilation: 2    Met Criteria for Spontaneous Breathing Trial: No    Reason for No Spontaneous Breathing Trial: perMD    Significant Events Today: None    ABG Results:   Recent Labs   Lab 09/11/20  0015 09/10/20  2159   PH 7.42 7.40   PCO2 29* 35   PO2 365* 209*   HCO3 19* 22   O2PER 100%  --        Current Vent Settings: Ventilation Mode: CMV/AC  (Continuous Mandatory Ventilation/ Assist Control)  FiO2 (%): 40 %  Rate Set (breaths/minute): 16 breaths/min  Tidal Volume Set (mL): 400 mL  PEEP (cm H2O): 5 cmH2O  Oxygen Concentration (%): 100 %  Resp: 16      Skin Assessment: Intact    Plan: Assess for SBT's    Marianne Nichols RT

## 2020-09-11 NOTE — ANESTHESIA PROCEDURE NOTES
CENTRAL LINE INSERTION PROCEDURE NOTE:   Staff -   Anesthesiologist:  Juan Manuel Latif MD      Performed By: anesthesiologist        Pre-Procedure  Performed by Juan Manuel Latif MD  Location: OR      Pre-Anesthestic Checklist: patient identified, IV checked, site marked, risks and benefits discussed, informed consent, monitors and equipment checked, pre-op evaluation and at physician/surgeon's request    Timeout  Correct Patient: Yes   Correct Procedure: Yes   Correct Site: Yes   Correct Laterality: Yes   Correct Position: Yes   Site Marked: Yes   .   Procedure Documentation   Procedure: central line  Position: Trendelenburg  Patient Prep/Sterile Barriers; chlorhexidine gluconate and isopropyl alcohol, maximum sterile barriers used during central venous catheter insertion      Insertion Site:internal jugular, right    Skin infiltrated with 2 mL of 2% lidocaine.  Catheter: 9 Moldovan, 10 cm (sheath introducer)  Assessment/Narrative    Catheter: 9 Moldovan, 10 cm (sheath introducer)     Secured by suture  Tegaderm and Biopatch dressing used.  blood aspirated from all lumens  All lumens flushed: Yes  Verification method: Ultrasound and Placement to be verified post-op (Permanent image not obtained)  Comments:  A time out was preformed identifying the correct procedure, the correct location with the nursing staff.  The right neck was prepped with 2% chlorhexidine and draped with a full length sterile sheet in the usual fashion.  1% lidocaine was administered subcutaneously for local anesthesia.  The right internal jugular vein was accessed under ultrasound guidance with an 18 gauge thin wall needle, a 1.75 inch catheter was advanced over the needle and the needle was removed. Normal venous pressure was confirmed using a fluid column technique. A wire was then advanced through the catheter and catheter was then removed. A 9 Croatian cordis was advanced over the wire via the seldinger technique. Blood was  withdrawn from all lumens and flushed with normal saline. A 7 Omani triple lumen catheter was then advanced through the cordis. Blood was withdrawn from all lumens and flushed with normal saline. The catheter was sutured in place and a sterile dressing was applied over the site prior to removal of drapes.

## 2020-09-11 NOTE — H&P
[facility]    History and Physical  [facility] Intensive Care     Date of Admission:  9/10/2020    Assessment & Plan   Evonne Martel is a 54 year old female who presents with septic shock due to a cecal perforation and sigmoid colon perforation.  She underwent exploratory laparotomy, right colectomy with end ileostomy, and transverse mucous fistula.    Neurology:  Sedated on ventilator: Propofol currently being used for sedation      Cardiovascular:  Septic shock: Currently requiring moderate doses of norepinephrine along with vasopressin.  Lactate 0.9  -Wean vasopressors as able    Pulmonary:        Return from the OR intubated mechanically ventilated: No issues with oxygenation or ventilation    Renal  BUN to creatinine ratio greater than 20 without acute kidney injury: Volume resuscitation as needed.  Patient also presented with profound hyponatremia, so will attempt to gradually correct this issue.  -Lactated Ringer's solution has 130 mmol/L of sodium so we will use it as resuscitation fluid    ID:         Perforated colon with gross stool spillage: Per colorectal surgery patient is on Zosyn      GI  Perforated colon: Status post surgery.  Management per colorectal surgery  -    Nutrition  N.p.o. for now  -    Endocrine:  No issues: Glycemic control per unit protocol  -    Heme/Onc:  No issues  -    DVT Prophylaxis: Pneumatic Compression Devices  GI Prophylaxis: H2 Blocker    Restraints: Restraints for medical healing needed: YES    Family update by me today: No    Romeo Gonzalez    Time Spent on this Encounter   Billing:  I spent 45 minutes bedside and on the inpatient unit today managing the critical care of Evonne Martel in relation to the issues listed in this note.    Code Status   Full Code    Primary Care Physician   Physician No Ref-Primary    Chief Complaint   Abdominal pain and weakness    History is obtained from the electronic health record and emergency  department physician    History of Present Illness   Evonne Martel is a 54 year old female who presented with diffuse abdominal pain and bloating. The patient reported that the pain started about a week ago Wednesday(9/2) and has continued since that time. It was starting to get better over the weekend, but then three days ago the pain became suddenly worse and has not resolved. She also notes acute onset of abdominal bloating 3 days ago and constipation but no nausea, vomiting, or diarrhea. She has not had a fever. Her spouse feels that she appears a bit more yellow than her baseline. She reports that she typically drinks 3 hard liquor beverages daily though has not had any for the last week and a half.     On CT she was found to have massive pneumoperitoneum, sigmoid inflammation, and extraluminal stool in the pelvis.  She was taken emergently to the operating room by colorectal surgery team.    Past Medical History    I have reviewed this patient's medical history and updated it with pertinent information if needed.   History reviewed. No pertinent past medical history.    Past Surgical History   I have reviewed this patient's surgical history and updated it with pertinent information if needed.  Past Surgical History:   Procedure Laterality Date     KNEE SURGERY         Prior to Admission Medications   None     Allergies   No Known Allergies    Social History   I have reviewed this patient's social history and updated it with pertinent information if needed. Evonne Martel  reports that she has been smoking cigarettes. She has smoked for the past 30.00 years. She has never used smokeless tobacco. She reports current alcohol use. She reports that she does not use drugs.    Family History   I have reviewed this patient's family history and updated it with pertinent information if needed.   History reviewed. No pertinent family history.    Review of Systems   Review of systems not obtained due to patient factors  - critical condition, intubation and sedation    Physical Exam   Temp: 98.8  F (37.1  C) Temp src: Oral Temp  Min: 98.5  F (36.9  C)  Max: 100.3  F (37.9  C) BP: (!) 158/100 Pulse: 81   Resp: 16 SpO2: 100 % O2 Device: Mechanical Ventilator    Vital Signs with Ranges  Temp:  [98.5  F (36.9  C)-100.3  F (37.9  C)] 98.8  F (37.1  C)  Pulse:  [] 81  Resp:  [15-30] 16  BP: (117-158)/() 158/100  MAP:  [75 mmHg-90 mmHg] 84 mmHg  Arterial Line BP: (103-120)/(58-69) 114/64  FiO2 (%):  [40 %-100 %] 40 %  SpO2:  [93 %-100 %] 100 %  100 lbs 4.95 oz     Constitutional: Sedated on ventilator  Eyes: PERRL  ENT: Endotracheal tube in place, trachea midline, moist mucous membranes  Respiratory: Lungs clear to auscultation  Cardiovascular: Tachycardia, no discernible murmur  GI: Dressings in place following surgery  Genitourinary: Ferguson catheter in place  Skin: Warm without mottling  Musculoskeletal: Good pulses throughout  Neurologic: Sedated on ventilator      Data   Results for orders placed or performed during the hospital encounter of 09/10/20 (from the past 24 hour(s))   Comprehensive metabolic panel   Result Value Ref Range    Sodium 116 (LL) 133 - 144 mmol/L    Potassium 4.0 3.4 - 5.3 mmol/L    Chloride 79 (L) 94 - 109 mmol/L    Carbon Dioxide 26 20 - 32 mmol/L    Anion Gap 11 3 - 14 mmol/L    Glucose 125 (H) 70 - 99 mg/dL    Urea Nitrogen 27 7 - 30 mg/dL    Creatinine 0.77 0.52 - 1.04 mg/dL    GFR Estimate 86 >60 mL/min/[1.73_m2]    GFR Estimate If Black >90 >60 mL/min/[1.73_m2]    Calcium 9.1 8.5 - 10.1 mg/dL    Bilirubin Total 1.0 0.2 - 1.3 mg/dL    Albumin 2.9 (L) 3.4 - 5.0 g/dL    Protein Total 9.3 (H) 6.8 - 8.8 g/dL    Alkaline Phosphatase 120 40 - 150 U/L    ALT 17 0 - 50 U/L    AST 39 0 - 45 U/L   CBC with platelets differential   Result Value Ref Range    WBC 16.1 (H) 4.0 - 11.0 10e9/L    RBC Count 4.43 3.8 - 5.2 10e12/L    Hemoglobin 15.0 11.7 - 15.7 g/dL    Hematocrit 41.3 35.0 - 47.0 %    MCV 93 78 -  100 fl    MCH 33.9 (H) 26.5 - 33.0 pg    MCHC 36.3 31.5 - 36.5 g/dL    RDW 12.5 10.0 - 15.0 %    Platelet Count 465 (H) 150 - 450 10e9/L    Diff Method Automated Method     % Neutrophils 78.2 %    % Lymphocytes 8.1 %    % Monocytes 11.9 %    % Eosinophils 0.0 %    % Basophils 0.1 %    % Immature Granulocytes 1.7 %    Nucleated RBCs 0 0 /100    Absolute Neutrophil 12.6 (H) 1.6 - 8.3 10e9/L    Absolute Lymphocytes 1.3 0.8 - 5.3 10e9/L    Absolute Monocytes 1.9 (H) 0.0 - 1.3 10e9/L    Absolute Eosinophils 0.0 0.0 - 0.7 10e9/L    Absolute Basophils 0.0 0.0 - 0.2 10e9/L    Abs Immature Granulocytes 0.3 0 - 0.4 10e9/L    Absolute Nucleated RBC 0.0    Lipase   Result Value Ref Range    Lipase 48 (L) 73 - 393 U/L   Troponin I   Result Value Ref Range    Troponin I ES <0.015 0.000 - 0.045 ug/L   INR   Result Value Ref Range    INR 1.42 (H) 0.86 - 1.14   Partial thromboplastin time   Result Value Ref Range    PTT 32 22 - 37 sec   Alcohol ethyl   Result Value Ref Range    Ethanol g/dL <0.01 <0.01 g/dL   Lactic acid whole blood   Result Value Ref Range    Lactic Acid 3.0 (H) 0.7 - 2.0 mmol/L   EKG 12-lead, tracing only   Result Value Ref Range    Interpretation ECG Click View Image link to view waveform and result    CT Abdomen Pelvis w Contrast    Narrative    CT ABDOMEN PELVIS W CONTRAST 9/10/2020 5:20 PM    CLINICAL HISTORY: Abd infection (incl peritonitis)    TECHNIQUE: CT scan of the abdomen and pelvis was performed following  injection of IV contrast. Multiplanar reformats were obtained. Dose  reduction techniques were used.  CONTRAST: 53 mL Isovue-370    COMPARISON: None.    FINDINGS:   LOWER CHEST: Normal.    HEPATOBILIARY: No suspicious lesions in the liver. Sludge-filled  gallbladder. No calcified gallstones.    PANCREAS: No peripancreatic inflammatory changes.    SPLEEN: Normal.    ADRENAL GLANDS: Normal.    KIDNEYS/BLADDER: No significant mass, stones, or hydronephrosis.    BOWEL: Large pneumoperitoneum. Diffuse  "wall thickening of the sigmoid  colon. There are few sigmoid diverticuli. Fecal material appears to be  outside the sigmoid colon in the left pelvis forming a collection  measuring 3.9 x 2.1 cm (series 3, image 187). There is also diffuse  circumferential wall thickening of multiple loops of small bowel and  the remainder of the colon. Diffuse wall thickening of the appendix  without focal inflammatory changes about the appendix, also related to  the generalized process in the bowel. Moderate amount of formed stool  throughout the remainder of the colon.    PELVIC ORGANS: No pelvic masses.    ADDITIONAL FINDINGS: No lymphadenopathy in the abdomen and pelvis.  Small amount of free fluid in the pelvis.    MUSCULOSKELETAL: Mild thoracolumbar scoliosis. No suspicious lesions.      Impression    IMPRESSION:   1.  Large pneumoperitoneum concerning for hollow viscus perforation.  The site of the perforation is likely in the sigmoid colon, likely  secondary to perforated diverticulitis. Moderate sized collection of  fecal material adjacent to the sigmoid colon.  2.  Diffuse wall thickening of the small bowel and remainder of the  colon may be secondary to \"shock bowel\" as can be seen after  hypovolemic shock. Flattened IVC.  Less likely, these findings may be  related to diffuse enterocolitis.    Findings were discussed with DEAN Lennon CNP at 5:50 pm    MARC CHRISTENSEN MD   Asymptomatic COVID-19 Virus (Coronavirus) by PCR    Specimen: Nasopharyngeal   Result Value Ref Range    COVID-19 Virus PCR to [facility] - Source Nasopharyngeal     COVID-19 Virus PCR to [facility] - Result       Test received-See reflex to IDDL test SARS CoV2 (COVID-19) Virus RT-PCR   SARS-CoV-2 COVID-19 Virus (Coronavirus) RT-PCR Nasopharyngeal    Specimen: Nasopharyngeal   Result Value Ref Range    SARS-CoV-2 Virus Specimen Source Nasopharyngeal     SARS-CoV-2 PCR Result NEGATIVE     SARS-CoV-2 PCR Comment       Testing was performed using the Accumulate Xpress " SARS-CoV-2 Assay on the Cepheid Gene-Xpert   Instrument Systems. Additional information about this Emergency Use Authorization (EUA)   assay can be found via the Lab Guide.     ABO/Rh type and screen   Result Value Ref Range    ABO A     RH(D) Pos     Antibody Screen Neg     Test Valid Only At [facility]        Specimen Expires 09/13/2020    UA with Microscopic reflex to Culture    Specimen: Midstream Urine   Result Value Ref Range    Color Urine Yellow     Appearance Urine Clear     Glucose Urine Negative NEG^Negative mg/dL    Bilirubin Urine Negative NEG^Negative    Ketones Urine 10 (A) NEG^Negative mg/dL    Specific Gravity Urine 1.010 1.003 - 1.035    Blood Urine Negative NEG^Negative    pH Urine 5.5 5.0 - 7.0 pH    Protein Albumin Urine 10 (A) NEG^Negative mg/dL    Urobilinogen mg/dL Normal 0.0 - 2.0 mg/dL    Nitrite Urine Negative NEG^Negative    Leukocyte Esterase Urine Small (A) NEG^Negative    Source Midstream Urine     WBC Urine 3 0 - 5 /HPF    RBC Urine 2 0 - 2 /HPF    Squamous Epithelial /HPF Urine 3 (H) 0 - 1 /HPF    Hyaline Casts 4 (H) 0 - 2 /LPF   ISTAT gases elec art POCT   Result Value Ref Range    pH Arterial 7.40 7.35 - 7.45 pH    pCO2 Arterial 35 35 - 45 mm Hg    pO2 Arterial 209 (H) 80 - 105 mm Hg    Bicarbonate Arterial 22 21 - 28 mmol/L    O2 Sat Arterial 100 92 - 100 %    Sodium 124 (L) 133 - 144 mmol/L    Potassium 2.7 (L) 3.4 - 5.3 mmol/L    Hemoglobin 10.9 (L) 11.7 - 15.7 g/dL    Hematocrit - POCT 32 (L) 35.0 - 47.0 %PCV   Glucose by meter   Result Value Ref Range    Glucose 93 70 - 99 mg/dL   XR Chest Port 1 View    Narrative    EXAM: XR CHEST PORT 1 VW  LOCATION: [facility]  DATE/TIME: 9/10/2020 11:55 PM    INDICATION: Line placement.    COMPARISON: None.      Impression    IMPRESSION: Endotracheal tube tip is at the level of the willard and could be pulled back. Enteric tube with tip in the stomach. Right-sided IJ central venous catheter tip over the mid  SVC. No pneumothorax. Heart size and pulmonary vascularity are within   normal limits. Minimal hazy infiltrate versus atelectasis left lower lung. Right lung is clear. Mild thoracolumbar curve. Remainder unremarkable.   Blood gas arterial and oxyhgb   Result Value Ref Range    pH Arterial 7.42 7.35 - 7.45 pH    pCO2 Arterial 29 (L) 35 - 45 mm Hg    pO2 Arterial 365 (H) 80 - 105 mm Hg    Bicarbonate Arterial 19 (L) 21 - 28 mmol/L    FIO2 100%     Oxyhemoglobin Arterial 99 92 - 100 %    Base Deficit Art 4.8 mmol/L   CBC with platelets   Result Value Ref Range    WBC 4.5 4.0 - 11.0 10e9/L    RBC Count 3.37 (L) 3.8 - 5.2 10e12/L    Hemoglobin 11.1 (L) 11.7 - 15.7 g/dL    Hematocrit 31.0 (L) 35.0 - 47.0 %    MCV 92 78 - 100 fl    MCH 32.9 26.5 - 33.0 pg    MCHC 35.8 31.5 - 36.5 g/dL    RDW 12.3 10.0 - 15.0 %    Platelet Count 378 150 - 450 10e9/L   CK total   Result Value Ref Range    CK Total 158 30 - 225 U/L   Comprehensive metabolic panel   Result Value Ref Range    Sodium 121 (L) 133 - 144 mmol/L    Potassium 3.3 (L) 3.4 - 5.3 mmol/L    Chloride 92 (L) 94 - 109 mmol/L    Carbon Dioxide 19 (L) 20 - 32 mmol/L    Anion Gap 10 3 - 14 mmol/L    Glucose 109 (H) 70 - 99 mg/dL    Urea Nitrogen 19 7 - 30 mg/dL    Creatinine 0.46 (L) 0.52 - 1.04 mg/dL    GFR Estimate >90 >60 mL/min/[1.73_m2]    GFR Estimate If Black >90 >60 mL/min/[1.73_m2]    Calcium 6.4 (L) 8.5 - 10.1 mg/dL    Bilirubin Total 0.9 0.2 - 1.3 mg/dL    Albumin 1.9 (L) 3.4 - 5.0 g/dL    Protein Total 5.2 (L) 6.8 - 8.8 g/dL    Alkaline Phosphatase 60 40 - 150 U/L    ALT 10 0 - 50 U/L    AST 23 0 - 45 U/L   INR   Result Value Ref Range    INR 1.82 (H) 0.86 - 1.14   Lactic acid whole blood   Result Value Ref Range    Lactic Acid 0.9 0.7 - 2.0 mmol/L   CBC Differential (AM Draw)   Result Value Ref Range    WBC 10.6 4.0 - 11.0 10e9/L    RBC Count 3.34 (L) 3.8 - 5.2 10e12/L    Hemoglobin 11.1 (L) 11.7 - 15.7 g/dL    Hematocrit 30.8 (L) 35.0 - 47.0 %    MCV 92 78 - 100  fl    MCH 33.2 (H) 26.5 - 33.0 pg    MCHC 36.0 31.5 - 36.5 g/dL    RDW 12.4 10.0 - 15.0 %    Platelet Count 341 150 - 450 10e9/L    Diff Method PENDING      Romeo Gonzalez MD  [facility] Intensivist Service

## 2020-09-11 NOTE — OP NOTE
BRIEF OPERATIVE NOTE :    Preop Dx: Sepsis (H) [A41.9]  Postop Dx: Perforated sigmoid colon with concomitant perforation of cecum.   Proc: Laparotomy.   [place]  Right hemicolectomy  Placement of pelvic drain  End ileostomy  Transverse colon mucous fistula  Surgeon(s)/Assist: Surgeon(s):  Chery Morrison MD  Anes: General  Drains:  19Fr Chaim in Pelvis  Spec:  Right colon  Compl: None  Findings:  Diffuse contamination and large volume of air in abdomen. Perforation in pelvis identified, as well as necrotic cecum with perforation. Right hemicolectomy performed, and given patient unstable, drain placed in pelvis with ileostomy and mucous fistula formation.  EBL: 75cc  Condition on discharge from OR: On pressor and intubated to the ICU.    Sukhjinder Chavez MD   [facility]   [phone].        ADDENDUM:    PATIENT DATA  Indicate Y or N:  Home O2 No  Hemodialysis No  Transplant patient No  Cirrhosis No  Steroids in last 30 days No  Immunomodulators in last 30 days No  Anticoagulation at time of surgery No   List medication   Prior abdominal surgery No  Pelvic irradiation no    Albumin within 30 days if known   Hgb within 30 days if known 15.0  Cr within 30 days if known 0.77    OR DATA  Emergent Yes   <24 hours Yes   <1 week Yes  Bowel Prep (Y or N) N  Antibiotics (Y or N) Y  DVT prophylaxis    Heparin Y   SCD Y   None N  Drain Y  ASA (1,2,3,4) 4  OR time (min) 85  Stents N  Transfuse >/= 2U N  Anastomosis   Stapled     Handsewn    Leak Test    Positive     Negative     Not done

## 2020-09-11 NOTE — PROGRESS NOTES
Arrived from OR at 2315. Opens eyes to voice. Sedation per propofol. Dilaudid prn for pain. Levo and Vaso to maintain MAP >65. Abd incision intact. 250 out from colostomy. Left intubated overnight. Possible extubation today.  Anibal updated by surgeon.

## 2020-09-11 NOTE — CONSULTS
Clinical Nutrition Brief Note    Received standing order for Nutrition Education - Low residue diet teaching     Patient NPO and currently intubated. Will follow-up for diet teaching as patient begins taking PO or is transferred out of ICU    Tanika Acevedo RD, LD  Clinical Dietitian

## 2020-09-11 NOTE — ANESTHESIA CARE TRANSFER NOTE
Patient: Evonne Martel    Procedure(s):  Exploratory laparotomy, right colectomy, ileostomy, transverse colon mucous fistula, pelvic drain placement, proctoscopy  COLECTOMY, WITH COLOSTOMY CREATION    Diagnosis: Sepsis (H) [A41.9]  Diagnosis Additional Information: No value filed.    Anesthesia Type:   General     Note:  Airway :ETT  Patient transferred to:ICU  Comments: In ICU, monitors connected, vent connected by RT per ICU protocol. VSS. Report given to RN.ICU Handoff: Call for PAUSE to initiate/utilize ICU HANDOFF, Identified Patient, Identified Responsible Provider, Reviewed the Pertinent Medical History, Discussed Surgical Course, Reviewed Intra-OP Anesthesia Management and Issues during Anesthesia, Set Expectations for Post Procedure Period and Allowed Opportunity for Questions and Acknowledgement of Understanding      Vitals: (Last set prior to Anesthesia Care Transfer)    CRNA VITALS  9/10/2020 2233 - 9/10/2020 2320      9/10/2020             Resp Rate (set):  10                Electronically Signed By: MARITZA Robertson CRNA  September 10, 2020  11:20 PM

## 2020-09-11 NOTE — PROGRESS NOTES
"[facility]  WO Nurse Inpatient Ostomy Assessment     Assessment of New Ileostomy  Surgery date: 9-10-20  Surgeon:  Dr Morrison  Procedure:  Exploratory laparotomy, right colectomy with end ileostomy and transverse mucous fistula, rigid proctoscopy, drainage of pelvis   Related diagnosis:  Cecal perforation, sigmoid phlegmon and contained pelvic perforation.     WOC Assessment & Physical Exam:        Current status: Awake and talking      Date of last WOC  photo: 9-11-20                Stoma size:  1 1/8\"    Stoma appearance:  Rounded, red, moist, protrudes, lumen @ apex    Mucocutaneous junction:  Intact with sutures    Peristomal complication(s): none    Abdominal assessment:  Rounded, distended and semi-firm    Surgical site(s): intact and well-approximated with staples, no ann marie-incisional erythema.     NG still in place? Yes    Output: 50cc light brown liquid, no flatus     Pain: tenderness        Current pouching system: leakage under post-op brian Premier flat pouch    Pouch last changed:  9-11-20    Reason for pouch change today: stoma eval and prosthetic refitting         -Dressing re-applied to incision     Learning and comprehension:  Discussed that pt has ileostomy and rationale    Psychosocial: Said she know about colostomies,\" My Mom was a nurse      WOC Plan:         Pouching product plan: Brian NI convex 57mm with high output pouch      Pt support system on discharge:     WO recommend home care?  TBD      WO follow-up plan: Daily beginning next Monday      Objective Data:       Current Diet/Nutrition:   Orders Placed This Encounter      NPO for Medical/Clinical Reasons Except for: Meds, Ice Chips      NPO for Medical/Clinical Reasons Except for: No Exceptions       Output:  I/O last 3 completed shifts:  In: 3450.88 [I.V.:3000.88]  Out: 2260 [Urine:775; Emesis/NG output:200; Drains:410; Other:500; Stool:300; Blood:75]      Labs:   Recent Labs   Lab " 09/11/20  0527 09/11/20  0015   ALBUMIN 1.8* 1.9*   HGB 11.1* 11.1*   INR  --  1.82*   WBC 10.6 4.5         Lacho Risk Assessment:   Sensory Perception: 2-->very limited  Moisture: 4-->rarely moist  Activity: 1-->bedfast  Mobility: 2-->very limited  Nutrition: 2-->probably inadequate  Friction and Shear: 2-->potential problem  Lacho Score: 13      WOC Interventions:       Patient's chart evaluated    Focus of today's visit: stoma assessment, prosthetic refitting    Pouch Brian NI 57mm cut to fit convex with tape and high output pouch    Participant(s) in teaching session today: Patient only    Change made with ostomy management today: convexity and high output pouch    Supplies: In pt room     Educational materials: will give to  and pt next week    Preparation for discharge: No discharge preparations started    Registered for supply samples? TBD    Discussed plan of care with: Nursing and patient     Ghazal Farrell RN, CWOCN

## 2020-09-11 NOTE — ANESTHESIA POSTPROCEDURE EVALUATION
Patient: Evonne Martel    Procedure(s):  Exploratory laparotomy, right colectomy, ileostomy, transverse colon mucous fistula, pelvic drain placement, proctoscopy  COLECTOMY, WITH COLOSTOMY CREATION    Diagnosis:Sepsis (H) [A41.9]  Diagnosis Additional Information: No value filed.    Anesthesia Type:  General    Note:  Anesthesia Post Evaluation    Patient location during evaluation: ICU  Patient participation: Unable to evaluate secondary to administered sedation  Pain management: unable to assess  Airway patency: patent  Cardiovascular status: stable  Respiratory status: ETT  Hydration status: stable  PONV: unable to assess             Last vitals:  Vitals:    09/10/20 1900 09/10/20 1912 09/10/20 1947   BP: 130/84  (!) 158/100   Pulse: 93 98    Resp: 28 22 20   Temp:   37.9  C (100.3  F)   SpO2: 98% 99% 99%         Electronically Signed By: Juan Manuel Latif MD  September 10, 2020  11:24 PM

## 2020-09-11 NOTE — CONSULTS
Surgical Oncology Consultation    Evonne Martel MRN# [id number]   Age: 54 year old YOB: 1965     Date of Admission:  9/10/2020             History of Present Illness:   53 yo smoker presents to ER with one week history of worsening abdominal pain, distension x 2 days.  Minimal emesis, poor PO intake.  CT with massive pneumoperitoneum, sigmoid inflammation and extraluminal stool in the pelvis.  Denies fever.          Past Medical History:   History reviewed. No pertinent past medical history.          Past Surgical History:     Past Surgical History:   Procedure Laterality Date     KNEE SURGERY             Medications:   No current facility-administered medications on file prior to encounter.   No current outpatient medications on file prior to encounter.        Allergies:    No Known Allergies         Social History:     Social History     Socioeconomic History     Marital status:      Spouse name: Not on file     Number of children: Not on file     Years of education: Not on file     Highest education level: Not on file   Occupational History     Not on file   Social Needs     Financial resource strain: Not on file     Food insecurity     Worry: Not on file     Inability: Not on file     Transportation needs     Medical: Not on file     Non-medical: Not on file   Tobacco Use     Smoking status: Current Every Day Smoker     Smokeless tobacco: Never Used   Substance and Sexual Activity     Alcohol use: Yes     Drug use: Never     Sexual activity: Not on file   Lifestyle     Physical activity     Days per week: Not on file     Minutes per session: Not on file     Stress: Not on file   Relationships     Social connections     Talks on phone: Not on file     Gets together: Not on file     Attends [employer] service: Not on file     Active member of club or organization: Not on file     Attends meetings of clubs or organizations: Not on file     Relationship status: Not on file     Intimate partner  "violence     Fear of current or ex partner: Not on file     Emotionally abused: Not on file     Physically abused: Not on file     Forced sexual activity: Not on file   Other Topics Concern     Not on file   Social History Narrative     Not on file             Family History:   History reviewed. No pertinent family history.          Review of Systems:   Ten point Review of Systems is negative other than noted in the HPI          Physical Exam:   Gen:  This is a well developed, well nourished in no apparent distress.  Blood pressure 130/84, pulse 93, temperature 98.5  F (36.9  C), temperature source Temporal, resp. rate 28, height 1.651 m (5' 5\"), weight 47.2 kg (104 lb), SpO2 98 %.  HEENT - Normocephalic, atraumatic, mucous membranes moist.  No scleral icterus.  Neck - supple without masses  Skin  Without rashes or jaundice.    Extremities - without cyanosis, clubbing, edema or deformities.   Lungs - breathing non labored  Skin - no rashes  Psych - affect appropriate  Neurologic - grossly intect.   Abdomen: Abdomen is tympanitic and distended.  Tenderness throughout without guarding or rebound.          Data:   All laboratory and imaging data in the past 24 hours reviewed    Recent Labs   Lab Test 09/10/20  1538   WBC 16.1*   HGB 15.0   *   INR 1.42*      Recent Labs   Lab Test 09/10/20  1538   *   POTASSIUM 4.0   CHLORIDE 79*   CO2 26   BUN 27   CR 0.77   ANIONGAP 11   PARISH 9.1   *          Recent Labs   Lab Test 09/10/20  1538   PROTTOTAL 9.3*   ALBUMIN 2.9*   BILITOTAL 1.0   ALKPHOS 120   AST 39   ALT 17       CT ABDOMEN PELVIS W CONTRAST 9/10/2020 5:20 PM     CLINICAL HISTORY: Abd infection (incl peritonitis)     TECHNIQUE: CT scan of the abdomen and pelvis was performed following  injection of IV contrast. Multiplanar reformats were obtained. Dose  reduction techniques were used.  CONTRAST: 53 mL Isovue-370     COMPARISON: None.     FINDINGS:   LOWER CHEST: Normal.     HEPATOBILIARY: No " "suspicious lesions in the liver. Sludge-filled  gallbladder. No calcified gallstones.     PANCREAS: No peripancreatic inflammatory changes.     SPLEEN: Normal.     ADRENAL GLANDS: Normal.     KIDNEYS/BLADDER: No significant mass, stones, or hydronephrosis.     BOWEL: Large pneumoperitoneum. Diffuse wall thickening of the sigmoid  colon. There are few sigmoid diverticuli. Fecal material appears to be  outside the sigmoid colon in the left pelvis forming a collection  measuring 3.9 x 2.1 cm (series 3, image 187). There is also diffuse  circumferential wall thickening of multiple loops of small bowel and  the remainder of the colon. Diffuse wall thickening of the appendix  without focal inflammatory changes about the appendix, also related to  the generalized process in the bowel. Moderate amount of formed stool  throughout the remainder of the colon.     PELVIC ORGANS: No pelvic masses.     ADDITIONAL FINDINGS: No lymphadenopathy in the abdomen and pelvis.  Small amount of free fluid in the pelvis.     MUSCULOSKELETAL: Mild thoracolumbar scoliosis. No suspicious lesions.                                                                      IMPRESSION:   1.  Large pneumoperitoneum concerning for hollow viscus perforation.  The site of the perforation is likely in the sigmoid colon, likely  secondary to perforated diverticulitis. Moderate sized collection of  fecal material adjacent to the sigmoid colon.  2.  Diffuse wall thickening of the small bowel and remainder of the  colon may be secondary to \"shock bowel\" as can be seen after  hypovolemic shock. Flattened IVC.  Less likely, these findings may be  related to diffuse enterocolitis.      ASSESSMENT/PLAN:  54 year old female smoker, reports 3 drinks per night, with massive pneumoperitoneum and extraluminal stool on CT, most likely related to perforated diverticulitis.  Discussed with patient and spouse indications to proceed with surgery.  She has severe hyponatremia " as well and is getting volume resuscitated.  We discussed indications for surgery, what would likely happen without surgery, and the need for resection and probable colostomy.  The patient and spouse understand and agree to proceed.     Chery Morrison MD  Colorectal Surgery

## 2020-09-11 NOTE — ANESTHESIA PROCEDURE NOTES
ARTERIAL LINE PROCEDURE NOTE:  Staff -   Anesthesiologist:  Juan Manuel Latif MD      Performed By: anesthesiologist         Pre-Procedure  Performed by Juan Manuel Latif MD  Location: pre-op      Pre-Anesthestic Checklist: patient identified, IV checked, site marked, risks and benefits discussed, informed consent, monitors and equipment checked, pre-op evaluation and at physician/surgeon's request    Timeout  Correct Patient: Yes   Correct Procedure: Yes   Correct Site: Yes   Correct Laterality: Yes   Correct Position: Yes   Site Marked: Yes, N/A   .   Procedure Documentation  Procedure: arterial line    Supine  Insertion Site:radial.Skin infiltrated with 2 mL of 1% lidocaine. Injection technique: Seldinger Technique and ultrasound guided (No image obtained for permanent record)  .  .  Patient Prep/Sterile Barriers; all elements of maximal sterile barrier technique followed, mask, hat, sterile gown, sterile gloves, draped, hand hygiene, chlorhexidine gluconate and isopropyl alcohol    Assessment/Narrative    Catheter: 20 gauge, 1.75 in/4.5 cm quick cath (integral wire)      Tegaderm dressing used.    Arterial waveform: Yes IBP within 10% of NIBP: Yes

## 2020-09-11 NOTE — PROGRESS NOTES
Extubation Note    Successful completion of SBT (Yes or No): Yes  Extubation time: 1040    Patient assessment:  Lung sounds: Clear  Stridor Present (Yes or No): No  Patient tolerance: Tolerated well.    Oxygen device: NC  Liter flow: 4L  SpO2: 100%    Plan: Continue to monitor.

## 2020-09-11 NOTE — PROGRESS NOTES
COLON & RECTAL SURGERY  PROGRESS NOTE    September 11, 2020  Post-op Day # 1 ex lap, right colectomy with end ileostomy and transverse mucous fistula and drainage of pelvis    SUBJECTIVE: intubated, on 2 pressors. Opens eyes briefly to voice.     OBJECTIVE:  Temp:  [98.5  F (36.9  C)-100.3  F (37.9  C)] 98.9  F (37.2  C)  Pulse:  [] 98  Resp:  [15-30] 26  BP: (117-158)/() 130/87  MAP:  [67 mmHg-99 mmHg] 99 mmHg  Arterial Line BP: ()/(57-73) 135/73  FiO2 (%):  [30 %-100 %] 30 %  SpO2:  [93 %-100 %] 100 %    Intake/Output Summary (Last 24 hours) at 9/11/2020 0851  Last data filed at 9/11/2020 0800  Gross per 24 hour   Intake 2452.57 ml   Output 1780 ml   Net 672.57 ml       GENERAL:  Asleep, intubated, opens eyes to voice  HEAD: Normocephalic atraumatic  SCLERA: Anicteric  EXTREMITIES: Warm and well perfused  ABDOMEN:  Firm, distended, tender. No peritoneal signs, rigidity or guarding. Stoma in RLQ pink and viable, no flatus or stool. GAB with serosanguinous output (205 ml overnight)  INCISION:  Midline wound with saturated dressing, did not change at bedside.     LABS:  Lab Results   Component Value Date    WBC 10.6 09/11/2020     Lab Results   Component Value Date    HGB 11.1 09/11/2020     Lab Results   Component Value Date    HCT 30.8 09/11/2020     Lab Results   Component Value Date     09/11/2020     Last Basic Metabolic Panel:  Lab Results   Component Value Date     09/11/2020      Lab Results   Component Value Date    POTASSIUM 3.9 09/11/2020     Lab Results   Component Value Date    CHLORIDE 93 09/11/2020     Lab Results   Component Value Date    PARISH 6.5 09/11/2020     Lab Results   Component Value Date    CO2 18 09/11/2020     Lab Results   Component Value Date    BUN 15 09/11/2020     Lab Results   Component Value Date    CR 0.39 09/11/2020     Lab Results   Component Value Date    GLC 93 09/11/2020       ASSESSMENT/PLAN: 54-year-old female smoker with a history of some alcohol  use and otherwise no known history who presented to the emergency room with worsening abdominal pain and distention found to have extensive pneumoperitoneum. Now s/p POD#1 exploratory laparotomy, right colectomy with end ileostomy and mucous fistula, pelvic drainage. Remains intubated and on two pressors. Plan to wean pressors as able today and possible extubation.     1. NPO until off pressors and extubated.  2. PRN pain medications  3. Lovenox for ppx  4. WOCN consult  5. Remainder of cares per intensivist, appreciate assistance in management     Discussed with Dr. Andres, who will assume care for this patient from colorectal surgery.     For questions/paging, please contact the CRS office at [phone].    Tabitha Flores PA-C  [facility]  Phone: [phone]

## 2020-09-11 NOTE — ANESTHESIA PREPROCEDURE EVALUATION
"Anesthesia Pre-Procedure Evaluation    Patient: Evonne Martel   MRN: [id number] : 1965          Preoperative Diagnosis: Sepsis (H) [A41.9]    Procedure(s):  Exploratory laparotomy, sigmoid colectomy with colostomy  COLECTOMY, WITH COLOSTOMY CREATION    History reviewed. No pertinent past medical history.  Past Surgical History:   Procedure Laterality Date     KNEE SURGERY         Anesthesia Evaluation     .             ROS/MED HX    ENT/Pulmonary:     (+)tobacco use, Current use , . .    Neurologic:       Cardiovascular:         METS/Exercise Tolerance:  >4 METS   Hematologic:         Musculoskeletal:         GI/Hepatic:     (+) Other GI/Hepatic Bowel peforation      Renal/Genitourinary:     (+) Other Renal/ Genitourinary, Hyponatremia (116)      Endo:         Psychiatric:         Infectious Disease:   (+) Recent Fever, Other Infectious Disease Sepsis      Malignancy:         Other:                                 Lab Results   Component Value Date    WBC 16.1 (H) 09/10/2020    HGB 15.0 09/10/2020    HCT 41.3 09/10/2020     (H) 09/10/2020     (LL) 09/10/2020    POTASSIUM 4.0 09/10/2020    CHLORIDE 79 (L) 09/10/2020    CO2 26 09/10/2020    BUN 27 09/10/2020    CR 0.77 09/10/2020     (H) 09/10/2020    PARISH 9.1 09/10/2020    ALBUMIN 2.9 (L) 09/10/2020    PROTTOTAL 9.3 (H) 09/10/2020    ALT 17 09/10/2020    AST 39 09/10/2020    ALKPHOS 120 09/10/2020    BILITOTAL 1.0 09/10/2020    LIPASE 48 (L) 09/10/2020    PTT 32 09/10/2020    INR 1.42 (H) 09/10/2020       Preop Vitals  BP Readings from Last 3 Encounters:   09/10/20 (!) 158/100    Pulse Readings from Last 3 Encounters:   09/10/20 98      Resp Readings from Last 3 Encounters:   09/10/20 20    SpO2 Readings from Last 3 Encounters:   09/10/20 99%      Temp Readings from Last 1 Encounters:   09/10/20 37.9  C (100.3  F) (Temporal)    Ht Readings from Last 1 Encounters:   09/10/20 1.651 m (5' 5\")      Wt Readings from Last 1 Encounters: " "  09/10/20 47.2 kg (104 lb)    Estimated body mass index is 17.31 kg/m  as calculated from the following:    Height as of this encounter: 1.651 m (5' 5\").    Weight as of this encounter: 47.2 kg (104 lb).       Anesthesia Plan      History & Physical Review  History and physical reviewed and following examination; no interval change.    ASA Status:  3 emergent.    NPO Status:  > 8 hours    Plan for General with Intravenous and Propofol induction. Maintenance will be Balanced.    PONV prophylaxis:  Ondansetron (or other 5HT-3) and Dexamethasone or Solumedrol  Na 116, plan to resuscitate slowly        Postoperative Care  Postoperative pain management:  IV analgesics and Multi-modal analgesia.      Consents  Anesthetic plan, risks, benefits and alternatives discussed with:  Patient..                 Juan Manuel Latif MD  "

## 2020-09-11 NOTE — PLAN OF CARE
Patient extubated this AM, tolerating NC well. Pressors off, keep MAP >65. Arterial line removed. Wound care nurse changed surgical dressing and ostomy bag. Dilaudid given PRN for pain. Phos, Mag, and Potassium replaced.  updated at bedside. Continue to monitor.

## 2020-09-12 NOTE — PROGRESS NOTES
COLON & RECTAL SURGERY  PROGRESS NOTE  POD2 right hemicolectomy and pelvic drain for perforated colon    September 12, 2020    SUBJECTIVE:   Feels better  Up in chair  Off pressors  No nausea of vomiting  Thirsty  Stoma working  Pain okay.    OBJECTIVE:  Temp:  [98  F (36.7  C)-99.5  F (37.5  C)] 98.5  F (36.9  C)  Pulse:  [75-96] 80  Resp:  [16-33] 25  BP: ()/(57-68) 97/65  MAP:  [64 mmHg-90 mmHg] 64 mmHg  Arterial Line BP: (103-136)/(48-68) 103/48  SpO2:  [95 %-100 %] 100 %    Intake/Output Summary (Last 24 hours) at 9/12/2020 1149  Last data filed at 9/12/2020 1100  Gross per 24 hour   Intake 3396.5 ml   Output 3630 ml   Net -233.5 ml       GENERAL:  Awake, alert, no acute distress,  EXTREMITIES: Warm and well perfused, mild edema   ABDOMEN:  Soft, appropriately tender, non-distended. No guarding, rigidity, or peritoneal signs. Wound is CDI. Stoma is working with watery brown output    LABS:  Lab Results   Component Value Date    WBC 9.6 09/12/2020     Lab Results   Component Value Date    HGB 8.8 09/12/2020     Lab Results   Component Value Date    HCT 24.7 09/12/2020     Lab Results   Component Value Date     09/12/2020     Last Basic Metabolic Panel:  Lab Results   Component Value Date     09/12/2020      Lab Results   Component Value Date    POTASSIUM 3.1 09/12/2020     Lab Results   Component Value Date    CHLORIDE 104 09/12/2020     Lab Results   Component Value Date    PARISH 6.9 09/12/2020     Lab Results   Component Value Date    CO2 25 09/12/2020     Lab Results   Component Value Date    BUN 6 09/12/2020     Lab Results   Component Value Date    CR 0.41 09/12/2020     Lab Results   Component Value Date     09/12/2020       ASSESSMENT/PLAN: Evonne Martel is a 54 year old female who presented with free air and underwent an exploratory laparotomy with right hemicolectomy and placement of a pelvic drain. She is doing better from a systemic illness perspective. She's been extubated  and is off pressors. Her stoma has started working.    1) Likely to the ceja when ready  2) Discontinue NG  3) Discontinue hudson  4) Ambulate  5) CF diet when NG out    Will d.w Dr. Dmitry Chavez MD  CRS Fellow  [facility]   9/12/2020   11:49 AM

## 2020-09-12 NOTE — PLAN OF CARE
Pt rests between cares. Tolerating ice. Ostomy put out 300cc liquid stool. Stoma beefy red. UOP adequate. Weight down markedly by ~10kg after validation x 3. GAB with minimal drainage. Continuous k replacement. Pt denied pain until pt instructed on et performed incentive spirometry. Prn dilaudid effective for pain control. Poor performance of IS to only 250. Requested RT to perform EZ pap. Per them, pt reported ability to breathe deeper afterwards. To continue on day shift. Reinforced IS. Pt educated at length re:pain control to perform necessary tasks that prevents complications. Pressors remain off.

## 2020-09-12 NOTE — PROGRESS NOTES
{ TIP  Improve documentation with new tip texts.  DO NOT DELETE TIPS. Once your note is signed tips will disappear. Learn more- Malnutrition tip sheet, Sepsis tip sheet, & QuickLearn videos                                    :[id number]}    [facility]    Medicine Progress Note - Hospitalist Service       Date of Admission:  9/10/2020  Assessment & Plan The email address for your [employer] account has been updated         Septic shock from perforated colon  1 wk abdominal pain, found to have necrotic cecum with perforation, leakage of stool presented in shock.  Needed 2 pressors, intubation immediately, went to the or on 9/10 for ex lap, end ileostomy, and transverse mucous fistula and drainage of the pelvis  Pelvic drain placed  Weaned off vasopressors rapidly POD 1 and doing extremely well  Called hospitalist from transfer out on 9/12  Plan  - transfer to stepdown  - diet, pelvic drain per colorectal  - pain control  - continue zosyn  - consult PT and OT  - her stoma appears to already be working    EtOH use disorder  - no obvious withdrawal  - would continue thiamine    Tobacco dependence  - will discuss cessation       Diet: NPO for Medical/Clinical Reasons Except for: Meds, Ice Chips  NPO for Medical/Clinical Reasons Except for: No Exceptions    DVT Prophylaxis: Enoxaparin (Lovenox) subcutaneous as okayed by CRS  Ferguson Catheter: in place, indication: Strict 1-2 Hour I&O  Code Status: Full Code           Disposition Plan   Expected discharge: 2 - 3 days, recommended to prior living arrangement once tolerating diet and cleared by CRS.  Entered: Subhash Espinosa DO 09/12/2020, 9:29 AM       The patient's care was discussed with the Patient.    Subhash Espinosa DO  Hospitalist Service  [facility]    ______________________________________________________________________    Interval History   Doing very well, tolerating some ambulation. Still NPO. Off pressors since  yesterday    Data reviewed today: I reviewed all medications, new labs and imaging results over the last 24 hours. I personally reviewed no images or EKG's today.    Physical Exam   Vital Signs: Temp: 98.5  F (36.9  C) Temp src: Oral BP: (!) 85/60 Pulse: 80   Resp: 23 SpO2: 97 % O2 Device: None (Room air) Oxygen Delivery: 2 LPM  Weight: 79 lbs 9.38 oz  GENERAL: awake in chair  HEAD: Normocephalic atraumatic  SCLERA: Anicteric  EXTREMITIES: Warm and well perfused  ABDOMEN:  Firm, distended, tender. No peritoneal signs, rigidity or guarding. Stoma in RLQ pink and viable, some liquid brown stool present. GAB with serosanguinous output (205 ml overnight)  INCISION:  not eval'ed as dressed       Data   Recent Labs   Lab 09/12/20  1200 09/12/20  0340 09/12/20  0020 09/11/20  1545 09/11/20  1000 09/11/20  0527 09/11/20  0015  09/10/20  1538   WBC  --  9.6  --   --   --  10.6 4.5  --  16.1*   HGB  --  8.8*  --   --   --  11.1* 11.1*   < > 15.0   MCV  --  92  --   --   --  92 92  --  93   PLT  --  255  --   --   --  341 378  --  465*   INR  --  1.46*  --   --   --   --  1.82*  --  1.42*   NA  --  133  --  127* 123* 122* 121*   < > 116*   POTASSIUM 3.8 3.1* 3.1* 3.0* 3.5 3.9 3.3*   < > 4.0   CHLORIDE  --  104  --  96 93* 93* 92*  --  79*   CO2  --  25  --  24 16* 18* 19*  --  26   BUN  --  6*  --  9 12 15 19  --  27   CR  --  0.41*  --  0.39* 0.43* 0.39* 0.46*  --  0.77   ANIONGAP  --  4  --  7 14 11 10  --  11   PARISH  --  6.9*  --  7.0* 6.9* 6.5* 6.4*  --  9.1   GLC  --  126* 129* 117* 98 93 109*  --  125*   ALBUMIN  --   --   --   --   --  1.8* 1.9*  --  2.9*   PROTTOTAL  --   --   --   --   --  5.2* 5.2*  --  9.3*   BILITOTAL  --   --   --   --   --  1.2 0.9  --  1.0   ALKPHOS  --   --   --   --   --  52 60  --  120   ALT  --   --   --   --   --  11 10  --  17   AST  --   --   --   --   --  27 23  --  39   LIPASE  --   --   --   --   --   --   --   --  48*   TROPI  --   --   --   --   --   --   --   --  <0.015    < > =  values in this interval not displayed.     No results found for this or any previous visit (from the past 24 hour(s)).  Medications     dextrose 5% and 0.9% NaCl 100 mL/hr at 09/12/20 1058     norepinephrine Stopped (09/11/20 1300)     vasopressin Stopped (09/11/20 1439)       enoxaparin ANTICOAGULANT  40 mg Subcutaneous Q24H     piperacillin-tazobactam  3.375 g Intravenous Q6H     thiamine  100 mg Intravenous Daily

## 2020-09-12 NOTE — PLAN OF CARE
Up to the chair. Tolerating clear liquids well. Removed CVC w introducer, hudson, and NG. Transferred to 33 at 1730.  updated at bedside. Continue to monitor.

## 2020-09-13 NOTE — PROGRESS NOTES
Two RN's attempted to place NG tube at bedside. Patient did not tolerate well. NG was unable to be inserted. [employer] squad has been paged to try a third attempt. However patient is now stating she wants to refuse tube to be placed. Colorectal was notified via web based paging. MD advised RN to educate on the importance of tube to prevent further complications.

## 2020-09-13 NOTE — PROVIDER NOTIFICATION
DATE:  9/13/2020   TIME OF RECEIPT FROM LAB:  1226  LAB TEST:  lactic  LAB VALUE:  4.1   RESULTS GIVEN WITH READ-BACK TO (PROVIDER): Dr. Espinosa   TIME LAB VALUE REPORTED VERBALLY (face to face)TO PROVIDER:   3140    No RRT called d/t patient going down to OR for repeat washout with colorectal surgery

## 2020-09-13 NOTE — PLAN OF CARE
Pt is a&o x 3, soft BP, HR tachy, RR tachypneic, afebrile 97% on room air. Abdomen tender. Emesis x 1 Declined pain meds.  Lactic acid has been elevated, MD notified,has had 3 liters LR bolus.   Continue on antibiotic.  Urinary retention- catheter placed.  Incision with staples intact. GAB drain. Ileostomy putting out green liquid stool.  NPO with ice chips. K3.3 is being replaced.  Abdomen CT order for this morning.

## 2020-09-13 NOTE — ANESTHESIA PREPROCEDURE EVALUATION
Anesthesia Pre-Procedure Evaluation    Patient: Evonne Martel   MRN: [id number] : 1965          Preoperative Diagnosis: Perforated sigmoid colon (H) [K63.1]    Procedure(s):  REOPEN LAPAROTOMY, ABDOMEN WASHOUT.  POSSIBLE BOWEL RESECTION. POSSIBLE STOMA    History reviewed. No pertinent past medical history.  Past Surgical History:   Procedure Laterality Date     COLECTOMY WITH COLOSTOMY, COMBINED N/A 9/10/2020    Procedure: COLECTOMY, WITH COLOSTOMY CREATION;  Surgeon: Chery Morrison MD;  Location:  OR     KNEE SURGERY       LAPAROTOMY EXPLORATORY N/A 9/10/2020    Procedure: Exploratory laparotomy, right colectomy, ileostomy, transverse colon mucous fistula, pelvic drain placement, proctoscopy;  Surgeon: Chery Morrison MD;  Location:  OR       Anesthesia Evaluation     .             ROS/MED HX    ENT/Pulmonary:     (+)tobacco use, Current use , . .    Neurologic:       Cardiovascular:         METS/Exercise Tolerance:  >4 METS   Hematologic:         Musculoskeletal:         GI/Hepatic: Comment: Previous colectomy, now with ongoing sepsis.    (+) Other GI/Hepatic Bowel peforation      Renal/Genitourinary:     (+) Other Renal/ Genitourinary, Hyponatremia (116)      Endo:         Psychiatric:         Infectious Disease:   (+) Recent Fever, Other Infectious Disease Sepsis      Malignancy:         Other:                          Physical Exam      Airway   Mallampati: II  TM distance: >3 FB  Neck ROM: full    Dental     Cardiovascular   Rhythm and rate: regular and abnormal      Pulmonary (+) decreased breath sounds     PE comment: tachypneic            Lab Results   Component Value Date    WBC 12.7 (H) 2020    HGB 11.4 (L) 2020    HCT 32.8 (L) 2020     2020     2020    POTASSIUM 4.1 2020    CHLORIDE 106 2020    CO2 25 2020    BUN 6 (L) 2020    CR 0.36 (L) 2020     (H) 2020    PARISH 7.1 (L) 2020    PHOS 2.1  "(L) 09/13/2020    MAG 2.6 (H) 09/12/2020    ALBUMIN 1.8 (L) 09/11/2020    PROTTOTAL 5.2 (L) 09/11/2020    ALT 11 09/11/2020    AST 27 09/11/2020    ALKPHOS 52 09/11/2020    BILITOTAL 1.2 09/11/2020    LIPASE 48 (L) 09/10/2020    PTT 32 09/10/2020    INR 1.47 (H) 09/13/2020       Preop Vitals  BP Readings from Last 3 Encounters:   09/13/20 92/64    Pulse Readings from Last 3 Encounters:   09/13/20 124      Resp Readings from Last 3 Encounters:   09/13/20 30    SpO2 Readings from Last 3 Encounters:   09/13/20 98%      Temp Readings from Last 1 Encounters:   09/13/20 36.6  C (97.9  F) (Oral)    Ht Readings from Last 1 Encounters:   09/10/20 1.651 m (5' 5\")      Wt Readings from Last 1 Encounters:   09/13/20 45.9 kg (101 lb 3.2 oz)    Estimated body mass index is 16.84 kg/m  as calculated from the following:    Height as of this encounter: 1.651 m (5' 5\").    Weight as of this encounter: 45.9 kg (101 lb 3.2 oz).       Anesthesia Plan      History & Physical Review  History and physical reviewed and following examination; no interval change.    ASA Status:  4 .        Plan for General with Intravenous and Propofol induction. Maintenance will be Balanced.    PONV prophylaxis:  Ondansetron (or other 5HT-3) and Dexamethasone or Solumedrol  Additional equipment: 2nd IV, Arterial Line, Central Line and CVP        Postoperative Care      Consents  Anesthetic plan, risks, benefits and alternatives discussed with:  Patient and Spouse.  Use of blood products discussed: Yes.   Use of blood products discussed with Patient and Spouse.  Consented to blood products.  .                 Usman Wallace MD  "

## 2020-09-13 NOTE — PROGRESS NOTES
"AXR Results:    \"IMPRESSION: Catheter terminates over the pelvis. Midline ventral skin staples. There are a few gas-filled loops of mildly prominent central small bowel. Developing obstruction versus ileus would be difficult to exclude. Scant colonic bowel gas. No definite free air on these supine images. \"    Will make her NPO for now pending further surgical assessment this AM. As per my previous notes, lactates have been trending up this evening; repeat level after most recent aggressive fluid bolus is currently pending at this time.      "

## 2020-09-13 NOTE — PROGRESS NOTES
[facility]    Medicine Progress Note - Hospitalist Service       Date of Admission:  9/10/2020  Assessment & Plan The email address for your [employer] account has been updated         Septic shock from perforated colon  1 wk abdominal pain, found to have necrotic cecum with perforation, leakage of stool presented in shock.  Needed 2 pressors, intubation immediately, went to the or on 9/10 for ex lap, end ileostomy, and transverse mucous fistula and drainage of the pelvis  Pelvic drain placed  Weaned off vasopressors rapidly POD 1 and doing extremely well  ICU Called hospitalist from transfer out on 9/12  Worsening lactic acid overnight to 3.6, received 3 liters of NS  Repeat CT of the abdomen revealed stable fluid collection with drain in place, but overall concerning for adynamic ileus  CRS advised to replace the NGT  Plan  - continue stepdown  - follow the serial lactic acids with continued IVF boluses  - diet reverts to npo  - if hypoperfusion would transfer to the unit for more vasopressors.  - pain control  - continue zosyn  - consult PT and OT    EtOH use disorder  - no obvious withdrawal  - would continue thiamine    Tobacco dependence  - will discuss cessation       Diet: NPO for Medical/Clinical Reasons Except for: Ice Chips    DVT Prophylaxis: Enoxaparin (Lovenox) subcutaneous as okayed by CRS  Ferguson Catheter: in place, indication: Strict 1-2 Hour I&O  Code Status: Full Code           Disposition Plan   Expected discharge: 2 - 3 days, recommended to prior living arrangement once tolerating diet and cleared by CRS.  Entered: Subhash Espinosa DO 09/13/2020, 9:22 AM       The patient's care was discussed with the Patient.    Subhash Espinosa DO  Hospitalist Service  [facility]    ______________________________________________________________________    Interval History   Lactic acid fired and elevated last night. Received 3 liters of IVF. Repeat lactic acid pending.  CRS reviewed the CT scan, and plan for replacing the NGT to suction. Continue IV abx. She states the pain is only 1/10. Continues to have fluid out her ostomy.     Data reviewed today: I reviewed all medications, new labs and imaging results over the last 24 hours. I personally reviewed no images or EKG's today.    Physical Exam   Vital Signs: Temp: 98  F (36.7  C) Temp src: Oral BP: (!) 87/59 Pulse: 114   Resp: 24 SpO2: 95 % O2 Device: None (Room air)    Weight: 101 lbs 3.2 oz  GENERAL: awake in chair  HEAD: Normocephalic atraumatic  SCLERA: Anicteric  EXTREMITIES: Warm and well perfused  ABDOMEN:  Firm, distended, tender. No peritoneal signs, rigidity or guarding. Stoma in RLQ pink and viable, some liquid brown stool present. GAB with serosanguinous output (205 ml overnight)   INCISION:  not eval'ed as dressed       Data   Recent Labs   Lab 09/13/20  0356 09/12/20  1200 09/12/20  0340 09/12/20  0020 09/11/20  1545  09/11/20  0527 09/11/20  0015  09/10/20  1538   WBC 12.7*  --  9.6  --   --   --  10.6 4.5  --  16.1*   HGB 11.4*  --  8.8*  --   --   --  11.1* 11.1*   < > 15.0   MCV 94  --  92  --   --   --  92 92  --  93     --  255  --   --   --  341 378  --  465*   INR  --   --  1.46*  --   --   --   --  1.82*  --  1.42*     --  133  --  127*   < > 122* 121*   < > 116*   POTASSIUM 3.3* 3.8 3.1* 3.1* 3.0*   < > 3.9 3.3*   < > 4.0   CHLORIDE 106  --  104  --  96   < > 93* 92*  --  79*   CO2 25  --  25  --  24   < > 18* 19*  --  26   BUN 6*  --  6*  --  9   < > 15 19  --  27   CR 0.36*  --  0.41*  --  0.39*   < > 0.39* 0.46*  --  0.77   ANIONGAP 6  --  4  --  7   < > 11 10  --  11   PARISH 7.1*  --  6.9*  --  7.0*   < > 6.5* 6.4*  --  9.1   *  --  126* 129* 117*   < > 93 109*  --  125*   ALBUMIN  --   --   --   --   --   --  1.8* 1.9*  --  2.9*   PROTTOTAL  --   --   --   --   --   --  5.2* 5.2*  --  9.3*   BILITOTAL  --   --   --   --   --   --  1.2 0.9  --  1.0   ALKPHOS  --   --   --   --   --   --   52 60  --  120   ALT  --   --   --   --   --   --  11 10  --  17   AST  --   --   --   --   --   --  27 23  --  39   LIPASE  --   --   --   --   --   --   --   --   --  48*   TROPI  --   --   --   --   --   --   --   --   --  <0.015    < > = values in this interval not displayed.     Recent Results (from the past 24 hour(s))   XR Abdomen Port 1 View    Narrative    EXAM: XR ABDOMEN PORT 1 VW  LOCATION: [facility]  DATE/TIME: 9/13/2020 2:42 AM    INDICATION: Abdominal pain following bowel repair.  COMPARISON: 09/10/2020.      Impression    IMPRESSION: Catheter terminates over the pelvis. Midline ventral skin staples. There are a few gas-filled loops of mildly prominent central small bowel. Developing obstruction versus ileus would be difficult to exclude. Scant colonic bowel gas. No   definite free air on these supine images.    CT Chest (PE) Abdomen Pelvis w Contrast    Narrative    CT CHEST PE ABDOMEN PELVIS W CONTRAST 9/13/2020 8:00 AM    CLINICAL HISTORY: Shortness of breath; Sepsis; Worsening abdominal  pain and lactic acidosis with tachypnea, tachycardia (status post  surgery for perforated sigmoid diverticulitis)  TECHNIQUE: CT angiogram chest and routine CT abdomen pelvis with IV  contrast. Arterial phase through the chest and venous phase through  the abdomen and pelvis. 2D and 3D MIP reconstructions were preformed  by the CT technologist. Dose reduction techniques were used.     CONTRAST: 51mL Isovue-370    COMPARISON: CT of the abdomen and pelvis 9/10/2020    FINDINGS:  ANGIOGRAM CHEST: Pulmonary arteries are normal caliber and negative  for pulmonary emboli. Thoracic aorta is negative for dissection. No CT  evidence of right heart strain.     LUNGS AND PLEURA: Small focus of groundglass opacity in the lateral  left upper lobe (series 8, image 96) could represent a small focus of  infection or aspiration. Bibasilar atelectasis and small-to-moderate  bilateral pleural effusions, new since  9/10/2020. Paraseptal mild  emphysematous changes in the lungs. Few punctate calcified pulmonary  granulomas. 4 mm noncalcified nodule along the right minor fissure in  the anterior right middle lobe (series 8, image 133).    MEDIASTINUM/AXILLAE: Fluid-filled esophagus. No lymphadenopathy in the  chest or axillae.    HEPATOBILIARY: No suspicious lesions in the liver. Sludge within the  gallbladder lumen. No radiopaque gallstones.    PANCREAS: No significant mass, duct dilatation, or inflammatory  change.    SPLEEN: Normal.    ADRENAL GLANDS: Normal.    KIDNEYS/BLADDER: Excreted contrast in the renal collecting systems.  Normal kidneys. Decompressed urinary bladder with a Ferguson catheter in  place.    BOWEL: Interval right hemicolectomy with right lower quadrant  ileostomy and transverse mucous fistula. Diffuse mildly dilated loops  of small bowel. Marked diffuse circumferential small bowel and colonic  wall thickening. Moderate amount of formed stool in the rectum. In the  left hemipelvis, there is a 4.7 x 2.2 x 5.0 cm collection containing a  large amount of gas and small amount of fluid and a surgical drain is  present within this collection (series 14, image [id number]).    LYMPH NODES: Multiple small gastrohepatic ligament and para-aortic  lymph nodes are stable, nonspecific and likely reactive.    PELVIC ORGANS: No pelvic masses.    OTHER: Small-to-moderate ascites. Decreased small pneumoperitoneum  presumably related to laparotomy for recent bowel perforation.    MUSCULOSKELETAL: Unremarkable.      Impression    IMPRESSION:  1.  No pulmonary emboli.   2.  New small bilateral pleural effusions and bibasilar atelectasis.  Small ground glass focus in the left upper lobe could represent  infection or aspiration.  3.  Diffuse mild small bowel dilatation presumably postoperative  adynamic ileus. Marked circumferential small bowel wall thickening and  colonic wall thickening is presumably postoperative edema.  4.   Moderate sized collection containing predominantly gas and small  amount of fluid in the left low pelvis with a surgical drain within  it.  5.  Small to moderate ascites. Small residual pneumoperitoneum,  presumably related to adjacent surgery.  6.  Fluid within the esophagus and mild distention of the stomach with  gas and fluid.    MARC CHRISTENSEN MD     Medications     dextrose 5% and 0.9% NaCl 100 mL/hr at 09/12/20 6243       enoxaparin ANTICOAGULANT  40 mg Subcutaneous Q24H     piperacillin-tazobactam  3.375 g Intravenous Q6H     sodium chloride (PF)  3 mL Intracatheter Q8H     thiamine  100 mg Intravenous Daily

## 2020-09-13 NOTE — PROGRESS NOTES
COLON & RECTAL SURGERY  PROGRESS NOTE  POD3 right hemicolectomy and pelvic drain for perforated colon    September 12, 2020    SUBJECTIVE: Tachycardia and hypotension overnight. Increased lactate. Emesis with some abdominal pain and SOB. CT PE with A/P this morning - read pending.     OBJECTIVE:  Temp:  [98  F (36.7  C)-99.2  F (37.3  C)] 98  F (36.7  C)  Pulse:  [] 114  Resp:  [20-46] 24  BP: ()/(59-83) 87/59  SpO2:  [95 %-100 %] 95 %    Intake/Output Summary (Last 24 hours) at 9/12/2020 1149  Last data filed at 9/12/2020 1100  Gross per 24 hour   Intake 3396.5 ml   Output 3630 ml   Net -233.5 ml       GENERAL:  Awake, alert, no acute distress,  EXTREMITIES: Warm and well perfused, mild edema   ABDOMEN:  Soft, appropriately tender, moderate distension. No guarding, rigidity, or peritoneal signs. Dressing intact. Stoma is working with watery brown output GAB now with feculent drainage     LABS:  Lab Results   Component Value Date    WBC 9.6 09/12/2020     Lab Results   Component Value Date    HGB 8.8 09/12/2020     Lab Results   Component Value Date    HCT 24.7 09/12/2020     Lab Results   Component Value Date     09/12/2020     Last Basic Metabolic Panel:  Lab Results   Component Value Date     09/12/2020      Lab Results   Component Value Date    POTASSIUM 3.1 09/12/2020     Lab Results   Component Value Date    CHLORIDE 104 09/12/2020     Lab Results   Component Value Date    PARISH 6.9 09/12/2020     Lab Results   Component Value Date    CO2 25 09/12/2020     Lab Results   Component Value Date    BUN 6 09/12/2020     Lab Results   Component Value Date    CR 0.41 09/12/2020     Lab Results   Component Value Date     09/12/2020       ASSESSMENT/PLAN: Evonne Martel is a 54 year old female who presented with free air and underwent an exploratory laparotomy with right hemicolectomy and placement of a pelvic drain. She was extubated on POD #1 and has had function from the ileostomy.  Worsening hemodynamics overnight with hypotension and tachycardia which improved with resuscitation.     Replace NG this morning   Continue IVF resuscitation.  IV abx for abdominal contamination.  Ferguson replaced overnight for close I/O.   Follow-up final CT C/A/P. May need abdominal washout if no improvement with NG, Abx and fluid resuscitation. No evidence of peritonitis on exam this morning. Drain now with feculent output as expected. Will monitor closely.       Zuleyka Andres MD  [facility]   Office: [phone]  9/13/2020   8:35 AM

## 2020-09-13 NOTE — BRIEF OP NOTE
[facility]    Brief Operative Note    Pre-operative diagnosis: Perforated sigmoid colon (H) [K63.1]  Post-operative diagnosis Same as pre-operative diagnosis    Procedure: Procedure(s):  LAPAROTOMY, ABDOMINAL WASHOUT  ANTERIOR RESECTION WITH RIGID PROCTOSCOPY  Surgeon: Surgeon(s) and Role:     * Zuleyka Andres MD - Primary   Sukhjinder Chavez- Fellow  Anesthesia: General   Estimated blood loss: 200 ml  Drains:Boo drain in pelvis  Specimens: Sigmoid colon and top of rectum   ID Type Source Tests Collected by Time Destination   A : SIGMOID COLON AND RECTUM Tissue Other SURGICAL PATHOLOGY EXAM Zuleyka Andres MD 9/13/2020  4:44 PM      Findings: Multiple large holes in sigmoid consistent with previous CT. Large volume fecal contamination. Stapled rectal stump with boo drain posterior to stump.    Complications: None.    Sukhjinder Chavez MD  CRS Fellow    ADDENDUM:    PATIENT DATA  Indicate Y or N:  Home O2 No  Hemodialysis  No  Transplant patient  No  Cirrhosis  No  Steroids in last 30 days  No  Immunomodulators in last 30 days  No  Anticoagulation at time of surgery  No   List medication   Prior abdominal surgery  Yes  Pelvic irradiation  No    Albumin within 30 days if known 1.8  Hgb within 30 days if known 11.4  Hemoglobin   Date Value Ref Range Status   09/13/2020 11.4 (L) 11.7 - 15.7 g/dL Final   ]  Cr within 30 days if known 0.36  Creatinine   Date Value Ref Range Status   09/13/2020 0.36 (L) 0.52 - 1.04 mg/dL Final   ]  Body mass index is 16.84 kg/m .      OR DATA  Emergent  Yes   <24 hours  Yes   <1 week  Yes  Bowel Prep No  Antibiotics  Yes  DVT prophylaxis    Heparin  Yes   SCD  Yes   None  No  Drain  Yes  ASA (1,2,3,4) 4  OR time (min) 170  Stents  No  Transfuse >/= 2U  No  Anastomosis   Stapled  No   Handsewn  No  Leak Test    Positive  No   Negative  No   Not done  No

## 2020-09-13 NOTE — PROGRESS NOTES
Sepsis Evaluation Progress Note    I was called to see Evonne Martel due to abnormal vital signs triggering the Sepsis SIRS screening alert. She is known to have an infection.     Physical Exam   Vital Signs:  Temp: 98.5  F (36.9  C) Temp src: Oral BP: 102/66 Pulse: 126   Resp: 27 SpO2: 98 % O2 Device: None (Room air)    @[facility] [place][id number],1,,1)@  General: acutely ill appearing though less tachypnic now  Mental Status: baseline mental status.     Remainder of physical exam is significant for abdominal pain    Data   Lactic Acid   Date Value Ref Range Status   09/13/2020 3.5 (H) 0.7 - 2.0 mmol/L Final   09/11/2020 0.9 0.7 - 2.0 mmol/L Final     Lactate for Sepsis Protocol   Date Value Ref Range Status   09/12/2020 2.6 (H) 0.7 - 2.0 mmol/L Final     Comment:     Significant value called to and read back by  JUNIE LLOYD RN IN 33 @ 2320 DK         Assessment & Plan   Evonne Martel meets SIRS criteria AND has a lactate >2 or other evidence of acute organ damage.  These vital signs, lab and physical exam findings are consistent with SEVERE SEPSIS.    Sepsis Time-Zero (time severe sepsis diagnosis confirmed):   09/12/20 as this was the time when Lactate resulted, and the level was > 2.0     Anti-infectives (From now, onward)    Start     Dose/Rate Route Frequency Ordered Stop    09/11/20 0000  piperacillin-tazobactam (ZOSYN) 3.375 g vial to attach to  mL bag      3.375 g  over 30 Minutes Intravenous EVERY 6 HOURS 09/10/20 2304          Current antibiotic coverage is appropriate for source of infection.    3 Hour Severe Sepsis Bundle Completion:  1. Initial Lactic Acid result shown above. Repeat lactic acid ordered for 1 hour from now.   2. Blood Cultures before Antibiotics: No, antibiotics were started prior to BCx collection b/c waiting for BCx to be collected would have been detrimental to the patient  3. Broad Spectrum Antibiotics Administered: yes  4. Fluids: 1000 mL fluids ORDERED to be given      WILL CHECK STAT AXR    Disposition: The patient will remain on the current unit. We will continue to monitor this patient closely..  Damion Bob MD    Sepsis Criteria   Sepsis: 2+ SIRS criteria due to infection  Severe Sepsis: Sepsis AND 1+ new sign of acute organ dysfunction (Note: lactate >2 or acute encephalopathy each qualify as organ dysfunction)  Septic Shock: Sepsis AND hypotension despite volume resuscitation with 30 ml/kg crystalloid or lactate >=4  Note: HYPOTENSION is defined as 2 BP readings measured 3 hrs apart that have a SBP <90, MAP <65, or decrease >40 mmHg, occurring 6 hrs before or after t-zero

## 2020-09-13 NOTE — OP NOTE
PREOPERATIVE DIAGNOSIS: Sigmoid colon perforation with feculent peritonitis    POSTOPERATIVE DIAGNOSIS: Same    OPERATION PERFORMED:    1.  Reexploration of recent laparotomy  2.  Abdominal washout  3.  Anterior resection  4.  Rigid proctoscopy with rectal stump leak testing  5.  Removal of abdominal drain and replacement of new pelvic drain    SURGEON: Zuleyka Andres MD     ASSISTANT SURGEON: Sukhjinder Chavez MD, PhD, Colorectal Surgery Fellow    ANESTHESIA: General.      INDICATION: Evonne Martel is an 54 year old woman who initially presented Thursday night with pneumoperitoneum and a right colon and sigmoid colon perforation.  She was emergently taken to the OR however due to her instability underwent a right colon resection with an end ileostomy and pelvic drainage for her sigmoid colon perforation.  She was admitted to the ICU after this procedure and then progressed well.  She was able to be extubated on postoperative day one and was transferred to the floor on postoperative day two.  Unfortunately on the evening of postoperative day two, she developed tachycardia hypotension and tachypnea.  She also had feculent drainage from her pelvic drain as well as peritonitis.  Her NG tube was replaced, she was resuscitated and the decision was made to bring her back to the operating room for abdominal washout and likely sigmoid colon resection.  The risks and benefits of the procedure were discussed with Evonne and her  and they agreed to proceed.     OPERATIVE FINDINGS: Feculent peritonitis, 3 perforations in the distal sigmoid colon and proximal rectum, chronic abscess cavity posterior to the rectum, dilated and edematous small bowel    PROCEDURE: After informed consent was obtained, the patient was brought to the operating room and placed on the operating room table in the supine position.  She had a central line as well as an arterial line placed in the preoperative area.  Sequential compression devices  were placed on bilateral lower extremities prior to induction of general anesthesia. General anesthesia was induced.  On induction she did develop hypotension and was started on pressor medications and remained on pressor support throughout the case. She was then placed in a well-padded dorsal lithotomy position.  We did perform a rectal irrigation with 1 L of warm saline mixed with 50 mL's of Betadine solution through a large Malecot catheter.  Staples were removed from her previous midline laparotomy and her ostomy appliance was also removed.  A Betadine soaked gauze was then placed over the stoma site and the abdomen was then sterilely prepped and draped in standard fashion.      We removed the previous midline laparotomy sutures entering the abdomen.  On entry into the abdomen there was feculent ascites.  We then irrigated the abdomen with 2 L of warm saline.  On inspection of the pelvis, we were able to identify a hole in the proximal rectum which was the likely source of the feculent peritonitis.  We removed the previously placed pelvic drain.  We then used the Bookwalter retractor to help with abdominal exposure.  We packed the small bowel into the right upper quadrant exposing the base of the mesentery to the sigmoid colon and rectum.  We opened the lateral attachments in the sigmoid fossa and took down the white line of Toldt extending up the descending colon.  We were able to eventually identify the ureter within the retroperitoneum and keep this protected throughout our dissection.  We then opened the peritoneum medially underneath the vascular pedicle connecting to our lateral dissection plane and creating a window under the mesentery.  We divided the ALIE sharply and ligated it with a #0 Vicryl tie.  There was good hemostasis of the vascular stump.  We then selected our proximal transection point in the mid sigmoid colon.  We made a mesenteric window along the bowel wall and divided the colon with a  single firing of the contour stapler.  We then divided the mesentery down to our mesenteric window.  We then attempted to continue our dissection along the mesentery to the presacral plane.  Unfortunately there was a large abscess cavity posteriorly which obliterated this plane.  There were also adhesions between the the left tube and ovary as well as the uterus to this abscess cavity and the site of perforation.  Using a combination of cautery and blunt dissection we were able to separate the uterus and left ovary and tube off of the sigmoid colon and proximal rectum.  During this dissection we identified at least 2 holes in the distal sigmoid colon and proximal rectum.  With opening the peritoneum bilaterally we were eventually able to get distal enough to identify healthy rectum.  There was significantly thickened peritoneum overlying the rectum.  We selected a point just proximal to the anterior reflection and opened the peritoneum revealing healthy rectal wall.  We were eventually able to create a mesenteric window at this level and divided the rectum with a single firing of the contour stapler.  We were then able to divide the remaining mesentery and chronic abscess cavity between this point and our previous mesenteric dissection plane.  We then handed off our specimen which was later opened and revealed chronically thickened colon and proximal rectum without any evidence of a malignancy.    We then proceeded to copiously irrigate the abdomen with an additional 8 L of warm saline until the fluid was clear.  We ensured hemostasis in the pelvis and placed two 2-0 Prolene sutures on the rectal stump for later identification of the rectal stump.  We then perform a rigid proctoscopy and leak test of the rectal stump which appeared airtight.  We then irrigated the rectal stump with 1 L of warm saline mixed with 50 mL of Betadine.  We left a Malecot catheter in the rectal stump for drainage.  We placed a pelvic drain  through her previous drainage site in the right lower quadrant.  We then ran the small bowel to ensure there were no other injuries to the small bowel remaining colon or the stomach. We also decompressed a significant amount of fluid from the small bowel through the NG to help with abdominal closure.  Her entire small bowel was edematous and erythematous but appeared to be well perfused and viable. 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    We then proceeded with clean closure technique for abdominal closure.  Her fascia was very thin and we tore multiple holes in the fascia attempting to close the abdominal wall.  We were able to get the fascia well approximated using a combination of two #0 looped PDS sutures from either end in a running fashion with #1 Vicryl sutures as retention sutures intermittently.  The fascial closure did not appear to be on any undue tension.  We then irrigated the skin and placed intermittent staples.  We packed the open skin with a saline moistened gauze and applied a sterile dressing. A stoma appliance was placed over the end ileostomy.     At the end of the procedure all needle, instrument, and sponge counts were correct.  The patient remained critically ill and on three pressors.  She will be admitted to the ICU and kept intubated and sedated overnight.    EBL: 150 ml  SPECIMEN: sigmoid colon and upper rectum  COMPLICATIONS: no operative complications    Zuleyka Andres MD

## 2020-09-13 NOTE — PLAN OF CARE
PT: Pt in bed, declines getting up to chair at this time. RN reports pt had tough night. Will hold PT at this time.    Pt gone to OR this PM

## 2020-09-13 NOTE — PLAN OF CARE
Intermittently lethargic at times and slow to respond. Tachycardic, BP's soft. On room air LS diminished. Very weak, up with AX2 GB. NPO. Nauseous emesis x2. Ileostomy patent moderate amount of bile green watery output. GAB draining small amount of tan liquid. Abdomen very rounded, BS hypo, patient passing some gas. Given dilaudid x1 for pain. Lactic level increased 3.6 to 4.1. NG tube placed at bedside per [employer] nurse. Large amount of bile output. Patient prepped and taken down to surgery at 12:30.

## 2020-09-13 NOTE — ANESTHESIA CARE TRANSFER NOTE
Patient: Evonne Martel    Procedure(s):  LAPAROTOMY, ABDOMINAL WASHOUT  ANTERIOR RESECTION WITH RIGID PROCTOSCOPY    Diagnosis: Perforated sigmoid colon (H) [K63.1]  Diagnosis Additional Information: No value filed.    Anesthesia Type:   General     Note:  Airway :ETT  Patient transferred to:ICU  Comments: Pt to ICU, ett in place, ambu with 10L O2 for transport, placed on vent in ICU  Vent settings per RT  Patent IV  All monitors and alarms on.  VSS  Report and transfer of care to RN.ICU Handoff: Call for PAUSE to initiate/utilize ICU HANDOFF, Identified Patient, Identified Responsible Provider, Reviewed the Pertinent Medical History, Discussed Surgical Course, Reviewed Intra-OP Anesthesia Management and Issues during Anesthesia, Set Expectations for Post Procedure Period and Allowed Opportunity for Questions and Acknowledgement of Understanding      Vitals: (Last set prior to Anesthesia Care Transfer)    CRNA VITALS  9/13/2020 1718 - 9/13/2020 1818      9/13/2020             ART BP:  104/66    ART Mean:  83    Resp Rate (observed):  12    Resp Rate (set):  12                Electronically Signed By: MARITZA Rea CRNA  September 13, 2020  6:24 PM

## 2020-09-13 NOTE — PROGRESS NOTES
Sepsis Evaluation Progress Note    I was called to see Evonne Martel due to abnormal vital signs triggering the Sepsis SIRS screening alert. She is known to have an infection.     Physical Exam   Vital Signs:  Temp: 99.2  F (37.3  C) Temp src: Oral BP: 134/81 Pulse: 122   Resp: (!) 34 SpO2: 97 % O2 Device: None (Room air)    General: acutely ill appearing  Tachypneic; denies pain  Mental Status: baseline mental status.     Remainder of physical exam is significant for CV e s1 s2 RESP CTAF B  GI soft NTND EXT No CCE    Data   Lactic Acid   Date Value Ref Range Status   09/11/2020 0.9 0.7 - 2.0 mmol/L Final   09/10/2020 3.0 (H) 0.7 - 2.0 mmol/L Final     Lactate for Sepsis Protocol   Date Value Ref Range Status   09/12/2020 2.6 (H) 0.7 - 2.0 mmol/L Final     Comment:     Significant value called to and read back by  JUNIE LLOYD RN IN 33 @ 2320 DK         Assessment & Plan   Evonne Martel meets SIRS criteria AND has a lactate >2 or other evidence of acute organ damage.  These vital signs, lab and physical exam findings are consistent with SEVERE SEPSIS.    Sepsis Time-Zero (time severe sepsis diagnosis confirmed):   09/12/20 as this was the time when Lactate resulted, and the level was > 2.0     Anti-infectives (From now, onward)    Start     Dose/Rate Route Frequency Ordered Stop    09/11/20 0000  piperacillin-tazobactam (ZOSYN) 3.375 g vial to attach to  mL bag      3.375 g  over 30 Minutes Intravenous EVERY 6 HOURS 09/10/20 2304          Current antibiotic coverage is appropriate for source of infection.    3 Hour Severe Sepsis Bundle Completion:  1. Initial Lactic Acid result shown above. Repeat lactic acid ordered for 1 hour from now.   2. Blood Cultures before Antibiotics: No, antibiotics were started prior to BCx collection b/c waiting for BCx to be collected would have been detrimental to the patient  3. Broad Spectrum Antibiotics Administered: yes  4. Fluids: 1000 mL fluids ORDERED to be given    (30 mg/kg)    Disposition: The patient will remain on the current unit. We will continue to monitor this patient closely..  Damion Bob MD    Sepsis Criteria   Sepsis: 2+ SIRS criteria due to infection  Severe Sepsis: Sepsis AND 1+ new sign of acute organ dysfunction (Note: lactate >2 or acute encephalopathy each qualify as organ dysfunction)  Septic Shock: Sepsis AND hypotension despite volume resuscitation with 30 ml/kg crystalloid or lactate >=4  Note: HYPOTENSION is defined as 2 BP readings measured 3 hrs apart that have a SBP <90, MAP <65, or decrease >40 mmHg, occurring 6 hrs before or after t-zero

## 2020-09-13 NOTE — SIGNIFICANT EVENT
Significant Event Note  Worsening lactic acid and noted plans to go to OR for repeat washout with colorectal  Did discuss with Dr. aShu from ICU, given likelihood of ending back up in the ICU.    Likely would benefit from central access    Please call if questions, hospitalist will continue to round unless on pressors/ventilator    Call if questions or concerns    Subhash Espinosa DO  Hospitalist [facility]  Pager: [phone]

## 2020-09-13 NOTE — ANESTHESIA PROCEDURE NOTES
CENTRAL LINE INSERTION PROCEDURE NOTE:   Staff -   Anesthesiologist:  Usman Wallace MD      Performed By: anesthesiologist        Pre-Procedure  Performed by Usman Wallace MD  Location: pre-op      Pre-Anesthestic Checklist: patient identified, IV checked, site marked, risks and benefits discussed, informed consent, monitors and equipment checked and pre-op evaluation    Timeout  Correct Patient: Yes   Correct Procedure: Yes   Correct Site: Yes   Correct Laterality: Yes   Correct Position: Yes   Site Marked: Yes   .   Procedure Documentation   Procedure: central line and new line  Position:  Patient Prep/Sterile Barriers; chlorhexidine gluconate and isopropyl alcohol, maximum sterile barriers used during central venous catheter insertion      Insertion Site:right, internal jugular  . A permanent image is entered into the patient's record.   Skin infiltrated with 3 mL of 1% lidocaine.  Catheter: 7 Fr, 20 cm, 3-lumen.  Assessment/Narrative         Secured by suture  Tegaderm and Biopatch dressing used.  blood aspirated from all lumens

## 2020-09-13 NOTE — ANESTHESIA PROCEDURE NOTES
ARTERIAL LINE PROCEDURE NOTE:  Staff -   Anesthesiologist:  Usman Wallace MD      Performed By: anesthesiologist         Pre-Procedure  Performed by Usman Wallace MD  Location: pre-op      Pre-Anesthestic Checklist: patient identified, IV checked, site marked, risks and benefits discussed, informed consent, monitors and equipment checked and pre-op evaluation    Timeout  Correct Patient: Yes   Correct Procedure: Yes   Correct Site: Yes   Correct Laterality: Yes   Correct Position: Yes   Site Marked: Yes   .   Procedure Documentation  Procedure: arterial line (right radial art line)      Insertion Site:right.Skin infiltrated with 2 mL of 1% lidocaine. Injection technique: Seldinger Technique .  .  Patient Prep/Sterile Barriers; all elements of maximal sterile barrier technique followed, mask, hat, sterile gown, sterile gloves, draped, hand hygiene, chlorhexidine gluconate and isopropyl alcohol    Assessment/Narrative    Catheter: 20 gauge, 12 cm     Secured by transparent dressing  Tegaderm dressing used.    Arterial waveform: Yes

## 2020-09-13 NOTE — PLAN OF CARE
OT:cx pt just declined OT, nursing states pt had difficult night. Will try later today if schedule allows

## 2020-09-13 NOTE — PLAN OF CARE
ICU tx @ 1730. VSS ex mild temp & sinus tachy 110-120s.. Lactic fired, in process. A/O. However very restless, taken ileostomy pouch and gown off. Multiple boost and reposition, Complain of pain but refusing pain meds. Dilaudid given once. Ilesotomy has good watery output. GAB intact. Midline incision dressing WDL. Clears diet, intermittent nauseated, slow down oral intake. Unable to void post hudson removal, str8t cath 450 ml once @ 2230.     Lactic 2.6. hospitalist paged and pending orders.

## 2020-09-14 NOTE — PROGRESS NOTES
[facility]    Daily Progress Note  [facility] Intensive Care     Date of Admission:  9/10/2020    Assessment & Plan   Evonne Martel is a 54 year old female who initially presented with septic shock due to a cecal perforation and sigmoid colon perforation.  She underwent exploratory laparotomy, right colectomy with end ileostomy, and transverse mucous fistula. She did remarkably well postoperatively, but last evening developed tachycardia, tachypnea and hypotension. Taken back to the OR today and found to have gross stool spillage in abdomen with at least 3 sigmoid perforations. Washout and anterior resection performed with drain placement. Very friable tissues in fascia closure, so we were asked to keep paralyzed overnight tonight.    Neurology:  Sedated on ventilator: Propofol currently being used for sedation      Cardiovascular:  Septic shock: Currently requiring norepinephrine, phenylephrine along with vasopressin.  Lactate 5  -Wean vasopressors as able    Pulmonary:        Return from the OR intubated mechanically ventilated: No issues with oxygenation or ventilation.     Renal  BUN to creatinine ratio greater than 20 without acute kidney injury: Volume resuscitation as needed. Total protein extremely low, so will give 25% albumin.    ID:         Perforated colon with gross stool spillage: Per colorectal surgery patient is on Zosyn      GI  Perforated colon: Status post surgery.  Management per colorectal surgery  -    Nutrition  N.p.o. for now  -    Endocrine:  No issues: Glycemic control per unit protocol  -    Heme/Onc:  No issues  -    DVT Prophylaxis: Pneumatic Compression Devices  GI Prophylaxis: H2 Blocker    Restraints: Restraints for medical healing needed: YES    Family update by me today: No    Romeo Gonzalez    Time Spent on this Encounter   Billing:  I spent 45 minutes bedside and on the inpatient unit today managing the critical care of Evonne Martel in  relation to the issues listed in this note.    Code Status   Full Code    Primary Care Physician   Physician No Ref-Primary    Chief Complaint   Abdominal pain and weakness    History is obtained from the electronic health record and emergency department physician    History of Present Illness   Evonne Martel is a 54 year old female who presented with diffuse abdominal pain and bloating. The patient reported that the pain started about a week ago Wednesday(9/2) and has continued since that time. It was starting to get better over the weekend, but then three days ago the pain became suddenly worse and has not resolved. She also notes acute onset of abdominal bloating 3 days ago and constipation but no nausea, vomiting, or diarrhea. She has not had a fever. Her spouse feels that she appears a bit more yellow than her baseline. She reports that she typically drinks 3 hard liquor beverages daily though has not had any for the last week and a half.     On CT she was found to have massive pneumoperitoneum, sigmoid inflammation, and extraluminal stool in the pelvis.  She was taken emergently to the operating room by colorectal surgery team.     On 9/13 she again became septic and was taken back to the OR for further bowel perforation.    Past Medical History    I have reviewed this patient's medical history and updated it with pertinent information if needed.   History reviewed. No pertinent past medical history.    Past Surgical History   I have reviewed this patient's surgical history and updated it with pertinent information if needed.  Past Surgical History:   Procedure Laterality Date     COLECTOMY WITH COLOSTOMY, COMBINED N/A 9/10/2020    Procedure: COLECTOMY, WITH COLOSTOMY CREATION;  Surgeon: Chery Morrison MD;  Location: [facility] OR     KNEE SURGERY       LAPAROTOMY EXPLORATORY N/A 9/10/2020    Procedure: Exploratory laparotomy, right colectomy, ileostomy, transverse colon mucous fistula, pelvic drain placement,  proctoscopy;  Surgeon: Chery Morrison MD;  Location:  OR       Prior to Admission Medications   None     Allergies   No Known Allergies    Social History   I have reviewed this patient's social history and updated it with pertinent information if needed. Evonne Martel  reports that she has been smoking cigarettes. She has smoked for the past 30.00 years. She has never used smokeless tobacco. She reports current alcohol use. She reports that she does not use drugs.    Family History   I have reviewed this patient's family history and updated it with pertinent information if needed.   History reviewed. No pertinent family history.    Review of Systems   Review of systems not obtained due to patient factors - critical condition, intubation and sedation    Physical Exam   Temp: 93.9  F (34.4  C) Temp src: Oral Temp  Min: 93.9  F (34.4  C)  Max: 99.2  F (37.3  C) BP: 92/64 Pulse: 75   Resp: 15 SpO2: 100 % O2 Device: Mechanical Ventilator    Vital Signs with Ranges  Temp:  [93.9  F (34.4  C)-99.2  F (37.3  C)] 93.9  F (34.4  C)  Pulse:  [] 75  Resp:  [12-46] 15  BP: ()/(59-83) 92/64  FiO2 (%):  [60 %] 60 %  SpO2:  [76 %-100 %] 100 %  101 lbs 3.2 oz     Constitutional: Sedated on ventilator  Eyes: PERRL  ENT: Endotracheal tube in place, trachea midline, moist mucous membranes  Respiratory: Lungs clear to auscultation  Cardiovascular: Tachycardia, no discernible murmur  GI: Dressings in place following surgery  Genitourinary: Ferguson catheter in place  Skin: Warm without mottling  Musculoskeletal: Good pulses throughout  Neurologic: Sedated on ventilator      Data   Results for orders placed or performed during the hospital encounter of 09/10/20 (from the past 24 hour(s))   Lactic acid level STAT   Result Value Ref Range    Lactate for Sepsis Protocol 2.6 (H) 0.7 - 2.0 mmol/L   Lactic acid whole blood   Result Value Ref Range    Lactic Acid 3.5 (H) 0.7 - 2.0 mmol/L   XR Abdomen Port 1 View    Narrative     EXAM: XR ABDOMEN PORT 1 VW  LOCATION: [facility]  DATE/TIME: 9/13/2020 2:42 AM    INDICATION: Abdominal pain following bowel repair.  COMPARISON: 09/10/2020.      Impression    IMPRESSION: Catheter terminates over the pelvis. Midline ventral skin staples. There are a few gas-filled loops of mildly prominent central small bowel. Developing obstruction versus ileus would be difficult to exclude. Scant colonic bowel gas. No   definite free air on these supine images.    CBC with platelets   Result Value Ref Range    WBC 12.7 (H) 4.0 - 11.0 10e9/L    RBC Count 3.50 (L) 3.8 - 5.2 10e12/L    Hemoglobin 11.4 (L) 11.7 - 15.7 g/dL    Hematocrit 32.8 (L) 35.0 - 47.0 %    MCV 94 78 - 100 fl    MCH 32.6 26.5 - 33.0 pg    MCHC 34.8 31.5 - 36.5 g/dL    RDW 13.1 10.0 - 15.0 %    Platelet Count 312 150 - 450 10e9/L   Basic metabolic panel   Result Value Ref Range    Sodium 137 133 - 144 mmol/L    Potassium 3.3 (L) 3.4 - 5.3 mmol/L    Chloride 106 94 - 109 mmol/L    Carbon Dioxide 25 20 - 32 mmol/L    Anion Gap 6 3 - 14 mmol/L    Glucose 135 (H) 70 - 99 mg/dL    Urea Nitrogen 6 (L) 7 - 30 mg/dL    Creatinine 0.36 (L) 0.52 - 1.04 mg/dL    GFR Estimate >90 >60 mL/min/[1.73_m2]    GFR Estimate If Black >90 >60 mL/min/[1.73_m2]    Calcium 7.1 (L) 8.5 - 10.1 mg/dL   Phosphorus   Result Value Ref Range    Phosphorus 2.1 (L) 2.5 - 4.5 mg/dL   Lactic acid whole blood   Result Value Ref Range    Lactic Acid 3.6 (H) 0.7 - 2.0 mmol/L   CT Chest (PE) Abdomen Pelvis w Contrast    Narrative    CT CHEST PE ABDOMEN PELVIS W CONTRAST 9/13/2020 8:00 AM    CLINICAL HISTORY: Shortness of breath; Sepsis; Worsening abdominal  pain and lactic acidosis with tachypnea, tachycardia (status post  surgery for perforated sigmoid diverticulitis)  TECHNIQUE: CT angiogram chest and routine CT abdomen pelvis with IV  contrast. Arterial phase through the chest and venous phase through  the abdomen and pelvis. 2D and 3D MIP reconstructions were  preformed  by the CT technologist. Dose reduction techniques were used.     CONTRAST: 51mL [id number]-370    COMPARISON: CT of the abdomen and pelvis 9/10/2020    FINDINGS:  ANGIOGRAM CHEST: Pulmonary arteries are normal caliber and negative  for pulmonary emboli. Thoracic aorta is negative for dissection. No CT  evidence of right heart strain.     LUNGS AND PLEURA: Small focus of groundglass opacity in the lateral  left upper lobe (series 8, image 96) could represent a small focus of  infection or aspiration. Bibasilar atelectasis and small-to-moderate  bilateral pleural effusions, new since 9/10/2020. Paraseptal mild  emphysematous changes in the lungs. Few punctate calcified pulmonary  granulomas. 4 mm noncalcified nodule along the right minor fissure in  the anterior right middle lobe (series 8, image 133).    MEDIASTINUM/AXILLAE: Fluid-filled esophagus. No lymphadenopathy in the  chest or axillae.    HEPATOBILIARY: No suspicious lesions in the liver. Sludge within the  gallbladder lumen. No radiopaque gallstones.    PANCREAS: No significant mass, duct dilatation, or inflammatory  change.    SPLEEN: Normal.    ADRENAL GLANDS: Normal.    KIDNEYS/BLADDER: Excreted contrast in the renal collecting systems.  Normal kidneys. Decompressed urinary bladder with a Ferguson catheter in  place.    BOWEL: Interval right hemicolectomy with right lower quadrant  ileostomy and transverse mucous fistula. Diffuse mildly dilated loops  of small bowel. Marked diffuse circumferential small bowel and colonic  wall thickening. Moderate amount of formed stool in the rectum. In the  left hemipelvis, there is a 4.7 x 2.2 x 5.0 cm collection containing a  large amount of gas and small amount of fluid and a surgical drain is  present within this collection (series 14, image [id number]).    LYMPH NODES: Multiple small gastrohepatic ligament and para-aortic  lymph nodes are stable, nonspecific and likely reactive.    PELVIC ORGANS: No pelvic  masses.    OTHER: Small-to-moderate ascites. Decreased small pneumoperitoneum  presumably related to laparotomy for recent bowel perforation.    MUSCULOSKELETAL: Unremarkable.      Impression    IMPRESSION:  1.  No pulmonary emboli.   2.  New small bilateral pleural effusions and bibasilar atelectasis.  Small ground glass focus in the left upper lobe could represent  infection or aspiration.  3.  Diffuse mild small bowel dilatation presumably postoperative  adynamic ileus. Marked circumferential small bowel wall thickening and  colonic wall thickening is presumably postoperative edema.  4.  Moderate sized collection containing predominantly gas and small  amount of fluid in the left low pelvis with a surgical drain within  it.  5.  Small to moderate ascites. Small residual pneumoperitoneum,  presumably related to adjacent surgery.  6.  Fluid within the esophagus and mild distention of the stomach with  gas and fluid.    MARC CHRISTENSEN MD   Potassium   Result Value Ref Range    Potassium 4.1 3.4 - 5.3 mmol/L   Lactic acid whole blood   Result Value Ref Range    Lactic Acid 4.1 (HH) 0.7 - 2.0 mmol/L   INR   Result Value Ref Range    INR 1.47 (H) 0.86 - 1.14   ABO/Rh type and screen   Result Value Ref Range    ABO A     RH(D) Pos     Antibody Screen Neg     Test Valid Only At [facility]        Specimen Expires 09/16/2020    Glucose by meter   Result Value Ref Range    Glucose 89 70 - 99 mg/dL   CBC with platelets   Result Value Ref Range    WBC 27.3 (H) 4.0 - 11.0 10e9/L    RBC Count 3.04 (L) 3.8 - 5.2 10e12/L    Hemoglobin 10.1 (L) 11.7 - 15.7 g/dL    Hematocrit 28.8 (L) 35.0 - 47.0 %    MCV 95 78 - 100 fl    MCH 33.2 (H) 26.5 - 33.0 pg    MCHC 35.1 31.5 - 36.5 g/dL    RDW 13.2 10.0 - 15.0 %    Platelet Count 253 150 - 450 10e9/L   Comprehensive metabolic panel   Result Value Ref Range    Sodium 140 133 - 144 mmol/L    Potassium 3.5 3.4 - 5.3 mmol/L    Chloride 111 (H) 94 - 109 mmol/L    Carbon Dioxide  21 20 - 32 mmol/L    Anion Gap 8 3 - 14 mmol/L    Glucose 94 70 - 99 mg/dL    Urea Nitrogen 6 (L) 7 - 30 mg/dL    Creatinine 0.35 (L) 0.52 - 1.04 mg/dL    GFR Estimate >90 >60 mL/min/[1.73_m2]    GFR Estimate If Black >90 >60 mL/min/[1.73_m2]    Calcium 6.2 (L) 8.5 - 10.1 mg/dL    Bilirubin Total 1.8 (H) 0.2 - 1.3 mg/dL    Albumin 1.6 (L) 3.4 - 5.0 g/dL    Protein Total 3.4 (L) 6.8 - 8.8 g/dL    Alkaline Phosphatase 64 40 - 150 U/L    ALT 14 0 - 50 U/L    AST 37 0 - 45 U/L   Lactic acid whole blood   Result Value Ref Range    Lactic Acid 5.0 (HH) 0.7 - 2.0 mmol/L   Blood gas arterial with oxyhemoglobin   Result Value Ref Range    pH Arterial 7.39 7.35 - 7.45 pH    pCO2 Arterial 31 (L) 35 - 45 mm Hg    pO2 Arterial 183 (H) 80 - 105 mm Hg    Bicarbonate Arterial 19 (L) 21 - 28 mmol/L    FIO2 60     Oxyhemoglobin Arterial 99 92 - 100 %    Base Deficit Art 4.6 mmol/L   INR   Result Value Ref Range    INR 1.92 (H) 0.86 - 1.14   Partial thromboplastin time   Result Value Ref Range    PTT 43 (H) 22 - 37 sec   Glucose by meter   Result Value Ref Range    Glucose 90 70 - 99 mg/dL     Romeo Gonzalez MD  [facility] Intensivist Service

## 2020-09-14 NOTE — PROGRESS NOTES
"[facility]  WO Nurse Inpatient Ostomy Assessment     Assessment of New Ileostomy  Surgery date: 9-10-20 and 9-13-20  Surgeon:  Dr Morrison /    Procedure:     9-10-20:  Exploratory laparotomy, right colectomy with end ileostomy and transverse mucous fistula, rigid proctoscopy,                  drainage of pelvis (Suleman)   9-13-20: .  Reexploration of recent laparotomy: Abdominal washout; Anterior resection; Rigid proctoscopy with rectal stump leak                   Testing; Removal of abdominal drain and replacement of new pelvic drain (Dmitry)     Related diagnosis:  Cecal perforation, sigmoid phlegmon and contained pelvic perforation.     WO Assessment & Physical Exam:        Current status: intubated and sedated          Stoma size:  1 1/8\"    Stoma appearance:  Rounded, red, moist, protrudes, lumen @ apex    Mucocutaneous junction:  Not assessed, barrier intact     Peristomal complication(s): not assessed, barrier intact    Abdominal assessment:  Rounded, distended and firm    Surgical site(s): surgical dressing intact, no drainage    NG still in place? Yes    Output: scant small green output in appliance    Pain: unable to determine        Current pouching system: No leakage noted under post-op brian Premier flat pouch    Pouch last changed:  913-20 in OR    Reason for pouch change today: appliance not changed today.  Remains intact         Learning and comprehension:  Discussed that pt has ileostomy and rationale    Psychosocial: Said she know about colostomies,\" My Mom was a nurse      WO Plan:         Pouching product plan:  Will resume Brian NI convex 57mm with high output pouch in am      Pt support system on discharge:     WO recommend home care?  TBD      [facility] follow-up plan: Tuesday 9-15      Objective Data:       Current Diet/Nutrition:   Orders Placed This Encounter      NPO for Medical/Clinical Reasons Except for: No Exceptions       Output:  I/O " last 3 completed shifts:  In: 9666.02 [I.V.:8566.02; NG/GT:100]  Out: 2485 [Urine:835; Emesis/NG output:50; Drains:1140; Stool:310; Blood:150]      Labs:   Recent Labs   Lab 09/14/20  0400   ALBUMIN 2.1*   HGB 8.7*   INR 1.77*   WBC 32.2*   A1C 5.1         Lacho Risk Assessment:   Sensory Perception: 1-->completely limited  Moisture: 3-->occasionally moist  Activity: 1-->bedfast  Mobility: 1-->completely immobile  Nutrition: 1-->very poor  Friction and Shear: 2-->potential problem  Lacho Score: 9      WOC Interventions:       Patient's chart evaluated    Focus of today's visit: stoma assessment, prosthetic refitting    Pouch Brian NI 57mm cut to fit convex with tape and high output pouch, resume in am    Participant(s) in teaching session today: Pt vented and sedated    Change made with ostomy management today:  Will resume convexity and high output pouch    Supplies: In pt room     Educational materials: will give to  and pt next week    Preparation for discharge: No discharge preparations started    Registered for supply samples? TBD    Discussed plan of care with: Nursing and patient     Ghazal Farrell, RN, CWOCN

## 2020-09-14 NOTE — PLAN OF CARE
Since last entry, uop improved. BP stabilized for much of noc until 0600. Again, labile et sensitive to pressor titration. GAB/boo drain cont to put out lg amts. Electrolyte replacement per protocol. LA normal this am. NG became plugged, irrigated with improvement. Minimal output. Colostomy wnl et functioning. Cont current care.

## 2020-09-14 NOTE — CONSULTS
"CLINICAL NUTRITION SERVICES  -  ASSESSMENT NOTE      Future/Additional Recommendations: If unable to extubate and take oral intake in the next day or so recommend nutrition support (TPN vs TF)   Malnutrition: % Weight Loss:  History not available   % Intake:  </= 50% for >/= 5 days (severe malnutrition)  Subcutaneous Fat Loss:  Orbital region severe depletion  Muscle Loss:  Temporal region severe depletion and Clavicle bone region severe depletion  Fluid Retention:  Moderate as above     Malnutrition Diagnosis: Severe malnutrition  In Context of:  Acute illness or injury  Environmental or social circumstances        REASON FOR ASSESSMENT  Evonne Martel is a 54 year old female seen by Registered Dietitian for Admission Nutrition Risk Screen for unintentional loss of 10# or more in the past two months and reduced oral intake over the last month (received 9/12/20)      NUTRITION HISTORY  - Unable to obtain nutrition history as patient intubated and sedated       CURRENT NUTRITION ORDERS  Diet Order:     NPO day #5 inadequate intake (was on clear liquid diet 9/12 but otherwise has been NPO)    Current Intake/Tolerance:  N/A      NUTRITION FOCUSED PHYSICAL ASSESSMENT FOR DIAGNOSING MALNUTRITION)  Yes             Observed:    Muscle wasting (refer to documentation in Malnutrition section) and Subcutaneous fat loss (refer to documentation in Malnutrition section)    Obtained from Chart/Interdisciplinary Team:  Edema Mild-moderate (generalized, hip, perineum, labia)    ANTHROPOMETRICS  Height: 5' 5\"  Weight: 45.9 kg (9/13)  Body mass index is 16.8 kg/m   Weight Status:  Underweight BMI <18.5  IBW: 56.8 kg   % IBW: 81%  Weight History:   Wt Readings from Last 10 Encounters:   09/14/20 53.1 kg (117 lb 1 oz)     Weight history not available     LABS  K 3.0 (L), Mg 2.9 (H), Phos 2.1 (L)    MEDICATIONS  Insulin drip  IV Albumin  Thiamine  D5 at 125 mL/hr= 150 g CHO, 510 kcal   Propofol at 11 mL/hr= 290 kcal  Pressor x " 3      ASSESSED NUTRITION NEEDS PER APPROVED PRACTICE GUIDELINES:    Dosing Weight 45.9 kg   Estimated Energy Needs: 8935-3580 kcals (30-35 Kcal/Kg)  Justification: repletion, underweight and vented  Estimated Protein Needs: 60-85 grams protein (1.3-1.8 g pro/Kg)  Justification: repletion, post-op and hypercatabolism with critical illness  Estimated Fluid Needs: 4461-1707 mL (1 mL/Kcal)  Justification: maintenance    MALNUTRITION:  % Weight Loss:  History not available   % Intake:  </= 50% for >/= 5 days (severe malnutrition)  Subcutaneous Fat Loss:  Orbital region severe depletion  Muscle Loss:  Temporal region severe depletion and Clavicle bone region severe depletion  Fluid Retention:  Moderate as above     Malnutrition Diagnosis: Severe malnutrition  In Context of:  Acute illness or injury  Environmental or social circumstances    NUTRITION DIAGNOSIS:  Inadequate protein-energy intake related to NPO on vent as evidenced by meeting 0% protein and 50% energy needs from D5 IVF and Propofol       NUTRITION INTERVENTIONS  Recommendations / Nutrition Prescription  If unable to extubate and take oral intake in the next day or so recommend nutrition support (TPN vs TF)    Implementation  Nutrition education: Not appropriate at this time due to patient condition  Collaboration and Referral of Nutrition care:  Patient discussed today during interdisciplinary bedside rounds     Nutrition Goals  Patient will receive nutrition within the next 24-48 hours     MONITORING AND EVALUATION:  Progress towards goals will be monitored and evaluated per protocol and Practice Guidelines    Neli rTejo RD, LD, CNSC   Clinical Dietitian - [facility]

## 2020-09-14 NOTE — PROGRESS NOTES
Intensive Care Daily Note          Assessment and Plan:     Summary Statement:  Evonne Martel is a 54 year old female admitted on 9/10/2020 for septic shock due to cecal perforation and sigmoid colon perforation, likely secondary to perforated diverticulitis. She underwent exploratory laparotomy, right colectomy with end ileostomy, and transverse mucous fistula. She initially did well postoperatively, but developed tachycardia, tachypnea and hypotension. She was taken back to the OR on 9/13 and was found to have gross stool spillage in abdomen with at least 3 sigmoid perforations. She underwent washout and anterior resection performed with drain placement. The patient had very friable tissues in fascia closure, so the patient was placed on paralytics overnight. My assessment and plan for this patient is as follows:    Neurology: Propofol and fentanyl for sedation   Cardiovascular/Hemodynamics: Septic shock -- on pressors, wean as tolerated   Pulmonary: Mechanically ventilated   GI and Nutrition: Perforated colon s/p ex lap, colectomy, and abdominal washout (9/10, re-exploration 9/13)  - Management per colorectal surgery  - NPO  - NG to LIS  - Monitor GAB output   Renal: Low BUN, low total protein, low albumin  - received 25% albumin on 9/13  - continue to monitor    Hypokalemia, hypophosphatemia  - replacement protocol   Infectious Disease: Septic shock secondary to perforated colon with feculent peritonitis  - Zosyn 4.5g q6h   Hematology and Oncology: Acute normocytic anemia  - Hgb 8.7 today from 10.1  - Continue to monitor Hgb   Endocrinology: Insulin per unit protocol       Intensive Care: Central line: Central line present, needed and to be continued  Arterial line: Arterial line present, needed and to be continued  Ferguson catheter:  In place  NG tube: LIS  Drains: abdominal GAB drain  Restraint:Needed   Others: n/a   Top goals for today Paralytics stopped today  Wean off pressors as tolerated                     Key events/ Interval history - last 24 hours:    No acute events overnight. Continues on 3 pressors. Paralytic stopped today and will wean off pressors as tolerated.           Problem list:     Severe sepsis (H)    Pneumoperitoneum    Colon perforation (H)    Free intraperitoneal air    Perforation of colon (H)    * No resolved hospital problems. *             Medications:   I have reviewed this patient's current medications              Physical Exam:   Vital Sign Ranges  Temperature Temp  Av.5  F (36.4  C)  Min: 93.9  F (34.4  C)  Max: 100.4  F (38  C)   Blood pressure Systolic (24hrs), Av , Min:76 , Max:157        Diastolic (24hrs), Av, Min:52, Max:91      Pulse Pulse  Av.4  Min: 66  Max: 148   Respirations Resp  Av.2  Min: 10  Max: 31   Pulse oximetry SpO2  Av.9 %  Min: 86 %  Max: 100 %         Intake/Output Summary (Last 24 hours) at 2020 1023  Last data filed at 2020 1000  Gross per 24 hour   Intake 9868.41 ml   Output 2915 ml   Net 6953.41 ml         General Appearance:   Sedated on ventilator   HEENT:   Trachea is midline  Endotrachael tube is present   Chest and Lungs:   Symmetrical and normal breath sounds, no wheeze, ronchi, crackles, rub or bronchial breath sounds   Cardiovascular:   Regular rate and rhythm   Abdomen:   Non-distended, midline dressings, colostomy with dark green output   :   Ferguson in place   Musculoskeletal:   Not assessed   Extremities and Skin:   Warm, well perfused   Neuro:   Sedation: sedated   Drains and Tubes:   NG in place, GAB with serosanguinous output   Intravascular Access and Device:   Lines present: triple lumen catheter (right internal jugular) and arterial line (radial)            Data:   Labs:  All lab results reviewed past 24 hours  All imaging results reviewed past 24 hours    Imaging:      This is a cosigned note with medical student.  Please see my separate note for my independent visit

## 2020-09-14 NOTE — PROGRESS NOTES
Critical Care Progress Note      09/14/2020    Name: Evonne Martel MRN#: [id number]   Age: 54 year old YOB: 1965     Hsptl Day# 4  ICU DAY # 4    MV DAY # 4             Problem List:   Active Problems:    Severe sepsis (H)    Pneumoperitoneum    Colon perforation (H)    Free intraperitoneal air    Perforation of colon (H)           Summary/Hospital Course:   54-year-old female with history of alcohol use admitted to the ICU for septic shock after multiple bowel perforations requiring exploratory laparotomy right colectomy with end ileostomy and mucous fistula.  She has been to the operating room twice for washouts and for perforations.  The patient's last trip to the OR was last p.m.  She was admitted to the ICU in septic shock requiring 3 pressors.  Given difficulty abdominal closure she was also chemically paralyzed upon admission.  This morning when I saw her she remains chemically paralyzed on 3 pressors maintaining her blood pressure.        Assessment and plan :     Evonne Martel IS a 54 year old female admitted on 9/10/2020 for acute respiratory failure, acute blood loss anemia, septic shock and hypokalemia.   I have personally reviewed the daily labs, imaging studies, cultures and discussed the case with referring physician and consulting physicians.     My assessment and plan by system for this patient is as follows:    Neurology/Psychiatry:   1.  Chemically paralyzed for abdominal closure  2. Analgesia fentanyl for pain  3. Anxiety propofol for ICU sedation.  Plan  I discussed the plan with colorectal surgery and we decided to wean the patient from chemical paralysis however we will increase the patient's fentanyl in order to prevent any blocking or increased intra-abdominal pressures while weaning.  We will continue use propofol for sedation and add Precedex if needed in order to maintain sedation score of -3.    Cardiovascular:   1.Hemodynamics -hypotensive requiring multiple pressors  secondary to septic shock  2.Rhythm -normal sinus rhythm  3. Ischemia -no signs of ischemia  Plan  Overall the patient is somewhat fluid responsive we will increase the patient's maintenance fluid with a goal of weaning her pressors.  Her lactic acid is trending downward so I think we are appropriately resuscitating the patient.  Continue gentle fluid resuscitation wean pressors as tolerated.    Pulmonary/Ventilator Management:   1. Airway intubated  2. Oxygenation/ventilation/mechanics on full ventilatory support  Plan  -We will continue the patient on current ventilatory settings.  She has adequate oxygenation and ventilation.  In fact she is a bit over ventilated with respiratory alkalosis.  I decreased the patient's respiratory rate in order to correct the pH this a.m.  Our goal is to maintain PaO2 greater than 60 and we wean her FiO2 as tolerated    GI and Nutrition :   1.  N.p.o.  2.  Concern for protein calorie malnutrition based on loss of muscle mass    Plan  -If we are unable to start trickle feeds in the next few days would consider starting TPN.    Renal/Fluids/Electrolytes:   1.  Creatinine within normal limits  2.  Hypokalemia  3.  Respiratory alkalosis  4. Volume status appears hypovolemic  Plan  -We will decrease the patient's respiratory rate in hopes to correct the respiratory alkalosis.  - monitor function and electrolytes as needed with replacement per ICU protocols.  - generally avoid nephrotoxic agents such as NSAID, IV contrast unless specifically required  - adjust medications as needed for renal clearance  - follow I/O's as appropriate.    Infectious Disease:   1.  Concern for intra-abdominal sepsis  2.  3.  Plan  -Continue the patient on broad-spectrum antibiotics at this time    Endocrine:   1.  Concern for stress-induced hyperglycemia    Plan  - ICU insulin protocol, goal sugar <180      Hematology/Oncology:   1.  Leukocytosis most likely secondary to acute phase reaction and  intra-abdominal sepsis  2.  Acute blood loss anemia anemia, no signs, symptoms of active blood loss    Plan  -We will continue to monitor            IV/Access:   1. Venous access -triple-lumen catheter needed  2. Arterial access -arterial line needed  3.  Plan  - central access required and necessary      ICU Prophylaxis:   1. DVT: Mechanical  2. VAP: HOB 30 degrees, chlorhexidine rinse  3. Stress Ulcer: PPI  4. Restraints: Nonviolent soft two point restraints required and necessary for patient safety and continued cares and good effect as patient continues to pull at necessary lines, tubes despite education and distraction. Will readdress daily.   5. Wound care - per unit routine   6. Feeding -n.p.o.  7. Family Update: Discussed with the patient's  need to keep the patient on full ventilatory support, discussed need to decrease chemical paralysis at this time and increase fentanyl as needed  8. Disposition -ICU        Key goals for next 24 hours:   1.  Wean the patient's paralytic as tolerated increase patient's fentanyl to keep her at a deeper level of sedation.  Add Precedex if needed  2.  Continue to wean pressors as tolerated will increase maintenance fluid  3.  N.p.o.               Interim History:              Key Medications:       sodium chloride 0.9%  1,000 mL Intravenous Once     albumin human  50 g Intravenous Once     chlorhexidine  15 mL Mouth/Throat Q12H     [Held by provider] enoxaparin ANTICOAGULANT  40 mg Subcutaneous Q24H     pantoprazole (PROTONIX) IV  40 mg Intravenous Daily     piperacillin-tazobactam  4.5 g Intravenous Q6H     sodium chloride (PF)  3 mL Intracatheter Q8H     thiamine  100 mg Intravenous Daily       dexmedetomidine       dextrose       dextrose 5% and 0.9% NaCl 100 mL/hr at 09/14/20 0626     fentaNYL 75 mcg/hr (09/14/20 0929)     insulin (regular) 2 Units/hr (09/14/20 0825)     norepinephrine 0.13 mcg/kg/min (09/14/20 0032)     phenylephrine 0.5 mcg/kg/min (09/14/20  0620)     propofol (DIPRIVAN) infusion 40 mcg/kg/min (09/14/20 0627)     sodium chloride 20 mL/hr at 09/13/20 1954     vasopressin 2.4 Units/hr (09/14/20 0522)               Physical Examination:   Temp:  [93.9  F (34.4  C)-100.4  F (38  C)] 96.8  F (36  C)  Pulse:  [] 82  Resp:  [10-31] 12  BP: ()/(52-91) 114/75  MAP:  [80 mmHg-111 mmHg] 84 mmHg  Arterial Line BP: (104-153)/(62-88) 111/65  FiO2 (%):  [30 %-60 %] 30 %  SpO2:  [86 %-100 %] 99 %    Intake/Output Summary (Last 24 hours) at 9/14/2020 1133  Last data filed at 9/14/2020 1000  Gross per 24 hour   Intake 9868.41 ml   Output 2565 ml   Net 7303.41 ml     Wt Readings from Last 4 Encounters:   09/14/20 53.1 kg (117 lb 1 oz)     Arterial Line BP: (104-153)/(62-88) 111/65  MAP:  [80 mmHg-111 mmHg] 84 mmHg  BP - Mean:  [] 85  CVP:  [2 mmHg-10 mmHg] 10 mmHg  Ventilation Mode: CMV/AC  (Continuous Mandatory Ventilation/ Assist Control)  FiO2 (%): 30 %  Rate Set (breaths/minute): 12 breaths/min  Tidal Volume Set (mL): 400 mL  PEEP (cm H2O): 5 cmH2O  Oxygen Concentration (%): 30 %  Resp: 12    Recent Labs   Lab 09/14/20  0400 09/13/20  1815 09/13/20  1609 09/13/20  1440 09/11/20  1000 09/11/20  0015   PH 7.47* 7.39 7.34* 7.29* 7.39 7.42   PCO2 31* 31* 36 34* 26* 29*   PO2 122* 183* 185* 219* 99 365*   HCO3 22 19* 20* 16* 16* 19*   O2PER  --  60  --   --  30% 100%       GEN: no acute distress   HEENT: head ncat, sclera anicteric, OP patent, trachea midline   PULM: unlabored synchronous with vent, clear anteriorly    CV/COR: RRR S1S2 no gallop,  No rub, no murmur  ABD: soft nontender, hypoactive bowel sounds, no mass  EXT:  Edema   warm  NEURO: Consistent with chemical sedation and paralysis  SKIN: no obvious rash  LINES: clean, dry intact         Data:   All data and imaging reviewed     ROUTINE ICU LABS (Last four results)  CMP  Recent Labs   Lab 09/14/20  0400 09/13/20  2225 09/13/20  1815 09/13/20  1609  09/13/20  0356 09/12/20  1200 09/12/20  0340   09/11/20  1545  09/11/20  0527 09/11/20  0015    141 140 139   < > 137  --  133  --  127*   < > 122* 121*   POTASSIUM 3.0* 3.6 3.5 3.5   < > 3.3* 3.8 3.1*   < > 3.0*   < > 3.9 3.3*   CHLORIDE 111* 111* 111*  --   --  106  --  104  --  96   < > 93* 92*   CO2 23 23 21  --   --  25  --  25  --  24   < > 18* 19*   ANIONGAP 6 7 8  --   --  6  --  4  --  7   < > 11 10   * 125* 94  --   --  135*  --  126*   < > 117*   < > 93 109*   BUN 5* 6* 6*  --   --  6*  --  6*  --  9   < > 15 19   CR 0.35* 0.30* 0.35*  --   --  0.36*  --  0.41*  --  0.39*   < > 0.39* 0.46*   GFRESTIMATED >90 >90 >90  --   --  >90  --  >90  --  >90   < > >90 >90   GFRESTBLACK >90 >90 >90  --   --  >90  --  >90  --  >90   < > >90 >90   PARISH 6.3* 6.7* 6.2*  --   --  7.1*  --  6.9*  --  7.0*   < > 6.5* 6.4*   MAG 2.9* 1.4*  --   --   --   --   --  2.6*  --  3.6*   < >  --   --    PHOS 1.8*  --   --   --   --  2.1* 2.2* 1.4*  --  1.7*   < >  --   --    PROTTOTAL 4.1*  --  3.4*  --   --   --   --   --   --   --   --  5.2* 5.2*   ALBUMIN 2.1*  --  1.6*  --   --   --   --   --   --   --   --  1.8* 1.9*   BILITOTAL 1.7*  --  1.8*  --   --   --   --   --   --   --   --  1.2 0.9   ALKPHOS 49  --  64  --   --   --   --   --   --   --   --  52 60   AST 42  --  37  --   --   --   --   --   --   --   --  27 23   ALT 17  --  14  --   --   --   --   --   --   --   --  11 10    < > = values in this interval not displayed.     CBC  Recent Labs   Lab 09/14/20 0400 09/13/20 1815 09/13/20  1609 09/13/20  1440 09/13/20  0356 09/12/20  0340   WBC 32.2* 27.3*  --   --  12.7* 9.6   RBC 2.64* 3.04*  --   --  3.50* 2.68*   HGB 8.7* 10.1* 8.2* 8.8* 11.4* 8.8*   HCT 24.8* 28.8*  --   --  32.8* 24.7*   MCV 94 95  --   --  94 92   MCH 33.0 33.2*  --   --  32.6 32.8   MCHC 35.1 35.1  --   --  34.8 35.6   RDW 13.3 13.2  --   --  13.1 12.6    253  --   --  312 255     INR  Recent Labs   Lab 09/14/20 0400 09/13/20 1815 09/13/20  1156 09/12/20  0340   INR 1.77*  1.92* 1.47* 1.46*     Arterial Blood Gas  Recent Labs   Lab 09/14/20  0400 09/13/20  1815 09/13/20  1609 09/13/20  1440 09/11/20  1000 09/11/20  0015   PH 7.47* 7.39 7.34* 7.29* 7.39 7.42   PCO2 31* 31* 36 34* 26* 29*   PO2 122* 183* 185* 219* 99 365*   HCO3 22 19* 20* 16* 16* 19*   O2PER  --  60  --   --  30% 100%       All cultures:  Recent Labs   Lab 09/10/20  1745 09/10/20  1730   CULT No growth after 4 days No growth after 4 days     No results found for this or any previous visit (from the past 24 hour(s)).      Billing: This patient is critically ill: yes Total critical care time today 35 min.

## 2020-09-14 NOTE — PROVIDER NOTIFICATION
Dr Huerta notified of low up, severely labile BP. Order received for albumin 5%-25gm. Given increased stability of BP, uop remains low.

## 2020-09-14 NOTE — ANESTHESIA POSTPROCEDURE EVALUATION
Patient: Evonne Martel    Procedure(s):  LAPAROTOMY, ABDOMINAL WASHOUT  ANTERIOR RESECTION WITH RIGID PROCTOSCOPY    Diagnosis:Perforated sigmoid colon (H) [K63.1]  Diagnosis Additional Information: No value filed.    Anesthesia Type:  General    Note:  Anesthesia Post Evaluation    Patient location during evaluation: ICU  Patient participation: Unable to evaluate secondary to administered sedation  Pain management: adequate  Airway patency: patent  Cardiovascular status: hemodynamically stable  Respiratory status: ventilator and ETT  Hydration status: acceptable  PONV: none     Anesthetic complications: None          Last vitals:  Vitals:    09/14/20 0145 09/14/20 0200 09/14/20 0215   BP:      Pulse: 83 87 83   Resp: 16 16 15   Temp: 37.7  C (99.9  F) 37.6  C (99.7  F) 37.4  C (99.3  F)   SpO2: 99% 99% 99%         Electronically Signed By: Amparo Todd MD, MD  September 14, 2020  2:54 AM

## 2020-09-14 NOTE — PROGRESS NOTES
11a-3p    Neuro: Unresponsive. Sedated and paralyzed.   Pulm: On full vent support. LS clear.  CV: SR. Unable to wean trinity.   : Ferguson patent w/ marginal UOP  GI: Abdomen soft. Very faint BS. Ostomy intact on RLQ, producing green bowel. No output from rectal tube. Pelvic drain patent.   Extremities: No movements  Skin: Generalized edema x2. Mid abdomen incision covered with dressing. Generalized bruises. Dusky skin. Toes are melva.    Lines: R internal jugular central line. PIVx1 on LUE. Heike on R AC.   Family:  at bedside updated throughout the shift  Plan: Paralytic stopped at 11am. Dex added. Keep her sedated today and start weaning tomorrow.

## 2020-09-14 NOTE — PROGRESS NOTES
[facility] ICU RESPIRATORY NOTE           Date of Admission: 9/10/2020     Date of Intubation (most recent): 09/10/10     Reason for Mechanical Ventilation: AW Protection     Number of Days on Mechanical Ventilation: 5     Met Criteria for Spontaneous Breathing Trial: No     Reason for No Spontaneous Breathing Trial: perMD     Significant Events Today: None overnight     ABG Results:   Recent Labs   Lab 09/13/20  1815 09/11/20  1000 09/11/20  0015 09/10/20  2159   PH 7.39 7.39 7.42 7.40   PCO2 31* 26* 29* 35   PO2 183* 99 365* 209*   HCO3 19* 16* 19* 22   O2PER 60 30% 100%  --      Ventilation Mode: CMV/AC  (Continuous Mandatory Ventilation/ Assist Control)  FiO2 (%): 30 %  Rate Set (breaths/minute): 16 breaths/min  Tidal Volume Set (mL): 400 mL  PEEP (cm H2O): 5 cmH2O  Oxygen Concentration (%): 30 %  Resp: 15    Will continue to follow.     Eliot Rosales, RT

## 2020-09-14 NOTE — PLAN OF CARE
Received pt fm OR @1810. Per CRNA had been given paralytic before transfer. Vented, movement absent, see flowsheets for pressors. Per orders, initiated continuous paralytic, fentanyl infusion. Given albumin for severely labile BP with 3 pressors with improvement. Cont to be oliguric. Cr unchanged per recent labs. Noted freq pvc/pac's. Mg 1.6, to give 4gm per replacement orders. Abdominal boo drain with large drainage backing up causing moist surgical dressing. Is now slowing. , Ollie, to bedside. Plan of care, meds, et equipment explained. Verbalized understand, appropriate questions asked. Given unit phone number to call for status updates. Cont current plan of care.

## 2020-09-14 NOTE — PROGRESS NOTES
COLON & RECTAL SURGERY  PROGRESS NOTE    September 14, 2020    POD4 from exploratory laparotomy and right hemicolectomy  POD1 from exploratory laparotomy and anterior resection    SUBJECTIVE:    Intubated and on vasoactive medication  No additional nursing concerns overnight  Liquid green output from stoma  Serous drainage from GAB.    OBJECTIVE:  Temp:  [93.9  F (34.4  C)-100.4  F (38  C)] 99.3  F (37.4  C)  Pulse:  [] 78  Resp:  [12-31] 15  BP: ()/(52-91) 114/75  FiO2 (%):  [30 %-60 %] 30 %  SpO2:  [86 %-100 %] 99 %    Intake/Output Summary (Last 24 hours) at 9/14/2020 0613  Last data filed at 9/14/2020 0200  Gross per 24 hour   Intake 8254.63 ml   Output 2840 ml   Net 5414.63 ml       GENERAL:  Awake, alert, no acute distress,  EXTREMITIES: Warm and well perfused, moderate peripheral edema   ABDOMEN:  Soft, tender diffusely, moderately distended. No guarding, rigidity, or peritoneal signs.  INCISION:  Dressing wet. Packing BID    LABS:  Lab Results   Component Value Date    WBC 32.2 09/14/2020     Lab Results   Component Value Date    HGB 8.7 09/14/2020     Lab Results   Component Value Date    HCT 24.8 09/14/2020     Lab Results   Component Value Date     09/14/2020     Last Basic Metabolic Panel:  Lab Results   Component Value Date     09/14/2020      Lab Results   Component Value Date    POTASSIUM 3.0 09/14/2020     Lab Results   Component Value Date    CHLORIDE 111 09/14/2020     Lab Results   Component Value Date    PARISH 6.3 09/14/2020     Lab Results   Component Value Date    CO2 23 09/14/2020     Lab Results   Component Value Date    BUN 5 09/14/2020     Lab Results   Component Value Date    CR 0.35 09/14/2020     Lab Results   Component Value Date     09/14/2020       ASSESSMENT/PLAN: Evonne Martel is a 54 year old female POD4 from a ex lap and right hemicolectomy, and POD1 from a ex lap and anterior resection. In the ICU, critically ill, intubated and on vasoactive  medication.    1) Please continue to keep heavily sedated, as fascial closure was tenuous  2) BID packing changes to midline wound  3) Monitor drain output  4) NG to LIS  5) Monitor GAB output, if remains high volume will investigate with GAB creatinine.    Will D/W Dr. Andres.    Sukhjinder Chavez MD  CRS fellow  9/14/2020   6:13 AM

## 2020-09-15 NOTE — PROGRESS NOTES
At approximately 0535: Pt suddenly dropped BP mid 60's/40's w/ MAP in mid to high 50's. Levo gtt immediately titrated up, trinity-synephrine re-started. A-line going bad considered, however, BP via cuff confirmed pressures.     Approx 0546--Pt BP + MAP improved, stabilized; but HR dropped to low 50's for about 2 minutes before returning back to baseline. Levo gtt titrated back down; trinity-synephrine stopped.

## 2020-09-15 NOTE — PLAN OF CARE
Continue sedation of prop, dex, and fentanyl. Will attempt to wean pressors as able.      Attempted to half vasopressin, had to increase rate back to 2.4cc/hr after 10 min.    1530 Dr. Patel updated, Continues borderline UO, will give another dose of albumin.

## 2020-09-15 NOTE — PROGRESS NOTES
[facility] ICU RESPIRATORY NOTE        Date of Admission: 9/10/2020    Date of Intubation (most recent): 9/10/20    Reason for Mechanical Ventilation: Airway protection     Number of Days on Mechanical Ventilation: 6    Met Criteria for Spontaneous Breathing Trial: No   Reason for No Spontaneous Breathing Trial: Per MD     Significant Events Today: Per MD, no PS will be doing today.     ABG Results:   Recent Labs   Lab 09/14/20  0400 09/13/20  1815 09/13/20  1609 09/13/20  1440 09/11/20  1000 09/11/20  0015   PH 7.47* 7.39 7.34* 7.29* 7.39 7.42   PCO2 31* 31* 36 34* 26* 29*   PO2 122* 183* 185* 219* 99 365*   HCO3 22 19* 20* 16* 16* 19*   O2PER  --  60  --   --  30% 100%       Current Vent Settings: Ventilation Mode: CMV/AC  (Continuous Mandatory Ventilation/ Assist Control)  FiO2 (%): 30 %  Rate Set (breaths/minute): 12 breaths/min  Tidal Volume Set (mL): 400 mL  PEEP (cm H2O): 5 cmH2O  Oxygen Concentration (%): 30 %  Resp: 12      Plan: Continue on full ventilator support.     Xavier Hansen, RT

## 2020-09-15 NOTE — PROGRESS NOTES
Critical Care Progress Note      09/15/2020    Name: Evonne Martel MRN#: [id number]   Age: 54 year old YOB: 1965     Hsptl Day# 5  ICU DAY # 5    MV DAY # 5             Problem List:   Active Problems:    Severe sepsis (H)    Pneumoperitoneum    Colon perforation (H)    Free intraperitoneal air    Perforation of colon (H)           Summary/Hospital Course:   54-year-old female with history of alcohol use admitted to the ICU for septic shock after multiple bowel perforations requiring exploratory laparotomy right colectomy with end ileostomy and mucous fistula.  She has been to the operating room twice for washouts and for perforations.  The patient remains in the ICU in critical condition.  Ventilator setting stable.  However she still requires vasopressin and norepinephrine.  She has been responding to internal fluid boluses.  Has been weaned off bedside and I have multiple discussions with her.      Assessment and plan :     Evonne Martel IS a 54 year old female admitted on 9/10/2020 for acute respiratory failure, acute blood loss anemia, septic shock and hypokalemia.   I have personally reviewed the daily labs, imaging studies, cultures and discussed the case with referring physician and consulting physicians.     My assessment and plan by system for this patient is as follows:    Neurology/Psychiatry:   1.  Follow-up work-up chemically paralysis  2. Analgesia fentanyl for pain  3. Anxiety propofol for ICU sedation.  Plan  We stopped paralysis of the patient and we will control the patient's sedation with a combination of fentanyl propofol and Precedex.  She seems to be tolerating this combination well with no evidence of increased intra-abdominal pressure secondary to plugging or coughing.      Cardiovascular:   1.Hemodynamics -hypotensive requiring multiple pressors secondary to septic shock  2.Rhythm -normal sinus rhythm  3. Ischemia -no signs of ischemia  Plan  Overall the patient remains  hypotensive secondary to septic shock.  There also may be a component of hypovolemia at this time.  The patient was responsive to fluid boluses and we were able to wean the phenylephrine off overnight.  Given the severity of patient's septic shock the patient still requires vasopressin and norepinephrine.  We will continue to try to titrate off these pressors as we continue fluid resuscitation.    Pulmonary/Ventilator Management:   1. Airway intubated  2. Oxygenation/ventilation/mechanics on full ventilatory support  Plan  -At this time no signs of acute lung injury.  Airway pressures remain within normal limits.  She is currently on an FiO2 of 30% with a PEEP of 5.  We will continue to monitor.    GI and Nutrition :   1.  N.p.o.  2.  Concern for protein calorie malnutrition    Plan  -If we are unable to start trickle feeds in the next few days would consider starting TPN.    Renal/Fluids/Electrolytes:   1.  Creatinine within normal limits  2.  Hypocalcemia  3.  Respiratory alkalosis resolved  4. Volume status appears hypovolemic  Plan  -Resolved respiratory alkalosis- monitor function and electrolytes as needed with replacement per ICU protocols.  - generally avoid nephrotoxic agents such as NSAID, IV contrast unless specifically required  - adjust medications as needed for renal clearance  - follow I/O's as appropriate.    Infectious Disease:   1.  Concern for intra-abdominal sepsis  2.  3.  Plan  -Continue the patient on broad-spectrum antibiotics at this time    Endocrine:   1.  Concern for stress-induced hyperglycemia    Plan  - ICU insulin protocol, goal sugar <180      Hematology/Oncology:   1.  Leukocytosis most likely secondary to acute phase reaction and intra-abdominal sepsis  2.  Acute blood loss anemia anemia, no signs, symptoms of active blood loss    Plan  -We will continue to monitor            IV/Access:   1. Venous access -triple-lumen catheter needed  2. Arterial access -arterial line  needed  3.  Plan  - central access required and necessary      ICU Prophylaxis:   1. DVT: Mechanical  2. VAP: HOB 30 degrees, chlorhexidine rinse  3. Stress Ulcer: PPI  4. Restraints: Nonviolent soft two point restraints required and necessary for patient safety and continued cares and good effect as patient continues to pull at necessary lines, tubes despite education and distraction. Will readdress daily.   5. Wound care - per unit routine   6. Feeding -n.p.o.  7. Family Update: Discussed with patient's  is at bedside the need to keep the patient on full ventilatory support.  We also discussed that the patient was weaned off of paralytics.  She remains sedated.  We talked about her increased pain requirements giving her inflammatory state.    8. Disposition -ICU        Key goals for next 24 hours:   1.  Wean pressors as tolerated   2.  Continue present sedation regimen 3.  N.p.o.               Interim History:              Key Medications:       sodium chloride 0.9%  1,000 mL Intravenous Once     albumin human  50 g Intravenous Once     chlorhexidine  15 mL Mouth/Throat Q12H     [Held by provider] enoxaparin ANTICOAGULANT  40 mg Subcutaneous Q24H     pantoprazole (PROTONIX) IV  40 mg Intravenous Daily     piperacillin-tazobactam  4.5 g Intravenous Q6H     sodium chloride (PF)  3 mL Intracatheter Q8H     thiamine  100 mg Intravenous Daily       dexmedetomidine 0.2 mcg/kg/hr (09/15/20 0853)     dextrose       dextrose 5% and 0.9% NaCl 125 mL/hr at 09/15/20 0856     fentaNYL 75 mcg/hr (09/15/20 0856)     insulin (regular) 3 Units/hr (09/15/20 1123)     norepinephrine 0.09 mcg/kg/min (09/15/20 0953)     phenylephrine Stopped (09/15/20 0546)     propofol (DIPRIVAN) infusion 30 mcg/kg/min (09/15/20 1120)     sodium chloride 20 mL/hr at 09/14/20 1538     vasopressin 2.4 Units/hr (09/15/20 1059)               Physical Examination:   Temp:  [96.3  F (35.7  C)-98.2  F (36.8  C)] 96.4  F (35.8  C)  Pulse:  [54-86]  60  Resp:  [11-14] 12  BP: ()/(53-83) 119/83  MAP:  [49 mmHg-107 mmHg] 95 mmHg  Arterial Line BP: ()/(37-89) 106/74  FiO2 (%):  [30 %] 30 %  SpO2:  [99 %-100 %] 100 %    Intake/Output Summary (Last 24 hours) at 9/14/2020 1133  Last data filed at 9/14/2020 1000  Gross per 24 hour   Intake 9868.41 ml   Output 2565 ml   Net 7303.41 ml     Wt Readings from Last 4 Encounters:   09/15/20 54.4 kg (119 lb 14.9 oz)     Arterial Line BP: ()/(37-89) 106/74  MAP:  [49 mmHg-107 mmHg] 95 mmHg  BP - Mean:  [57-95] 95  CVP:  [8 mmHg-11 mmHg] 8 mmHg  Ventilation Mode: CMV/AC  (Continuous Mandatory Ventilation/ Assist Control)  FiO2 (%): 30 %  Rate Set (breaths/minute): 12 breaths/min  Tidal Volume Set (mL): 400 mL  PEEP (cm H2O): 5 cmH2O  Oxygen Concentration (%): 30 %  Resp: 12    Recent Labs   Lab 09/14/20  0400 09/13/20  1815 09/13/20  1609 09/13/20  1440 09/11/20  1000 09/11/20  0015   PH 7.47* 7.39 7.34* 7.29* 7.39 7.42   PCO2 31* 31* 36 34* 26* 29*   PO2 122* 183* 185* 219* 99 365*   HCO3 22 19* 20* 16* 16* 19*   O2PER  --  60  --   --  30% 100%       GEN: no acute distress   HEENT: head ncat, sclera anicteric, OP patent, trachea midline   PULM: unlabored synchronous with vent, clear anteriorly    CV/COR: RRR S1S2 no gallop,  No rub, no murmur  ABD: soft nontender, hypoactive bowel sounds, no mass  EXT:  Edema   warm  NEURO: Consistent with chemical sedation   SKIN: no obvious rash  LINES: clean, dry intact         Data:   All data and imaging reviewed     ROUTINE ICU LABS (Last four results)  CMP  Recent Labs   Lab 09/15/20  0955 09/15/20  0354 09/14/20  1234 09/14/20  0400 09/13/20  2225 09/13/20  1815  09/13/20  0356  09/11/20  0527 09/11/20  0015   NA  --  140  --  140 141 140   < > 137   < > 122* 121*   POTASSIUM 4.3 3.2* 3.8 3.0* 3.6 3.5   < > 3.3*   < > 3.9 3.3*   CHLORIDE  --  113*  --  111* 111* 111*  --  106   < > 93* 92*   CO2  --  21  --  23 23 21  --  25   < > 18* 19*   ANIONGAP  --  6  --  6 7 8   --  6   < > 11 10   GLC  --  124*  --  189* 125* 94  --  135*   < > 93 109*   BUN  --  5*  --  5* 6* 6*  --  6*   < > 15 19   CR  --  0.28*  --  0.35* 0.30* 0.35*  --  0.36*   < > 0.39* 0.46*   GFRESTIMATED  --  >90  --  >90 >90 >90  --  >90   < > >90 >90   GFRESTBLACK  --  >90  --  >90 >90 >90  --  >90   < > >90 >90   PARISH  --  6.2*  --  6.3* 6.7* 6.2*  --  7.1*   < > 6.5* 6.4*   MAG  --  2.0 2.6* 2.9* 1.4*  --   --   --    < >  --   --    PHOS  --  1.9* 2.6 1.8*  --   --   --  2.1*   < >  --   --    PROTTOTAL  --   --   --  4.1*  --  3.4*  --   --   --  5.2* 5.2*   ALBUMIN  --   --   --  2.1*  --  1.6*  --   --   --  1.8* 1.9*   BILITOTAL  --   --   --  1.7*  --  1.8*  --   --   --  1.2 0.9   ALKPHOS  --   --   --  49  --  64  --   --   --  52 60   AST  --   --   --  42  --  37  --   --   --  27 23   ALT  --   --   --  17  --  14  --   --   --  11 10    < > = values in this interval not displayed.     CBC  Recent Labs   Lab 09/15/20  0354 09/14/20  0400 09/13/20  1815 09/13/20  1609  09/13/20  0356   WBC 23.9* 32.2* 27.3*  --   --  12.7*   RBC 2.37* 2.64* 3.04*  --   --  3.50*   HGB 7.7* 8.7* 10.1* 8.2*   < > 11.4*   HCT 22.7* 24.8* 28.8*  --   --  32.8*   MCV 96 94 95  --   --  94   MCH 32.5 33.0 33.2*  --   --  32.6   MCHC 33.9 35.1 35.1  --   --  34.8   RDW 14.1 13.3 13.2  --   --  13.1   * 200 253  --   --  312    < > = values in this interval not displayed.     INR  Recent Labs   Lab 09/15/20  0830 09/14/20  0400 09/13/20  1815 09/13/20  1156   INR 1.29* 1.77* 1.92* 1.47*     Arterial Blood Gas  Recent Labs   Lab 09/14/20  0400 09/13/20  1815 09/13/20  1609 09/13/20  1440 09/11/20  1000 09/11/20  0015   PH 7.47* 7.39 7.34* 7.29* 7.39 7.42   PCO2 31* 31* 36 34* 26* 29*   PO2 122* 183* 185* 219* 99 365*   HCO3 22 19* 20* 16* 16* 19*   O2PER  --  60  --   --  30% 100%       All cultures:  Recent Labs   Lab 09/10/20  1745 09/10/20  1730   CULT No growth after 5 days No growth after 5 days     No results  found for this or any previous visit (from the past 24 hour(s)).      Billing: This patient is critically ill: yes Total critical care time today 32 min.

## 2020-09-15 NOTE — PROGRESS NOTES
"[facility]  WO Nurse Inpatient Ostomy Assessment     Assessment of New Ileostomy  Surgery date: 9-10-20 and 9-13-20  Surgeon:  Dr Morrison /    Procedure:     9-10-20:  Exploratory laparotomy, right colectomy with end ileostomy and transverse mucous fistula, rigid proctoscopy,                  drainage of pelvis (Suleman)   9-13-20: .  Reexploration of recent laparotomy: Abdominal washout; Anterior resection; Rigid proctoscopy with rectal stump leak                   Testing; Removal of abdominal drain and replacement of new pelvic drain (Dmitry)     Related diagnosis:  Cecal perforation, sigmoid phlegmon and contained pelvic perforation.     WOC Assessment & Physical Exam:        Current status: remains intubated and sedated                                Stoma viable and await return of function          Stoma size:  1 1/8\"    Stoma appearance:  Rounded, red, moist, protrudes, lumen @ apex    Mucocutaneous junction: intact with sutures    Peristomal complication(s): intact, no erythema    Abdominal assessment:  Rounded, distended and firm    Surgical site(s): surgical dressing intact, no drainage    NG still in place? Yes    Output: scant small green output in appliance    Pain: unable to determine        Current pouching system: No leakage noted under post-op manuela Premier flat pouch    Pouch last changed:  913-20 in OR; 9-15-20    Reason for pouch change today: stoma assessment; demo for          Learning and comprehension:  General ostomy cares/concerns for  .     Psychosocial: Said she know about colostomies,\" My Mom was a nurse      WOC Plan:         Pouching product plan:  [place] NI convex 57mm with high output pouch in am      Pt support system on discharge:     WOC recommend home care?  TBD      WOC follow-up plan: Friday       Objective Data:       Current Diet/Nutrition:   Orders Placed This Encounter      NPO for Medical/Clinical Reasons " Except for: No Exceptions       Output:  I/O last 3 completed shifts:  In: 5338.31 [I.V.:5088.31]  Out: 1780 [Urine:825; Emesis/NG output:50; Drains:810; Stool:95]      Labs:   Recent Labs   Lab 09/15/20  0830 09/15/20  0354 09/14/20  0400   ALBUMIN  --   --  2.1*   HGB  --  7.7* 8.7*   INR 1.29*  --  1.77*   WBC  --  23.9* 32.2*   A1C  --   --  5.1         Lacho Risk Assessment:   Sensory Perception: 1-->completely limited  Moisture: 3-->occasionally moist  Activity: 1-->bedfast  Mobility: 1-->completely immobile  Nutrition: 1-->very poor  Friction and Shear: 2-->potential problem  Lacho Score: 9      WOC Interventions:       Patient's chart evaluated    Focus of today's visit: stoma assessment, prosthetic refitting; demo appliance change for     Pouch:  Brian NI 57mm cut to fit convex with tape and high output pouch,     Participant(s) in teaching session today: . Pt remains vented and sedated    Change made with ostomy management today: resume convexity and high output pouch    Supplies: In pt room     Preparation for discharge: No discharge preparations started    Registered for supply samples? TBD    Discussed plan of care with: Nursing and patient     Ghazal Farrell RN, CWOCN

## 2020-09-15 NOTE — PROGRESS NOTES
Stable on vent. Continues on Vaso and levo for pressure support. Plan to continue wean. Prop, Dex, and fentanyl for sedation. Tele SB. Adequate UO after albumin. Insulin gtt for glucose management.

## 2020-09-15 NOTE — PLAN OF CARE
Clarified goals with Dr Patel. Will continue sedation goals. Labile BP and DON this am . Albumin given. Electrolytes replaced (Mg, K, Phos, Ca). Continues on pressors. Midline dressing changed.

## 2020-09-15 NOTE — PROGRESS NOTES
COLON & RECTAL SURGERY  PROGRESS NOTE    September 15, 2020    POD2 and POD5 from exploratory laparotomy for perforated sigmoid colon    SUBJECTIVE:    Had episode of hypotension overnight- improved with vasoactive medication  No nursing concerns otherwise  U/O >1L  NG-50cc  GAB- ~800cc    OBJECTIVE:  Temp:  [96.6  F (35.9  C)-98.2  F (36.8  C)] 96.6  F (35.9  C)  Pulse:  [61-98] 64  Resp:  [11-16] 12  BP: ()/() 75/53  MAP:  [49 mmHg-111 mmHg] 105 mmHg  Arterial Line BP: ()/(37-89) 117/83  FiO2 (%):  [30 %] 30 %  SpO2:  [99 %-100 %] 100 %    Intake/Output Summary (Last 24 hours) at 9/15/2020 0637  Last data filed at 9/15/2020 0600  Gross per 24 hour   Intake 5338.31 ml   Output 1780 ml   Net 3558.31 ml       GENERAL:  Intubated, sedated, no acute distress   EXTREMITIES: Warm and well perfused, moderate edema   ABDOMEN:  Soft, mild distention. No guarding, rigidity, or peritoneal signs. Stoma looks okay, with liquid output. Drain looks murky yellow.  INCISION:  Open and being packed BID. No signs of evisceration     LABS:  Lab Results   Component Value Date    WBC 23.9 09/15/2020     Lab Results   Component Value Date    HGB 7.7 09/15/2020     Lab Results   Component Value Date    HCT 22.7 09/15/2020     Lab Results   Component Value Date     09/15/2020     Last Basic Metabolic Panel:  Lab Results   Component Value Date     09/15/2020      Lab Results   Component Value Date    POTASSIUM 3.2 09/15/2020     Lab Results   Component Value Date    CHLORIDE 113 09/15/2020     Lab Results   Component Value Date    PARISH 6.2 09/15/2020     Lab Results   Component Value Date    CO2 21 09/15/2020     Lab Results   Component Value Date    BUN 5 09/15/2020     Lab Results   Component Value Date    CR 0.28 09/15/2020     Lab Results   Component Value Date     09/15/2020       ASSESSMENT/PLAN: Evonne Martel is a 54 year old female POD5 from a ex lap with right hemicolectomy, and POD2 from a ex  lap with anterior resection for perforated diverticulitis. Intubated in the ICU, vasoactive medications being weaned off. Abdominal incision still intact. Overall improving.    1) No acute changes from a surgical perspective.  2) ICU to continue to wean pressors as able    Will dw Dr. Andres.      Sukhjinder Chavez MD  CRS fellow  9/15/2020   6:37 AM

## 2020-09-15 NOTE — PROGRESS NOTES
[facility] ICU RESPIRATORY NOTE        Date of Admission: 9/10/2020    Date of Intubation (most recent): 9/10/2020    Reason for Mechanical Ventilation: Airway protection    Number of Days on Mechanical Ventilation: 6    Met Criteria for Spontaneous Breathing Trial:  NO    Reason for No Spontaneous Breathing Trial:per MD    Significant Events Today: None overnight    ABG Results:   Recent Labs   Lab 09/14/20  0400 09/13/20  1815 09/13/20  1609 09/13/20  1440 09/11/20  1000 09/11/20  0015   PH 7.47* 7.39 7.34* 7.29* 7.39 7.42   PCO2 31* 31* 36 34* 26* 29*   PO2 122* 183* 185* 219* 99 365*   HCO3 22 19* 20* 16* 16* 19*   O2PER  --  60  --   --  30% 100%       Current Vent Settings: Ventilation Mode: CMV/AC  (Continuous Mandatory Ventilation/ Assist Control)  FiO2 (%): 30 %  Rate Set (breaths/minute): 12 breaths/min  Tidal Volume Set (mL): 400 mL  PEEP (cm H2O): 5 cmH2O  Oxygen Concentration (%): 30 %  Resp: 12        Plan: continue on full ventilatory support and assess for readiness during the day.    Tru Burris, RT

## 2020-09-16 NOTE — PLAN OF CARE
Shift 3949-7606: no acute changes, see previous note re: low UOP    Neuro: not following, withdrawing, or tracking. Corneal reflex present. PERRL.   CV: SB. BP labile with turns. Levophed able to be decreased from .06-->.04 mcg/kg/min. Vasopressin remains on at 2.4 units/hr.   Resp: AC 30%/5. Minimal ett secretions.   GI/: NG remains to LIS with no output. No output from stoma, 5 ml from rectal tube. GAB drain with brisk yellow OP. UOP dim.   Gtts: Levophed, vasopressin, precedex, propofol, fenanyl, insulin (alg 1), D5NS.   Skin: Abdominal incision with three areas that are not approximated. Dressing changed x2 d/t saturated dressing. Weeping from various puncture sites. Preventative mepilex on non-blanchable coccyx.   Family: pts daughter, Sayra updated by phone last night. No other calls or visitors this shift.    Hortencia Morrow RN on 9/16/2020 at 5:47 AM

## 2020-09-16 NOTE — PROGRESS NOTES
COLON & RECTAL SURGERY  PROGRESS NOTE    September 16, 2020  Post-op Day # 6/3 ex lap with washout, right colectomy, end ileostomy, and transverse MF/ ex lap with LAR, washout    SUBJECTIVE:  Remains intubated. Soft Bps, UOP btvbceyp=283gt/24hr. Weaning pressors. NG tube to LIS with no output over past 24 hours. Pelvic drain with 875ml/24hr yellow output. Rectal tube with scant output. Stoma with no gas or stool. Changing incisional packing every couple hours per nursing.     OBJECTIVE:  Temp:  [95.4  F (35.2  C)-97.7  F (36.5  C)] 95.7  F (35.4  C)  Pulse:  [48-64] 56  Resp:  [11-16] 12  BP: ()/(44-79) 69/44  MAP:  [52 mmHg-105 mmHg] 78 mmHg  Arterial Line BP: ()/(40-82) 93/65  FiO2 (%):  [30 %] 30 %  SpO2:  [99 %-100 %] 99 %    Intake/Output Summary (Last 24 hours) at 9/16/2020 0907  Last data filed at 9/16/2020 0800  Gross per 24 hour   Intake 4410.84 ml   Output 1415 ml   Net 2995.84 ml       GENERAL:  Intubated, sedated.   HEAD: Normocephalic atraumatic  SCLERA: Anicteric  EXTREMITIES: Warm and well perfused  ABDOMEN:  Distended, unable to assess tenderness. No guarding, rigidity, or peritoneal signs.   INCISION:  Packing removed. Fascial sutures visible. Superior aspect of wound looks OK. Inferior aspect with drainage of ascites, musky appearing wound base.     LABS:  Lab Results   Component Value Date    WBC 17.9 09/16/2020     Lab Results   Component Value Date    HGB 7.4 09/16/2020     Lab Results   Component Value Date    HCT 22.2 09/16/2020     Lab Results   Component Value Date     09/16/2020     Last Basic Metabolic Panel:  Lab Results   Component Value Date     09/16/2020      Lab Results   Component Value Date    POTASSIUM 3.5 09/16/2020     Lab Results   Component Value Date    CHLORIDE 114 09/16/2020     Lab Results   Component Value Date    PARISH 6.3 09/16/2020     Lab Results   Component Value Date    CO2 21 09/16/2020     Lab Results   Component Value Date    BUN 4  09/16/2020     Lab Results   Component Value Date    CR 0.37 09/16/2020     Lab Results   Component Value Date     09/16/2020       ASSESSMENT/PLAN: Evonne Martel is a 54 year old female POD3/6 from a ex lap with right hemicolectomy and end ileostomy/transverse colon mucus fistula/ ex lap with anterior resection for perforated diverticulitis. She remains intubated in the ICU, pressors being weaned. Abdominal incision intact, continue BID dressing changes. Plan to start TPN today.      1. NPO diet, continue NG tube  2. Continue rectal drain  3. Check Welsh drain creatinine  4. Wean pressors as able  5. Other cares per ICU, appreciate assistance  6. Will discuss with Dr Andres     For questions/paging, please contact the CRS office at [phone].    Sonia Treadwell PA-C  [facility]  Phone: [phone]

## 2020-09-16 NOTE — PROCEDURES
[facility]    Triple Lumen PICC Placement    Date/Time: 9/16/2020 12:30 PM  Performed by: Sara Burns RN  Authorized by: Damion Patel MD   Indications: vascular access    UNIVERSAL PROTOCOL   Site Marked: Yes  Prior Images Obtained and Reviewed:  Yes  Required items: Required blood products, implants, devices and special equipment available    Patient identity confirmed:  Arm band, hospital-assigned identification number and anonymous protocol, patient vented/unresponsive  NA - No sedation, light sedation, or local anesthesia  Confirmation Checklist:  Patient's identity using two indicators, relevant allergies, procedure was appropriate and matched the consent or emergent situation and correct equipment/implants were available  Time out: Immediately prior to the procedure a time out was called    Universal Protocol: the Joint Commission Universal Protocol was followed    Preparation: Patient was prepped and draped in usual sterile fashion           ANESTHESIA    Anesthesia: Local infiltration  Local Anesthetic:  Lidocaine 1% without epinephrine  Anesthetic Total (mL):  1      SEDATION    Patient Sedated: No        Preparation: skin prepped with ChloraPrep  Skin prep agent: skin prep agent completely dried prior to procedure  Sterile barriers: maximum sterile barriers were used: cap, mask, sterile gown, sterile gloves, and large sterile sheet  Hand hygiene: hand hygiene performed prior to central venous catheter insertion  Type of line used: Power PICC  Catheter type: triple lumen  Lumen type: valved  Catheter size: 5 Fr  Brand: [employer] App  Lot number: [id number]  Placement method: ultrasound  Number of attempts: 3  Successful placement: yes  Orientation: left  Location: basilic vein  Arm circumference: adults 10 cm  Extremity circumference: 24  Visible catheter length: 2  Total catheter length: 41  Dressing and securement: sterile dressing applied, securement device, occlusive dressing applied,  chlorhexidine patch applied and blood cleaned with CHG  Post procedure assessment: blood return through all ports (ECG)  PROCEDURE   Patient Tolerance:  Patient tolerated the procedure well with no immediate complications  Describe Procedure: Successful insertion of triple lumen PICC L basilic vein. Took 3 attempts to access vessel. Wire and subsequent PICC thread into the vessel without complication. Verified placement with ECG. PICC is good to use. Pt resting comfortably throughout procedure.

## 2020-09-16 NOTE — PROGRESS NOTES
Critical Care Progress Note      09/16/2020    Name: Evonne Martel MRN#: [id number]   Age: 54 year old YOB: 1965     Hsptl Day# 6  ICU DAY # 6    MV DAY # 6               Problem List:   Active Problems:    Severe sepsis (H)    Pneumoperitoneum    Colon perforation (H)    Free intraperitoneal air    Perforation of colon (H)           Summary/Hospital Course:   54-year-old female with history of alcohol use admitted to the ICU for septic shock after multiple bowel perforations requiring exploratory laparotomy right colectomy with end ileostomy and mucous fistula.  She has been to the operating room twice for washouts and for perforations.  The patient remains in the ICU in critical condition.  Patient still requires 2 pressors to maintain blood pressure.  She still has minimal oxygen requirements.  Given the patient's current pressor requirements we will place a PICC line and start TPN today.    Assessment and plan :     Evonne Martel IS a 54 year old female admitted on 9/10/2020 for acute respiratory failure, acute blood loss anemia, septic shock and hypokalemia.   I have personally reviewed the daily labs, imaging studies, cultures and discussed the case with referring physician and consulting physicians.     My assessment and plan by system for this patient is as follows:    Neurology/Psychiatry:   1.  Follow-up work-up chemically paralysis  2. Analgesia fentanyl for pain  3. Anxiety propofol for ICU sedation.  Plan  Continue the patient on Precedex propofol and fentanyl.  She is tolerating this combination well with no evidence of bucking the ventilator.  We will keep the patient with rass of -3 until she is off pressors.    Cardiovascular:   1.Hemodynamics -hypotensive requiring multiple pressors secondary to septic shock  2.Rhythm -normal sinus rhythm  3. Ischemia -no signs of ischemia  Plan  Overall the patient remains hypotensive secondary to septic shock.  There also may be a component of  hypovolemia at this time.  The patient was responsive to fluid boluses and we were able to remain off the phenylephrine .  Given the severity of patient's septic shock the patient still requires vasopressin and norepinephrine.  We will continue to try to titrate off these pressors as we continue fluid resuscitation.    Pulmonary/Ventilator Management:   1. Airway intubated  2. Oxygenation/ventilation/mechanics on full ventilatory support  Plan  -At this time no signs of acute lung injury.  Airway pressures remain within normal limits.  She is currently on an FiO2 of 30% with a PEEP of 5.  We will continue to monitor.    GI and Nutrition :   1.  N.p.o.  2.  Concern for protein calorie malnutrition    Plan  -Place PICC line and start TPN       Renal/Fluids/Electrolytes:   1.  Creatinine within normal limits  2.  Hypocalcemia  3.  Respiratory alkalosis resolved  4. Volume status appears hypovolemic  Plan  -Resolved respiratory alkalosis- monitor function and electrolytes as needed with replacement per ICU protocols.  - generally avoid nephrotoxic agents such as NSAID, IV contrast unless specifically required  - adjust medications as needed for renal clearance  - follow I/O's as appropriate.    Infectious Disease:   1.  Concern for intra-abdominal sepsis  2.  3.  Plan  -Continue the patient on broad-spectrum antibiotics at this time    Endocrine:   1.  Concern for stress-induced hyperglycemia    Plan  - ICU insulin protocol, goal sugar <180      Hematology/Oncology:   1.  Leukocytosis most likely secondary to acute phase reaction and intra-abdominal sepsis  2.  Acute blood loss anemia anemia, no signs, symptoms of active blood loss no indication for transfusion at this time.     Plan  -We will continue to monitor            IV/Access:   1. Venous access -triple-lumen catheter needed  2. Arterial access -arterial line needed  3.  Plan  - central access required and necessary      ICU Prophylaxis:   1. DVT: Mechanical  2.  VAP: HOB 30 degrees, chlorhexidine rinse  3. Stress Ulcer: PPI  4. Restraints: Nonviolent soft two point restraints required and necessary for patient safety and continued cares and good effect as patient continues to pull at necessary lines, tubes despite education and distraction. Will readdress daily.   5. Wound care - per unit routine   6. Feeding -n.p.o.  7. Family Update: Discussed with  who is at bedside.  Discussed need to maintain the patient on 2 pressors to maintain her blood pressure.  Also indicated we will place PICC line and start TPN.  8. Disposition -ICU        Key goals for next 24 hours:   1.  Wean pressors as tolerated   2.  Continue present sedation regimen   3.  Place PICC line and start TPN..               Interim History:   Continues to require pressors.           Key Medications:       sodium chloride 0.9%  1,000 mL Intravenous Once     albumin human  12.5 g Intravenous Once     chlorhexidine  15 mL Mouth/Throat Q12H     [Held by provider] enoxaparin ANTICOAGULANT  40 mg Subcutaneous Q24H     lipids  250 mL Intravenous Q24H     pantoprazole (PROTONIX) IV  40 mg Intravenous Daily     piperacillin-tazobactam  4.5 g Intravenous Q6H     sodium chloride (PF)  3 mL Intracatheter Q8H     thiamine  100 mg Intravenous Daily       dexmedetomidine 0.2 mcg/kg/hr (09/16/20 0818)     dextrose       dextrose       dextrose 5% and 0.9% NaCl 150 mL/hr at 09/16/20 0817     fentaNYL 75 mcg/hr (09/16/20 0817)     insulin (regular) 1 Units/hr (09/16/20 0857)     norepinephrine 0.05 mcg/kg/min (09/16/20 0915)     parenteral nutrition - Clinimix E       phenylephrine Stopped (09/15/20 0546)     propofol (DIPRIVAN) infusion 30 mcg/kg/min (09/16/20 0815)     sodium chloride 10 mL/hr at 09/16/20 0818     vasopressin 2.4 Units/hr (09/16/20 0801)               Physical Examination:   Temp:  [95.4  F (35.2  C)-97.7  F (36.5  C)] 96.3  F (35.7  C)  Pulse:  [48-65] 65  Resp:  [11-16] 12  BP: ()/(44-79)  88/63  MAP:  [55 mmHg-105 mmHg] 83 mmHg  Arterial Line BP: ()/(44-82) 108/65  FiO2 (%):  [30 %] 30 %  SpO2:  [98 %-100 %] 99 %    Intake/Output Summary (Last 24 hours) at 9/14/2020 1133  Last data filed at 9/14/2020 1000  Gross per 24 hour   Intake 9868.41 ml   Output 2565 ml   Net 7303.41 ml     Wt Readings from Last 4 Encounters:   09/16/20 60.3 kg (132 lb 15 oz)     Arterial Line BP: ()/(44-82) 108/65  MAP:  [55 mmHg-105 mmHg] 83 mmHg  BP - Mean:  [53-89] 72  Ventilation Mode: CMV/AC  (Continuous Mandatory Ventilation/ Assist Control)  FiO2 (%): 30 %  Rate Set (breaths/minute): 12 breaths/min  Tidal Volume Set (mL): 400 mL  PEEP (cm H2O): 5 cmH2O  Oxygen Concentration (%): 30 %  Resp: 12    Recent Labs   Lab 09/14/20  0400 09/13/20  1815 09/13/20  1609 09/13/20  1440 09/11/20  1000 09/11/20  0015   PH 7.47* 7.39 7.34* 7.29* 7.39 7.42   PCO2 31* 31* 36 34* 26* 29*   PO2 122* 183* 185* 219* 99 365*   HCO3 22 19* 20* 16* 16* 19*   O2PER  --  60  --   --  30% 100%       GEN: no acute distress   HEENT: head ncat, sclera anicteric, OP patent, trachea midline   PULM: unlabored synchronous with vent, clear anteriorly    CV/COR: RRR S1S2 no gallop,  No rub, no murmur  ABD: soft nontender, hypoactive bowel sounds, no mass  EXT:  Edema   warm  NEURO: Consistent with chemical sedation   SKIN: no obvious rash  LINES: clean, dry intact         Data:   All data and imaging reviewed     ROUTINE ICU LABS (Last four results)  CMP  Recent Labs   Lab 09/16/20  0400 09/15/20  1230 09/15/20  0955 09/15/20  0354 09/14/20  1234 09/14/20  0400 09/13/20  2225 09/13/20  1815  09/11/20  0527 09/11/20  0015     --   --  140  --  140 141 140   < > 122* 121*   POTASSIUM 3.5  --  4.3 3.2* 3.8 3.0* 3.6 3.5   < > 3.9 3.3*   CHLORIDE 114*  --   --  113*  --  111* 111* 111*   < > 93* 92*   CO2 21  --   --  21  --  23 23 21   < > 18* 19*   ANIONGAP 6  --   --  6  --  6 7 8   < > 11 10   *  --   --  124*  --  189* 125* 94   <  > 93 109*   BUN 4*  --   --  5*  --  5* 6* 6*   < > 15 19   CR 0.37*  --   --  0.28*  --  0.35* 0.30* 0.35*   < > 0.39* 0.46*   GFRESTIMATED >90  --   --  >90  --  >90 >90 >90   < > >90 >90   GFRESTBLACK >90  --   --  >90  --  >90 >90 >90   < > >90 >90   PARISH 6.3*  --   --  6.2*  --  6.3* 6.7* 6.2*   < > 6.5* 6.4*   MAG 2.3  --   --  2.0 2.6* 2.9* 1.4*  --    < >  --   --    PHOS 2.0* 2.7  --  1.9* 2.6 1.8*  --   --    < >  --   --    PROTTOTAL  --   --   --   --   --  4.1*  --  3.4*  --  5.2* 5.2*   ALBUMIN  --   --   --   --   --  2.1*  --  1.6*  --  1.8* 1.9*   BILITOTAL  --   --   --   --   --  1.7*  --  1.8*  --  1.2 0.9   ALKPHOS  --   --   --   --   --  49  --  64  --  52 60   AST  --   --   --   --   --  42  --  37  --  27 23   ALT  --   --   --   --   --  17  --  14  --  11 10    < > = values in this interval not displayed.     CBC  Recent Labs   Lab 09/16/20  0400 09/15/20  0354 09/14/20  0400 09/13/20  1815   WBC 17.9* 23.9* 32.2* 27.3*   RBC 2.28* 2.37* 2.64* 3.04*   HGB 7.4* 7.7* 8.7* 10.1*   HCT 22.2* 22.7* 24.8* 28.8*   MCV 97 96 94 95   MCH 32.5 32.5 33.0 33.2*   MCHC 33.3 33.9 35.1 35.1   RDW 14.5 14.1 13.3 13.2   * 125* 200 253     INR  Recent Labs   Lab 09/16/20  0400 09/15/20  0830 09/14/20  0400 09/13/20  1815   INR 1.36* 1.29* 1.77* 1.92*     Arterial Blood Gas  Recent Labs   Lab 09/14/20  0400 09/13/20  1815 09/13/20  1609 09/13/20  1440 09/11/20  1000 09/11/20  0015   PH 7.47* 7.39 7.34* 7.29* 7.39 7.42   PCO2 31* 31* 36 34* 26* 29*   PO2 122* 183* 185* 219* 99 365*   HCO3 22 19* 20* 16* 16* 19*   O2PER  --  60  --   --  30% 100%       All cultures:  Recent Labs   Lab 09/10/20  1745 09/10/20  1730   CULT No growth No growth     No results found for this or any previous visit (from the past 24 hour(s)).      Billing: This patient is critically ill: yes Total critical care time today 34 min.

## 2020-09-16 NOTE — PROGRESS NOTES
Intensive Care Daily Note          Assessment and Plan:     Summary Statement:  Evonne Martel is a 54 year old female admitted on 9/10/2020 for septic shock due to cecal perforation and sigmoid colon perforation, likely secondary to perforated diverticulitis. She underwent exploratory laparotomy, right colectomy with end ileostomy, and transverse mucous fistula. She initially did well postoperatively, but developed tachycardia, tachypnea and hypotension. She was taken back to the OR on 9/13 and was found to have gross stool spillage in abdomen with at least 3 sigmoid perforations. She underwent washout and anterior resection performed with drain placement. The patient had very friable tissues in fascia closure, so the patient was placed on paralytics overnight (discontinued (9/14). My assessment and plan for this patient is as follows:    Neurology: Propofol and fentanyl for sedation   Cardiovascular/Hemodynamics: Patient remains hypotensive secondary to septic shock and hypovolemia. Patient requires vasopressin and norepinephrine. Pt will be weaned off of pressors as tolerated. Fluid resuscitation with D5-NS at 150 ml/hr.   Pulmonary: Mechanically ventilated. Patient is receiving adequate oxygenation and ventilation with FiO2 30% and PEEP 5.    GI and Nutrition: Perforated colon s/p ex lap, colectomy, and abdominal washout (9/10, re-exploration 9/13)  - Management per colorectal surgery  - NPO  - Start TPN for malnutrition  - NG to LIS  - Monitor GAB output   Renal: Hypovolemia  - D5-NS at 150 ml/hr  - continue to monitor     Hypokalemia, hypophosphatemia, hypocalcemia  - Hypokalemia has resolved, continue to monitor  - replacement protocol   Infectious Disease: Septic shock secondary to perforated colon with feculent peritonitis  - Zosyn 4.5g q6h   Hematology and Oncology: Acute normocytic anemia  - Hgb 7.4 today from 7.7  - Continue to monitor Hgb    Leukocytosis secondary to sepsis is downtrending    Endocrinology: Insulin per unit protocol       Intensive Care: Central line: Central line present, needed and to be continued  Arterial line: Arterial line present, needed and to be continued  Ferguson catheter:  In place  NG tube: LIS  Drains: abdominal GAB drain  Restraint:Needed   Others: n/a   Top goals for today Plan to place PICC line for TPN  Wean off pressors as tolerated                    Key events/ Interval history - last 24 hours:    No acute events overnight. Continues on 2 pressors for management of septic shock. Patient will be weaned off pressors as tolerated. Given malnutrition, patient will needed PICC line to start TPN.          Problem list:     Severe sepsis (H)    Pneumoperitoneum    Colon perforation (H)    Free intraperitoneal air    Perforation of colon (H)    * No resolved hospital problems. *             Medications:   I have reviewed this patient's current medications              Physical Exam:   Vital Sign Ranges  Temperature Temp  Av.5  F (36.4  C)  Min: 93.9  F (34.4  C)  Max: 100.4  F (38  C)   Blood pressure Systolic (24hrs), Av , Min:76 , Max:157        Diastolic (24hrs), Av, Min:52, Max:91      Pulse Pulse  Av.4  Min: 66  Max: 148   Respirations Resp  Av.2  Min: 10  Max: 31   Pulse oximetry SpO2  Av.9 %  Min: 86 %  Max: 100 %         Intake/Output Summary (Last 24 hours) at 2020 1023  Last data filed at 2020 1000  Gross per 24 hour   Intake 9868.41 ml   Output 2915 ml   Net 6953.41 ml         General Appearance:   Sedated on ventilator   HEENT:   Trachea is midline  Endotrachael tube is present   Chest and Lungs:   Symmetrical and normal breath sounds, no wheeze, ronchi, crackles, rub or bronchial breath sounds   Cardiovascular:   Regular rate and rhythm   Abdomen:   midline dressings, stoma pink with no stool   :   Ferguson in place   Musculoskeletal:   Not assessed   Extremities and Skin:   Warm, well perfused   Neuro:   Sedation: sedated    Drains and Tubes:   NG in place, GAB with yellow output   Intravascular Access and Device:   Lines present: triple lumen catheter (right internal jugular) and arterial line (radial)            Data:   Labs:  All lab results reviewed past 24 hours    Imaging:  All imaging results reviewed past 24 hours    Please see my separate note documenting my independent visit and assessment of this patient.    Carmencita rAaiza, MS4

## 2020-09-16 NOTE — PROGRESS NOTES
Discussed low UOP 30/2hrs with MD. Received order for 12.5g of 5% albumin. Will administer and monitor. Hortencia Morrow RN on 9/15/2020 at 10:34 PM    Following administration of albumin, UOP 60 at 0000. At 0200, UOP 20. MD notified and plan to monitor. Hortencia Morrow RN on 9/16/2020 at 2:07 AM

## 2020-09-16 NOTE — CONSULTS
BRIEF NUTRITION NOTE:    Was asked to initiate TPN with Pharmacist.  Indication:  GI perforation, plan PICC line.  A full Nutrition Assessment was completed 9/14.  See note for details.    NEW FINDINGS:  Inadequate nutrition since admission x 7 days.  Pt on 2 pressors.  With h/o ETOH abuse and low BMI, pt at risk for refeeding syndrome.  Mg 2.3 (NL), K 3.5 (NL), Phos 2.0 (L) --> replaced today.  IVF D5 NaCl at 150 mL/hr = 180 gm CHO, 612 kcals   Propofol at 8.3 mL/hr = 219 kcals (lipid) - for sedation.  Total (D5 IVF + Propofol):  831 kcals (18 kcal/kg).  MD has requested Total Fluid = 150 mL/hr when TPN is started.    INTERVENTIONS:  Parenteral Nutrition/IV Fluids - Initiate TPN as follows:    Step 1:  Begin TPN D15 AA5 at 30 mL/hr (hold Lipids while on Propofol) = 511 kcals, 36 gm pro (0.8 gm/kg), 108 gm CHO  Decrease D5 containing IVF to 120 mL/hr = 144 gm CHO, 490 kcals.  Total (TPN + Propofol + D5 IVF):  1220 kcals (27 kcal/kg), 252 gm CHO (GIR 3.8).    Step 2:  After 24 hrs, if labs accceptable (K, Mg, Phos), increase TPN D15 AA5 to goal 65 mL/hr = 1108 kcals, 78 gm pro (1.7 gm/kg), 234 gm CHO.  Decrease D5 containing IVF to 85 mL/hr = 102 gm CHO, 347 kcals.  Total (TPN + Propofol + D5 IVF):  1674 kcals (36 kcal/kg and 105% energy needs), 336 gm CHO (GIR 5.1).    FOLLOW UP:  Will monitor the need to begin Lipids when Propofol off and also the need to slightly increase goal TPN rate when D5 IVF reduced or discontinued.    Lauren Martinez, RD, LD, CNSC

## 2020-09-16 NOTE — PROGRESS NOTES
SPIRITUAL HEALTH SERVICES  SPIRITUAL ASSESSMENT Progress Note  [facility] ICU     REFERRAL SOURCE: Initial Visit    I shared a reflective visit with patient's spouse, Anibal at patient's bedside today. Anibal reflected on the last few days and what brought Evonne to the hospital. He reflected on how difficult this has been. He names they are being supported by their children and many family and friends who are reaching out and brining Anibal food. He shares he also finds prayer meaningful in his coping during this time. He welcomed prayer, which I shared with him and Evonne at bedside. He was tearful and offering supportive care to patient. I offered emotional/grief support through reflective listening, validation of emotions/experiences and words of comfort.     PLAN: Anibal welcomes shs visits during their time in the ICU, I will continue to follow for support.     Celi Anton  Associate    Phone: [phone]  Pager: [phone]

## 2020-09-16 NOTE — PROGRESS NOTES
[facility] ICU RESPIRATORY NOTE        Date of Admission: 9/10/2020    Date of Intubation (most recent): 9/10/20    Reason for Mechanical Ventilation: Airway protection    Number of Days on Mechanical Ventilation: 7    Met Criteria for Spontaneous Breathing Trial: No    Reason for No Spontaneous Breathing Trial: Per MD    Significant Events Today: None    ABG Results:   Recent Labs   Lab 09/14/20  0400 09/13/20  1815 09/13/20  1609 09/13/20  1440 09/11/20  1000 09/11/20  0015   PH 7.47* 7.39 7.34* 7.29* 7.39 7.42   PCO2 31* 31* 36 34* 26* 29*   PO2 122* 183* 185* 219* 99 365*   HCO3 22 19* 20* 16* 16* 19*   O2PER  --  60  --   --  30% 100%       Current Vent Settings: Ventilation Mode: CMV/AC  (Continuous Mandatory Ventilation/ Assist Control)  FiO2 (%): 30 %  Rate Set (breaths/minute): 12 breaths/min  Tidal Volume Set (mL): 400 mL  PEEP (cm H2O): 5 cmH2O  Oxygen Concentration (%): 30 %  Resp: 12      Skin Assessment: Intact    Plan: Continue full vent support, continue to monitor.    Veena Villegas, RT

## 2020-09-16 NOTE — PROGRESS NOTES
[facility] ICU RESPIRATORY NOTE        Date of Admission: 9/10/2020    Date of Intubation (most recent): 9/10/2020    Reason for Mechanical Ventilation: Airway protection    Number of Days on Mechanical Ventilation: 7    Met Criteria for Spontaneous Breathing Trial: No    Reason for No Spontaneous Breathing Trial: No per MD    Significant Events Today: None    ABG Results:   Recent Labs   Lab 09/14/20  0400 09/13/20  1815 09/13/20  1609 09/13/20  1440 09/11/20  1000 09/11/20  0015   PH 7.47* 7.39 7.34* 7.29* 7.39 7.42   PCO2 31* 31* 36 34* 26* 29*   PO2 122* 183* 185* 219* 99 365*   HCO3 22 19* 20* 16* 16* 19*   O2PER  --  60  --   --  30% 100%       Current Vent Settings: Ventilation Mode: CMV/AC  (Continuous Mandatory Ventilation/ Assist Control)  FiO2 (%): 30 %  Rate Set (breaths/minute): 12 breaths/min  Tidal Volume Set (mL): 400 mL  PEEP (cm H2O): 5 cmH2O  Oxygen Concentration (%): 30 %  Resp: 11    Plan: Will continue full ventilatory support for now and assess for weaning readiness daily.               Stephanie Larson, RT

## 2020-09-16 NOTE — PLAN OF CARE
Neuro: Intermittently following commands, no visual tracking. PERRL.    Pain: Fentanyl drip infusing.    CV: Heart rate bradycardic at times in the morning, more tachycardic this evening. BPs labile. Titrating Levophed to keep MAP greater than 65.    Pulm: Mechanical ventilator. Lungs course this evening. Small secretions with deep suctioning.    GI/: Ferguson patent, albumin given this morning for low urine output; good urine output this evening. NPO. NG to low intermittent suction, no output. Colostomy with small amount of serous output throughout the day; stool present in colostomy bag at shift change. Rectal tube with small amount of mucus output. GAB drain to right lower abdomen with serous/yellow output.    Skin: Midline abdominal incision with staples, packed per order. Covered with heavy drainage gauze pads due to copious amount of serous drainage. Nonblanchable redness to coccyx. Generalized edema +3.     Lines/Drips: Left upper extremity PICC line placed today. Fentanyl drip, propofol, levophed, vasopressin, precedex, insulin and D5NS infusing. Right brachial arterial line.    Additional: Right internal jugular triple lumen removed at 18:00 this evening. Plan to start TPN and lipids this evening.

## 2020-09-17 NOTE — PROGRESS NOTES
[facility] ICU RESPIRATORY NOTE        Date of Admission: 9/10/2020    Date of Intubation (most recent): 09/10/20    Reason for Mechanical Ventilation: Airway Protection    Number of Days on Mechanical Ventilation: 8      Significant Events Today: Patient transported to CT. Patient also went to OR tonight.     ABG Results:   Recent Labs   Lab 09/16/20  2200 09/14/20  0400 09/13/20  1815 09/13/20  1609  09/11/20  1000 09/11/20  0015   PH 7.33* 7.47* 7.39 7.34*   < > 7.39 7.42   PCO2 33* 31* 31* 36   < > 26* 29*   PO2 61* 122* 183* 185*   < > 99 365*   HCO3 17* 22 19* 20*   < > 16* 19*   O2PER  --   --  60  --   --  30% 100%    < > = values in this interval not displayed.       Current Vent Settings: Ventilation Mode: CMV/AC  (Continuous Mandatory Ventilation/ Assist Control)  FiO2 (%): 30 %  Rate Set (breaths/minute): 12 breaths/min  Tidal Volume Set (mL): 400 mL  PEEP (cm H2O): 5 cmH2O  Oxygen Concentration (%): 50 %  Resp: 11      Plan: Continue ventilatory support, daily wean assessments    Aimee Salazar, RT

## 2020-09-17 NOTE — PROGRESS NOTES
Intensive Care Daily Note          Assessment and Plan:     Summary Statement:  Evonne Martel is a 54 year old female admitted on 9/10/2020 for septic shock due to cecal perforation and sigmoid colon perforation, likely secondary to perforated diverticulitis. She underwent exploratory laparotomy, right colectomy with end ileostomy, and transverse mucous fistula. She initially did well postoperatively, but developed tachycardia, tachypnea and hypotension. She was taken back to the OR on 9/13 and was found to have gross stool spillage in abdomen with at least 3 sigmoid perforations. She underwent washout and anterior resection performed with drain placement. The patient had very friable tissues in fascia closure, so the patient was placed on paralytics overnight (discontinued (9/14). My assessment and plan for this patient is as follows:    Neurology: Propofol and fentanyl for sedation   Cardiovascular/Hemodynamics: Patient remains hypotensive secondary to septic shock and hypovolemia. Patient requires vasopressin and norepinephrine. Pt will be weaned off of pressors as tolerated. Fluid resuscitation with D5-NS at 150 ml/hr.   Pulmonary: Mechanically ventilated. Patient is receiving adequate oxygenation and ventilation with FiO2 30% and PEEP 5.    GI and Nutrition: Perforated colon s/p ex lap, colectomy, and abdominal washout (9/10, re-exploration 9/13)  - Management per colorectal surgery  - NPO  - Start TPN for malnutrition  - NG to LIS  - Monitor GAB output   Renal: Hypovolemia  - D5-NS at 150 ml/hr  - continue to monitor     Hypokalemia, hypophosphatemia, hypocalcemia  - Hypokalemia has resolved, continue to monitor  - replacement protocol   Infectious Disease: Septic shock secondary to perforated colon with feculent peritonitis  - Zosyn 4.5g q6h   Hematology and Oncology: Acute normocytic anemia  - Hgb 7.4 today from 7.7  - Continue to monitor Hgb    Leukocytosis secondary to sepsis is downtrending    Endocrinology: Insulin per unit protocol       Intensive Care: Central line: Central line present, needed and to be continued  Arterial line: Arterial line present, needed and to be continued  Ferguson catheter:  In place  NG tube: LIS  Drains: abdominal GAB drain  Restraint:Needed   Others: n/a   Top goals for today Plan to place PICC line for TPN  Wean off pressors as tolerated                    Key events/ Interval history - last 24 hours:    No acute events overnight. Continues on 2 pressors for management of septic shock. Patient will be weaned off pressors as tolerated. Given malnutrition, patient will needed PICC line to start TPN.          Problem list:     Severe sepsis (H)    Pneumoperitoneum    Colon perforation (H)    Free intraperitoneal air    Perforation of colon (H)    * No resolved hospital problems. *             Medications:   I have reviewed this patient's current medications              Physical Exam:   Vital Sign Ranges  Temperature Temp  Av.5  F (36.4  C)  Min: 93.9  F (34.4  C)  Max: 100.4  F (38  C)   Blood pressure Systolic (24hrs), Av , Min:76 , Max:157        Diastolic (24hrs), Av, Min:52, Max:91      Pulse Pulse  Av.4  Min: 66  Max: 148   Respirations Resp  Av.2  Min: 10  Max: 31   Pulse oximetry SpO2  Av.9 %  Min: 86 %  Max: 100 %         Intake/Output Summary (Last 24 hours) at 2020 1023  Last data filed at 2020 1000  Gross per 24 hour   Intake 9868.41 ml   Output 2915 ml   Net 6953.41 ml         General Appearance:   Sedated on ventilator   HEENT:   Trachea is midline  Endotrachael tube is present   Chest and Lungs:   Symmetrical and normal breath sounds, no wheeze, ronchi, crackles, rub or bronchial breath sounds   Cardiovascular:   Regular rate and rhythm   Abdomen:   midline dressings, stoma pink with no stool   :   Ferguson in place   Musculoskeletal:   Not assessed   Extremities and Skin:   Warm, well perfused   Neuro:   Sedation: sedated    Drains and Tubes:   NG in place, GAB with yellow output   Intravascular Access and Device:   Lines present: triple lumen catheter (right internal jugular) and arterial line (radial)            Data:   Labs:  All lab results reviewed past 24 hours    Imaging:  All imaging results reviewed past 24 hours        Carmencita Araiza, MS4

## 2020-09-17 NOTE — BRIEF OP NOTE
BRIEF OPERATIVE NOTE :     Preop Dx: Sepsis (H) [A41.9]  Postop Dx: Perforated sigmoid colon with concomitant perforation of cecum.   Proc: Laparotomy.   Washout  Right hemicolectomy  Placement of pelvic drain  End ileostomy  Transverse colon mucous fistula  Surgeon(s)/Assist: Surgeon(s):  Chery Morrison MD  Anes: General  Drains:  19Fr Chaim in Pelvis  Spec:  Right colon  Compl: None  Findings:  Diffuse contamination and large volume of air in abdomen. Perforation in pelvis identified, as well as necrotic cecum with perforation. Right hemicolectomy performed, and given patient unstable, drain placed in pelvis with ileostomy and mucous fistula formation.  EBL: 75cc  Condition on discharge from OR: On pressor and intubated to the ICU.     Suhkjinder Chavez MD   [facility]   [phone].           ADDENDUM:     PATIENT DATA  Indicate Y or N:  Home O2 No  Hemodialysis No  Transplant patient No  Cirrhosis No  Steroids in last 30 days No  Immunomodulators in last 30 days No  Anticoagulation at time of surgery No              List medication   Prior abdominal surgery No  Pelvic irradiation no     Albumin within 30 days if known   Hgb within 30 days if known 15.0  Cr within 30 days if known 0.77     OR DATA  Emergent Yes              <24 hours Yes              <1 week Yes  Bowel Prep (Y or N) N  Antibiotics (Y or N) Y  DVT prophylaxis               Heparin Y              SCD Y              None N  Drain Y  ASA (1,2,3,4) 4  OR time (min) 85  Stents N  Transfuse >/= 2U N  Anastomosis              Stapled                Handsewn    Leak Test               Positive                Negative                Not done  BRIEF OPERATIVE NOTE :     Preop Dx: Sepsis (H) [A41.9]  Postop Dx: Perforated sigmoid colon with concomitant perforation of cecum.   Proc: Laparotomy.   Washout  Right hemicolectomy  Placement of pelvic drain  End ileostomy  Transverse colon mucous fistula  Surgeon(s)/Assist:  Surgeon(s):  Chery Morrison MD  Anes: General  Drains:  19Fr Chaim in Pelvis  Spec:  Right colon  Compl: None  Findings:  Diffuse contamination and large volume of air in abdomen. Perforation in pelvis identified, as well as necrotic cecum with perforation. Right hemicolectomy performed, and given patient unstable, drain placed in pelvis with ileostomy and mucous fistula formation.  EBL: 75cc  Condition on discharge from OR: On pressor and intubated to the ICU.     Sukhjinder Chavez MD   [facility]   [phone].           ADDENDUM:     PATIENT DATA  Indicate Y or N:  Home O2 No  Hemodialysis No  Transplant patient No  Cirrhosis No  Steroids in last 30 days No  Immunomodulators in last 30 days No  Anticoagulation at time of surgery No              List medication   Prior abdominal surgery No  Pelvic irradiation no     Albumin within 30 days if known   Hgb within 30 days if known 15.0  Cr within 30 days if known 0.77     OR DATA  Emergent Yes              <24 hours Yes              <1 week Yes  Bowel Prep (Y or N) N  Antibiotics (Y or N) Y  DVT prophylaxis               Heparin Y              SCD Y              None N  Drain Y  ASA (1,2,3,4) 4  OR time (min) 85  Stents N  Transfuse >/= 2U N  Anastomosis              Stapled                Handsewn    Leak Test               Positive                Negative                Not done

## 2020-09-17 NOTE — PROGRESS NOTES
CRS Staff    Called this evening re: increasing pressor requirements.  53 yo F with minimal prior medical care who presented with large bowel obstruction with perforation at cecum and sigmoid colon.  Initially underwent a ileocolic resection with end ileostomy and mucus fistula (in same trephine as the ileostomy) and pelvic drainage.  Patient reportedly unstable in OR and so resection of the sigmoid colon was not undertaken.  Did well initially until she developed tachycardia and hypotension on POD #3.  Taken back to the OR.  Feculent peritonitis noted and source found to be the perforated sigmoid colon.  The sigmoid colon was resected and left as a Ayo's closure of the rectum, and the descending colon was left stapled off in the abdomen as a blind end.      Patient doing OK until this evening when she developed worsening tachycardia to the 130's and hypotension. Initially on half dose norepi and stable dose of vasopressin.  Now maxed out on norepi, on vasopressin and phenylepherine started.  CT scan obtained.  Reviewed with rads.  ? Gap in staple line in descending colon with leak    Discussed with family.  Certainly there are risks to going back to the OR, but she is demonstrating worsening sepsis.  I recommend repeat ex lap, possible stoma.  Her  wishes to proceed.  Consent obtained.    Mikey Fermin MD  [facility]  [phone] (office)  [phone] (pager)  [email or web address]

## 2020-09-17 NOTE — BRIEF OP NOTE
[facility]    Brief Operative Note    Pre-operative diagnosis: Staple line leak  Post-operative diagnosis Same as pre-operative diagnosis    Procedure: Procedure(s):  EXPLORATORY LAPAROTOMY, ABDOMINAL WASHOUT, CREATION COLOSTOMY  Surgeon: Surgeon(s) and Role:     * Mikey Fermin MD - Primary  Anesthesia: General   Estimated blood loss: Less than 10 ml  Drains: Harley-Carter  Specimens:   ID Type Source Tests Collected by Time Destination   1 : abdominal abscess culture  Abscess Abdomen ABSCESS CULTURE AEROBIC BACTERIAL, ANAEROBIC BACTERIAL CULTURE Mikey Fermin MD 9/17/2020  1:55 AM    2 : abdominal abscess #2 Abscess Abdomen ABSCESS CULTURE AEROBIC BACTERIAL, ANAEROBIC BACTERIAL CULTURE Mikey Fermin MD 9/17/2020  2:01 AM      Findings:   Stool leaking from staple line in descending colon. End colostomy/mucus fistula created  Complications: None.  Implants: * No implants in log *        IVF: 1500  UOP: none  Condition on discharge from OR: Satisfactory    Mikey Fermin MD   [facility]   [phone].        ADDENDUM:    PATIENT DATA  Indicate Y or N:  Home O2 No  Hemodialysis  No  Transplant patient  No  Cirrhosis  No  Steroids in last 30 days  No  Immunomodulators in last 30 days  No  Anticoagulation at time of surgery  No   List medication n  Prior abdominal surgery  Yes  Pelvic irradiation  No    Albumin within 30 days if known 2.0   Hgb within 30 days if known    Hemoglobin   Date Value Ref Range Status   09/16/2020 9.8 (L) 11.7 - 15.7 g/dL Final   ]  Cr within 30 days if known    Creatinine   Date Value Ref Range Status   09/16/2020 0.40 (L) 0.52 - 1.04 mg/dL Final   ]  Body mass index is 23.59 kg/m .      OR DATA  Emergent  Yes   <24 hours  Yes   <1 week  Yes  Bowel Prep No  Antibiotics  Yes  DVT prophylaxis    Heparin  Yes   SCD  Yes   None  No  Drain  Yes  ASA (1,2,3,4) 4e  OR time (min) 120 min  Stents  No  Transfuse >/= 2U   No  Anastomosis   Stapled  No   Handsewn  No  Leak Test    Positive  No   Negative  No   Not done  Yes       Route (Have a good day)

## 2020-09-17 NOTE — ANESTHESIA POSTPROCEDURE EVALUATION
Patient: Evonne Martel    Procedure(s):  EXPLORATORY LAPAROTOMY, ABDOMINAL WASHOUT, CREATION COLOSTOMY    Diagnosis:Bowel obstruction (H) [K56.609]  Diagnosis Additional Information: No value filed.    Anesthesia Type:  General    Note:  Anesthesia Post Evaluation    Patient location during evaluation: ICU  Patient participation: Unable to evaluate secondary to administered sedation  Level of consciousness: obtunded/minimal responses  Pain management: unable to assess  Airway patency: patent  Cardiovascular status: acceptable and hemodynamically stable (on norepinephrine, phenylephrine, vasopression infusions)  Respiratory status: acceptable, intubated, ventilator and ETT  Hydration status: stable (Patient edematous, anasarca)  PONV: unable to assess       Comments: Stable transport to , SBAR to ICU team        Last vitals:  Vitals:    09/17/20 0045 09/17/20 0100 09/17/20 0115   BP:      Pulse: 101 101 101   Resp: 14 13 12   Temp: 35.6  C (96.1  F) 35.5  C (95.9  F) 35.4  C (95.7  F)   SpO2: 92% 95% 93%         Electronically Signed By: Tal Graff DO  September 17, 2020  3:18 AM

## 2020-09-17 NOTE — ANESTHESIA CARE TRANSFER NOTE
Patient: Evonne Martel    Procedure(s):  EXPLORATORY LAPAROTOMY, ABDOMINAL WASHOUT, CREATION COLOSTOMY    Diagnosis: Bowel obstruction (H) [K56.609]  Diagnosis Additional Information: No value filed.    Anesthesia Type:   General     Note:  Airway :ETT  Patient transferred to:ICU  Comments: Transported back to ICU with continuous monitoring, o2 via ambu, report to RNADRIENNE Handoff: Call for PAUSE to initiate/utilize ICU HANDOFF, Identified Patient, Identified Responsible Provider, Reviewed the Pertinent Medical History, Discussed Surgical Course, Reviewed Intra-OP Anesthesia Management and Issues during Anesthesia, Set Expectations for Post Procedure Period and Allowed Opportunity for Questions and Acknowledgement of Understanding      Vitals: (Last set prior to Anesthesia Care Transfer)    [facility] VITALS  9/17/2020 0233 - 9/17/2020 0316      9/17/2020             Resp Rate (observed):  (!) 7    Resp Rate (set):  10                Electronically Signed By: MARITZA Ingram CRNA  September 17, 2020  3:16 AM

## 2020-09-17 NOTE — PROVIDER NOTIFICATION
MD Notification    Notified Person: MD    Notified Person Name: Amanda HARDEN    Notification Date/Time: 1630    Notification Interaction: in person    Purpose of Notification: critical lab value- pH 7.13     Orders Received: STAT lactic, Vent settings changed to RR of 16    Comments:

## 2020-09-17 NOTE — PROVIDER NOTIFICATION
MD Notification    Notified Person: MD    Notified Person Name: Clyde    Notification Date/Time: 09/16/20 @ 1900    Notification Interaction: spoke with MD    Purpose of Notification: Heart rate 120-130    Orders Received: NS fluid bolus 500 mL by pressure bag    Comments:

## 2020-09-17 NOTE — PROGRESS NOTES
Intensive Care Daily Note          Assessment and Plan:     Summary Statement:  Evonne Martel is a 54 year old female admitted on 9/10/2020 for septic shock due to cecal perforation and sigmoid colon perforation, likely secondary to perforated diverticulitis. She underwent exploratory laparotomy, right colectomy with end ileostomy, and transverse mucous fistula. She initially did well postoperatively, but developed tachycardia, tachypnea and hypotension. She was taken back to the OR on 9/13 and was found to have gross stool spillage in abdomen with at least 3 sigmoid perforations. She underwent washout and anterior resection performed with drain placement. The patient had very friable tissues in fascia closure, so the patient was placed on paralytics overnight (discontinued (9/14). She had worsening hypotension on 9/17, so CT was obtained and showed possible leak at the descending colon. She went back to the OR (9/17) and was found to have stool leaking from the staple line at the descending colon; washout and end colostomy was performed.  My assessment and plan for this patient is as follows:    Neurology: Patient is receiving midazolam and fentanyl for sedation/analgesia. Goal RASS of -3 until she is off pressors.   Cardiovascular/Hemodynamics: Patient remains hypotensive secondary to septic shock and hypovolemia requiring multiple pressors. She is post-op from a 3rd operation. She will be weaned off of pressors as tolerated. Fluid resuscitation with D5-NS at 150 ml/hr.   Pulmonary: Mechanically ventilated. Patient is receiving adequate oxygenation and ventilation with FiO2 50% and PEEP 5.    GI and Nutrition: Perforated colon s/p ex lap, colectomy, and abdominal washout (9/10, re-exploration 9/13 and 9/17)  - Management per colorectal surgery  - NPO  - TPN   - NG to LIS  - Monitor GAB output   Renal: Concern for hypovolemia. Receiving fluid replacement with D5-NS at 120 ml/hr and 5% albumin. Suspect that volume  status may improve since initiation of TPN for concern for malnutrition.     Hypokalemia, hypophosphatemia, with concern for refeeding syndrome given history of alcohol use.  - replacement protocol   Infectious Disease: Septic shock secondary to perforated colon with feculent peritonitis. CXR on  showed patchy opacities consistent with inflammation vs infection. Patient switched from Zosyn to meropenem, metronidazole, micafungin, and vancomycin (on ).   Hematology and Oncology: Acute normocytic anemia  - Hgb 8.9 today from 9.8   - Continue to monitor Hgb    Leukocytosis secondary to septic shock from bowel perforations and concern for pneumonia.  - Continue antibiotic regimen as above   Endocrinology: Insulin per unit protocol       Intensive Care: Central line: Central line present, needed and to be continued  Arterial line: Arterial line present, needed and to be continued  Ferguson catheter:  In place  NG tube: LIS  Drains: abdominal GAB drain  Restraint:Needed   Others: n/a   Top goals for today Wean off pressors as tolerated                    Key events/ Interval history - last 24 hours:    Overnight, the patient had worsening hypotension on pressors and was tachycardic to the 130s. CT was obtained and demonstrated a possible leak at the descending colon. She was taken to the OR and found to have stool leaking from the staple line at the descending colon and an end colostomy was created. Post-operatively, the patient still requires multiple pressors.          Problem list:     Severe sepsis (H)    Pneumoperitoneum    Colon perforation (H)    Free intraperitoneal air    Perforation of colon (H)    * No resolved hospital problems. *             Medications:   I have reviewed this patient's current medications              Physical Exam:   Vital Sign Ranges  Temperature Temp  Av.5  F (36.4  C)  Min: 93.9  F (34.4  C)  Max: 100.4  F (38  C)   Blood pressure Systolic (24hrs), Av , Min:76 , Max:157         Diastolic (24hrs), Av, Min:52, Max:91      Pulse Pulse  Av.4  Min: 66  Max: 148   Respirations Resp  Av.2  Min: 10  Max: 31   Pulse oximetry SpO2  Av.9 %  Min: 86 %  Max: 100 %         Intake/Output Summary (Last 24 hours) at 2020 1023  Last data filed at 2020 1000  Gross per 24 hour   Intake 9868.41 ml   Output 2915 ml   Net 6953.41 ml         General Appearance:   Sedated on ventilator   HEENT:   Trachea is midline  Endotrachael tube is present   Chest and Lungs:   Occasional crackles bilaterally   Cardiovascular:   Regular rate and rhythm   Abdomen:   midline dressings, both stomas are pink, no stool   :   Ferguson in place   Musculoskeletal:   Not assessed   Extremities and Skin:   Warm, well perfused   Neuro:   Sedation: sedated   Drains and Tubes:   NG in place, GAB with serosanguinous/yellow output   Intravascular Access and Device:   Lines present: triple lumen catheter (right internal jugular) and arterial line (radial)            Data:   Labs:  All lab results reviewed past 24 hours    Imaging:  All imaging results reviewed past 24 hours    CXR  IMPRESSION: Prominent patchy infiltrates throughout both lungs new since 2020 and most compatible with acute pneumonitis. Clinical correlation. Endotracheal tube with tip 2.5 cm above the willard. Enteric tubes with tips below the diaphragm. Heart   size and pulmonary vascularity within normal limits. Left-sided PICC line tip over the SVC. Questionable small bilateral pleural effusions. Remainder unremarkable.    CT C/A/P  IMPRESSION:  1. Moderately extensive patchy opacities scattered within both lungs, new since 2020. These are nonspecific, but likely infectious or inflammatory in etiology.  2. Large right pleural effusion and moderate-sized left pleural effusion, increased in size.  3. An endotracheal tube is present with distal tip just entering the left main bronchus.  4. Several foci of extraluminal gas scattered within  the abdomen and pelvis are within normal limits for the recent surgery. There is a relatively prominent 4 cm collection of gas and stool-like material in the lateral aspect of the left hemipelvis near   a site of surgery in the distal descending/proximal sigmoid colon. There is also stranding throughout the intraperitoneal fat. These findings are at least moderately suspicious for a colonic leak and associated peritonitis. If clinically relevant, a   colonic evaluation using water-soluble contrast could be considered for further evaluation.  5. Nonspecific moderate diffuse wall thickening of mildly distended fluid-filled small bowel in the mid abdomen. There is also prominent stranding in the mesenteric fat and elsewhere in the intraperitoneal fat. These findings could relate to enteritis.   Bowel ischemia cannot be excluded.    This note is been reviewed and discussed with the medical student.  Please see my separate note for my independent visit with this patient.    Carmencita Araiza, MS4

## 2020-09-17 NOTE — BRIEF OP NOTE
[facility]    Brief Operative Note    Pre-operative diagnosis: Bowel obstruction (H) [K56.609]  Post-operative diagnosis Same as pre-operative diagnosis    Procedure: Procedure(s):  EXPLORATORY LAPAROTOMY, ABDOMINAL WASHOUT, CREATION COLOSTOMY  Surgeon: Surgeon(s) and Role:     * Mikey Fermin MD - Primary  Anesthesia: General   Estimated blood loss: Minimal  Drains: Previously placed Chaim left in pelvis  Specimens:   ID Type Source Tests Collected by Time Destination   1 : abdominal abscess culture  Abscess Abdomen ABSCESS CULTURE AEROBIC BACTERIAL, ANAEROBIC BACTERIAL CULTURE Mikey Fermin MD 9/17/2020  1:55 AM    2 : abdominal abscess #2 Abscess Abdomen ABSCESS CULTURE AEROBIC BACTERIAL, ANAEROBIC BACTERIAL CULTURE Mikey Fermin MD 9/17/2020  2:01 AM      Findings: Descending colon staple line dehisced. Exteriorized as a stoma.  Complications: None.    ADDENDUM:    PATIENT DATA  Indicate Y or N:  Home O2 No  Hemodialysis  No  Transplant patient  No  Cirrhosis  No  Steroids in last 30 days  Yes  Immunomodulators in last 30 days  No  Anticoagulation at time of surgery  No   List medication   Prior abdominal surgery  Yes  Pelvic irradiation  No    Albumin within 30 days if known 2.0   Hgb within 30 days if known  Hemoglobin   Date Value Ref Range Status   09/16/2020 9.8 (L) 11.7 - 15.7 g/dL Final   ]  Cr within 30 days if known   Creatinine   Date Value Ref Range Status   09/16/2020 0.40 (L) 0.52 - 1.04 mg/dL Final   ]  Body mass index is 23.59 kg/m .      OR DATA  Emergent  Yes   <24 hours  Yes   <1 week  No  Bowel Prep No  Antibiotics  Yes  DVT prophylaxis    Heparin  Yes   SCD  Yes   None  No  Drain  Yes  ASA (1,2,3,4) 4  OR time (min) 60  Stents  No  Transfuse >/= 2U  No  Anastomosis   Stapled  No   Handsewn  No  Leak Test    Positive  No   Negative  No   Not done  No

## 2020-09-17 NOTE — PROGRESS NOTES
Chart reviewed, patient started on TPN yesterday and is currently running as follows -->  D15 AA5 at 30 mL/hr (hold Lipids while on Propofol) = 511 kcals, 36 gm pro (0.8 gm/kg), 108 gm CHO   Decr D5 containing IVF to 120 mL/hr = 144 gm CHO, 490 kcals    Propofol now off today  Refeeding labs:  Mg and Phos normal, K 3.3 (L)    Spoke with Pharmacist -- plan to increase TPN to goal tonight at add Lipid:  D15 AA5 to goal 65 mL/hr + Lipid 250 mL 20% every 48 hours= 1358 kcals, 78 gm pro (1.7 gm/kg), 234 gm CHO  Decr D5 containing IVF to 85 mL/hr = 102 gm CHO, 347 kcals  Total (TF + D5)= 1705 kcal (37 kcal/kg and 107% needs)    Neli Trejo RD, LD, CNSC   Clinical Dietitian - [facility]

## 2020-09-17 NOTE — PROGRESS NOTES
"Evaluated the patient for tachycardia and increasing dose of pressors.  The patient had received colloids earlier today.  Ionized calcium 4.1.  Phosphorus 2.0.    She was on 0.08 mcg/kg/min norepinephrine at 4 PM today.  Now 0.17 mcg/kg/min.  Vasopressin 2.4 units/h.    BP (!) 88/63   Pulse 131   Temp 100.6  F (38.1  C)   Resp 21   Ht 1.651 m (5' 5\")   Wt 60.3 kg (132 lb 15 oz)   LMP 01/01/2015 (Approximate)   SpO2 96%   BMI 22.12 kg/m       Abdomen: Soft, no bowel sounds  Chest: Decreased breath sounds bilaterally.      Lactic acid 2.4.  Albumin 2.0.    Assessment and plan: Septic shock likely abdominal source.  - Panculture.  - Follow lactic acid.  -Question if she needs surgical reexploration.  Paged colorectal surgery.  -We will continue supportive cares as needed.  - We will broaden antibiotic coverage.  Add micafungin.  Add vancomycin. Stop Zosyn. Will conver with khalif and flagyl.   - Stat CT CAP.No contrast  - Will consider Hydrocortisone for CIRCI.     cct 35 minutes in additon to 34 minutes by Dr Lee today            "

## 2020-09-17 NOTE — PROGRESS NOTES
[facility] ICU RESPIRATORY NOTE        Date of Admission: 9/10/2020    Date of Intubation (most recent): 9/10/2020    Reason for Mechanical Ventilation: Airway protection    Number of Days on Mechanical Ventilation: 8    Met Criteria for Spontaneous Breathing Trial: No    Reason for No Spontaneous Breathing Trial: No per MD    Significant Events Today: None this shift.    ABG Results:   Recent Labs   Lab 09/16/20  2200 09/14/20  0400 09/13/20  1815 09/13/20  1609  09/11/20  1000 09/11/20  0015   PH 7.33* 7.47* 7.39 7.34*   < > 7.39 7.42   PCO2 33* 31* 31* 36   < > 26* 29*   PO2 61* 122* 183* 185*   < > 99 365*   HCO3 17* 22 19* 20*   < > 16* 19*   O2PER  --   --  60  --   --  30% 100%    < > = values in this interval not displayed.       Current Vent Settings: Ventilation Mode: CMV/AC  (Continuous Mandatory Ventilation/ Assist Control)  FiO2 (%): 30 %  Rate Set (breaths/minute): 12 breaths/min  Tidal Volume Set (mL): 400 mL  PEEP (cm H2O): 5 cmH2O  Oxygen Concentration (%): 40 %  Resp: 12        Plan: Will continue full ventilatory support for now and assess for weaning readiness daily.               Stephanie Larson, RT

## 2020-09-17 NOTE — PLAN OF CARE
Pt remains sedated and intubated with a RASS score of -5. Pressures titrated as able with goal of MAP >65, liable BPs requiring frequent titration of meds. HR elevated, pain meds increased (see mar). plan to start sedation vacation and vent weaning as able tomorrow. Ileostomy intact with scant amount of serous yellow drainage in bag. Wound vac changed by WOC. GAB drain with copious amount of output, leaking at site-mepilex around GAB site. Skin weeping with +3-+4 edema. K+ replaced.

## 2020-09-17 NOTE — PHARMACY-VANCOMYCIN DOSING SERVICE
Pharmacy Vancomycin Initial Note  Date of Service 2020  Patient's  1965  54 year old, female    Indication: Intra-abdominal infection and Sepsis    Current estimated CrCl = Estimated Creatinine Clearance: 151.3 mL/min (A) (based on SCr of 0.4 mg/dL (L)).    Creatinine for last 3 days  2020: 10:25 PM Creatinine 0.30 mg/dL  2020:  4:00 AM Creatinine 0.35 mg/dL  9/15/2020:  3:54 AM Creatinine 0.28 mg/dL  2020:  4:00 AM Creatinine 0.37 mg/dL;  9:33 PM Creatinine 0.40 mg/dL    Recent Vancomycin Level(s) for last 3 days  No results found for requested labs within last 72 hours.      Vancomycin IV Administrations (past 72 hours)      No vancomycin orders with administrations in past 72 hours.                Nephrotoxins and other renal medications (From now, onward)    Start     Dose/Rate Route Frequency Ordered Stop    20 0000  vancomycin (VANCOCIN) 1000 mg in dextrose 5% 200 mL PREMIX      1,000 mg  200 mL/hr over 1 Hours Intravenous EVERY 8 HOURS 20  norepinephrine (LEVOPHED) 16 mg in  mL infusion      0.03-0.4 mcg/kg/min × 45.9 kg  1.3-17.2 mL/hr  Intravenous CONTINUOUS 20  vasopressin 40 units in NS 40 mL (PITRESSIN) infusion      2.4 Units/hr  2.4 mL/hr  Intravenous CONTINUOUS 20            Contrast Orders - past 72 hours (72h ago, onward)    None                Plan:  1.  Start vancomycin  1000 mg IV q8h.   2.  Goal Trough Level: 15-20 mg/L   3.  Pharmacy will check trough levels as appropriate in 1-3 Days.    4. Serum creatinine levels will be ordered daily for the first week of therapy and at least twice weekly for subsequent weeks.    5. Ragley method utilized to dose vancomycin therapy: Method 1    Stephanie Guy [facility]

## 2020-09-17 NOTE — PROVIDER NOTIFICATION
MD Notification    Notified Person: MD    Notified Person Name: Clyde    Notification Date/Time: 09/16/20 @ 1835    Notification Interaction: spoke with MD    Purpose of Notification: Patient consistently tachycardic, increase in temp. Increasing pressor needs.    Orders Received: Lactic acid ordered    Comments:     @ 7:25 PM lactic acid 2.4, Dr. Beltran at bedside and updated.

## 2020-09-17 NOTE — PROVIDER NOTIFICATION
MD Notification    Notified Person: MD    Notified Person Name: Clyde HARDEN    Notification Date/Time: 0940    Notification Interaction: in person    Purpose of Notification: Pt BP dropping to 60s/30s.    Orders Received: give 500ml NS bolus by pressure bag, titrate meds as needed. MD at bedside.    Comments:

## 2020-09-17 NOTE — PROGRESS NOTES
COLON & RECTAL SURGERY  PROGRESS NOTE    Post-op Day # 7 ex lap with washout, right colectomy, end ileostomy, and transverse MF/  POD #4 s/p ex lap with anterior resection, washout  POD #0 s/p ex lap, washout, end descending colostomy     SUBJECTIVE:  Increasing pressor requirements yesterday afternoon/evening with elevated WBC and lactate. CT A/P with concern for leak from staple line in descending colon so taken to the OR by my partner Dr. Fermin for ex lap, washout and end descending colostomy/MF. Since the OR pressor requirements have come down, vitals are stabilizing, UOP improving, no ileostomy output.      OBJECTIVE:  Temp:  [95.4  F (35.2  C)-100.6  F (38.1  C)] 95.4  F (35.2  C)  Pulse:  [] 95  Resp:  [10-25] 11  BP: (88)/(63) 88/63  MAP:  [56 mmHg-116 mmHg] 92 mmHg  Arterial Line BP: ()/(38-87) 123/67  FiO2 (%):  [30 %] 30 %  SpO2:  [91 %-100 %] 97 %    Intake/Output Summary (Last 24 hours) at 9/16/2020 0907  Last data filed at 9/16/2020 0800  Gross per 24 hour   Intake 4410.84 ml   Output 1415 ml   Net 2995.84 ml       GENERAL:  Intubated, sedated.   EXTREMITIES: Warm and perfused  ABDOMEN:  Moderate distension, wound vac in midline wound with serous drainage, stomas pink x 2 with sweat in appliance, abdominal drain with serous fluid      LABS:  Lab Results   Component Value Date    WBC 17.9 09/16/2020     Lab Results   Component Value Date    HGB 7.4 09/16/2020     Lab Results   Component Value Date    HCT 22.2 09/16/2020     Lab Results   Component Value Date     09/16/2020     Last Basic Metabolic Panel:  Lab Results   Component Value Date     09/16/2020      Lab Results   Component Value Date    POTASSIUM 3.5 09/16/2020     Lab Results   Component Value Date    CHLORIDE 114 09/16/2020     Lab Results   Component Value Date    PARISH 6.3 09/16/2020     Lab Results   Component Value Date    CO2 21 09/16/2020     Lab Results   Component Value Date    BUN 4 09/16/2020     Lab  Results   Component Value Date    CR 0.37 09/16/2020     Lab Results   Component Value Date     09/16/2020       ASSESSMENT/PLAN: Evonne Martel is a 54 year old female s/p ex lap with right hemicolectomy on 9/10, followed by re-exploration with abdominal washout and anterior resection on 9/13, and ex lap with washout and descending colon MF on 9/17. She remains intubated in the ICU, vasoactive medications being weaned off.     NPO. NG. Continue TPN.   Wean pressors as able. Can consider enteral nutrition when off pressors for > 12 hours.  Continue GAB, incisional wound vac and rectal drain. GAB creatinine negative on 9/16 for urine leak.   Continue abx for abdominal contamination. Would continue for at least 5 days post-op from her most recent operation. Continue to trend leukocytosis.   Appreciate ICU assistance with cares.     For questions/paging, please contact the CRS office at [phone].    Zuleyka Andres MD  [facility]  Phone: [phone]

## 2020-09-17 NOTE — PLAN OF CARE
OT/PT: per discussion with RN during ICU rds, patient not appropriate for therapy. Will reschedule for tomorrow- 09/18/2020.

## 2020-09-17 NOTE — PROVIDER NOTIFICATION
Notified Dr. Beltran of increasing pressor need, tachycardia (130's), elevated lactic, low urine output < 30 ml/hr.    CRP, procalc, blood cultures, CBC, 1 gm calcium, albumin 25 grams, CT abdomen/pelvis.  Change sedation to Versed and discharge propofol and Dex.  Page out to Colorectal Surgery with update.  They agree with CT and repeat lactic.

## 2020-09-17 NOTE — PROGRESS NOTES
Critical Care Progress Note      09/17/2020    Name: Evonne Martel MRN#: [id number]   Age: 54 year old YOB: 1965     Hsptl Day# 7  ICU DAY # 7    MV DAY # 7               Problem List:   Active Problems:    Severe sepsis (H)    Pneumoperitoneum    Colon perforation (H)    Free intraperitoneal air    Perforation of colon (H)           Summary/Hospital Course:   54-year-old female with history of alcohol use admitted to the ICU for septic shock after multiple bowel perforations requiring exploratory laparotomy right colectomy with end ileostomy and mucous fistula.  Events of overnight are noted for worsening sepsis and the patient was taken to the operating room for reexploration.  She was noted to have a dehiscence on her anastomotic suture line.  She was returned to the OR in continued septic shock requiring vasopressin and norepinephrine.  She was started on broad-spectrum antibiotics.  She is still fluid responsive at this time requiring fluid boluses.     Evonne Martel IS a 54 year old female admitted on 9/10/2020 for acute respiratory failure, acute blood loss anemia, septic shock and hypokalemia.   I have personally reviewed the daily labs, imaging studies, cultures and discussed the case with referring physician and consulting physicians.     My assessment and plan by system for this patient is as follows:    Neurology/Psychiatry:   1.  Follow-up work-up chemically paralysis  2. Analgesia fentanyl for pain  3. Anxiety propofol for ICU sedation.  Plan  Would continue the patient on current sedation regimen.  No plans to lighten sedation today.  Continue opioids for PRN pain control.    Cardiovascular:   1.Hemodynamics -hypotensive requiring multiple pressors secondary to septic shock  2.Rhythm -normal sinus rhythm  3. Ischemia -no signs of ischemia  Plan  Continue to wean the patient from pressors if possible.  Patient is fluid dependent and will receive fluid bolus as we attempt to wean from  pressors.  Patient remains in normal sinus rhythm.  She is mildly tachycardic at times but that is most likely secondary to hypovolemia.    Pulmonary/Ventilator Management:   1. Airway intubated  2. Oxygenation/ventilation/mechanics on full ventilatory support  Plan  -At this time no signs of acute lung injury.  Airway pressures remain within normal limits.  She is currently on an FiO2 of 40% with a PEEP of 5.  Wean FiO2 as tolerated keeping PaO2 greater than 60.    GI and Nutrition :   1.  N.p.o.  2.  Concern for protein calorie malnutrition    Plan  -Will remain n.p.o. and we will continue TPN      Renal/Fluids/Electrolytes:   1.  Creatinine within normal limits  2.  Hypokalemia  3.  Respiratory alkalosis resolved currently with slight metabolic acidosis  4. Volume status appears hypovolemic  Plan  -Continue with current ventilatory settings.    -Needed with replacement per ICU protocols.  - generally avoid nephrotoxic agents such as NSAID, IV contrast unless specifically required  - adjust medications as needed for renal clearance  - follow I/O's as appropriate.    Infectious Disease:   1.  Concern for intra-abdominal sepsis  2.  3.  Plan  Agree with broad-spectrum antibiotics.  Antibiotics were broadened last p.m.  Antifungals were added.  We will continue with his current regimen until we get culture results.    Endocrine:   1.  Concern for stress-induced hyperglycemia    Plan  - ICU insulin protocol, goal sugar <180      Hematology/Oncology:   1.  Leukocytosis most likely secondary to acute phase reaction and intra-abdominal sepsis  2.  Acute blood loss anemia anemia, no signs, symptoms of active blood loss no indication for transfusion at this time.     Plan  -We will continue to monitor            IV/Access:   1. Venous access -PICC line in place  2. Arterial access -arterial line needed  3.  Plan  - central access required and necessary      ICU Prophylaxis:   1. DVT: Mechanical  2. VAP: HOB 30 degrees,  chlorhexidine rinse  3. Stress Ulcer: PPI  4. Restraints: Nonviolent soft two point restraints required and necessary for patient safety and continued cares and good effect as patient continues to pull at necessary lines, tubes despite education and distraction. Will readdress daily.   5. Wound care - per unit routine   6. Feeding -n.p.o.  7. Family Update: Discussed with  who is at bedside.  Discussed with  severity of patient's illness and that she is still requiring 2 pressors to maintain her blood pressure.  I indicated we will keep the patient sedated today.  We talked that the patient's oxygenation has been stable at this time.  8.  ICU disposition        Key goals for next 24 hours:   1.  Wean pressors as tolerated   2.  Continue present sedation regimen   3.  Wean FiO2 as tolerated  4.  Continue with broad-spectrum antibiotics               Interim History:   Status post exploratory laparotomy.  Remains critically ill           Key Medications:       sodium chloride 0.9%  1,000 mL Intravenous Once     albumin human  250 mL Intravenous Q8H     chlorhexidine  15 mL Mouth/Throat Q12H     hydrocortisone sodium succinate PF  50 mg Intravenous Q6H     lipids  250 mL Intravenous Q48H     meropenem  1 g Intravenous Q8H     metroNIDAZOLE  500 mg Intravenous Q6H     micafungin  100 mg Intravenous Q24H     pantoprazole (PROTONIX) IV  40 mg Intravenous Daily     sodium chloride (PF)  10 mL Intracatheter Q8H     sodium chloride (PF)  3 mL Intracatheter Q8H     thiamine  100 mg Intravenous Daily     vancomycin (VANCOCIN) IV  1,000 mg Intravenous Q8H       dexmedetomidine Stopped (09/16/20 2221)     dextrose       dextrose       dextrose 5% and 0.9% NaCl       dextrose 5% and 0.9% NaCl 120 mL/hr at 09/17/20 1136     fentaNYL 100 mcg/hr (09/17/20 1222)     insulin (regular) 5.5 Units/hr (09/17/20 1230)     midazolam 4 mg/hr (09/17/20 0812)     norepinephrine 0.4 mcg/kg/min (09/17/20 1320)     parenteral  nutrition - Clinimix E       parenteral nutrition - Clinimix E 30 mL/hr at 09/17/20 0803     phenylephrine Stopped (09/17/20 1035)     propofol (DIPRIVAN) infusion Stopped (09/16/20 2338)     sodium chloride 20 mL/hr at 09/17/20 0802     vasopressin 2.4 Units/hr (09/17/20 0802)               Physical Examination:   Temp:  [93.6  F (34.2  C)-100.6  F (38.1  C)] 98.1  F (36.7  C)  Pulse:  [] 103  Resp:  [10-25] 11  BP: (116)/(86) 116/86  MAP:  [41 mmHg-113 mmHg] 67 mmHg  Arterial Line BP: ()/(29-87) 111/47  FiO2 (%):  [30 %] 30 %  SpO2:  [91 %-100 %] 95 %    Intake/Output Summary (Last 24 hours) at 9/14/2020 1133  Last data filed at 9/14/2020 1000  Gross per 24 hour   Intake 9868.41 ml   Output 2565 ml   Net 7303.41 ml     Wt Readings from Last 4 Encounters:   09/17/20 64.3 kg (141 lb 12.1 oz)     Arterial Line BP: ()/(29-87) 111/47  MAP:  [41 mmHg-113 mmHg] 67 mmHg  BP - Mean:  [96] 96  Ventilation Mode: CMV/AC  (Continuous Mandatory Ventilation/ Assist Control)  FiO2 (%): 30 %  Rate Set (breaths/minute): 12 breaths/min  Tidal Volume Set (mL): 400 mL  PEEP (cm H2O): 5 cmH2O  Oxygen Concentration (%): 45 %  Resp: 11    Recent Labs   Lab 09/16/20  2200 09/14/20  0400 09/13/20  1815 09/13/20  1609  09/11/20  1000 09/11/20  0015   PH 7.33* 7.47* 7.39 7.34*   < > 7.39 7.42   PCO2 33* 31* 31* 36   < > 26* 29*   PO2 61* 122* 183* 185*   < > 99 365*   HCO3 17* 22 19* 20*   < > 16* 19*   O2PER  --   --  60  --   --  30% 100%    < > = values in this interval not displayed.       GEN: no acute distress   HEENT: head ncat, sclera anicteric, OP patent, trachea midline   PULM: unlabored synchronous with vent, clear anteriorly    CV/COR: RRR S1S2 no gallop,  No rub, no murmur  ABD: soft nontender, hypoactive bowel sounds, no mass  EXT:  Edema   warm  NEURO: Consistent with chemical sedation   SKIN: no obvious rash  LINES: clean, dry intact         Data:   All data and imaging reviewed     ROUTINE ICU LABS (Last  four results)  CMP  Recent Labs   Lab 09/17/20  0345 09/16/20  2133 09/16/20  1845 09/16/20  0400 09/15/20  1230 09/15/20  0955 09/15/20  0354 09/14/20  1234 09/14/20  0400  09/13/20  1815  09/11/20  0527    142  --  141  --   --  140  --  140   < > 140   < > 122*   POTASSIUM 3.3* 3.4  --  3.5  --  4.3 3.2* 3.8 3.0*   < > 3.5   < > 3.9   CHLORIDE 116* 116*  --  114*  --   --  113*  --  111*   < > 111*   < > 93*   CO2 18* 17*  --  21  --   --  21  --  23   < > 21   < > 18*   ANIONGAP 7 9  --  6  --   --  6  --  6   < > 8   < > 11   * 95  --  137*  --   --  124*  --  189*   < > 94   < > 93   BUN 3* 3*  --  4*  --   --  5*  --  5*   < > 6*   < > 15   CR 0.46* 0.40*  --  0.37*  --   --  0.28*  --  0.35*   < > 0.35*   < > 0.39*   GFRESTIMATED >90 >90  --  >90  --   --  >90  --  >90   < > >90   < > >90   GFRESTBLACK >90 >90  --  >90  --   --  >90  --  >90   < > >90   < > >90   PARISH 6.1* 7.0*  --  6.3*  --   --  6.2*  --  6.3*   < > 6.2*   < > 6.5*   MAG 1.7  --   --  2.3  --   --  2.0 2.6* 2.9*   < >  --    < >  --    PHOS 3.0 2.2*  --  2.0* 2.7  --  1.9* 2.6 1.8*  --   --    < >  --    PROTTOTAL 3.8*  --   --   --   --   --   --   --  4.1*  --  3.4*  --  5.2*   ALBUMIN 2.3*  --  2.0*  --   --   --   --   --  2.1*  --  1.6*  --  1.8*   BILITOTAL 1.9*  --   --   --   --   --   --   --  1.7*  --  1.8*  --  1.2   ALKPHOS 81  --   --   --   --   --   --   --  49  --  64  --  52   AST 27  --   --   --   --   --   --   --  42  --  37  --  27   ALT 10  --   --   --   --   --   --   --  17  --  14  --  11    < > = values in this interval not displayed.     CBC  Recent Labs   Lab 09/17/20  0345 09/16/20  2133 09/16/20  0400 09/15/20  0354   WBC 38.8* 37.3* 17.9* 23.9*   RBC 2.75* 2.96* 2.28* 2.37*   HGB 8.9* 9.8* 7.4* 7.7*   HCT 27.5* 29.1* 22.2* 22.7*    98 97 96   MCH 32.4 33.1* 32.5 32.5   MCHC 32.4 33.7 33.3 33.9   RDW 14.5 14.4 14.5 14.1    192 108* 125*     INR  Recent Labs   Lab 09/17/20 0345  09/16/20  0400 09/15/20  0830 09/14/20  0400   INR 2.08* 1.36* 1.29* 1.77*     Arterial Blood Gas  Recent Labs   Lab 09/16/20  2200 09/14/20  0400 09/13/20  1815 09/13/20  1609  09/11/20  1000 09/11/20  0015   PH 7.33* 7.47* 7.39 7.34*   < > 7.39 7.42   PCO2 33* 31* 31* 36   < > 26* 29*   PO2 61* 122* 183* 185*   < > 99 365*   HCO3 17* 22 19* 20*   < > 16* 19*   O2PER  --   --  60  --   --  30% 100%    < > = values in this interval not displayed.       All cultures:  Recent Labs   Lab 09/17/20  0201 09/17/20  0155 09/17/20  0054 09/16/20  2200 09/10/20  1745 09/10/20  1730   CULT Culture negative < 24 hours, reincubate  PENDING Culture in progress  PENDING No growth after 4 hours No growth after 5 hours No growth No growth     Recent Results (from the past 24 hour(s))   XR Chest Port 1 View    Narrative    EXAM: XR CHEST PORT 1 VW  LOCATION: [facility]  DATE/TIME: 9/16/2020 10:18 PM    INDICATION: Shock, evaluate for pneumonia.  COMPARISON: 09/11/2020.      Impression    IMPRESSION: Prominent patchy infiltrates throughout both lungs new since 09/11/2020 and most compatible with acute pneumonitis. Clinical correlation. Endotracheal tube with tip 2.5 cm above the willard. Enteric tubes with tips below the diaphragm. Heart   size and pulmonary vascularity within normal limits. Left-sided PICC line tip over the SVC. Questionable small bilateral pleural effusions. Remainder unremarkable.   CT Chest Abdomen Pelvis w/o Contrast    Narrative    EXAM: CT CHEST, ABDOMEN AND PELVIS WITHOUT CONTRAST  LOCATION: [facility]  DATE/TIME: 9/16/2020 11:06 PM    INDICATION: History of recent bowel perforation. Shock. Sepsis.    COMPARISON: 9/13/2020.    TECHNIQUE: CT scan of the chest, abdomen and pelvis was performed without IV contrast. Multiplanar reformats were obtained. Dose reduction techniques were used.    CONTRAST: None.    FINDINGS:  LUNGS AND PLEURA: An endotracheal tube is present with distal  tip just entering the left main bronchus. Moderately extensive patchy opacities scattered within both lungs, most prominent in bilateral lung apices. Large right pleural effusion.   Moderate-sized left pleural effusion. Mild atelectasis in the dependent aspects of both lungs.    MEDIASTINUM/AXILLAE: Unremarkable.    HEPATOBILIARY: Unremarkable.    SPLEEN: Unremarkable.    PANCREAS: Mild diffuse pancreatic atrophy.    ADRENAL GLANDS: Unremarkable.    KIDNEYS/BLADDER: A balloon-tip catheter is present in the urinary bladder lumen.    BOWEL: An enteric drainage tube is present with distal tip in the gastric body/antrum. A second smaller caliber enteric tube is present with distal tip in the gastric body. Prior colonic surgery. Right lower quadrant ostomy. There is a long segment of   mildly distended fluid-filled moderately thick-walled small bowel in the mid abdomen. No visualized pneumatosis. A few foci of extraluminal gas are scattered in the abdomen, including a relatively prominent 4 x 3 cm collection of extraluminal gas and   stool-like material in the left hemipelvis, adjacent to a site of colonic surgery (for example, series 2 image 96 and series 7 image 27).    LYMPH NODES: Unremarkable.    VASCULATURE: Atherosclerotic calcification in the aorta and coronary arteries. A left upper extremity peripherally inserted central catheter is present with distal catheter tip in the superior vena cava.    OTHER: A percutaneous surgical intraperitoneal drainage catheter is present with distal tip in the lateral aspect of the right hemiabdomen. A small amount of free fluid in the pelvis. Diffuse stranding throughout the intra-abdominal fat, most prominent   in the mesenteric fat. Mild diffuse body wall edema.      Impression    IMPRESSION:  1. Moderately extensive patchy opacities scattered within both lungs, new since 9/13/2020. These are nonspecific, but likely infectious or inflammatory in etiology.  2. Large right  pleural effusion and moderate-sized left pleural effusion, increased in size.  3. An endotracheal tube is present with distal tip just entering the left main bronchus.  4. Several foci of extraluminal gas scattered within the abdomen and pelvis are within normal limits for the recent surgery. There is a relatively prominent 4 cm collection of gas and stool-like material in the lateral aspect of the left hemipelvis near   a site of surgery in the distal descending/proximal sigmoid colon. There is also stranding throughout the intraperitoneal fat. These findings are at least moderately suspicious for a colonic leak and associated peritonitis. If clinically relevant, a   colonic evaluation using water-soluble contrast could be considered for further evaluation.  5. Nonspecific moderate diffuse wall thickening of mildly distended fluid-filled small bowel in the mid abdomen. There is also prominent stranding in the mesenteric fat and elsewhere in the intraperitoneal fat. These findings could relate to enteritis.   Bowel ischemia cannot be excluded.    The findings were called to Dr. Beltran by Dr. Yi on 9/16/2020 at 2335 hours.           Billing: This patient is critically ill: yes Total critical care time today 35 min.

## 2020-09-17 NOTE — ANESTHESIA PREPROCEDURE EVALUATION
Anesthesia Pre-Procedure Evaluation    Patient: Evonne Martel   MRN: [id number] : 1965          Preoperative Diagnosis: Bowel obstruction (H) [K56.609]    Procedure(s):  EXPLORATORY LAPAROTOMY    History reviewed. No pertinent past medical history.  Past Surgical History:   Procedure Laterality Date     COLECTOMY WITH COLOSTOMY, COMBINED N/A 9/10/2020    Procedure: COLECTOMY, WITH COLOSTOMY CREATION;  Surgeon: Chery Morrison MD;  Location:  OR     KNEE SURGERY       LAPAROTOMY EXPLORATORY N/A 9/10/2020    Procedure: Exploratory laparotomy, right colectomy, ileostomy, transverse colon mucous fistula, pelvic drain placement, proctoscopy;  Surgeon: Chery Morrison MD;  Location:  OR     LAPAROTOMY EXPLORATORY N/A 2020    Procedure: LAPAROTOMY, ABDOMINAL WASHOUT;  Surgeon: Zuleyka Andres MD;  Location:  OR     RESECT SMALL BOWEL WITHOUT OSTOMY N/A 2020    Procedure: ANTERIOR RESECTION WITH RIGID PROCTOSCOPY;  Surgeon: Zuleyka Andres MD;  Location:  OR       Anesthesia Evaluation     .             ROS/MED HX    ENT/Pulmonary:     (+)tobacco use, Current use , . .    Neurologic:       Cardiovascular:         METS/Exercise Tolerance:  >4 METS   Hematologic:         Musculoskeletal:         GI/Hepatic: Comment: Admitted to ICU for septic shock after multiple bowel perforations requiring exploratory laparotomy right colectomy with end ileostomy and mucous fistula.  She has been to the operating room twice for washouts and for perforations.      (+) Other GI/Hepatic Bowel peforation      Renal/Genitourinary:         Endo:         Psychiatric: Comment: EtOH use        Infectious Disease: Comment: Septic shock likely abdominal source on micafungin, vancomycin, meropenem, flagyl  (+) Recent Fever, Other Infectious Disease Sepsis      Malignancy:         Other:                          Physical Exam      Airway   Comment: ETT in situ    Dental     Cardiovascular  "      Pulmonary             Lab Results   Component Value Date    WBC 37.3 (H) 09/16/2020    HGB 9.8 (L) 09/16/2020    HCT 29.1 (L) 09/16/2020     09/16/2020     09/16/2020    POTASSIUM 3.4 09/16/2020    CHLORIDE 116 (H) 09/16/2020    CO2 17 (L) 09/16/2020    BUN 3 (L) 09/16/2020    CR 0.40 (L) 09/16/2020    GLC 95 09/16/2020    PARISH 7.0 (L) 09/16/2020    PHOS 2.2 (L) 09/16/2020    MAG 2.3 09/16/2020    ALBUMIN 2.0 (L) 09/16/2020    PROTTOTAL 4.1 (L) 09/14/2020    ALT 17 09/14/2020    AST 42 09/14/2020    ALKPHOS 49 09/14/2020    BILITOTAL 1.7 (H) 09/14/2020    LIPASE 48 (L) 09/10/2020    PTT 43 (H) 09/13/2020    INR 1.36 (H) 09/16/2020       Preop Vitals  BP Readings from Last 3 Encounters:   09/16/20 (!) 88/63    Pulse Readings from Last 3 Encounters:   09/16/20 126      Resp Readings from Last 3 Encounters:   09/16/20 22    SpO2 Readings from Last 3 Encounters:   09/16/20 95%      Temp Readings from Last 1 Encounters:   09/16/20 37.6  C (99.7  F)    Ht Readings from Last 1 Encounters:   09/10/20 1.651 m (5' 5\")      Wt Readings from Last 1 Encounters:   09/16/20 64.3 kg (141 lb 12.1 oz)    Estimated body mass index is 23.59 kg/m  as calculated from the following:    Height as of this encounter: 1.651 m (5' 5\").    Weight as of this encounter: 64.3 kg (141 lb 12.1 oz).       Anesthesia Plan      History & Physical Review  History and physical reviewed and following examination; no interval change.    ASA Status:  4 emergent.    NPO Status:  Waived due to emergency    Plan for General     Additional equipment: Arterial Line Will place A line in room if not already in place    IV hydrocortisone            Postoperative Care      Consents  Anesthetic plan, risks, benefits and alternatives discussed with:  Implied consent/emergency..                 Tal Graff, DO  "

## 2020-09-17 NOTE — PROGRESS NOTES
[facility] ICU RESPIRATORY NOTE        Date of Admission: 9/10/2020    Date of Intubation (most recent): 9/10/2020    Reason for Mechanical Ventilation: Airway Protection    Number of Days on Mechanical Ventilation: 7    Met Criteria for Spontaneous Breathing Trial: No    Reason for No Spontaneous Breathing Trial: No per MD    Significant Events Today: None    ABG Results:   Recent Labs   Lab 09/14/20  0400 09/13/20  1815 09/13/20  1609 09/13/20  1440 09/11/20  1000 09/11/20  0015   PH 7.47* 7.39 7.34* 7.29* 7.39 7.42   PCO2 31* 31* 36 34* 26* 29*   PO2 122* 183* 185* 219* 99 365*   HCO3 22 19* 20* 16* 16* 19*   O2PER  --  60  --   --  30% 100%       Current Vent Settings: Ventilation Mode: CMV/AC  (Continuous Mandatory Ventilation/ Assist Control)  FiO2 (%): 30 %  Rate Set (breaths/minute): 12 breaths/min  Tidal Volume Set (mL): 400 mL  PEEP (cm H2O): 5 cmH2O  Oxygen Concentration (%): 30 %  Resp: 24      Skin Assessment: No issues    Plan: Continue full ventilatory support t/o night shift.    Carlos Cruz, RT

## 2020-09-17 NOTE — PLAN OF CARE
Sepsis worsening with increasing lactic acidosis, and pressor requirements, despite fluid resuscitation attempts.  CT abdomen/chest showing bilateral patchy infiltrates of lungs and potential anastomosis leak.  Patient returned to the OR for another ex. Lap, washout, creation of mucus fistula and wound vac placement.  Currently patient requires vaso and Norepi to maintain MAP > 65 mmHg.  All electrolytes replaced.  Insulin drip infusing for hyperglycemia.  TPN started.  Urine output is picking up to   100 ml/ hour.  Patient was at bedside prior to OR.  Gave consent for procedure. All questions answered and he agrees with plan.

## 2020-09-18 NOTE — PHARMACY-VANCOMYCIN DOSING SERVICE
Pharmacy Vancomycin Note  Date of Service 2020  Patient's  1965   54 year old, female    Indication: Intra-abdominal infection and Sepsis  Goal Trough Level: 15-20 mg/L  Day of Therapy: 2  Current Vancomycin regimen:  1000 mg IV q8h    Current estimated CrCl = Estimated Creatinine Clearance: 153.6 mL/min (A) (based on SCr of 0.42 mg/dL (L)).    Creatinine for last 3 days  9/15/2020:  3:54 AM Creatinine 0.28 mg/dL  2020:  4:00 AM Creatinine 0.37 mg/dL;  9:33 PM Creatinine 0.40 mg/dL  2020:  3:45 AM Creatinine 0.46 mg/dL;  4:30 PM Creatinine 0.42 mg/dL    Recent Vancomycin Levels (past 3 days)  2020: 11:10 PM Vancomycin Level 21.6 mg/L    Vancomycin IV Administrations (past 72 hours)                   vancomycin (VANCOCIN) 1000 mg in dextrose 5% 200 mL PREMIX (mg) 1,000 mg New Bag 20 1709     1,000 mg New Bag  0902     1,000 mg New Bag  0000                Nephrotoxins and other renal medications (From now, onward)    Start     Dose/Rate Route Frequency Ordered Stop    20 0200  vancomycin (VANCOCIN) 1000 mg in dextrose 5% 200 mL PREMIX      1,000 mg  200 mL/hr over 1 Hours Intravenous EVERY 12 HOURS 20 0030      20  norepinephrine (LEVOPHED) 16 mg in  mL infusion      0.03-0.4 mcg/kg/min × 45.9 kg  1.3-17.2 mL/hr  Intravenous CONTINUOUS 20  vasopressin 40 units in NS 40 mL (PITRESSIN) infusion      2.4 Units/hr  2.4 mL/hr  Intravenous CONTINUOUS 20               Contrast Orders - past 72 hours (72h ago, onward)    None          Interpretation of levels and current regimen:  Trough level is  Supratherapeutic    Has serum creatinine changed > 50% in last 72 hours: No    Urine output:  unable to determine    Renal Function: Stable    Plan:  1.  Decrease Dose to 1000mg q12h  2.  Pharmacy will check trough levels as appropriate in 1-3 Days.    3. Serum creatinine levels will be ordered daily for the first week  of therapy and at least twice weekly for subsequent weeks.      Thomas Fontanez RPH        .

## 2020-09-18 NOTE — PROGRESS NOTES
[facility]ON [facility]CT[facility]GERY  PROGRESS NOTE    September 18, 2020  Post-op Day # 8 ex lap with washout, right colectomy, end ileostomy, and transverse MF/  POD #5 ex lap with anterior resection, washout  POD #1 ex lap, washout, end descending colostomy     SUBJECTIVE:  Hgb drop to 6.2, getting 1 U PRBC. Intubated, on two pressors. Weaning as able, however labile BP.  at bedside. TPN running.     OBJECTIVE:  Temp:  [89.8  F (32.1  C)-99.1  F (37.3  C)] 96.4  F (35.8  C)  Pulse:  [] 98  Resp:  [12-21] 17  MAP:  [41 mmHg-149 mmHg] 71 mmHg  Arterial Line BP: ()/(29-73) 118/55  FiO2 (%):  [30 %-60 %] 60 %  SpO2:  [81 %-100 %] 94 %    Intake/Output Summary (Last 24 hours) at 9/18/2020 0857  Last data filed at 9/18/2020 0830  Gross per 24 hour   Intake 7972.17 ml   Output 4484 ml   Net 3488.17 ml       GENERAL:  Intubated, sedated  HEAD: Normocephalic atraumatic  SCLERA: Anicteric  EXTREMITIES: Warm and well perfused  ABDOMEN:  Soft, moderately distended, does not respond to exam/tenderness. Stomas pink and protruding, healthy and viable, no output in bags. Midline wound with wound vac in place. GAB drain with serous output, pouching system in place to collect excess drainage from drain site.  INCISION: Wound vac in place.     LABS:  Lab Results   Component Value Date    WBC 24.3 09/18/2020     Lab Results   Component Value Date    HGB 6.2 09/18/2020     Lab Results   Component Value Date    HCT 18.6 09/18/2020     Lab Results   Component Value Date     09/18/2020     Last Basic Metabolic Panel:  Lab Results   Component Value Date     09/18/2020      Lab Results   Component Value Date    POTASSIUM 3.2 09/18/2020     Lab Results   Component Value Date    CHLORIDE 115 09/18/2020     Lab Results   Component Value Date    PARISH 6.1 09/18/2020     Lab Results   Component Value Date    CO2 21 09/18/2020     Lab Results   Component Value Date    BUN 7 09/18/2020     Lab Results   Component Value Date    CR  0.52 09/18/2020     Lab Results   Component Value Date     09/18/2020       ASSESSMENT/PLAN: 54 year old female s/p ex lap with right hemicolectomy on 9/10, followed by re-exploration with abdominal washout and anterior resection on 9/13, and ex lap with washout and descending colon MF on 9/17. She remains intubated in the ICU, vasoactive medications being weaned off as able. Currently getting 1 U PRBC for hgb 6.2. INR increased to 2.0, however LFTs WNL.     1. NPO. NGT in place. Continue TPN.   2. Wean pressors as able. Can consider enteral nutrition when off pressors for > 12 hours  3. Continue GAB drain and wound vac. Appreciate WOCN assistance with wound and stoma cares.   4. Continue abx for abdominal contamination. Would continue for at least 5 days post-op from her most recent operation. Continue to trend leukocytosis.  5. Continue Ferguson  6. Appreciate ICU cares and assistance in management of cares    Discussed with Dr. Andres.     For questions/paging, please contact the CRS office at [phone].    Tabitha Flores PA-C  [facility]  Phone: [phone]

## 2020-09-18 NOTE — PROVIDER NOTIFICATION
MD Notification    Notified Person: MD    Notified Person Name:  Dr. Beltran, Intensivist    Notification Date/Time:  09/18/2020, 04:35 AM    Notification Interaction:  Phone call    Purpose of Notification:  Hgb 6.2, sudden drop in SpO2 86%, increased rhonchi, coarse crackles in liane sounds, frothy serous thin fluid in ETT    Orders Received:  Portable chest XR, 1 unit PRBCs, increase Vent peep to 8, 20 mg IV lasix x 1    Comments:  Patient responded well to increase in peep.  SpO2 now 99%  Also increased fiO2 to 60%.  Positive response to diuresis.   ~ 1 hour after adminitration

## 2020-09-18 NOTE — PROGRESS NOTES
SPIRITUAL HEALTH SERVICES  SPIRITUAL ASSESSMENT Progress Note  [facility] ICU     REFERRAL SOURCE: Follow Up    I shared a brief visit with patient's spouse, Anibal at patient's bedside today, known to me from previous visits. Anibal shares it was a hard morning but he is doing better now. He names that this is going longer than expected and is trying to be hopeful and take it one day at a time. He welcomed prayer, which I shared. I offered emotional support through reflective listening, validation of emotions/experiences and words of comfort and peace.     PLAN: I will continue to follow for support.     Celi Anton  Associate    Phone: [phone]  Pager: [phone]

## 2020-09-18 NOTE — PROGRESS NOTES
[facility] ICU RESPIRATORY NOTE        Date of Admission: 9/10/2020    Date of Intubation (most recent): 9/10/20    Reason for Mechanical Ventilation: Airway protection     Number of Days on Mechanical Ventilation: 9    Met Criteria for Spontaneous Breathing Trial: No    Reason for No Spontaneous Breathing Trial: per MD     Significant Events Today: none during this shift     ABG Results:   Recent Labs   Lab 09/17/20  2340 09/17/20  2107 09/17/20  1630 09/16/20  2200  09/13/20  1815  09/11/20  1000   PH 7.30* 7.24* 7.13* 7.33*   < > 7.39   < > 7.39   PCO2 38 46* 60* 33*   < > 31*   < > 26*   PO2 91 106* 82 61*   < > 183*   < > 99   HCO3 19* 20* 20* 17*   < > 19*   < > 16*   O2PER 40% 40  --   --   --  60  --  30%    < > = values in this interval not displayed.       Current Vent Settings: Ventilation Mode: CMV/AC  (Continuous Mandatory Ventilation/ Assist Control)  FiO2 (%): 40 %  Rate Set (breaths/minute): (S) 18 breaths/min  Tidal Volume Set (mL): (S) 460 mL  PEEP (cm H2O): 5 cmH2O  Oxygen Concentration (%): 40 %  Resp: 18      Skin Assessment: intact     Plan: continue full ventilatory support and assess for weaning readiness daily.    Katheryn Barrett, RT

## 2020-09-18 NOTE — PROGRESS NOTES
Critical Care Progress Note      09/18/2020    Name: Evonne Martel MRN#: [id number]   Age: 54 year old YOB: 1965     Hsptl Day# 8  ICU DAY # 8    MV DAY # 8               Problem List:   Active Problems:    Severe sepsis (H)    Pneumoperitoneum    Colon perforation (H)    Free intraperitoneal air    Perforation of colon (H)           Summary/Hospital Course:   54-year-old female with history of alcohol use admitted to the ICU for septic shock after multiple bowel perforations requiring exploratory laparotomy right colectomy with end ileostomy and mucous fistula.  Events of overnight are noted for worsening sepsis and the patient was taken to the operating room for reexploration.  She was noted to have a dehiscence on her anastomotic suture line.  She was returned to the OR in continued septic shock requiring vasopressin and norepinephrine.  She was started on broad-spectrum antibiotics.  Patient remains critically ill with intra-abdominal sepsis and concern for multisystem organ failure.  Her creatinine continues to rise and her urine output remains marginal.  She was diuresed last night with adequate response.  However oxygen requirement and PEEP requirements have slowly increased overnight.      Evonne Martel IS a 54 year old female admitted on 9/10/2020 for acute respiratory failure, acute blood loss anemia, septic shock and hypokalemia.   I have personally reviewed the daily labs, imaging studies, cultures and discussed the case with referring physician and consulting physicians.     My assessment and plan by system for this patient is as follows:    Neurology/Psychiatry:   1.  Off chemical paralysis  2. Analgesia fentanyl for pain  3.  propofol for ICU sedation.  Plan  Will attempt sedation holiday at this time.  Then will resume on fentanyl and Versed in order to achieve sedation goal of -3 to -4.    Cardiovascular:   1.Hemodynamics -hypotensive requiring multiple pressors secondary to  septic shock  2.Rhythm -normal sinus rhythm  3. Ischemia -no signs of ischemia  Plan  Patient is mildly tachycardic most likely secondary to pain and continued need for fluid resuscitation.  She remains on norepinephrine and vasopressin.  We will attempt to use pressors as tolerated keeping her mean arterial pressure greater than 65.    Pulmonary/Ventilator Management:   1. Airway intubated  2. Oxygenation/ventilation/mechanics on full ventilatory support  Plan  -Of concern is some pulmonary edema and increased oxygen requirements and increase PEEP requirements to maintain saturation greater than 92%.  I believe this is the process of her disease and her severe sepsis and the inflammatory response.  At this time her airway pressures are within normal limits and we are achieving adequate lung protection.  We will titrate her PEEP and her FiO2 to keep her saturation above 92%.  We will continue to monitor.    GI and Nutrition :   1.  N.p.o.  2.  Concern for protein calorie malnutrition    Plan  -Will remain n.p.o. and we will continue TPN      Renal/Fluids/Electrolytes:   1.  Creatinine has continued to climb now 0.52  2.  Hypokalemia  3.  Mixed metabolic and respiratory acidosis 4. Volume status appears hypovolemic  Plan  -Increase minute ventilation on the vent in order to correct for him respiratory acidosis.  We will also add uric acid her hyper chloremia causing her metabolic acidosis with the TPN  -Needed with replacement per ICU protocols.  - generally avoid nephrotoxic agents such as NSAID, IV contrast unless specifically required  - adjust medications as needed for renal clearance  - follow I/O's as appropriate.      Infectious Disease:   1.  Concern for intra-abdominal sepsis  2.  3.  Plan  Agree with broad-spectrum antibiotics.  Antibiotics were broadened last p.m.  Antifungals were added.  We will continue with his current regimen until we get culture results.    Endocrine:   1.  Concern for stress-induced  hyperglycemia  2.  Continue hydrocortisone    Plan  - ICU insulin protocol, goal sugar <180      Hematology/Oncology:   1.  Leukocytosis most likely secondary to acute phase reaction and intra-abdominal sepsis however leukocytosis is trending down.  2.  Acute blood loss anemia anemia, patient's hemoglobin is less than 7 this morning she was transfused and will get a repeat hemoglobin to assess her response.  3.  Patient's INR is also elevated with normal LFTs.  We will repeat the INR.  If it continues to climb we will treat with FFP.    Plan  -We will continue to monitor, has follow-up INR and follow-up hemoglobin due today.            IV/Access:   1. Venous access -PICC line in place  2. Arterial access -arterial line needed  3.  Plan  - central access required and necessary      ICU Prophylaxis:   1. DVT: Mechanical  2. VAP: HOB 30 degrees, chlorhexidine rinse  3. Stress Ulcer: PPI  4. Restraints: Nonviolent soft two point restraints required and necessary for patient safety and continued cares and good effect as patient continues to pull at necessary lines, tubes despite education and distraction. Will readdress daily.   5. Wound care - per unit routine   6. Feeding -n.p.o.  7. Family Update: Discussed with  severity of the patient's illness and how she continues to require multiple pressors to maintain her blood pressure.  Also explained to her  that her oxygen requirements and creatinine have risen over the last 24 hours.  Concern of multiorgan involvement at this time.  8.  Disposition ICU        Key goals for next 24 hours:   1.  Wean pressors as tolerated   2.  Continue present sedation regimen   3.  Wean FiO2 as tolerated  4.  Continue with broad-spectrum antibiotics               Interim History:   Status post exploratory laparotomy.  Remains critically ill           Key Medications:       sodium chloride 0.9%  1,000 mL Intravenous Once     albumin human  250 mL Intravenous Q8H      chlorhexidine  15 mL Mouth/Throat Q12H     hydrocortisone sodium succinate PF  50 mg Intravenous Q6H     lipids  250 mL Intravenous Q48H     meropenem  1 g Intravenous Q8H     metroNIDAZOLE  500 mg Intravenous Q6H     micafungin  100 mg Intravenous Q24H     pantoprazole (PROTONIX) IV  40 mg Intravenous Daily     sodium chloride (PF)  10 mL Intracatheter Q8H     sodium chloride (PF)  3 mL Intracatheter Q8H     thiamine  100 mg Intravenous Daily     vancomycin (VANCOCIN) IV  1,000 mg Intravenous Q12H       dexmedetomidine       dextrose       dextrose       dextrose 5% lactated ringers Stopped (09/18/20 0600)     fentaNYL 125 mcg/hr (09/18/20 0949)     insulin (regular) 4 Units/hr (09/18/20 0858)     midazolam 3 mg/hr (09/18/20 0747)     norepinephrine 0.11 mcg/kg/min (09/18/20 0944)     parenteral nutrition - Clinimix E 65 mL/hr at 09/18/20 0748     phenylephrine Stopped (09/17/20 1035)     sodium chloride 20 mL/hr at 09/18/20 0748     vasopressin 2.4 Units/hr (09/18/20 0749)               Physical Examination:   Temp:  [89.8  F (32.1  C)-99.1  F (37.3  C)] 96.6  F (35.9  C)  Pulse:  [] 95  Resp:  [12-21] 18  MAP:  [58 mmHg-149 mmHg] 72 mmHg  Arterial Line BP: ()/(40-73) 120/55  FiO2 (%):  [30 %-60 %] 60 %  SpO2:  [81 %-100 %] 93 %    Intake/Output Summary (Last 24 hours) at 9/14/2020 1133  Last data filed at 9/14/2020 1000  Gross per 24 hour   Intake 9868.41 ml   Output 2565 ml   Net 7303.41 ml     Wt Readings from Last 4 Encounters:   09/18/20 65 kg (143 lb 4.8 oz)     Arterial Line BP: ()/(40-73) 120/55  MAP:  [58 mmHg-149 mmHg] 72 mmHg  Ventilation Mode: CMV/AC  (Continuous Mandatory Ventilation/ Assist Control)  FiO2 (%): 60 %  Rate Set (breaths/minute): 18 breaths/min  Tidal Volume Set (mL): 460 mL  PEEP (cm H2O): 10 cmH2O  Oxygen Concentration (%): 60 %  Resp: 18    Recent Labs   Lab 09/17/20  2340 09/17/20  2107 09/17/20  1630 09/16/20  2200  09/13/20  1815  09/11/20  1000   PH 7.30* 7.24*  7.13* 7.33*   < > 7.39   < > 7.39   PCO2 38 46* 60* 33*   < > 31*   < > 26*   PO2 91 106* 82 61*   < > 183*   < > 99   HCO3 19* 20* 20* 17*   < > 19*   < > 16*   O2PER 40% 40  --   --   --  60  --  30%    < > = values in this interval not displayed.       GEN: no acute distress   HEENT: head ncat, sclera anicteric, OP patent, trachea midline   PULM: unlabored synchronous with vent, clear anteriorly    CV/COR: RRR S1S2 no gallop,  No rub, no murmur  ABD: soft nontender, hypoactive bowel sounds, no mass  EXT:  Edema   warm  NEURO: Consistent with chemical sedation   SKIN: no obvious rash  LINES: clean, dry intact         Data:   All data and imaging reviewed     ROUTINE ICU LABS (Last four results)  CMP  Recent Labs   Lab 09/18/20  0759 09/18/20  0415 09/17/20  1630 09/17/20  1430 09/17/20  0345 09/16/20  2133 09/16/20  1845 09/16/20  0400  09/15/20  0354  09/14/20  0400  09/13/20  1815   NA  --  141 143  --  141 142  --  141  --  140  --  140   < > 140   POTASSIUM  --  3.2* 3.6 3.7 3.3* 3.4  --  3.5   < > 3.2*   < > 3.0*   < > 3.5   CHLORIDE  --  115* 118*  --  116* 116*  --  114*  --  113*  --  111*   < > 111*   CO2  --  21 23  --  18* 17*  --  21  --  21  --  23   < > 21   ANIONGAP  --  5 2*  --  7 9  --  6  --  6  --  6   < > 8   GLC  --  184* 129*  --  189* 95  --  137*  --  124*  --  189*   < > 94   BUN  --  7 4*  --  3* 3*  --  4*  --  5*  --  5*   < > 6*   CR  --  0.52 0.42*  --  0.46* 0.40*  --  0.37*  --  0.28*  --  0.35*   < > 0.35*   GFRESTIMATED  --  >90 >90  --  >90 >90  --  >90  --  >90  --  >90   < > >90   GFRESTBLACK  --  >90 >90  --  >90 >90  --  >90  --  >90  --  >90   < > >90   PARISH  --  6.1* 6.4*  --  6.1* 7.0*  --  6.3*  --  6.2*  --  6.3*   < > 6.2*   MAG  --  1.7  --   --  1.7  --   --  2.3  --  2.0   < > 2.9*   < >  --    PHOS  --  2.3*  --   --  3.0 2.2*  --  2.0*   < > 1.9*   < > 1.8*  --   --    PROTTOTAL  --   --  4.2*  --  3.8*  --   --   --   --   --   --  4.1*  --  3.4*   ALBUMIN  --    --  2.6*  --  2.3*  --  2.0*  --   --   --   --  2.1*  --  1.6*   BILITOTAL 1.3  --  1.6*  --  1.9*  --   --   --   --   --   --  1.7*  --  1.8*   ALKPHOS  --   --  203*  --  81  --   --   --   --   --   --  49  --  64   AST 33  --  30  --  27  --   --   --   --   --   --  42  --  37   ALT 10  --  10  --  10  --   --   --   --   --   --  17  --  14    < > = values in this interval not displayed.     CBC  Recent Labs   Lab 09/18/20 0415 09/17/20  1630 09/17/20  0345 09/16/20  2133   WBC 24.3* 36.5* 38.8* 37.3*   RBC 1.88* 2.26* 2.75* 2.96*   HGB 6.2* 7.4* 8.9* 9.8*   HCT 18.6* 22.6* 27.5* 29.1*   MCV 99 100 100 98   MCH 33.0 32.7 32.4 33.1*   MCHC 33.3 32.7 32.4 33.7   RDW 14.9 14.7 14.5 14.4    217 205 192     INR  Recent Labs   Lab 09/18/20  0415 09/17/20  0345 09/16/20  0400 09/15/20  0830   INR 2.03* 2.08* 1.36* 1.29*     Arterial Blood Gas  Recent Labs   Lab 09/17/20  2340 09/17/20  2107 09/17/20  1630 09/16/20  2200  09/13/20  1815  09/11/20  1000   PH 7.30* 7.24* 7.13* 7.33*   < > 7.39   < > 7.39   PCO2 38 46* 60* 33*   < > 31*   < > 26*   PO2 91 106* 82 61*   < > 183*   < > 99   HCO3 19* 20* 20* 17*   < > 19*   < > 16*   O2PER 40% 40  --   --   --  60  --  30%    < > = values in this interval not displayed.       All cultures:  Recent Labs   Lab 09/17/20  0201 09/17/20  0155 09/17/20  0054 09/16/20  2200   CULT Culture negative monitoring continues  Culture in progress Culture negative monitoring continues  Culture in progress No growth after 1 day No growth after 1 day     Recent Results (from the past 24 hour(s))   XR Chest Port 1 View    Narrative    EXAM: XR CHEST PORT 1 VW  LOCATION: [facility]  DATE/TIME: 9/18/2020 4:59 AM    INDICATION: Hypoxemia.  COMPARISON: 09/16/2020.      Impression    IMPRESSION: Diffuse bilateral pulmonary infiltrates throughout both lungs with some consolidation in the right lower lung. These have significantly progressed since 09/16/2020. Heart size  within normal limits. Endotracheal tube, enteric tubes, and left   PICC line in stable position.                      Billing: This patient is critically ill: yes Total critical care time today 32 min.

## 2020-09-18 NOTE — PROGRESS NOTES
CLINICAL NUTRITION SERVICES - REASSESSMENT NOTE      Malnutrition: (9/14)  % Weight Loss:  History not available   % Intake:  </= 50% for >/= 5 days (severe malnutrition)  Subcutaneous Fat Loss:  Orbital region severe depletion  Muscle Loss:  Temporal region severe depletion and Clavicle bone region severe depletion  Fluid Retention:  Moderate as above      Malnutrition Diagnosis: Severe malnutrition  In Context of:  Acute illness or injury  Environmental or social circumstances       EVALUATION OF PROGRESS TOWARD GOALS   Diet:  NPO on vent     Nutrition Support:  Patient started on TPN 9/16, advanced to goal on 9/17, and continues as follows ~    Nutrition Support Parenteral:  Type of Access: PICC  Parenteral Frequency:  Continuous  Parenteral Regimen: D15 AA5 at 65 mL/hr + Lipid 250 mL every 48 hours   Total Parenteral Provisions: 1358 kcal, 78 g protein (1.7 g/kg), 234 g CHO  Also on D5 containing IVF at 85 mL/hr= 102 g CHO, 347 kcal   Total (TPN/Lipid + D5)= 1705 kcal (37 kcal/kg and 107% needs)      Intake/Tolerance:    K 3.2 (L) - being replaced, Mg normal, Phos 2.3 (L) - being replaced   -184 on Insulin drip   Stool 70 mL, drains 2737 mL  I/O 7593/4569, wt 65 kg (up 19.1 kg from dosing wt), +3-4 edema (weeping)      ASSESSED NUTRITION NEEDS:  Dosing Weight 45.9 kg   Estimated Energy Needs: 0074-0913 kcals (30-35 Kcal/Kg)  Justification: repletion, underweight and vented  Estimated Protein Needs: 60-85 grams protein (1.3-1.8 g pro/Kg)  Justification: repletion, post-op and hypercatabolism with critical illness      NEW FINDINGS:   Pressor reliance x 2    9/17:  Ex lap, washout, end descending colostomy     Propofol was off 9/16 and Lipid was therefore added to the TPN regimen       Previous Goals (9/14):   Patient will receive nutrition within the next 24-48 hours   Evaluation: Met    Previous Nutrition Diagnosis (9/14):   Inadequate protein-energy intake related to NPO on vent as evidenced by meeting  0% protein and 50% energy needs from D5 IVF and Propofol   Evaluation: Resolved       CURRENT NUTRITION DIAGNOSIS  No nutrition diagnosis identified at this time    INTERVENTIONS  Recommendations / Nutrition Prescription  Continue goal TPN/Lipid as above     Implementation  Collaboration and Referral of Nutrition care:  Discussed with Pharmacist - no TPN changes today     Goals  TPN/Lipid will continue to meet % needs     MONITORING AND EVALUATION:  Progress towards goals will be monitored and evaluated per protocol and Practice Guidelines    Neli Trejo RD, LD, CNSC   Clinical Dietitian - [facility]

## 2020-09-18 NOTE — PROGRESS NOTES
Intensive Care Daily Note          Assessment and Plan:     Summary Statement:  Evonne Martel is a 54 year old female admitted on 9/10/2020 for septic shock due to cecal perforation and sigmoid colon perforation, likely secondary to perforated diverticulitis. She underwent exploratory laparotomy, right colectomy with end ileostomy, and transverse mucous fistula. She initially did well postoperatively, but developed tachycardia, tachypnea and hypotension. She was taken back to the OR on 9/13 and was found to have gross stool spillage in abdomen with at least 3 sigmoid perforations. She underwent washout and anterior resection performed with drain placement. The patient had very friable tissues in fascia closure, so the patient was placed on paralytics overnight (discontinued (9/14). She had worsening hypotension on 9/17, so CT was obtained and showed possible leak at the descending colon. She went back to the OR (9/17) and was found to have stool leaking from the staple line at the descending colon; washout and end colostomy was performed.  My assessment and plan for this patient is as follows:    Neurology: Patient is receiving midazolam and fentanyl for sedation/analgesia. Goal RASS of -5 to -3 until she is off pressors.   Cardiovascular/Hemodynamics: Patient remains hypotensive secondary to septic shock and hypovolemia requiring multiple pressors. She is post-op from a 3rd operation. She will be weaned off of pressors as tolerated. Fluid resuscitation with D5-LR at 150 ml/hr.   Pulmonary: Mechanically ventilated. Patient is receiving adequate oxygenation and ventilation with FiO2 60% and PEEP 8.    GI and Nutrition: Perforated colon s/p ex lap, colectomy, and abdominal washout (9/10, re-exploration 9/13 and 9/17)  - Management per colorectal surgery  - NPO  - TPN   - NG to LIS  - Monitor GAB output   Renal: Concern for hypovolemia. Receiving fluid replacement with D5-LR at 150 ml/hr and scheduled 5% albumin.      Hypokalemia, hypophosphatemia, with concern for possible refeeding syndrome given history of alcohol use.  - replacement protocol   Infectious Disease: Septic shock secondary to perforated colon with feculent peritonitis. CXR on  shows diffuse bilateral infiltrates and consolidation of RLL.   - Continue antibiotic regimen: meropenem, metronidazole, micafungin, and vancomycin    Hematology and Oncology: Acute normocytic anemia  - Hgb dropped to 6.2; patient received 1 unit pRBC  - Continue to monitor Hgb, transfuse if Hgb <7    Leukocytosis secondary to septic shock from bowel perforations and concern for pneumonia.  - Continue antibiotic regimen as above   Endocrinology: Insulin per unit protocol       Intensive Care: Central line: Central line present, needed and to be continued  Arterial line: Arterial line present, needed and to be continued  Ferguson catheter:  In place  NG tube: LIS  Drains: abdominal GAB drain  Restraint:Needed   Others: n/a   Top goals for today Wean off pressors as tolerated  Continue antibiotic regimen as above                Key events/ Interval history - last 24 hours:    Overnight, the patient had a Hgb of 6.2 and received 1 unit pRBCs. She also desatted to 86% and had increased crackles on exam. She was increased to FiO2 of 60% and PEEP of 8 with improvement in her saturations.          Problem list:     Severe sepsis (H)    Pneumoperitoneum    Colon perforation (H)    Free intraperitoneal air    Perforation of colon (H)    * No resolved hospital problems. *             Medications:   I have reviewed this patient's current medications              Physical Exam:   Vital Sign Ranges  Temperature Temp  Av.5  F (36.4  C)  Min: 93.9  F (34.4  C)  Max: 100.4  F (38  C)   Blood pressure Systolic (24hrs), Av , Min:76 , Max:157        Diastolic (24hrs), Av, Min:52, Max:91      Pulse Pulse  Av.4  Min: 66  Max: 148   Respirations Resp  Av.2  Min: 10  Max: 31   Pulse  oximetry SpO2  Av.9 %  Min: 86 %  Max: 100 %         Intake/Output Summary (Last 24 hours) at 2020 1023  Last data filed at 2020 1000  Gross per 24 hour   Intake 9868.41 ml   Output 2915 ml   Net 6953.41 ml         General Appearance:   Sedated on ventilator   HEENT:   Trachea is midline  Endotrachael tube is present   Chest and Lungs:   Occasional crackles bilaterally   Cardiovascular:   Tachycardic, regular rhythm, no murmurs   Abdomen:   midline dressings, both stomas are pink, no stool present   :   Ferguson in place   Musculoskeletal:   Not assessed   Extremities and Skin:   Warm, well perfused   Neuro:   Sedation: sedated   Drains and Tubes:   NG in place, GAB with yellow output   Intravascular Access and Device:   Lines present: triple lumen catheter (right internal jugular) and arterial line (radial)            Data:   Labs:  All lab results reviewed past 24 hours    Imaging:  All imaging results reviewed past 24 hours    CXR 2020  IMPRESSION: Diffuse bilateral pulmonary infiltrates throughout both lungs with some consolidation in the right lower lung. These have significantly progressed since 2020. Heart size within normal limits. Endotracheal tube, enteric tubes, and left   PICC line in stable position.           Carmencita Araiza, MS4    Reviewed note with medical student, please see my note documenting my independent visit

## 2020-09-18 NOTE — PROVIDER NOTIFICATION
MD Notification    Notified Person: Dr. Beltran, Intensivist    Notification Date/Time: 9/17/2020, 10:00PM    Notification Interaction:  Message left    Purpose of Notification:  ABG Results    Orders Received:  Vent changes ordered and ABG 30 minutes after    Comments:  Reviewed follow up ABG after vent changes.  Keep current settings;  Ventilation Mode: CMV/AC  (Continuous Mandatory Ventilation/ Assist Control)  FiO2 (%): 40 %  Rate Set (breaths/minute): (S) 18 breaths/min  Tidal Volume Set (mL): (S) 460 mL  PEEP (cm H2O): 5 cmH2O  Oxygen Concentration (%): 40 %  Resp: 18

## 2020-09-18 NOTE — PHARMACY-CONSULT NOTE
TPN formula evaluated based on today s labs results.    The following changes have been made:  Chloride:Acetate ratio: changed to maximum acetate  Pharmacy will continue to follow and adjust as appropriate.  Nancy Lawson, VladimirD

## 2020-09-18 NOTE — OP NOTE
Procedure Date: 09/18/2020      PREOPERATIVE DIAGNOSES:     1.  History of recent colectomy with end ileostomy, ascending colonic mucous fistula, blind descending colon stump, and Ayo closure of the rectum.   2.  Recurrent abdominal sepsis.      POSTOPERATIVE DIAGNOSES:   1.  History of recent open colectomy with end ileostomy, mucous fistula of the ascending colon, blind end closure of the descending colon with staple line leak, Ayo closure of the rectum.   2.  Recurrent abdominal sepsis.      PROCEDURES:   1.  Exploratory laparotomy.   2.  Abdominal washout.   3.  Creation of end descending colon mucous fistula.      STAFF SURGEON:  Mikey Fermin MD      FIRST ASSISTANT:  Sukhjinder Chavez MD, [facility] Corrective Surgery Fellow      ANESTHESIA:  General.      ESTIMATED BLOOD LOSS:  25 mL      SPECIMENS:  Cultures obtained.      INDICATIONS:  Evonne is a pleasant 54-year-old female who presented to the hospital with a large bowel obstruction and free air on 09/10/2020.  At that time, the patient had a cecal perforation, as well as a suspected sigmoid colon perforation.  She underwent an exploratory laparotomy, right colectomy, end ileostomy and a mucous fistula of the transverse colon, as well as drainage of the pelvis.  There was a suspected perforation in the sigmoid colon, but this was found to be quite difficult to deal with, and given the patient's hemodynamic instability, just wide drainage of the pelvis was performed and then fecal diversion with end ileostomy.  Unfortunately, the patient developed recurrent pelvic sepsis, and she was taken back to the operating room on 09/13/2020 where feculent peritonitis was demonstrated, and she underwent a repeat exploratory laparotomy, a washout, an anterior resection with removal of the sigmoid colon and the upper rectum, a Ayo's closure of the rectum and placement of new drains.  A blind stapled off end the descending colon was left in the  abdomen.  She was quite thick at this time still, remained intubated in the Intensive Care Unit, but had overall improving sepsis parameters postoperatively over the last couple of days.  On the evening of surgery, the patient unfortunately developed a significant increase in her pressor support, as well as increase in her lactate and white blood cell count.  We repeated her CT scan.  Unfortunately, CT scan was concerning for a leak of the descending colon staple line.  Given her clinical status and the CT findings, I recommended a repeat laparotomy.  The risks of surgery including the risk of bleeding and infection were discussed with her .  He provided informed consent.      DESCRIPTION OF PROCEDURE:  The patient was brought down to the operating room from the Intensive Care Unit.  She was still intubated, sedated and on pressor support but otherwise maintaining adequate blood pressure, heart rate and adequate perfusion.  A Ferguson catheter was already in place.  She was carefully transferred over to the bed, onto the operating table in the supine position.  Her arms were out on arm boards.  The abdomen was prepped with Betadine and draped in the usual sterile fashion.  We left her abdominal drain in place, and the stoma of the ileostomy was prepped into the field.  A timeout was undertaken, which identified the patient and correct procedure.      We removed the skin staples and identified the sutures at the fascial closure.  As Dr. Andres had previously described, the fascia, particularly at the inferior portion of the wound, was quite poor in quality and tissue strength.  The sutures were cut, and the internal retention sutures were cut to fully open up the fascia.  We entered the fascia without too much difficulty and without any injury to the underlying viscera.  Fortunately, there were fairly minimal adhesions of the omentum and the small bowel to the anterior abdominal wall and very few interloop  adhesions.  We promptly were met with a modest amount of purulent and feculent-appearing ascites.  Exploration, particularly in the left lower quadrant, demonstrated stool within the abdomen in the left lower quadrant, and the descending colon was identified.  There was an obvious 1 cm perforation of the staple line with easy expression of soft stool.  We were able to mobilize the colon and the descending colon in a lateral-to-medial fashion up to the level of the splenic flexure.  We then irrigated out the abdomen with several liters of warm normal saline.  This was done with approximately 8 liters of saline.  We then elected to create an end descending colon colostomy, which will serve as a mucous fistula given that her descending, transverse and remaining ascending colon are essentially out of continuity given that she has an end ileostomy.  We did look down into the pelvis.  The uterus appeared normal.  The bilateral ovaries appeared normal, and the rectal stump appeared to be healed without any evidence of sepsis down along the stump line.  Therefore, a disk of skin was excised fairly laterally given that we anticipated some difficulties in closing the midline fashion.  A disk of skin was excised, and dissection was taken down with electrocautery.  The fascia was incised in a craniocaudal fashion.  The muscle fibers of the abdominal wall were spread, and the posterior fascia was incised in a craniocaudal fashion with a laparotomy pad within the abdomen to prevent any injury to the underlying viscera.  The descending colon was then brought through the trephine in the proper orientation.  We then closed the midline fascia with 2 sutures of looped 0 PDS with an interrupted internal retention sutures of 0 Vicryl.  The fascia, particularly just inferior to the umbilicus and in the lower midline, was of fairly poor quality, but we tried to bring things close to closure as we could.  The skin was irrigated.  The skin  was closed with multiple staples in the midline.  We then matured the end colostomy by excising any remaining staple line and maturing it in a Daria fashion with interrupted 3-0 Vicryl sutures.  Stoma appliances were applied to both of the mucous fistula and colostomy, as well as the end ileostomy.  The midline was treated with an incisional wound VAC.  Overall, she tolerated the procedure well.  She was then transferred to the Intensive Care Unit in critical condition.  Lap, sponge and needle counts were correct at the end of the case x 2.         MIKEY FERMIN MD             D: 2020   T: 2020   MT:       Name:     DREA RASMUSSEN   MRN:      [id number]        Account:        [id number]   :      1965           Procedure Date: 2020      Document: [id number]       cc: Mikey Fermin MD

## 2020-09-18 NOTE — PLAN OF CARE
OT/PT: Per discussion with RN, pt is not appropriate for therapy today. Therapy will continue to follow on 9/20/2020.

## 2020-09-18 NOTE — PLAN OF CARE
Sedated on versed drip to maintain RAAS -4/-5.  Continues to require pressors to maintain MAP > 65 mmHg.  Labile blood pressures despite adequate IVF, TPN/lipids, scheduled albumin. Severe, weeping edema throughout entire body.  Lung sounds worsened around 4 am with associated desating.  See MD note.  Vent changes at that time remain in place.  1 unit of PRBC's infusing for Hgb 6.2.  Lasix given with improvement in UOP.  Began replacing potassium.  Phosphorus needs replacement.  TPN and lipids infusing.  Insulin drip.  Sister updated on POC.

## 2020-09-19 NOTE — PHARMACY-VANCOMYCIN DOSING SERVICE
Pharmacy Vancomycin Note  Date of Service 2020  Patient's  1965   54 year old, female    Indication: Intra-abdominal infection  Goal Trough Level: 15-20 mg/L  Day of Therapy: 4  Current Vancomycin regimen:  1000 mg IV q12h    Current estimated CrCl = Estimated Creatinine Clearance: 89.4 mL/min (based on SCr of 0.75 mg/dL).    Creatinine for last 3 days  2020:  9:33 PM Creatinine 0.40 mg/dL  2020:  3:45 AM Creatinine 0.46 mg/dL;  4:30 PM Creatinine 0.42 mg/dL  2020:  4:15 AM Creatinine 0.52 mg/dL;  8:05 PM Creatinine 0.69 mg/dL  2020:  4:14 AM Creatinine 0.77 mg/dL;  4:18 AM Creatinine 0.75 mg/dL    Recent Vancomycin Levels (past 3 days)  2020: 11:10 PM Vancomycin Level 21.6 mg/L  2020:  2:28 PM Vancomycin Level 31.1 mg/L    Vancomycin IV Administrations (past 72 hours)                   vancomycin (VANCOCIN) 1000 mg in dextrose 5% 200 mL PREMIX (mg) 1,000 mg New Bag 20 0458     1,000 mg New Bag 20 1551     1,000 mg New Bag  0204    vancomycin (VANCOCIN) 1000 mg in dextrose 5% 200 mL PREMIX (mg) 1,000 mg New Bag 20 1709     1,000 mg New Bag  0902     1,000 mg New Bag  0000                Nephrotoxins and other renal medications (From now, onward)    Start     Dose/Rate Route Frequency Ordered Stop    20 153  vancomycin place juarez - receiving intermittent dosing      1 each Intravenous SEE ADMIN INSTRUCTIONS 20 1531      20  norepinephrine (LEVOPHED) 16 mg in  mL infusion      0.03-0.4 mcg/kg/min × 45.9 kg  1.3-17.2 mL/hr  Intravenous CONTINUOUS 20  vasopressin 40 units in NS 40 mL (PITRESSIN) infusion      2.4 Units/hr  2.4 mL/hr  Intravenous CONTINUOUS 20               Contrast Orders - past 72 hours (72h ago, onward)    None          Interpretation of levels and current regimen:  Trough level is  Supratherapeutic (9 hr level)    Has serum creatinine changed > 50% in last 72  hours: Yes    Urine output:  marginal    Renal Function: May be worsening, watching closely    Plan:  1.  Hold dosing for now & change to intermittent per levels.  2.  Pharmacy will check trough levels as appropriate in AM 9/20.        Ingris Blount [facility]        .

## 2020-09-19 NOTE — PROVIDER NOTIFICATION
Dr. Patel updated on bradycardia on low 40s. Change vasopressin to 1.2ml/h and keep on levo to keep MAP of 65 and higher. Will continue to monitor.,

## 2020-09-19 NOTE — PROGRESS NOTES
COLON & RECTAL SURGERY  PROGRESS NOTE    September 19, 2020  Post-op Day # 9 ex lap with washout, right colectomy, end ileostomy, and transverse MF/  POD #6 ex lap with anterior resection, washout  POD #2 ex lap, washout, end descending colostomy      SUBJECTIVE:  Remains on pressors and intubated. Norepinephrine weaned off briefly this morning but had to be restarted. Incisional wound VAC with high output overnight, serous, but slowed down this morning. Leukocytosis improving. No bowel function yet.     OBJECTIVE:  Temp:  [88  F (31.1  C)-98.6  F (37  C)] 96.6  F (35.9  C)  Pulse:  [45-85] 46  Resp:  [10-20] 19  MAP:  [49 mmHg-238 mmHg] 102 mmHg  Arterial Line BP: ()/() 151/73  FiO2 (%):  [30 %-60 %] 30 %  SpO2:  [90 %-100 %] 98 %    Intake/Output Summary (Last 24 hours) at 9/19/2020 1240  Last data filed at 9/19/2020 1200  Gross per 24 hour   Intake 7335.69 ml   Output 5150 ml   Net 2185.69 ml       GENERAL:  Intubated, sedated  HEAD: Nomocephalic atraumatic, NG tube in place  RESPIRATORY: On ventilator support  EXTREMITIES: Slightly cool but appear well perfused  ABDOMEN:  Soft, on-distended, stomas pink and viable but no stool in bag, LLQ mucous fistula with small amount of mucous at aperture, midline incisional VAC with serous output  INCISION:  As above    LABS:  Lab Results   Component Value Date    WBC 17.9 09/19/2020     Lab Results   Component Value Date    HGB 7.4 09/19/2020     Lab Results   Component Value Date    HCT 21.9 09/19/2020     Lab Results   Component Value Date     09/19/2020     Last Basic Metabolic Panel:  Lab Results   Component Value Date     09/19/2020      Lab Results   Component Value Date    POTASSIUM 3.7 09/19/2020     Lab Results   Component Value Date    CHLORIDE 112 09/19/2020     Lab Results   Component Value Date    PARISH 6.6 09/19/2020     Lab Results   Component Value Date    CO2 19 09/19/2020     Lab Results   Component Value Date    BUN 16 09/19/2020      Lab Results   Component Value Date    CR 0.75 09/19/2020     Lab Results   Component Value Date     09/19/2020       ASSESSMENT/PLAN: 54 year old female s/p ex lap with right hemicolectomy on 9/10, followed by re-exploration with abdominal washout and anterior resection on 9/13, and ex lap with washout and descending colon mucus fistula on 9/17. She remains intubated in the ICU, vasoactive medications being weaned off as able. One pRBC yesterday and hemoglobin stable thereafter.    1. Continue weaning pressors as tolerated  2. NPO with NG tube and TPN. Would avoid enteral feeds until weaned off of pressors for at least 12 hours.  3. Continue drains and incisional VAC. Output increased overnight, may be small area of fascial separation, will continue to monitor.   4. Continue antibiotics for at least 5 days postoperatively from last surgery for contamination.   Discussed with Dr. Brannon        For questions/paging, please contact the CRS office at [phone].

## 2020-09-19 NOTE — PROGRESS NOTES
[facility] ICU RESPIRATORY NOTE        Date of Admission: 9/10/2020    Date of Intubation (most recent): 9/10/20    Reason for Mechanical Ventilation: Airway protection     Number of Days on Mechanical Ventilation: 10    Met Criteria for Spontaneous Breathing Trial: No    Reason for No Spontaneous Breathing Trial: per MD     Significant Events Today: none     ABG Results:   Recent Labs   Lab 09/18/20  1432 09/18/20  1010 09/17/20  2340 09/17/20  2107   PH 7.30* 7.24* 7.30* 7.24*   PCO2 38 47* 38 46*   PO2 109* 86 91 106*   HCO3 19* 20* 19* 20*   O2PER Ventilator 60 40% 40       Current Vent Settings: Ventilation Mode: CMV/AC  (Continuous Mandatory Ventilation/ Assist Control)  FiO2 (%): 60 %  Rate Set (breaths/minute): 20 breaths/min  Tidal Volume Set (mL): 460 mL  PEEP (cm H2O): 10 cmH2O  Oxygen Concentration (%): 50 %  Resp: 19      Skin Assessment: intact     Plan: continue full ventilatory support and assess for daily weaning readiness.     Katheryn Barrett, RT

## 2020-09-19 NOTE — PLAN OF CARE
[facility] Intensive Care Unit   Nursing Note                                                       Neuro:  PERRL.  No movement of extremities. Does not follow commands.  Cardiovascular:  RRR.  Hemodynamically stable on 2 pressors.  Periodic bradycardia.  Pulmonary:  Tolerating ventilator onversed and fentanyl.  Oxygen saturation > 92  GI/:  Marginal UOP.  Endocrine: Glucose well controlled with insulin drip.  Skin:  Weeping serous fluid from many sites on arms and abdomen.  Protective mepilex applied to sacrum.  Drains:  GAB and Wound VAC with high output of serous fluid, as well as from drain insertion sites.  Restraints:  Not in use or necessary.  AM labs noted; electrolytes replaced as indicated.  Continue to monitor closely.    Recent Labs   Lab 09/18/20  1432 09/18/20  1010 09/17/20  2340 09/17/20  2107   PH 7.30* 7.24* 7.30* 7.24*   PCO2 38 47* 38 46*   PO2 109* 86 91 106*   HCO3 19* 20* 19* 20*   O2PER Ventilator 60 40% 40       ROUTINE IP LABS (Last four results)  BMP  Recent Labs   Lab 09/19/20 0418 09/19/20 0414 09/18/20 2005 09/18/20 1035 09/18/20 0415 09/17/20  1630 09/17/20  0345     --   --   --  141 143  --  141   POTASSIUM 3.7  --  3.8 3.9 3.2* 3.6   < > 3.3*   CHLORIDE 112*  --   --   --  115* 118*  --  116*   PARISH 6.6*  --   --   --  6.1* 6.4*  --  6.1*   CO2 19*  --   --   --  21 23  --  18*   BUN 16  --   --   --  7 4*  --  3*   CR 0.75 0.77 0.69  --  0.52 0.42*  --  0.46*   *  --   --   --  184* 129*  --  189*    < > = values in this interval not displayed.     CBC  Recent Labs   Lab 09/19/20 0414 09/18/20 2005 09/18/20 1432 09/18/20  1035 09/18/20 0415 09/17/20  1630 09/17/20  0345   WBC 17.9*  --   --   --  24.3* 36.5* 38.8*   RBC 2.37*  --   --   --  1.88* 2.26* 2.75*   HGB 7.4* 7.4* 7.3* 8.0* 6.2* 7.4* 8.9*   HCT 21.9*  --   --   --  18.6* 22.6* 27.5*   MCV 92  --   --   --  99 100 100   MCH 31.2  --   --   --  33.0 32.7 32.4   MCHC 33.8  --   --   --  33.3  32.7 32.4   RDW 16.4*  --   --   --  14.9 14.7 14.5   *  --   --   --  156 217 205     INR  Recent Labs   Lab 09/19/20  0414 09/18/20  1010 09/18/20  0415 09/17/20  0345   INR 1.73* 1.88* 2.03* 2.08*     LACTIC ACID  Lactic Acid (mmol/L)   Date Value   09/18/2020 1.2   09/17/2020 1.2   09/17/2020 1.7   09/17/2020 2.8 (H)     Blood Glucose  Glucose (mg/dL)   Date Value   09/19/2020 132 (H)   09/19/2020 168 (H)   09/19/2020 170 (H)   09/19/2020 162 (H)   09/18/2020 111 (H)   09/18/2020 96       Intake/Output Summary (Last 24 hours) at 9/19/2020 0636  Last data filed at 9/19/2020 0600  Gross per 24 hour   Intake 7122.99 ml   Output 5280 ml   Net 1842.99 ml       Daren Vargas MSN RN PHN CCRN  [facility]  Intensive Care Unit

## 2020-09-19 NOTE — PHARMACY-CONSULT NOTE
TPN formula evaluated based on today s labs results.    The following changes have been made:    Calcium:  increased to 9 mEq/day .    Pharmacy will continue to follow and adjust as appropriate.  Nancy Lawson, VladimirD

## 2020-09-19 NOTE — PROGRESS NOTES
Critical Care Progress Note      09/19/2020    Name: Evonne Martel MRN#: [id number]   Age: 54 year old YOB: 1965     Hsptl Day# 9  ICU DAY # 9    MV DAY # 9               Problem List:   Active Problems:    Severe sepsis (H)    Pneumoperitoneum    Colon perforation (H)    Free intraperitoneal air    Perforation of colon (H)           Summary/Hospital Course:   54-year-old female with history of alcohol use admitted to the ICU for septic shock after multiple bowel perforations requiring exploratory laparotomy right colectomy with end ileostomy and mucous fistula.  Events of overnight are noted for worsening sepsis and the patient was taken to the operating room for reexploration.  She was noted to have a dehiscence on her anastomotic suture line.  She was returned to the OR in continued septic shock requiring vasopressin and norepinephrine.  She was started on broad-spectrum antibiotics.  Patient remains critically ill with intra-abdominal sepsis and concern for multisystem organ failure.  Her creatinine continues to rise and her urine output remains marginal.  Continue attempts to wean norepinephrine and vasopressin.  Patient is intermittently off norepinephrine last night.  Patient has some episodes of bradycardia this a.m.  Concern of vasoconstriction secondary to vasopressin will attempt to wean vasopressin and restart norepinephrine if needed.    Evonne Martel IS a 54 year old female admitted on 9/10/2020 for acute respiratory failure, acute blood loss anemia, septic shock and hypokalemia.   I have personally reviewed the daily labs, imaging studies, cultures and discussed the case with referring physician and consulting physicians.     My assessment and plan by system for this patient is as follows:    Neurology/Psychiatry:   1.  Off chemical paralysis  2. Analgesia fentanyl for pain    Plan  Will attempt sedation holiday at this time.  Then will resume on fentanyl and Versed in order to  achieve sedation goal of -3 to -4.    Cardiovascular:   1.Hemodynamics -hypotensive requiring multiple pressors secondary to septic shock  2.Rhythm -normal sinus rhythm  3. Ischemia -no signs of ischemia  Plan  Patient was mildly tachycardic now bradycardic after stopping norepinephrine and continue vasopressin we could restart norepinephrine and wean the vasopressin to see if that helps with the patient's bradycardia.  As long as the patient is maintaining blood pressure we will continue to monitor.    Pulmonary/Ventilator Management:   1. Airway intubated  2. Oxygenation/ventilation/mechanics on full ventilatory support  Plan  -Continue patient on full ventilatory support her oxygenation is marginally improved.  Able to wean down FiO2 back to 30% and PEEP back down to 5.  Still concern for acute lung injury at this time.  We will continue to monitor the patient.            GI and Nutrition :   1.  N.p.o.  2.  Concern for protein calorie malnutrition    Plan  -Will remain n.p.o. and we will continue TPN      Renal/Fluids/Electrolytes:   1.  Creatinine has continued to climb now 0.75, concern for acute kidney injury  2.  Hypokalemia  3.  Acid-base status appears within normal limits   4. Volume status appears hypovolemic  Plan  -Increase minute ventilation on the vent in order to correct for him respiratory acidosis.  We will also add uric acid her hyper chloremia causing her metabolic acidosis with the TPN  -Needed with replacement per ICU protocols.  - generally avoid nephrotoxic agents such as NSAID, IV contrast unless specifically required  - adjust medications as needed for renal clearance  - follow I/O's as appropriate.      Infectious Disease:   1.  Concern for intra-abdominal sepsis  2.  3.  Plan  Agree with broad-spectrum antibiotics.  Antibiotics were broadened last p.m.  Antifungals were added.  We will continue with his current regimen until we get culture results.    Endocrine:   1.  Concern for  stress-induced hyperglycemia  2.  Continue hydrocortisone    Plan  - ICU insulin protocol, goal sugar <180      Hematology/Oncology:   1.  Leukocytosis most likely secondary to acute phase reaction and intra-abdominal sepsis however leukocytosis is trending down.  2.  Acute blood loss anemia anemia, patient had appropriate response to 1 unit of packed red blood cells we will continue to monitor.     Plan  -We will continue to monitor, has follow-up INR and follow-up hemoglobin due today.            IV/Access:   1. Venous access -PICC line in place  2. Arterial access -arterial line needed  3.  Plan  - central access required and necessary      ICU Prophylaxis:   1. DVT: Mechanical  2. VAP: HOB 30 degrees, chlorhexidine rinse  3. Stress Ulcer: PPI  4. Restraints: Nonviolent soft two point restraints required and necessary for patient safety and continued cares and good effect as patient continues to pull at necessary lines, tubes despite education and distraction. Will readdress daily.   5. Wound care - per unit routine   6. Feeding -n.p.o.  7. Family Update: Discussed with patient's  severity of patient's illness and my concern for acute kidney injury given her worsening creatinine.  At this time we will hold off on consulting renal however if her creatinine continues to increase will consider renal's input with with regards to management of her acute kidney injury.  This was discussed with her  who became tearful when I explained to him that there is concern of kidney injury and the possibility of dialysis.  8.  Disposition ICU        Key goals for next 24 hours:   1.  Wean pressors as tolerated.  Plan is to wean vasopressin first then norepinephrine.  2.  Continue present sedation regimen   3.  Minimal FiO2 requirements  4.  Continue with broad-spectrum antibiotics               Interim History:   Status post exploratory laparotomy.  Remains critically ill           Key Medications:       sodium  chloride 0.9%  1,000 mL Intravenous Once     chlorhexidine  15 mL Mouth/Throat Q12H     hydrocortisone sodium succinate PF  50 mg Intravenous Q6H     lipids  250 mL Intravenous Q48H     meropenem  1 g Intravenous Q8H     metroNIDAZOLE  500 mg Intravenous Q6H     micafungin  100 mg Intravenous Q24H     pantoprazole (PROTONIX) IV  40 mg Intravenous Daily     sodium chloride (PF)  10 mL Intracatheter Q8H     sodium chloride (PF)  3 mL Intracatheter Q8H     thiamine  100 mg Intravenous Daily     vancomycin (VANCOCIN) IV  1,000 mg Intravenous Q12H       dexmedetomidine       dextrose       dextrose       dextrose 5% lactated ringers 150 mL/hr at 09/19/20 0955     fentaNYL 125 mcg/hr (09/19/20 0800)     insulin (regular) 4 Units/hr (09/19/20 1210)     midazolam 3 mg/hr (09/19/20 0800)     norepinephrine Stopped (09/19/20 1215)     parenteral nutrition - ADULT compounded formula       parenteral nutrition - ADULT compounded formula 65 mL/hr at 09/19/20 0806     phenylephrine Stopped (09/17/20 1035)     sodium chloride 20 mL/hr at 09/19/20 0806     vasopressin 2.4 Units/hr (09/19/20 0927)               Physical Examination:   Temp:  [88  F (31.1  C)-98.6  F (37  C)] 96.6  F (35.9  C)  Pulse:  [45-85] 46  Resp:  [10-20] 19  MAP:  [49 mmHg-238 mmHg] 102 mmHg  Arterial Line BP: ()/() 151/73  FiO2 (%):  [30 %-60 %] 30 %  SpO2:  [90 %-100 %] 98 %    Intake/Output Summary (Last 24 hours) at 9/14/2020 1133  Last data filed at 9/14/2020 1000  Gross per 24 hour   Intake 9868.41 ml   Output 2565 ml   Net 7303.41 ml     Wt Readings from Last 4 Encounters:   09/19/20 66.8 kg (147 lb 4.3 oz)     Arterial Line BP: ()/() 151/73  MAP:  [49 mmHg-238 mmHg] 102 mmHg  Ventilation Mode: CMV/AC  (Continuous Mandatory Ventilation/ Assist Control)  FiO2 (%): 30 %  Rate Set (breaths/minute): 20 breaths/min  Tidal Volume Set (mL): 460 mL  PEEP (cm H2O): 10 cmH2O  Oxygen Concentration (%): 30 %  Resp: 19    Recent Labs   Lab  09/19/20  1105 09/18/20  1432 09/18/20  1010 09/17/20  2340 09/17/20  2107   PH 7.36 7.30* 7.24* 7.30* 7.24*   PCO2 36 38 47* 38 46*   PO2 102 109* 86 91 106*   HCO3 20* 19* 20* 19* 20*   O2PER  --  Ventilator 60 40% 40       GEN: no acute distress   HEENT: head ncat, sclera anicteric, OP patent, trachea midline   PULM: unlabored synchronous with vent, clear anteriorly    CV/COR: RRR S1S2 no gallop,  No rub, no murmur  ABD: soft nontender, hypoactive bowel sounds, no mass  EXT:  Edema   warm  NEURO: Consistent with chemical sedation   SKIN: no obvious rash  LINES: clean, dry intact         Data:   All data and imaging reviewed     ROUTINE ICU LABS (Last four results)  CMP  Recent Labs   Lab 09/19/20 0418 09/19/20 0414 09/18/20 2005 09/18/20  1035 09/18/20  0759 09/18/20  0415 09/17/20  1630  09/17/20  0345 09/16/20  2133 09/16/20  1845 09/16/20  0400  09/14/20  0400  09/13/20  1815     --   --   --   --  141 143  --  141 142  --  141   < > 140   < > 140   POTASSIUM 3.7  --  3.8 3.9  --  3.2* 3.6   < > 3.3* 3.4  --  3.5   < > 3.0*   < > 3.5   CHLORIDE 112*  --   --   --   --  115* 118*  --  116* 116*  --  114*   < > 111*   < > 111*   CO2 19*  --   --   --   --  21 23  --  18* 17*  --  21   < > 23   < > 21   ANIONGAP 7  --   --   --   --  5 2*  --  7 9  --  6   < > 6   < > 8   *  --   --   --   --  184* 129*  --  189* 95  --  137*   < > 189*   < > 94   BUN 16  --   --   --   --  7 4*  --  3* 3*  --  4*   < > 5*   < > 6*   CR 0.75 0.77 0.69  --   --  0.52 0.42*  --  0.46* 0.40*  --  0.37*   < > 0.35*   < > 0.35*   GFRESTIMATED 89 86 >90  --   --  >90 >90  --  >90 >90  --  >90   < > >90   < > >90   GFRESTBLACK >90 >90 >90  --   --  >90 >90  --  >90 >90  --  >90   < > >90   < > >90   PARISH 6.6*  --   --   --   --  6.1* 6.4*  --  6.1* 7.0*  --  6.3*   < > 6.3*   < > 6.2*   MAG  --  2.0  --   --   --  1.7  --   --  1.7  --   --  2.3   < > 2.9*   < >  --    PHOS  --  2.9  --   --   --  2.3*  --   --  3.0 2.2*   --  2.0*   < > 1.8*  --   --    PROTTOTAL  --   --   --   --   --   --  4.2*  --  3.8*  --   --   --   --  4.1*  --  3.4*   ALBUMIN  --   --   --   --   --   --  2.6*  --  2.3*  --  2.0*  --   --  2.1*  --  1.6*   BILITOTAL  --   --   --   --  1.3  --  1.6*  --  1.9*  --   --   --   --  1.7*  --  1.8*   ALKPHOS  --   --   --   --   --   --  203*  --  81  --   --   --   --  49  --  64   AST  --   --   --   --  33  --  30  --  27  --   --   --   --  42  --  37   ALT  --   --   --   --  10  --  10  --  10  --   --   --   --  17  --  14    < > = values in this interval not displayed.     CBC  Recent Labs   Lab 09/19/20 0414 09/18/20 2005 09/18/20  1432 09/18/20  1035 09/18/20 0415 09/17/20  1630 09/17/20  0345   WBC 17.9*  --   --   --  24.3* 36.5* 38.8*   RBC 2.37*  --   --   --  1.88* 2.26* 2.75*   HGB 7.4* 7.4* 7.3* 8.0* 6.2* 7.4* 8.9*   HCT 21.9*  --   --   --  18.6* 22.6* 27.5*   MCV 92  --   --   --  99 100 100   MCH 31.2  --   --   --  33.0 32.7 32.4   MCHC 33.8  --   --   --  33.3 32.7 32.4   RDW 16.4*  --   --   --  14.9 14.7 14.5   *  --   --   --  156 217 205     INR  Recent Labs   Lab 09/19/20 0414 09/18/20  1010 09/18/20 0415 09/17/20  0345   INR 1.73* 1.88* 2.03* 2.08*     Arterial Blood Gas  Recent Labs   Lab 09/19/20  1105 09/18/20  1432 09/18/20  1010 09/17/20  2340 09/17/20  2107   PH 7.36 7.30* 7.24* 7.30* 7.24*   PCO2 36 38 47* 38 46*   PO2 102 109* 86 91 106*   HCO3 20* 19* 20* 19* 20*   O2PER  --  Ventilator 60 40% 40       All cultures:  Recent Labs   Lab 09/17/20  0201 09/17/20  0155 09/17/20  0054 09/16/20  2200   CULT Moderate growth  Lactose fermenting gram negative rods  *  Moderate growth  Pseudomonas aeruginosa  Susceptibility testing done on previous specimen  *  Light growth  Enterococcus avium  Susceptibility testing done on previous specimen  *  Light growth  Candida albicans / dubliniensis  Candida albicans and Candida dubliniensis are not routinely  speciated  Susceptibility testing not routinely done  *  Culture in progress  Culture negative monitoring continues Moderate growth  Lactose fermenting gram negative rods  Susceptibility testing in progress  *  Moderate growth  Pseudomonas aeruginosa  Susceptibility testing in progress  *  Light growth  Enterococcus avium  Susceptibility testing in progress  *  Light growth  Candida albicans / dubliniensis  Candida albicans and Candida dubliniensis are not routinely speciated  Susceptibility testing not routinely done  *  Culture in progress  Culture negative monitoring continues No growth after 2 days No growth after 2 days     No results found for this or any previous visit (from the past 24 hour(s)).      Billing: This patient is critically ill: yes Total critical care time today 31 min.

## 2020-09-19 NOTE — PROGRESS NOTES
[facility] ICU RESPIRATORY NOTE        Date of Admission: 9/10/2020    Date of Intubation (most recent): 9/10/2020    Reason for Mechanical Ventilation: Airway Protection    Number of Days on Mechanical Ventilation: 10    Met Criteria for Spontaneous Breathing Trial: No    Significant Events Today: None    ABG Results:   Recent Labs   Lab 09/19/20  1105 09/18/20  1432 09/18/20  1010 09/17/20  2340 09/17/20  2107   PH 7.36 7.30* 7.24* 7.30* 7.24*   PCO2 36 38 47* 38 46*   PO2 102 109* 86 91 106*   HCO3 20* 19* 20* 19* 20*   O2PER  --  Ventilator 60 40% 40       Current Vent Settings: Ventilation Mode: CMV/AC  (Continuous Mandatory Ventilation/ Assist Control)  FiO2 (%): 30 %  Rate Set (breaths/minute): 20 breaths/min  Tidal Volume Set (mL): 460 mL  PEEP (cm H2O): 10 cmH2O  Pressure Support (cm H2O): 5 cmH2O  Oxygen Concentration (%): 30 %  Resp: 20      Skin Assessment: No issues    Plan: Continue on full ventilatory support t/o night shift.    Carlos Cruz, RT

## 2020-09-20 NOTE — PLAN OF CARE
Hemodynamics relatively stable but unable to wean off Norepi lower than .04 mcg/kg without MAP falling below 60.  Good diuresis with Albumin    , UOP around 100 ml/hr.  Still copious amounts of serous drainage from around GAB site which is leading to discontinuity of VAC dressing.  Multiple attempts to reinforce dressings, still almost one liter out plus saturated pads..   here for morning shift, aware of plan of care.

## 2020-09-20 NOTE — PROGRESS NOTES
[facility] ICU RESPIRATORY NOTE        Date of Admission: 9/10/2020    Date of Intubation (most recent): 9/10/20    Reason for Mechanical Ventilation: Airway protection     Number of Days on Mechanical Ventilation: 11    Met Criteria for Spontaneous Breathing Trial: No    Reason for No Spontaneous Breathing Trial: per MD     Significant Events Today: none     ABG Results:   Recent Labs   Lab 09/19/20  1105 09/18/20  1432 09/18/20  1010 09/17/20  2340 09/17/20  2107   PH 7.36 7.30* 7.24* 7.30* 7.24*   PCO2 36 38 47* 38 46*   PO2 102 109* 86 91 106*   HCO3 20* 19* 20* 19* 20*   O2PER  --  Ventilator 60 40% 40       Current Vent Settings: Ventilation Mode: CMV/AC  (Continuous Mandatory Ventilation/ Assist Control)  FiO2 (%): 30 %  Rate Set (breaths/minute): 20 breaths/min  Tidal Volume Set (mL): 460 mL  PEEP (cm H2O): 10 cmH2O  Pressure Support (cm H2O): 5 cmH2O  Oxygen Concentration (%): 30 %  Resp: 20      Skin Assessment: intact     Plan: continue full ventilatory support and assess for daily weaning readiness.     Katheryn Barrett, RT

## 2020-09-20 NOTE — CONSULTS
Consult Date:  09/20/2020      INFECTIOUS DISEASE CONSULTATION      LOCATION:  Room 361 ICU, [facility].      REFERRING PHYSICIAN:  Dr. Patel.      IMPRESSION:   1.  A 54-year-old female without prior major infection or abdominal issues, admitted with major bowel perforation, now status post 3 operations, abdominal abscess, further leak, now with better source control.   2.  Ongoing septic shock from the above.      RECOMMENDATIONS:   1.  Abdominal cultures now back showing enterococcus, multiple anaerobes, gram-negative rods including a sensitive Pseudomonas.  Has been well covered with microbiology, all covered throughout including currently; however enterococcus probably better covered by penicillin and Zosyn covers everything including excellent VIC to the 2 gram-negative rods in all anaerobes covered.  No need for double anaerobe coverage.  Discontinue Flagyl.  Need to continue micafungin for now, probably fluconazole eventually for the Candida.   2.  Suspect general sepsis and overall condition due to source control issues that hopefully have now been accomplished with multiple interventions.      HISTORY OF PRESENT ILLNESS:  This is a 54-year-old female seen in consultation in the ICU, intubated.   here giving history.  The patient without any prior bowel history, came in 9 days ago with acute abdominal pain, found to have perforated colon, underwent operative intervention at the time, has required to further surgical interventions due to ongoing peritonitis, abscess and leak.  Now with third surgery.  At that surgery, cultures were done and is growing microbiology.  Has been fairly well covered throughout.  Originally was on Zosyn with Candida in the cultures.  She had a micafungin added.  On multiple drugs currently, but microbiology now back and is well covered by Zosyn in general.      PAST MEDICAL HISTORY:  Unremarkable prior to this.  No major infection problems.      ALLERGIES:   NO ANTIMICROBIAL ALLERGIES.      MEDICATIONS:  As listed.      SOCIAL AND FAMILY HISTORY:  Nothing remarkable from the history per the .      REVIEW OF SYSTEMS:  Unobtainable in the ICU, intubated.      PHYSICAL EXAMINATION:   GENERAL:  The patient intubated in the ICU.   HEENT:  No significant HEENT abnormalities.   NECK:  Supple and nontender.   HEART AND LUNGS:  Tachycardic.   LUNGS:  Crackles and rhonchi bilaterally in the lower lung fields.   ABDOMEN:  Mildly distended.   EXTREMITIES:  No rash or skin lesions.      LABORATORY:  Abdominal cultures are now back from the  surgery, pansensitive pseudomonas and E. coli, multiple anaerobes, a penicillin sensitive enterococcus, Candida albicans.      Thank you very much for the consultation.  I will follow the patient with you.         ERIKA NGUYEN MD             D: 2020   T: 2020   MT: JAQUAN      Name:     DREA RASMUSSEN   MRN:      [id number]        Account:       [id number]   :      1965           Consult Date:  2020      Document: [id number]

## 2020-09-20 NOTE — CONSULTS
ID consult dictated IMP 1 53 yo female perf colon 3 ops, micro back from 9/17 op    REC micro ideally covered by zosyn high dose and marilyn(eventual flucon), ABX coverage OK thru, source control issues hopefully OK now  Could do synergistic gent(for pseudo and enterococcus) but renal risk and likely not needed

## 2020-09-20 NOTE — PROGRESS NOTES
RT was called  at bedside by attending RN  to assess pt for need of  bite block, upon assessing Pt,  there is no indication for bite block per protocol to prevent pressure injuries. Pt's ventilatory status was stable with returned volumes 458, peak pressures 28,  Mean airway pressure 18, plat pressures 18.   RT will continue to monitor     Marianne Nichols.RT

## 2020-09-20 NOTE — PLAN OF CARE
OT/PT: Per discussion with RN, pt is not appropriate for therapy. RN requesting to discontinue therapy order at this time. Please re-order when patient is appropriate for participation.

## 2020-09-20 NOTE — PROGRESS NOTES
Colon and Rectal Surgery Note         Assessment and Plan:   POD# 10 #7 #3 with reexploration, resection and washout.  Sepsis second to bowel perforation  Pneumoperitoneum          Interval History:   Patient remains unchanged, hemodynamics fair with some support however remains critically ill.  I have reviewed ICU care plans, I agree and appreciate your management.         Physical Exam:     Temp:  [93.4  F (34.1  C)-99.7  F (37.6  C)] 98.6  F (37  C)  Pulse:  [43-99] 73  Resp:  [19-20] 20  BP: (96)/(57) 96/57  MAP:  [51 mmHg-142 mmHg] 68 mmHg  Arterial Line BP: ()/() 98/50  FiO2 (%):  [30 %-40 %] 35 %  SpO2:  [89 %-100 %] 95 %    General:  Patient intubated  Chest clear  CV reg  Abdomen: distended. Stoma viable. Fistula unchanged,serous drainage from wound.         Data:     I/O last 3 completed shifts:  In: 7116.53 [I.V.:5131.81]  Out: 4125 [Urine:905; Drains:3110; Stool:110]    Lab Results   Component Value Date    WBC 24.4 09/20/2020    WBC 17.9 09/19/2020      Lab Results   Component Value Date    HGB 8.3 09/20/2020    HGB 7.4 09/19/2020       Lab Results   Component Value Date     09/20/2020      Lab Results   Component Value Date    POTASSIUM 3.9 09/20/2020     Lab Results   Component Value Date    CHLORIDE 110 09/20/2020     Lab Results   Component Value Date    CO2 21 09/20/2020     Lab Results   Component Value Date     09/20/2020       Lab Results   Component Value Date    BUN 22 09/20/2020     Lab Results   Component Value Date    CR 0.80 09/20/2020     Lab Results   Component Value Date    PARISH 6.8 09/20/2020          Sukhjinder Brannon MD

## 2020-09-20 NOTE — PROGRESS NOTES
Count includes the [facility] ICU RESPIRATORY NOTE        Date of Admission: 9/10/2020    Date of Intubation (most recent): 9/10/2020    Reason for Mechanical Ventilation: AW Protection    Number of Days on Mechanical Ventilation: 11    Met Criteria for Spontaneous Breathing Trial: No    Reason for No Spontaneous Breathing Trial: Per MD    Significant Events Today: None    ABG Results:   Recent Labs   Lab 09/19/20  1105 09/18/20  1432 09/18/20  1010 09/17/20  2340 09/17/20  2107   PH 7.36 7.30* 7.24* 7.30* 7.24*   PCO2 36 38 47* 38 46*   PO2 102 109* 86 91 106*   HCO3 20* 19* 20* 19* 20*   O2PER  --  Ventilator 60 40% 40       Current Vent Settings: Ventilation Mode: CMV/AC  (Continuous Mandatory Ventilation/ Assist Control)  FiO2 (%): 40 %  Rate Set (breaths/minute): 20 breaths/min  Tidal Volume Set (mL): 460 mL  PEEP (cm H2O): 10 cmH2O  Pressure Support (cm H2O): 5 cmH2O  Oxygen Concentration (%): 35 %  Resp: 20      Skin Assessment: No issues    Plan: Continue full ventilatory support t/o night shift.    Carlos Cruz, RT

## 2020-09-20 NOTE — PHARMACY-VANCOMYCIN DOSING SERVICE
Pharmacy Vancomycin Note  Date of Service 2020  Patient's  1965   54 year old, female    Indication: Intra-abdominal infection  Goal Trough Level: 15-20 mg/L  Day of Therapy: 5  Current Vancomycin regimen: intermittent based on levels    Current estimated CrCl = Estimated Creatinine Clearance: 83.8 mL/min (based on SCr of 0.8 mg/dL).    Creatinine for last 3 days  2020:  4:30 PM Creatinine 0.42 mg/dL  2020:  4:15 AM Creatinine 0.52 mg/dL;  8:05 PM Creatinine 0.69 mg/dL  2020:  4:14 AM Creatinine 0.77 mg/dL;  4:18 AM Creatinine 0.75 mg/dL  2020:  4:00 AM Creatinine 0.80 mg/dL    Recent Vancomycin Levels (past 3 days)  2020: 11:10 PM Vancomycin Level 21.6 mg/L  2020:  2:28 PM Vancomycin Level 31.1 mg/L  2020:  6:05 AM Vancomycin Level 26.8 mg/L    Vancomycin IV Administrations (past 72 hours)                   vancomycin (VANCOCIN) 1000 mg in dextrose 5% 200 mL PREMIX (mg) 1,000 mg New Bag 20 0458     1,000 mg New Bag 20 1551     1,000 mg New Bag  0204    vancomycin (VANCOCIN) 1000 mg in dextrose 5% 200 mL PREMIX (mg) 1,000 mg New Bag 20 1709                Nephrotoxins and other renal medications (From now, onward)    Start     Dose/Rate Route Frequency Ordered Stop    20 1531  vancomycin place juarez - receiving intermittent dosing      1 each Intravenous SEE ADMIN INSTRUCTIONS 20 1531      20  norepinephrine (LEVOPHED) 16 mg in  mL infusion      0.03-0.4 mcg/kg/min × 45.9 kg  1.3-17.2 mL/hr  Intravenous CONTINUOUS 20  vasopressin 40 units in NS 40 mL (PITRESSIN) infusion      2.4 Units/hr  2.4 mL/hr  Intravenous CONTINUOUS 20               Contrast Orders - past 72 hours (72h ago, onward)    None          Interpretation of levels and current regimen:  Trough level is  Supratherapeutic    Has serum creatinine changed > 50% in last 72 hours: Yes    Urine output:   borderline    Renal Function: Worsening    Plan:  1.  No dose, check level 9/21 AM  2. Serum creatinine levels will be ordered daily for the first week of therapy and at least twice weekly for subsequent weeks.      Nancy Lawson PharmD        .

## 2020-09-20 NOTE — PLAN OF CARE
Remains vented & sedated, labile BP, remains on Levo. Uop borderline continuous copious amt. Serous drainage from every old & existing puncture site, requiring q 2 hour absorbant pad changes. Lungs clear, small amt thick pink tinged secretions.

## 2020-09-20 NOTE — PROGRESS NOTES
Neuro: RASS -4. Facial grimacing at times. Pupils equal and sluggish  Pulm: LS clear. On full vent support  CV: SB. Vasopressor is off now. On levo to keep MAP 65 and above. Very labile BP w/ minimal titration  : Ferguson patent with marginal UOP  GI: Ileostomy, colostomy, and rectal tube patent. Stoma red and intact. On TPN  Extremities: No withdraw  Lines: PICC on LUE. PIVx2 on RUE.   Drain: Pelvic GAB drain, leaking.   Family:  updated at bedside  Plan: Continue to support.

## 2020-09-20 NOTE — PROGRESS NOTES
Intensive Care Daily Note          Assessment and Plan:     Summary Statement:  Evonne Martel is a 54 year old female admitted on 9/10/2020 for septic shock due to cecal perforation and sigmoid colon perforation, likely secondary to perforated diverticulitis. She underwent exploratory laparotomy, right colectomy with end ileostomy, and transverse mucous fistula. She initially did well postoperatively, but developed tachycardia, tachypnea and hypotension. She was taken back to the OR on 9/13 and was found to have gross stool spillage in abdomen with at least 3 sigmoid perforations. She underwent washout and anterior resection performed with drain placement. The patient had very friable tissues in fascia closure, so the patient was placed on paralytics overnight (discontinued (9/14). She had worsening hypotension on 9/17, so CT was obtained and showed possible leak at the descending colon. She went back to the OR (9/17) and was found to have stool leaking from the staple line at the descending colon; washout and end colostomy was performed.  My assessment and plan for this patient is as follows:    Neurology: Patient is receiving midazolam and fentanyl for sedation/analgesia. Goal RASS of -5 to -3 until she is off pressors.   Cardiovascular/Hemodynamics: Patient remains hypotensive secondary to septic shock and hypovolemia requiring multiple pressors. She is post-op from a 3rd operation. She will be weaned off of pressors as tolerated. Fluid resuscitation with D5-LR at 150 ml/hr.   Pulmonary: Mechanically ventilated for acute respiratory failure. Now concern for acute lung injury. Patient is receiving adequate oxygenation and ventilation with PEEP 10 and FiO2 35%   GI and Nutrition: Perforated colon s/p ex lap, colectomy, and abdominal washout (9/10, re-exploration 9/13 and 9/17)  - Management per colorectal surgery  - NPO  - TPN   - NG to LIS  - Monitor GAB output   Renal: Concern for hypovolemia and acute kidney  injury  - Fluid replacement with D5-LR at 150 ml/hr and scheduled 5% albumin.     Hypokalemia, hypophosphatemia, with concern for possible refeeding syndrome given history of alcohol use.  - replacement protocol    Mixed metabolic/respiratory acidosis  - Continue to monitor and adjust ventilatory settings to improve respiratory acidosis.    Infectious Disease: Septic shock secondary to perforated colon with feculent peritonitis. CXR on 9/17 shows diffuse bilateral infiltrates and consolidation of RLL. Intraoperative abscess cultures are positive for Pseudomonas, Enterococcus avium, E. coli and Bacteroides. Blood cultures remain NGTD.   - Continue antibiotic regimen: meropenem, metronidazole, micafungin, and vancomycin    Hematology and Oncology: Acute normocytic anemia  - Hgb has improved to 8.3  - Continue to monitor Hgb, transfuse if Hgb <7    Leukocytosis secondary to septic shock from bowel perforations  - continue antibiotic regimen as above   Endocrinology: Stress-induced hyperglycemia  - Insulin per unit protocol    Stress-dosed steroids  - hydrocortisone 50 mg q6       Intensive Care: Central line: Central line present, needed and to be continued  Arterial line: Arterial line present, needed and to be continued  Ferguson catheter:  In place  NG tube: LIS  Drains: abdominal GAB drain  Restraint:Needed   Others: n/a   Top goals for today Wean off pressors as tolerated  Continue antibiotic regimen as above                Key events/ Interval history - last 24 hours:    No acute events overnight. The patient has been weaned off of vasopressin and remains on norepinephrine for management of hypotension.         Problem list:     Severe sepsis (H)    Pneumoperitoneum    Colon perforation (H)    Free intraperitoneal air    Perforation of colon (H)    * No resolved hospital problems. *             Medications:   I have reviewed this patient's current medications              Physical Exam:   Vital Sign  Ranges  Temperature Temp  Av.5  F (36.4  C)  Min: 93.9  F (34.4  C)  Max: 100.4  F (38  C)   Blood pressure Systolic (24hrs), Av , Min:76 , Max:157        Diastolic (24hrs), Av, Min:52, Max:91      Pulse Pulse  Av.4  Min: 66  Max: 148   Respirations Resp  Av.2  Min: 10  Max: 31   Pulse oximetry SpO2  Av.9 %  Min: 86 %  Max: 100 %         Intake/Output Summary (Last 24 hours) at 2020 1023  Last data filed at 2020 1000  Gross per 24 hour   Intake 9868.41 ml   Output 2915 ml   Net 6953.41 ml         General Appearance:   Sedated on ventilator   HEENT:   Trachea is midline  Endotrachael tube is present   Chest and Lungs:   CTAB, no crackles or wheezes; nonlabored breathing on mechanical ventilation   Cardiovascular:   Tachycardic, regular rhythm, no murmurs   Abdomen:   midline dressings, both stomas are pink, no stool present   :   Ferguson in place   Musculoskeletal:   Not assessed   Extremities and Skin:   Warm, well perfused   Neuro:   Sedation: sedated   Drains and Tubes:   NG in place, GAB with yellow output   Intravascular Access and Device:   Lines present: triple lumen catheter (right internal jugular) and arterial line (radial)            Data:   Labs:  All lab results reviewed past 24 hours    Imaging:  All imaging results reviewed past 24 hours      Carmencita Araiza, MS4    Physician Attestation   I, Damion Patel, was present with the medical student who participated in the service and in the documentation of the note.  I have verified the history and personally performed the physical exam and medical decision making.  I agree with the assessment and plan of care as documented in the note.      I personally reviewed vitals signs, data , exam and plan.    Cachectic female lying in bed with mild jaundice  CV is regular rate and rhythm chest is coarse anteriorly    Abdomen has midline incision which is clean dry and intact  Extremities warm and well-perfused    Overall the  patient remains on full ventilatory support with minimal oxygen and PEEP requirements.  We will continue patient on current ventilatory support    Continue septic shock seems to be progressing as expected she has been weaned off vasopressin and she remains on low-dose norepinephrine.  We will continue with volume resuscitation and the plan is to get her off the norepinephrine today.    We will review cultures and de-escalate and antibiotics as needed.    Acute kidney injury seems to have plateaued with creatinine remaining about 0.8.  She makes marginal urine.  We will continue with volume resuscitation and to avoid nephrotoxic agents.    Concern for protein calorie malnutrition, patient is on TPN.  If we are able to wean off vasopressors we will discuss with colorectal surgery about starting trickle feeds.    Patient's  at bedside I discussed with him the weaning of pressors and the fact that she was off vasopressin and we will try to wean off norepinephrine with volume resuscitation.  I explained to him that her renal function has plateaued and acute kidney injury seems to have stabilized.    Damion Patel MD  Date of Service 09/20/2020 I spent a total of 34 minutes of critical care time with the patient today excluding procedures

## 2020-09-21 NOTE — PROGRESS NOTES
Brief Intensivist note  On 35% and +10 PEEP, with 99% oxygen saturations, and PEEP decreased to +7.    On exam she sounds quite wheezy and duonebs and PRN albuterol added; solucortef increased from 50 q6 to 100 q6 to hopefully improve anti-inflammatory response in airways.     Her PIP's are about 40 and IPP's in high 20's.    lloyd tatum  September 20, 2020

## 2020-09-21 NOTE — PROGRESS NOTES
3p-7p    Neuro: RASS -4. Facial grimacing at times. Pupils equal and sluggish  Pulm: LS coarse/expiratory wheezing. On full vent support  CV: SR. On levo to keep MAP 65 and above.   : Ferguson patent with good UOP  GI: Ileostomy, colostomy, and rectal tube patent. Stoma red and intact. On TPN  Extremities: No withdraw  Lines: PICC on LUE. PIVx1 on RUE.   Drain: Pelvic GAB drain, leaking.   Family:  updated at bedside  Plan: Continue to support.

## 2020-09-21 NOTE — PROGRESS NOTES
Intensive Care Daily Note          Assessment and Plan:     Summary Statement:  Evonne Martel is a 54 year old female admitted on 9/10/2020 for septic shock due to cecal perforation and sigmoid colon perforation, likely secondary to perforated diverticulitis. She underwent exploratory laparotomy, right colectomy with end ileostomy, and transverse mucous fistula. She initially did well postoperatively, but developed tachycardia, tachypnea and hypotension. She was taken back to the OR on 9/13 and was found to have gross stool spillage in abdomen with at least 3 sigmoid perforations. She underwent washout and anterior resection performed with drain placement. The patient had very friable tissues in fascia closure, so the patient was placed on paralytics overnight (discontinued (9/14). She had worsening hypotension on 9/17, so CT was obtained and showed possible leak at the descending colon. She went back to the OR (9/17) and was found to have stool leaking from the staple line at the descending colon; washout and end colostomy was performed.  My assessment and plan for this patient is as follows:    Neurology: Patient is receiving midazolam and fentanyl for sedation/analgesia. Goal RASS of -5 to -3 until she is off pressors.   Cardiovascular/Hemodynamics: Hypotension secondary to septic shock and hypovolemia, initially requiring multiple pressors but now remains on norepinephrine alone.   - She will be weaned off of pressors as tolerated.  - Continue fluid resuscitation with D5-LR at 150 ml/hr and albumin    Pulmonary: Mechanically ventilated for acute respiratory failure. Now concern for acute lung injury. Patient is receiving adequate oxygenation and ventilation with PEEP 10 and FiO2 40%  - Duonebs QID  - Albuterol PRN   GI and Nutrition: Perforated colon s/p ex lap, colectomy, and abdominal washout (9/10, re-exploration 9/13 and 9/17). Concern for malnutrition.   - Management per colorectal surgery  - NPO  - TPN    - NG to LIS  - Monitor GAB output   Renal: Concern for hypovolemia secondary to malnutrition and acute kidney injury. Cr 0.88 from ~0.4 at admission.   - Fluid replacement with D5-LR at 150 ml/hr   - albumin 25% for target of total protein >5.2     Hypokalemia, hypophosphatemia, with concern for possible refeeding syndrome given history of alcohol use.  - replacement protocol    Mixed metabolic/respiratory acidosis  - Improved with full ventilatory support, continue to monitor.    Infectious Disease: Septic shock secondary to perforated colon with feculent peritonitis. CXR on 9/17 shows diffuse bilateral infiltrates and consolidation of RLL. Intraoperative abscess cultures are positive for Pseudomonas, Enterococcus avium, E. coli and Bacteroides. Blood cultures remain NGTD.   - ID consulted, appreciate assistance  - Continue antibiotic regimen: Zosyn and micafungin   Hematology and Oncology: Acute normocytic anemia  - Hgb has improved to 8.4  - Continue to monitor Hgb, transfuse if Hgb <7    Leukocytosis secondary to septic shock from bowel perforations  - continue antibiotic regimen as above   Endocrinology: Stress-induced hyperglycemia  - Insulin per unit protocol    Stress-dosed steroids  - hydrocortisone 100 mg q6       Intensive Care: Central line: Central line present, needed and to be continued  Arterial line: Arterial line present, needed and to be continued  Ferguson catheter:  In place  NG tube: LIS  Drains: abdominal GAB drain  Restraint:Needed   Others: n/a   Top goals for today Wean off pressors as tolerated  Continue antibiotic regimen as above                Key events/ Interval history - last 24 hours:    No acute events overnight. The patient had some wheezing on exam, so hydrocortisone was increased from 50 to 100mg q6 and scheduled Duonebs and prn albuterol was added.         Problem list:     Severe sepsis (H)    Pneumoperitoneum    Colon perforation (H)    Free intraperitoneal air    Perforation of  colon (H)    * No resolved hospital problems. *             Medications:   I have reviewed this patient's current medications              Physical Exam:   Vital Sign Ranges  Temperature Temp  Av.5  F (36.4  C)  Min: 93.9  F (34.4  C)  Max: 100.4  F (38  C)   Blood pressure Systolic (24hrs), Av , Min:76 , Max:157        Diastolic (24hrs), Av, Min:52, Max:91      Pulse Pulse  Av.4  Min: 66  Max: 148   Respirations Resp  Av.2  Min: 10  Max: 31   Pulse oximetry SpO2  Av.9 %  Min: 86 %  Max: 100 %         Intake/Output Summary (Last 24 hours) at 2020 1023  Last data filed at 2020 1000  Gross per 24 hour   Intake 9868.41 ml   Output 2915 ml   Net 6953.41 ml         General Appearance:   Sedated on ventilator   HEENT:   Trachea is midline  Endotrachael tube is present   Chest and Lungs:   Crackles and expiratory wheezes present bilaterally; nonlabored breathing on mechanical ventilation   Cardiovascular:   Regular rate and rhythm, no murmurs   Abdomen:   midline dressings, both stomas are pink, no stool    :   Ferguson in place   Musculoskeletal:   Not assessed   Extremities and Skin:   Warm, well perfused; bilateral pitting edema present    Neuro:   Sedation: sedated   Drains and Tubes:   NG in place, GAB drain with yellow output   Intravascular Access and Device:   Lines present: triple lumen catheter (right internal jugular) and arterial line (radial)            Data:   Labs:  All lab results reviewed past 24 hours    Imaging:  All imaging results reviewed past 24 hours      I, Carmencita Araiza MS4, served as a medical scribe for Dr. Gonzalez.    ATTENDING PHYSICIAN:    I reviewed the note above and agree with all findings and it reflects our shared visit and findings and plan.      Romeo Gonzalez MD  Pager [phone]

## 2020-09-21 NOTE — PROGRESS NOTES
[facility]  PROGRESS NOTE    September 21, 2020  Post-op Day #11, #8, #4    SUBJECTIVE:  Remains intubated and sedated, on Levophed    OBJECTIVE:  Temp:  [97.2  F (36.2  C)-99.1  F (37.3  C)] 98.8  F (37.1  C)  Pulse:  [55-90] 64  Resp:  [20] 20  MAP:  [34 mmHg-116 mmHg] 85 mmHg  Arterial Line BP: ()/(25-85) 120/62  FiO2 (%):  [35 %-40 %] 40 %  SpO2:  [88 %-100 %] 97 %    Intake/Output Summary (Last 24 hours) at 9/21/2020 0858  Last data filed at 9/21/2020 0800  Gross per 24 hour   Intake 6891.65 ml   Output 6378 ml   Net 513.65 ml       GENERAL:  Intubated and sedated  HEAD: Normocephalic atraumatic  SCLERA: Anicteric  EXTREMITIES: Warm and well perfused  ABDOMEN:  Soft, appropriately tender, distended. No guarding, rigidity, or peritoneal signs. Stomas pink and healthy, viable. Scant serous output in ileostomy bag. No flatus or stool. GAB drain with copious serous output.   INCISION: Incisional vac in place    LABS:  Lab Results   Component Value Date    WBC 26.8 09/21/2020     Lab Results   Component Value Date    HGB 8.4 09/21/2020     Lab Results   Component Value Date    HCT 24.5 09/21/2020     Lab Results   Component Value Date     09/21/2020     Last Basic Metabolic Panel:  Lab Results   Component Value Date     09/21/2020      Lab Results   Component Value Date    POTASSIUM 4.1 09/21/2020     Lab Results   Component Value Date    CHLORIDE 115 09/21/2020     Lab Results   Component Value Date    PARISH 7.2 09/21/2020     Lab Results   Component Value Date    CO2 23 09/21/2020     Lab Results   Component Value Date    BUN 32 09/21/2020     Lab Results   Component Value Date    CR 0.88 09/21/2020     Lab Results   Component Value Date     09/21/2020       ASSESSMENT/PLAN: 54 year old female s/p ex lap with right hemicolectomy on 9/10, followed by re-exploration with abdominal washout and anterior resection on 9/13, and ex lap with washout and descending colon mucus fistula on  9/17. She remains intubated in the ICU, vasoactive medications being weaned off as able. Leukocytosis today, will trend WBC and monitor vitals for now. Hgb has remained stable.      1. NPO diet. Continue TPN. NGT clogged, discussed with nursing, plan to replace and check placement with xray today  2. Continue weaning pressors as able  3. Continue drains and incisional VAC. WOCN to change vac today.   4. Ferguson with good UOP  5. Continue antibiotics for at least 5 days postoperatively from last surgery for contamination. Appreciate ID input.    6. Remainder of cares per ICU team, appreciate management.     Patient seen and examined with Dr. Andres.     For questions/paging, please contact the [facility] office at [phone].    Tabitha Flores PA-C  [facility]  Phone: [phone]

## 2020-09-21 NOTE — PROGRESS NOTES
[facility] ICU RESPIRATORY NOTE           Date of Admission: 9/10/2020     Date of Intubation (most recent): 9/10/2020     Reason for Mechanical Ventilation: AW Protection     Number of Days on Mechanical Ventilation: 12     Met Criteria for Spontaneous Breathing Trial: No     Reason for No Spontaneous Breathing Trial: Per MD     Significant Events Today: PEEP decreased to +7     Recent Labs   Lab 09/19/20  1105 09/18/20  1432 09/18/20  1010 09/17/20  2340 09/17/20  2107   PH 7.36 7.30* 7.24* 7.30* 7.24*   PCO2 36 38 47* 38 46*   PO2 102 109* 86 91 106*   HCO3 20* 19* 20* 19* 20*   O2PER  --  Ventilator 60 40% 40         Current Vent Settings: Ventilation Mode: CMV/AC  (Continuous Mandatory Ventilation/ Assist Control)  FiO2 (%): 40 %  Rate Set (breaths/minute): 20 breaths/min  Tidal Volume Set (mL): 460 mL  PEEP (cm H2O): 7 cmH2O  Oxygen Concentration (%): 35 %  Resp: 20          Plan: Continue full ventilatory support and assess SBT daily per MD order      Moshe Vick, RT

## 2020-09-21 NOTE — PROGRESS NOTES
[facility]  Infectious Disease Progress Note          Assessment and Plan:   IMPRESSION:   1.  A 54-year-old female without prior major infection or abdominal issues, admitted with major bowel perforation, now status post 3 operations, abdominal abscess, further leak, now with better source control.   2.  Ongoing septic shock from the above.      RECOMMENDATIONS:   1.  Abdominal cultures now back showing enterococcus, multiple anaerobes, gram-negative rods including a sensitive Pseudomonas.  Has been well covered with microbiology, all covered throughout including currently; however enterococcus probably better covered by penicillin and Zosyn covers everything including excellent VIC to the 2 gram-negative rods in all anaerobes covered.  No need for double anaerobe coverage.  Discontinue Flagyl.  Need to continue micafungin for now, probably fluconazole eventually for the Candida. Could do synergistic gent but if source control now Ok should not need, also just like best not vanco as renal risk significant although nl range creat trend up  2.  Suspect general sepsis and overall condition due to source control issues that hopefully have now been accomplished with multiple interventions.              Interval History:   Intubated and sedated T Ok still pressors cxs same creat 0.88              Medications:       sodium chloride 0.9%  1,000 mL Intravenous Once     albumin human  50 g Intravenous Once     chlorhexidine  15 mL Mouth/Throat Q12H     hydrocortisone sodium succinate PF  100 mg Intravenous Q6H     ipratropium - albuterol 0.5 mg/2.5 mg/3 mL  3 mL Nebulization 4x daily     lipids  250 mL Intravenous Q48H     micafungin  100 mg Intravenous Q24H     pantoprazole (PROTONIX) IV  40 mg Intravenous Daily     piperacillin-tazobactam  4.5 g Intravenous Q6H     sodium chloride (PF)  10 mL Intracatheter Q8H     sodium chloride (PF)  3 mL Intracatheter Q8H     thiamine  100 mg Intravenous Daily  "                 Physical Exam:   Blood pressure 96/57, pulse 64, temperature 98.8  F (37.1  C), resp. rate 20, height 1.651 m (5' 5\"), weight 64.4 kg (141 lb 15.6 oz), last menstrual period 01/01/2015, SpO2 97 %.  Wt Readings from Last 2 Encounters:   09/21/20 64.4 kg (141 lb 15.6 oz)     Vital Signs with Ranges  Temp:  [97.5  F (36.4  C)-99.1  F (37.3  C)] 98.8  F (37.1  C)  Pulse:  [55-90] 64  Resp:  [20] 20  MAP:  [34 mmHg-116 mmHg] 85 mmHg  Arterial Line BP: ()/(25-85) 120/62  FiO2 (%):  [35 %-40 %] 40 %  SpO2:  [88 %-100 %] 97 %    Constitutional: Intubated sedated   Lungs: Clear to auscultation bilaterally, no crackles or wheezing   Cardiovascular: Regular rate and rhythm, normal S1 and S2, and no murmur noted   Abdomen: Mild distended,stoma wd as noted by CR   Skin: No rashes, no cyanosis, no edema   Other:           Data:   All microbiology laboratory data reviewed.  Recent Labs   Lab Test 09/21/20  0400 09/20/20  0400 09/19/20  0414   WBC 26.8* 24.4* 17.9*   HGB 8.4* 8.3* 7.4*   HCT 24.5* 23.9* 21.9*   MCV 91 91 92    196 142*     Recent Labs   Lab Test 09/21/20  0400 09/20/20  0400 09/19/20  0418   CR 0.88 0.80 0.75     No lab results found.  Recent Labs   Lab Test 09/17/20  0201 09/17/20  0155 09/17/20  0054 09/16/20  2200 09/10/20  1745 09/10/20  1730   CULT Moderate growth  Escherichia coli  Susceptibility testing done on previous specimen  *  Moderate growth  Pseudomonas aeruginosa  Susceptibility testing done on previous specimen  *  Light growth  Strain 2  Escherichia coli  Susceptibility testing done on previous specimen  *  Light growth  Enterococcus avium  Susceptibility testing done on previous specimen  *  Light growth  Candida albicans / dubliniensis  Candida albicans and Candida dubliniensis are not routinely speciated  Susceptibility testing not routinely done  *  On day 2, isolated in broth only:  Anaerobic gram negative rods  See anaerobic report for identification  *  " Heavy growth  Mixed aerobic and anaerobic fatimah  *  Heavy growth  Bacteroides uniformis  *  Heavy growth  Bacteroides ovatus/xylanisolvens  *  Susceptibility testing not routinely done Moderate growth  Escherichia coli  *  Moderate growth  Pseudomonas aeruginosa  *  Moderate growth  Strain 2  Escherichia coli  *  Light growth  Enterococcus avium  *  Light growth  Candida albicans / dubliniensis  Candida albicans and Candida dubliniensis are not routinely speciated  Susceptibility testing not routinely done  *  On day 2, isolated in broth only:  Anaerobic gram negative rods  See anaerobic report for identification  *  Heavy growth  Mixed aerobic and anaerobic fatimah  *  Heavy growth  Bacteroides fragilis  Susceptibility testing not routinely done  * No growth after 4 days No growth after 4 days No growth No growth

## 2020-09-21 NOTE — PROGRESS NOTES
"[facility] Nurse Inpatient Ostomy Assessment     Assessment of New Ileostomy  Surgery date: 9-10-20 and 9-13-20  Surgeon:  Dr Morrison /    Procedure:     9-10-20:  Exploratory laparotomy, right colectomy with end ileostomy and transverse mucous fistula, rigid proctoscopy,                  drainage of pelvis (Suleman)     9-13-20: .  Reexploration of recent laparotomy: Abdominal washout; Anterior resection; Rigid proctoscopy with rectal stump leak                   Testing; Removal of abdominal drain and replacement of new pelvic drain (Dmitry)     9-17-20: EXPLORATORY LAPAROTOMY, ABDOMINAL WASHOUT, CREATION COLOSTOMY with incisional VAC (Fermin)    Related diagnosis:  Cecal perforation, sigmoid phlegmon and contained pelvic perforation.     [facility] Assessment & Physical Exam:        Current status: remains intubated and sedated                                   Stomas viable and await return of full  function                                    .      [facility]  Photo: 9-17-20 RLQ Ileostomy; LUQ Colostomy; closed mid-line incision    9-21-20             Ileostomy: RLQ        Stoma size:  1 1/2\"    Stoma appearance:  Rounded, red, moist, protrudes, lumen @ apex    Mucocutaneous junction: intact with sutures    Peristomal complication(s): intact, no erythema    Abdominal assessment:  Rounded, distended and firm    Surgical site(s): surgical dressing intact, moderate serous drainage from distal aspect that is VAC    NG still in place? Yes    Output: serous fluid    Pain: unable to determine    Colostomy: LUQ  Stoma size:  1 1/2\"    Stoma appearance:  Rounded, red, moist, protrudes, lumen @ apex    Mucocutaneous junction: intact with sutures    Peristomal complication(s): intact, no erythema    Abdominal assessment:  Rounded, distended and firm    Surgical site(s): surgical dressing intact, no drainage    NG still in place? Yes    Output:  Scant  amt of small brown formed fecal output today "     Pain: unable to determine    Midline incision:    Incision:  Long mid-line  incison well approximated with staples                   No Ann Marie-incisional erythema scattered erosion along Rt superior margin                   Distal incision with large serous drainage from incision and GAB site.                                                   No Purulent drainage noted.        Incision VAC removed. Leakage from GAB site underminingVAC dressing     Wound cleaned with MicroKlenz     3M No Sting Skin prep to ann marie-wound skin     Incision window framed with VAC drape     Black sponge strip  X 2 to incision line     Secured with VAC drape     Adaptor padded to @ distal aspect of incision     Dressing change secondary to continual oozing of serous drainage from distal incision and insertion site of GAB     VAC @ 125mm/hg cont suction on regular VAC  Therapy. No air leak noted     Mepilex border trimmed and placed around GAB in attempt to contain drainage from around GAB        Sponges removed on 9-21-20:      1. 2 x black granufoam strips     2. 1 x black foam as padding for adaptor         Sponges applied on 9-21-20     1. 2 x black sponge strips over incision     2. 1 x black sponge padding for adaptor pad          RLQ GAB site      GAB tube intact and patent with stitch.       No ann marie-tube erythema, with drainage from insertion site      Output: Large serous      Containment: Brian NI flat barrier to tube              Current pouching system : Brian NI convex to each stoma     Pouch last changed:  913-20 in OR; 9-15-20; 9-17; 9-21    Reason for pouch change today: stoma assessment; VAC dressing change         Learning and comprehension:    present. Pt vented and sedated.     Psychosocial:  TBD      WOC Plan:         Pouching product plan:  Brian NI convex 57mm with high output pouch to RLQ ilesotomy and drainable pouch to LUQ colostomy    Plan for incisional VAC change M-TH weekly        Pt support system  on discharge:     WOC recommend home care?  TBD      WOC follow-up plan: TH      Objective Data:       Current Diet/Nutrition:   Orders Placed This Encounter      NPO for Medical/Clinical Reasons Except for: No Exceptions       Output:  I/O last 3 completed shifts:  In: 7816.65 [I.V.:5681.65; NG/GT:50]  Out: 5850 [Urine:3455; Drains:2380; Stool:15]      Labs:   Recent Labs   Lab 09/21/20  0400  09/17/20  0042   ALBUMIN 1.9*   < >  --    PREALB 7*   < >  --    HGB 8.4*   < >  --    INR 1.56*   < >  --    WBC 26.8*   < >  --    CRP  --   --  65.4*    < > = values in this interval not displayed.         Lacho Risk Assessment:   Sensory Perception: 1-->completely limited  Moisture: 1-->constantly moist  Activity: 1-->bedfast  Mobility: 1-->completely immobile  Nutrition: 2-->probably inadequate  Friction and Shear: 2-->potential problem  Lacho Score: 8      WOC Interventions:       Patient's chart evaluated    Focus of today's visit: stoma assessment, prosthetic refitting; incisional VAC dressing change    Pouch:  [place] NI 57mm cut to fit convex with tape and high output pouch,     Participant(s) in teaching session today:  observed appliance changes    Change made with ostomy management today: resume convexity  To both stoma sites     Supplies: In pt room     Preparation for discharge: No discharge preparations started    Registered for supply samples? TBD    Discussed plan of care with: Nursing and [facility]    Ghazal Farrell RN, CWOC Nurse

## 2020-09-21 NOTE — PLAN OF CARE
Remains vented & deeply sedated. On Levo to keep MAP>65,unable to wean. Continues to weep serous drainage from puncture sites old & new on both arms. Copious serous drainage from GAB, Wound Vac. No output from rectal tube nor NGT, attempted to irrigation NGT, unable. Continues with absent bowel sounds. Excellent uop.

## 2020-09-22 NOTE — PROGRESS NOTES
COLON [facility] RECTAL [facility]GERY  PROGRESS NOTE    September 22, 2020  Post-op Day # 12/9/5    SUBJECTIVE:  Intubated and sedated, Tmax=99.9 yesterday. On levophed.    OBJECTIVE:  Temp:  [97  F (36.1  C)-99.9  F (37.7  C)] 97  F (36.1  C)  Pulse:  [53-93] 69  MAP:  [61 mmHg-98 mmHg] 67 mmHg  Arterial Line BP: ()/(46-71) 116/50  FiO2 (%):  [40 %] 40 %  SpO2:  [91 %-100 %] 100 %    Intake/Output Summary (Last 24 hours) at 9/22/2020 0736  Last data filed at 9/22/2020 0700  Gross per 24 hour   Intake 3582.96 ml   Output 3640 ml   Net -57.04 ml       GENERAL:  Intubated, sedated. NG tube to suction, minimal orange/clear output.  HEAD: Normocephalic atraumatic  SCLERA: Anicteric  EXTREMITIES: Warm and well perfused  ABDOMEN:  Moderately distended. No guarding, rigidity, or peritoneal signs. Ileostomy and colostomy pink, viable. No gas or stool in bags. Anterior midline wound with wound vac in place. Output from wound vac syttpxz=188az/24hr, midline AB=304, clear yellow. Output from midline ostomy=80. All decreasing.   INCISION:  Wound vac to midline wound intact    LABS:  Lab Results   Component Value Date    WBC 21.9 09/22/2020     Lab Results   Component Value Date    HGB 7.5 09/22/2020     Lab Results   Component Value Date    HCT 21.6 09/22/2020     Lab Results   Component Value Date     09/22/2020     Last Basic Metabolic Panel:  Lab Results   Component Value Date     09/22/2020      Lab Results   Component Value Date    POTASSIUM 4.0 09/22/2020     Lab Results   Component Value Date    CHLORIDE 117 09/22/2020     Lab Results   Component Value Date    PARISH 7.5 09/22/2020     Lab Results   Component Value Date    CO2 23 09/22/2020     Lab Results   Component Value Date    BUN 47 09/22/2020     Lab Results   Component Value Date    CR 0.98 09/22/2020     Lab Results   Component Value Date     09/22/2020       ASSESSMENT/PLAN: 54 year old female s/p ex lap with right hemicolectomy on 9/10, followed by  re-exploration with abdominal washout and anterior resection on 9/13, and ex lap with washout and descending colon mucus fistula on 9/17. She remains intubated in the ICU, vasoactive medications being weaned off as able. WBC=21.9 from 26 yesterday, Hgb=7.5 from 8.4.      1. NPO diet. Continue TPN. Continue NGT to LIS.  2. Continue weaning pressors as able  3. Continue drains and incisional VAC. WOCN following, appreciate assistance. May remove rectal tube later today.  4. Ferguson with good UOP  5. Continue antibiotics for at least 5 days postoperatively from last surgery for contamination. Appreciate ID input.    6. Remainder of cares per ICU team, appreciate management.   7. Discussed with Dr Andres    For questions/paging, please contact the CRS office at [phone].    Sonia Treadwell PA-C  [facility]  Phone: [phone]

## 2020-09-22 NOTE — PHARMACY-CONSULT NOTE
TPN formula evaluated based on today s labs results.    The following changes have been made:     Sodium:  decreased  to 47 mEq/day .  Magnesium: decreased  to 6 mEq/day.     Pharmacy will continue to follow and adjust as appropriate.    Elizabeth Desir, PharmD, BCPS

## 2020-09-22 NOTE — PROGRESS NOTES
[facility]  Infectious Disease Progress Note          Assessment and Plan:   IMPRESSION:   1.  A 54-year-old female without prior major infection or abdominal issues, admitted with major bowel perforation, now status post 3 operations, abdominal abscess, further leak, now with better source control.   2.  Ongoing septic shock from the above.      RECOMMENDATIONS:   1.  Abdominal cultures now back showing enterococcus, multiple anaerobes, gram-negative rods including a sensitive Pseudomonas.  Has been well covered with microbiology, all covered throughout including currently; however enterococcus probably better covered by penicillin and Zosyn covers everything including excellent VIC to the 2 gram-negative rods in all anaerobes covered.  No need for double anaerobe coverage.  Discontinue Flagyl.  Need to continue micafungin for now, probably fluconazole eventually for the Candida. Could do synergistic gent but if source control now Ok should not need, also just like best not vanco as renal risk significant although nl range creat trend up  2.  Suspect general sepsis and overall condition due to source control issues that hopefully have now been accomplished with multiple interventions. Slowly better still pressors             Interval History:   Intubated and sedated T Ok still pressors cxs same creat 0.98              Medications:       sodium chloride 0.9%  1,000 mL Intravenous Once     chlorhexidine  15 mL Mouth/Throat Q12H     hydrocortisone sodium succinate PF  100 mg Intravenous Q6H     ipratropium - albuterol 0.5 mg/2.5 mg/3 mL  3 mL Nebulization 4x daily     lipids  250 mL Intravenous Q48H     micafungin  100 mg Intravenous Q24H     pantoprazole (PROTONIX) IV  40 mg Intravenous Daily     piperacillin-tazobactam  4.5 g Intravenous Q6H     sodium chloride (PF)  10 mL Intracatheter Q8H     sodium chloride (PF)  3 mL Intracatheter Q8H     thiamine  100 mg Intravenous Daily                  " Physical Exam:   Blood pressure 96/57, pulse 57, temperature 97  F (36.1  C), resp. rate 20, height 1.651 m (5' 5\"), weight 63 kg (138 lb 14.2 oz), last menstrual period 01/01/2015, SpO2 91 %.  Wt Readings from Last 2 Encounters:   09/22/20 63 kg (138 lb 14.2 oz)     Vital Signs with Ranges  Temp:  [97  F (36.1  C)-99.9  F (37.7  C)] 97  F (36.1  C)  Pulse:  [53-93] 57  MAP:  [61 mmHg-98 mmHg] 70 mmHg  Arterial Line BP: ()/(46-71) 120/49  FiO2 (%):  [40 %] 40 %  SpO2:  [91 %-100 %] 91 %    Constitutional: Intubated sedated   Lungs: Clear to auscultation bilaterally, no crackles or wheezing   Cardiovascular: Regular rate and rhythm, normal S1 and S2, and no murmur noted   Abdomen: Mild distended,stoma wd as noted by CR   Skin: No rashes, no cyanosis, no edema   Other:           Data:   All microbiology laboratory data reviewed.  Recent Labs   Lab Test 09/22/20  0445 09/21/20  0400 09/20/20  0400   WBC 21.9* 26.8* 24.4*   HGB 7.5* 8.4* 8.3*   HCT 21.6* 24.5* 23.9*   MCV 92 91 91    209 196     Recent Labs   Lab Test 09/22/20  0445 09/21/20  0400 09/20/20  0400   CR 0.98 0.88 0.80     No lab results found.  Recent Labs   Lab Test 09/17/20  0201 09/17/20  0155 09/17/20  0054 09/16/20  2200 09/10/20  1745 09/10/20  1730   CULT Moderate growth  Escherichia coli  Susceptibility testing done on previous specimen  *  Moderate growth  Pseudomonas aeruginosa  Susceptibility testing done on previous specimen  *  Light growth  Strain 2  Escherichia coli  Susceptibility testing done on previous specimen  *  Light growth  Enterococcus avium  Susceptibility testing done on previous specimen  *  Light growth  Candida albicans / dubliniensis  Candida albicans and Candida dubliniensis are not routinely speciated  Susceptibility testing not routinely done  *  On day 2, isolated in broth only:  Anaerobic gram negative rods  See anaerobic report for identification  *  Heavy growth  Mixed aerobic and anaerobic fatimah  * "  Heavy growth  Bacteroides uniformis  *  Heavy growth  Bacteroides ovatus/xylanisolvens  *  Susceptibility testing not routinely done Moderate growth  Escherichia coli  *  Moderate growth  Pseudomonas aeruginosa  *  Moderate growth  Strain 2  Escherichia coli  *  Light growth  Enterococcus avium  *  Light growth  Candida albicans / dubliniensis  Candida albicans and Candida dubliniensis are not routinely speciated  Susceptibility testing not routinely done  *  On day 2, isolated in broth only:  Anaerobic gram negative rods  See anaerobic report for identification  *  Heavy growth  Mixed aerobic and anaerobic fatimah  *  Heavy growth  Bacteroides fragilis  Susceptibility testing not routinely done  * No growth after 5 days No growth after 5 days No growth No growth

## 2020-09-22 NOTE — PROGRESS NOTES
Intensive Care Daily Note          Assessment and Plan:     Summary Statement:  Evonne Martel is a 54 year old female admitted on 9/10/2020 for septic shock due to cecal perforation and sigmoid colon perforation, likely secondary to perforated diverticulitis. She underwent exploratory laparotomy, right colectomy with end ileostomy, and transverse mucous fistula. She initially did well postoperatively, but developed tachycardia, tachypnea and hypotension. She was taken back to the OR on 9/13 and was found to have gross stool spillage in abdomen with at least 3 sigmoid perforations. She underwent washout and anterior resection performed with drain placement. The patient had very friable tissues in fascia closure, so the patient was placed on paralytics overnight (discontinued (9/14). She had worsening hypotension on 9/17, so CT was obtained and showed possible leak at the descending colon. She went back to the OR (9/17) and was found to have stool leaking from the staple line at the descending colon; washout and end colostomy was performed.  My assessment and plan for this patient is as follows:    Neurology: Patient is receiving midazolam and fentanyl for sedation/analgesia.   - Wean off sedation as tolerated   Cardiovascular/Hemodynamics: Hypotension secondary to septic shock and hypovolemia, initially requiring multiple pressors and slowly weaned off. She is now off pressors.   - Continue to monitor fluid status, albumin 25%   Pulmonary: Mechanically ventilated for acute respiratory failure. Now concern for acute lung injury. Patient is receiving adequate oxygenation and ventilation with PEEP 10 and FiO2 40%  - Duonebs QID  - Albuterol PRN  - Pressure support trial today   GI and Nutrition: Perforated colon s/p ex lap, colectomy, and abdominal washout (9/10, re-exploration 9/13 and 9/17). Concern for malnutrition.   - Management per colorectal surgery  - NPO  - TPN   - NG to LIS  - Monitor GAB output   Renal:  Concern for hypovolemia secondary to malnutrition and acute kidney injury. Cr 0.98 from ~0.4 at admission.   - albumin 25% for target of total protein >5.2     Hypokalemia, hypophosphatemia, with concern for possible refeeding syndrome given history of alcohol use.  - replacement protocol    Mixed metabolic/respiratory acidosis  - Improved with full ventilatory support, continue to monitor.    Infectious Disease: Septic shock secondary to perforated colon with feculent peritonitis. CXR on 9/17 shows diffuse bilateral infiltrates and consolidation of RLL. Intraoperative abscess cultures are positive for Pseudomonas, Enterococcus avium, E. coli and Bacteroides. Blood cultures remain NGTD.   - ID consulted, appreciate assistance  - Continue antibiotic regimen: Zosyn and micafungin   Hematology and Oncology: Acute normocytic anemia  - Hgb 7.5 (from 8.4)  - Continue to monitor Hgb, transfuse if Hgb <7    Leukocytosis secondary to septic shock from bowel perforations  - continue antibiotic regimen as above   Endocrinology: Stress-induced hyperglycemia  - Insulin per unit protocol    Stress-dosed steroids  - hydrocortisone 50 mg q6        Intensive Care: Central line: Central line present, needed and to be continued  Arterial line: Arterial line present, needed and to be continued  Ferguson catheter:  In place  NG tube: LIS  Drains: abdominal GAB drain  Restraint:Needed   Others: n/a   Top goals for today Wean off sedation as tolerated  Pressure support trial today  Continue antibiotic regimen as above                Key events/ Interval history - last 24 hours:    No acute events overnight. Patient has been weaned off of norepinephrine and is now not requiring any pressors with blood pressures in the 110-130s SBP.          Problem list:     Severe sepsis (H)    Pneumoperitoneum    Colon perforation (H)    Free intraperitoneal air    Perforation of colon (H)    * No resolved hospital problems. *             Medications:   I  have reviewed this patient's current medications              Physical Exam:   Vital Sign Ranges  Temperature Temp  Av.5  F (36.4  C)  Min: 93.9  F (34.4  C)  Max: 100.4  F (38  C)   Blood pressure Systolic (24hrs), Av , Min:76 , Max:157        Diastolic (24hrs), Av, Min:52, Max:91      Pulse Pulse  Av.4  Min: 66  Max: 148   Respirations Resp  Av.2  Min: 10  Max: 31   Pulse oximetry SpO2  Av.9 %  Min: 86 %  Max: 100 %         Intake/Output Summary (Last 24 hours) at 2020 1023  Last data filed at 2020 1000  Gross per 24 hour   Intake 9868.41 ml   Output 2915 ml   Net 6953.41 ml         General Appearance:   Sedated on ventilator   HEENT:   Trachea is midline  Endotrachael tube is present   Chest and Lungs:   Crackles and expiratory wheezes present bilaterally; nonlabored breathing on mechanical ventilation   Cardiovascular:   Regular rate and rhythm, no murmurs   Abdomen:   midline dressings, both stomas are pink, no stool    :   Ferguson in place   Musculoskeletal:   Not assessed   Extremities and Skin:   Warm, well perfused; bilateral pitting edema present, DP pulses intact   Neuro:   Sedation: sedated   Drains and Tubes:   NG in place, GAB drain with yellow output   Intravascular Access and Device:   Lines present: triple lumen catheter (right internal jugular) and arterial line (radial)            Data:   Labs:  All lab results reviewed past 24 hours    Imaging:  All imaging results reviewed past 24 hours      I, Carmencita Araiza MS4, served as a medical scribe for Dr. Gonzalez.    ATTENDING PHYSICIAN:    I reviewed the above note and made any changes as needed. Critical care time 40 min.      Romeo Gonzalez MD  Pager [phone]

## 2020-09-22 NOTE — PROGRESS NOTES
[facility] ICU RESPIRATORY NOTE        Date of Admission: 9/10/2020    Date of Intubation (most recent): 09/10/20    Reason for Mechanical Ventilation: Airway Protection    Number of Days on Mechanical Ventilation: 13      Significant Events Today: None overnight    ABG Results:   Recent Labs   Lab 09/19/20  1105 09/18/20  1432 09/18/20  1010 09/17/20  2340 09/17/20  2107   PH 7.36 7.30* 7.24* 7.30* 7.24*   PCO2 36 38 47* 38 46*   PO2 102 109* 86 91 106*   HCO3 20* 19* 20* 19* 20*   O2PER  --  Ventilator 60 40% 40       Current Vent Settings: Ventilation Mode: CMV/AC  (Continuous Mandatory Ventilation/ Assist Control)  FiO2 (%): 40 %  Rate Set (breaths/minute): 20 breaths/min  Tidal Volume Set (mL): 460 mL  PEEP (cm H2O): 5 cmH2O  Oxygen Concentration (%): 40 %  Resp: 20      Plan: Continue vent support, daily wean assessments     Aimee Salazar, RT

## 2020-09-22 NOTE — PROGRESS NOTES
CLINICAL NUTRITION SERVICES - REASSESSMENT NOTE      RECOMMENDATIONS FOR MD/PROVIDER TO ORDER:   Pt will begin to transition from TPN --> TF in the next 24-48 hrs.   Future/Additional Recommendations:   Recommend begin trickle TF Impact Peptide 1.5 at 10 mL/hr = 360 kcals, 23 gm pro, 185 mL H20, 34 gm CHO, no fiber   Total (TF + TPN/Lipid):  1718 kcals (37 kcal/kg and 107%% energy needs), 101 gm pro (2.2 gm/kg and 110% pro need).    Recommend increase TF as tolerated (along with gradual TPN taper) to eventual goal Impact Peptide 1.5 at 40 mL/hr = 1440 kcals (31 kcal/kg), 90 gm pro (2 gm/kg), 739 mL H20, 134 gm CHO, no fiber     Malnutrition:   (9/14)  % Weight Loss:  History not available   % Intake:  </= 50% for >/= 5 days (severe malnutrition)  Subcutaneous Fat Loss:  Orbital region severe depletion  Muscle Loss:  Temporal region severe depletion and Clavicle bone region severe depletion  Fluid Retention:  Moderate as above      Malnutrition Diagnosis: Severe malnutrition  In Context of:  Acute illness or injury  Environmental or social circumstances       EVALUATION OF PROGRESS TOWARD GOALS   Diet:  NPO on vent    Nutrition Support:  TPN/Lipids continue as below:    Nutrition Support Parenteral:  Type of Access: PICC  Parenteral Frequency:  Continuous  Parenteral Regimen: D15 AA5 at 65 mL/hr + Lipid 250 mL every 48 hours   Total Parenteral Provisions: 1358 kcal (30 kcal/kg), 78 g protein (1.7 g/kg), 234 g CHO, 18% fat.    The D5 containing IVF was previously running at 150 mL/hr and discontinued yesterday.    Intake/Tolerance:    BGM:  108-137 (Insulin drip).  Na 146 (H)  BUN 47 (H)  Cr 0.98 (NL)  Mg 2.5 (H).  I/O:  3742/3640.  NG output 150 mL.  Drains 1195 mL.  Wt:  63 kg (up 17.1 kg since 9/13).  Pt with 3+ generalized edema.    ASSESSED NUTRITION NEEDS: (Modified protein range higher)  Dosing Weight 45.9 kg   Estimated Energy Needs: 3821-6344 kcals (30-35 Kcal/Kg)  Justification: repletion, underweight and  vented  Estimated Protein Needs: 68-92 grams protein (1.5-2 g pro/Kg)  Justification: repletion, post-op and hypercatabolism with critical illness      NEW FINDINGS:   Yesterday RN thought  NG ws clogged but after investigation, found NG to be clamped.    Norepinephrine off yesterday at 2100.    Colorectal Surgery previously stated would consider enteral nutrition when off pressors for > 12 hours.    Previous Goals (9/:18)   TPN/Lipid will continue to meet % needs   Evaluation: Met    Previous Nutrition Diagnosis (9/18):   No nutrition diagnosis identified at this time  Evaluation: Declining      CURRENT NUTRITION DIAGNOSIS  Inadequate enteral nutrition infusion related to no TF running and pressor off > 12 hrs as evidenced by TPN/Lipids meeting estimated needs    INTERVENTIONS  Recommendations / Nutrition Prescription  Recommend begin trickle TF Impact Peptide 1.5 at 10 mL/hr = 360 kcals, 23 gm pro, 185 mL H20, 34 gm CHO, no fiber   Total (TF + TPN/Lipid):  1718 kcals (37 kcal/kg and 107%% energy needs), 101 gm pro (2.2 gm/kg and 110% pro need).    Recommend increase TF as tolerated (along with gradual TPN taper) to eventual goal Impact Peptide 1.5 at 40 mL/hr = 1440 kcals (31 kcal/kg), 90 gm pro (2 gm/kg), 739 mL H20, 134 gm CHO, no fiber     Implementation  Collaboration and Referral of Nutrition care - Pt was discussed during ICU interdisciplinary rounds this morning.  Discussed with Pharmacist, who will continue the same TPN for now.    Goals  Pt will begin to transition from TPN --> TF in the next 24-48 hrs.      MONITORING AND EVALUATION:  Progress towards goals will be monitored and evaluated per protocol and Practice Guidelines    Lauren Martinez, RD, LD, CNSC

## 2020-09-23 NOTE — PROGRESS NOTES
[facility]  Infectious Disease Progress Note          Assessment and Plan:   IMPRESSION:   1.  A 54-year-old female without prior major infection or abdominal issues, admitted with major bowel perforation, now status post 3 operations, abdominal abscess, further leak, now with better source control.   2.  Ongoing septic shock from the above.      RECOMMENDATIONS:   1.  Abdominal cultures now back showing enterococcus, multiple anaerobes, gram-negative rods including a sensitive Pseudomonas.  Has been well covered with microbiology, all covered throughout including currently; however enterococcus probably better covered by penicillin and Zosyn covers everything including excellent VIC to the 2 gram-negative rods in all anaerobes covered.  No need for double anaerobe coverage.  Discontinue Flagyl.  Need to continue micafungin for now, probably fluconazole eventually for the Candida. Could do synergistic gent but if source control now Ok should not need, also just like best not vanco as renal risk significant although nl range creat trend up  2.  Suspect general sepsis and overall condition due to source control issues that hopefully have now been accomplished with multiple interventions. Slowly better , sedation and pressors weaning T down WBC to 13 K             Interval History:   Intubated and sedated T Ok still pressors cxs same creat 1.04              Medications:       sodium chloride 0.9%  1,000 mL Intravenous Once     chlorhexidine  15 mL Mouth/Throat Q12H     hydrocortisone sodium succinate PF  50 mg Intravenous Q6H     ipratropium - albuterol 0.5 mg/2.5 mg/3 mL  3 mL Nebulization 4x daily     lipids  250 mL Intravenous Q48H     micafungin  100 mg Intravenous Q24H     pantoprazole (PROTONIX) IV  40 mg Intravenous Daily     piperacillin-tazobactam  4.5 g Intravenous Q6H     sodium chloride (PF)  10 mL Intracatheter Q8H     sodium chloride (PF)  3 mL Intracatheter Q8H     thiamine  100  "mg Intravenous Daily                  Physical Exam:   Blood pressure 123/61, pulse 85, temperature 97.5  F (36.4  C), resp. rate 20, height 1.651 m (5' 5\"), weight 63.8 kg (140 lb 10.5 oz), last menstrual period 01/01/2015, SpO2 100 %.  Wt Readings from Last 2 Encounters:   09/23/20 63.8 kg (140 lb 10.5 oz)     Vital Signs with Ranges  Temp:  [95.7  F (35.4  C)-99.5  F (37.5  C)] 97.5  F (36.4  C)  Pulse:  [] 85  BP: (112-187)/(56-96) 123/61  MAP:  [51 mmHg-126 mmHg] 96 mmHg  Arterial Line BP: ()/(39-86) 158/65  FiO2 (%):  [40 %] 40 %  SpO2:  [96 %-100 %] 100 %    Constitutional: Intubated sedated   Lungs: Clear to auscultation bilaterally, no crackles or wheezing   Cardiovascular: Regular rate and rhythm, normal S1 and S2, and no murmur noted   Abdomen: Mild distended,stoma wd as noted by CR   Skin: No rashes, no cyanosis, no edema   Other:           Data:   All microbiology laboratory data reviewed.  Recent Labs   Lab Test 09/23/20  0448 09/22/20  0445 09/21/20  0400   WBC 13.5* 21.9* 26.8*   HGB 7.0* 7.5* 8.4*   HCT 20.1* 21.6* 24.5*   MCV 91 92 91    184 209     Recent Labs   Lab Test 09/23/20  0448 09/22/20  1300 09/22/20  0445   CR 1.04 0.95 0.98     No lab results found.  Recent Labs   Lab Test 09/17/20  0201 09/17/20  0155 09/17/20  0054 09/16/20  2200 09/10/20  1745 09/10/20  1730   CULT Moderate growth  Escherichia coli  Susceptibility testing done on previous specimen  *  Moderate growth  Pseudomonas aeruginosa  Susceptibility testing done on previous specimen  *  Light growth  Strain 2  Escherichia coli  Susceptibility testing done on previous specimen  *  Light growth  Enterococcus avium  Susceptibility testing done on previous specimen  *  Light growth  Candida albicans / dubliniensis  Candida albicans and Candida dubliniensis are not routinely speciated  Susceptibility testing not routinely done  *  On day 2, isolated in broth only:  Anaerobic gram negative rods  See anaerobic " report for identification  *  Heavy growth  Mixed aerobic and anaerobic fatimah  *  Heavy growth  Bacteroides uniformis  *  Heavy growth  Bacteroides ovatus/xylanisolvens  *  Susceptibility testing not routinely done Moderate growth  Escherichia coli  *  Moderate growth  Pseudomonas aeruginosa  *  Moderate growth  Strain 2  Escherichia coli  *  Light growth  Enterococcus avium  *  Light growth  Candida albicans / dubliniensis  Candida albicans and Candida dubliniensis are not routinely speciated  Susceptibility testing not routinely done  *  On day 2, isolated in broth only:  Anaerobic gram negative rods  See anaerobic report for identification  *  Heavy growth  Mixed aerobic and anaerobic fatimah  *  Heavy growth  Bacteroides fragilis  Susceptibility testing not routinely done  * No growth No growth No growth No growth

## 2020-09-23 NOTE — PLAN OF CARE
Pt remains sedated on ventilator. Weaned off versed and onto Precedex. Moves extremities spontaneously and will open eyes to speech intermittently. Fentanyl gtt continued weaned to 50mcg. BPs htn, hydralazine given PRN to keep SBP <170. HR Tachy 90s-110s. Ferguson in place, urine cherry red and clear, (md notified) good UOP. Skin weeping in all areas, mepilex applied. TF started at 10ml/hr. Tried CPAP and pt did not spontaneously breath on own at first, then became tachypnic and tachycardic. Vent changed to 16 RR from 20. Tolerated being up in chair for 3 hours.

## 2020-09-23 NOTE — PROGRESS NOTES
Intensive Care Daily Note          Assessment and Plan:     Summary Statement:  Evonne Martel is a 54 year old female admitted on 9/10/2020 for septic shock due to cecal perforation and sigmoid colon perforation, likely secondary to perforated diverticulitis. She underwent exploratory laparotomy, right colectomy with end ileostomy, and transverse mucous fistula. She initially did well postoperatively, but developed tachycardia, tachypnea and hypotension. She was taken back to the OR on 9/13 and was found to have gross stool spillage in abdomen with at least 3 sigmoid perforations. She underwent washout and anterior resection performed with drain placement. The patient had very friable tissues in fascia closure, so the patient was placed on paralytics overnight (discontinued (9/14). She had worsening hypotension on 9/17, so CT was obtained and showed possible leak at the descending colon. She went back to the OR (9/17) and was found to have stool leaking from the staple line at the descending colon; washout and end colostomy was performed. She has since been weaned off all pressors; however, her creatinine has risen to 1.0 from a baseline around 0.4 and remains malnourished and intravascularly depleted despite infusions with albumin. My assessment and plan for this patient is as follows:    Neurology: Patient is receiving fentanyl for sedation/analgesia.   - Wean off sedation as tolerated   Cardiovascular/Hemodynamics: Hypotension secondary to septic shock and hypovolemia, initially requiring multiple pressors and slowly weaned off. She is now off pressors.   - Continue to monitor fluid status, albumin 25%   Pulmonary: Mechanically ventilated for acute respiratory failure. Now concern for acute lung injury vs pulmonary edema. Patient is receiving adequate oxygenation and ventilation with PEEP 5 and FiO2 40%  - [place] QID  - Albuterol PRN  - Pressure support trial today   GI and Nutrition: Perforated colon s/p ex  lap, colectomy, and abdominal washout (9/10, re-exploration 9/13 and 9/17). Concern for malnutrition.   - Management per colorectal surgery  - Tube feeds  - NG to LIS  - Monitor GAB output   Renal: Concern for hypovolemia secondary to malnutrition and acute kidney injury. Cr 1.04 from ~0.4 at admission.   - Received 100g of albumin 25% for target of total protein >5.2     Hypokalemia, hypophosphatemia, with concern for possible refeeding syndrome given history of alcohol use.  - replacement protocol   Infectious Disease: Septic shock secondary to perforated colon with feculent peritonitis. CXR on 9/17 shows diffuse bilateral infiltrates and consolidation of RLL. Intraoperative abscess cultures are positive for Pseudomonas, Enterococcus avium, E. coli and Bacteroides. Blood cultures remain NGTD.   - ID consulted, appreciate assistance  - Continue antibiotic regimen: Zosyn and micafungin   Hematology and Oncology: Acute normocytic anemia  - Hgb 7.0 (from 7.5)  - Continue to monitor Hgb, transfuse if Hgb <7    Leukocytosis secondary to septic shock from bowel perforations  - continue antibiotic regimen as above   Endocrinology: Stress-induced hyperglycemia  - Insulin per unit protocol    Stress-dosed steroids  - hydrocortisone 50 mg q6        Intensive Care: Central line: Central line present, needed and to be continued  Arterial line: Arterial line present, needed and to be continued  Ferguson catheter:  In place  NG tube: LIS  Drains: abdominal GAB drain  Restraint:Needed   Others: n/a   Top goals for today Wean off sedation as tolerated  Pressure support trial today  Continue antibiotic regimen as above                Key events/ Interval history - last 24 hours:    No acute events overnight. Patient failed pressure support trial today. Received 100g of 25% albumin to target a Total protein >5.2.          Problem list:     Severe sepsis (H)    Pneumoperitoneum    Colon perforation (H)    Free intraperitoneal air     Perforation of colon (H)    * No resolved hospital problems. *             Medications:   I have reviewed this patient's current medications              Physical Exam:   Vital Sign Ranges  Temperature Temp  Av.5  F (36.4  C)  Min: 93.9  F (34.4  C)  Max: 100.4  F (38  C)   Blood pressure Systolic (24hrs), Av , Min:76 , Max:157        Diastolic (24hrs), Av, Min:52, Max:91      Pulse Pulse  Av.4  Min: 66  Max: 148   Respirations Resp  Av.2  Min: 10  Max: 31   Pulse oximetry SpO2  Av.9 %  Min: 86 %  Max: 100 %         Intake/Output Summary (Last 24 hours) at 2020 1023  Last data filed at 2020 1000  Gross per 24 hour   Intake 9868.41 ml   Output 2915 ml   Net 6953.41 ml         General Appearance:   No acute distress, sitting up in chair   HEENT:   Trachea is midline  Endotrachael tube is present   Chest and Lungs:   Diffuse crackles bilaterally; nonlabored breathing on mechanical ventilation   Cardiovascular:   Regular rate and rhythm, no murmurs   Abdomen:   Soft, nondistended; midline dressings, both stomas are pink, no stool    :   Ferguson in place   Musculoskeletal:   Not assessed   Extremities and Skin:   Warm, well perfused; bilateral pitting edema present   Neuro:   Lightly sedated, moving all extremities.    Drains and Tubes:   NG in place, GAB drain with yellow output   Intravascular Access and Device:   Lines present: triple lumen catheter (right internal jugular) and arterial line (radial)            Data:   Labs:  All lab results reviewed past 24 hours    Imaging:  All imaging results reviewed past 24 hours      I, Carmencita Araiza MS4, served as a medical scribe for Dr. Gonzalez.    ATTENDING PHYSICIAN:    I reviewed the patient's note as written by Carmencita Araiza. I agree with all aspects of the note as we examined and planned the care together and she served as a scribe for the note.  CC time 50 minutes      Romeo Gonzalez MD  Pager [phone]

## 2020-09-23 NOTE — PHARMACY
TPN formula evaluated based on today s labs results.    The following changes have been made:  Sodium:  decreased  to 0  .  Magnesium: decreased  to 2  .  Phosphorus: decreased  to 10  .    Pharmacy will continue to follow and adjust as appropriate.

## 2020-09-23 NOTE — PLAN OF CARE
End of Shift Nursing Summary    Neuro:  Sedated on Versed drip.  Attempting down titration to prepare for SBT's today.  Patient opens and blinks eyes occasionally.  Does not follow commands.  ICU CAM positive. No spontaneous movement of extremities  Pain: Fentanyl drip.  CV:  Labile BP's  Beginning of shift patient was hypertensive.  Attempts made to decreased included repositioning, increase in fentanyl drip, IV hydralazine.  Temp began to decrease to 35.9 degrees celsius per bladder temp as well as axillary temp.  [place] warmer put on.  Patient's BP started to decrease as her temp increased.  Pulm: Lung sounds are rhonchi, course crackles throughout.  No vent changes made over night.  Despite more arousability, patient is not breathing over the vent.    GI/: Hudson catheter remains patent.  UOP ~ 110 ml Q 2 hour  Endocrine: Insulin gtt at 1 unit per hour, algorithm 2.  Skin: constant weeping serous fluid throughout entire body.  Skin is fragile and prone to sloughing.  Reddened,excoriated ann marie area.  Severely edematous, tight and shiny.  Several abdominal incisions.  Fever: Seems that patient is not able to regulate her temp.  36 degrees prior to blanket warmer.    Lines/drips: Brachial arterial line, triple lumen PICC, PIV right hand, hudson cath.  Versed, Fentanyl, TPN, Insulin and 2 NS TKO's.  Additional: Several abnormal am labs.  Reviewed all with night Intensivist.  50 grams of 25% albumin ordered.  Remaining labs will be addressed by day Intensivist.       *See EPIC flowsheet for full nursing assessment findings

## 2020-09-23 NOTE — PLAN OF CARE
Pt remains vented. Versed weaned down from 3 to 1. Fentanyl weaned down from 125 to 100. Bite block placed for continuous biting. Up to chair with lift. Albumin given. VSS.

## 2020-09-23 NOTE — PROGRESS NOTES
Spontaneous Breathing Trial    Criteria met for SBT (Y/N):Yes    Settings:    PS:15  PEEP:5  FiO2 40%    Measured Parameters:    RR:22  Tidal volume:250 ml    Patient tolerance:Poor. Vt's around 250 ml's on 15/5 and HR increased from the 70's to around 100.    Duration of SBT:2 minutes  Plan:Will cont full vent support for now and will assess for another PS trial in the morning.  9/23/2020  Sara Renee, RT

## 2020-09-23 NOTE — PROGRESS NOTES
[facility] ICU RESPIRATORY NOTE        Date of Admission: 9/10/2020    Date of Intubation (most recent): 9/10/20    Reason for Mechanical Ventilation: Airway protection     Number of Days on Mechanical Ventilation: 14    Met Criteria for Spontaneous Breathing Trial: No    Reason for No Spontaneous Breathing Trial: per MD     Significant Events Today: none     ABG Results:   Recent Labs   Lab 09/19/20  1105 09/18/20  1432 09/18/20  1010 09/17/20  2340 09/17/20  2107   PH 7.36 7.30* 7.24* 7.30* 7.24*   PCO2 36 38 47* 38 46*   PO2 102 109* 86 91 106*   HCO3 20* 19* 20* 19* 20*   O2PER  --  Ventilator 60 40% 40       Current Vent Settings: Ventilation Mode: CMV/AC  (Continuous Mandatory Ventilation/ Assist Control)  FiO2 (%): 40 %  Rate Set (breaths/minute): 20 breaths/min  Tidal Volume Set (mL): 460 mL  PEEP (cm H2O): 5 cmH2O  Oxygen Concentration (%): 40 %      Skin Assessment: intact     Plan: continue full ventilatory support and assess for SBT     Katheryn Barrett, RT

## 2020-09-23 NOTE — PROGRESS NOTES
COLON & RECTAL SURGERY  PROGRESS NOTE    PAD13  POD#13, 10, 6    September 23, 2020    SUBJECTIVE:    Intubated and sedated  Afebrile  Off vasoactive medications    OBJECTIVE:  Temp:  [95.7  F (35.4  C)-99.5  F (37.5  C)] 97.3  F (36.3  C)  Pulse:  [53-91] 85  BP: (112-187)/(56-96) 123/61  MAP:  [51 mmHg-126 mmHg] 91 mmHg  Arterial Line BP: ()/(39-86) 147/64  FiO2 (%):  [40 %] 40 %  SpO2:  [91 %-100 %] 99 %    Intake/Output Summary (Last 24 hours) at 9/23/2020 0749  Last data filed at 9/23/2020 0400  Gross per 24 hour   Intake 2223.06 ml   Output 2145 ml   Net 78.06 ml       GENERAL:  No acute distress  EXTREMITIES: Warm and well perfused, moderate edema   ABDOMEN:  Soft, appropriately tender, non-distended. No guarding, rigidity, or peritoneal signs.  Stomas both healthy, with only sweat in the bag.  INCISION:  Wound vac on  GAB output serous.    LABS:  Lab Results   Component Value Date    WBC 13.5 09/23/2020     Lab Results   Component Value Date    HGB 7.0 09/23/2020     Lab Results   Component Value Date    HCT 20.1 09/23/2020     Lab Results   Component Value Date     09/23/2020     Last Basic Metabolic Panel:  Lab Results   Component Value Date     09/23/2020      Lab Results   Component Value Date    POTASSIUM 3.9 09/23/2020     Lab Results   Component Value Date    CHLORIDE 119 09/23/2020     Lab Results   Component Value Date    PARISH 7.6 09/23/2020     Lab Results   Component Value Date    CO2 24 09/23/2020     Lab Results   Component Value Date    BUN 63 09/23/2020     Lab Results   Component Value Date    CR 1.04 09/23/2020     Lab Results   Component Value Date     09/23/2020       ASSESSMENT/PLAN: 54 year old female s/p ex lap with right hemicolectomy on 9/10, followed by re-exploration with abdominal washout and anterior resection on 9/13, and ex lap with washout and descending colon MF on 9/17. She remains intubated in the ICU, off of vasoactive medications. Awaiting return of  bowel function.    1) ICU weaning sedation  2) We will continue to follow    Will discuss with Dr. Andres.    Thanks,    Sukhjinder Chavez MD  CRS fellow  9/23/2020   7:49 AM

## 2020-09-23 NOTE — CONSULTS
Chart reviewed, patient seen yesterday for a complete reassessment - see RD note dated 9/22/20 for details    Patient currently receiving the following TPN regimen -->    Nutrition Support Parenteral:  Type of Access: PICC  Parenteral Frequency:  Continuous  Parenteral Regimen: D15 AA5 at 65 mL/hr + Lipid 250 mL every 48 hours   Total Parenteral Provisions: 1358 kcal (30 kcal/kg), 78 g protein (1.7 g/kg), 234 g CHO, 18% fat.    Labs today -->  Na 150 (H), Mg 2.6 (H), Phos 4.7 (H) - Noted adjustments made in next bag of TPN   -153 on Insulin drip    mL  Last BM 9/21    ASSESSED NUTRITION NEEDS:   Dosing Weight 45.9 kg   Estimated Energy Needs: 1104-5710 kcals (30-35 Kcal/Kg)  Justification: repletion, underweight and vented  Estimated Protein Needs: 68-92 grams protein (1.5-2 g pro/Kg)  Justification: repletion, post-op and hypercatabolism with critical illness    Discussed today during interdisciplinary rounds - per bedside RN, ok to start trickle TF per CRS  Orders placed to begin Impact Peptide 1.5 at 10 mL/hr = 360 kcals, 23 gm pro, 185 mL H20, 34 gm CHO, no fiber   Flushes 60 mL every 4 hours   Total (TF + TPN/Lipid):  1718 kcals (37 kcal/kg and 107% energy needs), 101 gm pro (2.2 gm/kg and 110% pro need)    Recommend increase TF as tolerated (along with gradual TPN taper) to eventual goal Impact Peptide 1.5 at 40 mL/hr = 1440 kcals (31 kcal/kg), 90 gm pro (2 gm/kg), 739 mL H20, 134 gm CHO, no fiber      Neli Trejo, DANTE, LD, CNSC   Clinical Dietitian - [facility]

## 2020-09-24 NOTE — PLAN OF CARE
Neuro: Pt's eyes open spontaneously this AM, does not track. Does not attempt to follow commands. Does not withdraw to pain, however does RODRIGUEZ spontaneously. Restlessness improved with versed. Fentanyl and precedex gtts for pain and sedation.  CV: SR, hydralazine for SBP > 160  Pulm: lungs course  GI/: red urine with sediment, no clots. Ostomies and GAB drain intact.  Skin: edematous and serous weeping  Lines:insulin gtt  Other: K partially replaced

## 2020-09-24 NOTE — PROGRESS NOTES
COLON & RECTAL SURGERY  PROGRESS NOTE    September 24, 2020  Post-op Day # 14/11/7    SUBJECTIVE:  Patient intubated, sedated. Off pressors. Afebrile. No stoma function yet.     OBJECTIVE:  Temp:  [96.8  F (36  C)-99  F (37.2  C)] 98.8  F (37.1  C)  Pulse:  [] 69  Resp:  [17-22] 20  BP: (146)/(88) 146/88  MAP:  [86 mmHg-128 mmHg] 107 mmHg  Arterial Line BP: (134-173)/() 151/75  FiO2 (%):  [40 %] 40 %  SpO2:  [98 %-100 %] 99 %    Intake/Output Summary (Last 24 hours) at 9/24/2020 0912  Last data filed at 9/24/2020 0600  Gross per 24 hour   Intake 3347.57 ml   Output 2670 ml   Net 677.57 ml       GENERAL:  Intubated, sedated. Ferguson with blood tinged urine, good output.   HEAD: Normocephalic atraumatic  SCLERA: Anicteric  EXTREMITIES: Warm and well perfused  ABDOMEN:  Soft, mildly distended. No guarding, rigidity, or peritoneal signs. Stomas pink and viable. No gas in bag, +sweat.   INCISION:  Wound vac in place. Mgstbz=935zt/24hr, GAB midline mkgypk=6198wd/24hr. Stoma over midline output=20ml/24hr. All serous fluid.     LABS:  Lab Results   Component Value Date    WBC 15.4 09/24/2020     Lab Results   Component Value Date    HGB 7.7 09/24/2020     Lab Results   Component Value Date    HCT 21.6 09/24/2020     Lab Results   Component Value Date     09/24/2020     Last Basic Metabolic Panel:  Lab Results   Component Value Date     09/24/2020      Lab Results   Component Value Date    POTASSIUM 2.9 09/24/2020     Lab Results   Component Value Date    CHLORIDE 116 09/24/2020     Lab Results   Component Value Date    PARISH 7.7 09/24/2020     Lab Results   Component Value Date    CO2 21 09/24/2020     Lab Results   Component Value Date    BUN 70 09/24/2020     Lab Results   Component Value Date    CR 1.00 09/24/2020     Lab Results   Component Value Date     09/24/2020       ASSESSMENT/PLAN: 54 year old female s/p ex lap with right hemicolectomy on 9/10, followed by re-exploration with abdominal  washout and anterior resection on 9/13, and ex lap with washout and descending colon MF on 9/17. She remains intubated in the ICU, off pressors. Post-op ileus.      1. NPO. NG. Continue TPN.   2. CT abd/pelvis today to ruleout abscess/leak   3. Hold tubefeeds until CT results in.    4. Continue GAB, incisional wound vac. Plan to change abdominal wound VAC Mon/Thursday with wound care.  5. Appreciate ID assistance with abx.   6. Appreciate ICU assistance with cares.     For questions/paging, please contact the CRS office at [phone].    Sonia Treadwell PA-C  [facility]  Phone: [phone]

## 2020-09-24 NOTE — PHARMACY
TPN formula evaluated based on today s labs results.    The following changes have been made:  Potassium: increased to 55 .  Magnesium: removed .  Phosphorus: increased to 15 .    Pharmacy will continue to follow and adjust as appropriate.

## 2020-09-24 NOTE — PROGRESS NOTES
Intensive Care Daily Note          Assessment and Plan:     Summary Statement:  Evonne Martel is a 54 year old female admitted on 9/10/2020 for septic shock due to cecal perforation and sigmoid colon perforation, likely secondary to perforated diverticulitis. She underwent exploratory laparotomy, right colectomy with end ileostomy, and transverse mucous fistula. She initially did well postoperatively, but developed tachycardia, tachypnea and hypotension. She was taken back to the OR on 9/13 and was found to have gross stool spillage in abdomen with at least 3 sigmoid perforations. She underwent washout and anterior resection performed with drain placement. The patient had very friable tissues in fascia closure, so the patient was placed on paralytics overnight (discontinued (9/14). She had worsening hypotension on 9/17, so CT was obtained and showed possible leak at the descending colon. She went back to the OR (9/17) and was found to have stool leaking from the staple line at the descending colon; washout and end colostomy was performed. She has since been weaned off all pressors; however, her creatinine has risen to 1.0 from a baseline around 0.4 and remains malnourished and intravascularly depleted despite infusions with albumin. My assessment and plan for this patient is as follows:    Neurology: Patient is receiving fentanyl for sedation/analgesia. She is not tracking with her eyes or following commands.   - Wean off sedation as tolerated   Cardiovascular/Hemodynamics: Presented with hypotension secondary to septic shock and hypovolemia, initially requiring multiple pressors and has slowly been weaned off all pressors. She is now hypertensive with SBP in 150-160s but remains intravascularly depleted  - Continue to monitor fluid status, albumin 25%   Pulmonary: Mechanically ventilated for acute respiratory failure. Now concern for acute lung injury vs pulmonary edema. Patient is receiving adequate oxygenation  and ventilation with PEEP 5 and FiO2 40%  - Duonebs QID  - Albuterol PRN   GI and Nutrition: Perforated colon s/p ex lap, colectomy, and abdominal washout (9/10, re-exploration 9/13 and 9/17). Concern for malnutrition. Currently having no output from colostomy and ileostomy.  - Management per colorectal surgery, appreciate assistance  - Repeat CT abd/pelvis to assess for abscess/leak  - NPO, TPN  - Hold tube feeds until CT results   - NG to LIS  - Monitor GAB output   Renal: Concern for intravascular depletion secondary to malnutrition and acute kidney injury. Cr 1.00 from ~0.4 at admission.   - Continue infusion with albumin 25% for target of total protein >5.2     Hypokalemia, hypophosphatemia, with concern for possible refeeding syndrome given history of alcohol use.  - replacement protocol   Infectious Disease: Septic shock secondary to perforated colon with feculent peritonitis. CXR on 9/17 shows diffuse bilateral infiltrates and consolidation of RLL. Intraoperative abscess cultures are positive for Pseudomonas, Enterococcus avium, E. coli and Bacteroides. Blood cultures remain NGTD.   - ID consulted, appreciate assistance  - Continue antibiotic regimen: Zosyn and micafungin   Hematology and Oncology: Acute normocytic anemia  - Hgb 7.7  (from 7.0)  - Continue to monitor Hgb, transfuse if Hgb <7    Leukocytosis secondary to septic shock from bowel perforations  - continue antibiotic regimen as above   Endocrinology: Stress-induced hyperglycemia  - Insulin per unit protocol    Stress-dosed steroids  - hydrocortisone 50 mg q6        Intensive Care: Central line: Central line present, needed and to be continued  Arterial line: Arterial line present, needed and to be continued  Ferguson catheter:  In place  NG tube: LIS  Drains: abdominal GAB drain  Restraint:Needed   Others: n/a   Top goals for today Repeat imaging with CT abd/pelvis to assess for abscess/leak per Colorectal Surgery  Wean off sedation as  tolerated  Continue antibiotic regimen as above                Key events/ Interval history - last 24 hours:    No acute events overnight. Patient is not tracking with her eyes or following commands. She does move her extremities spontaneously. She has had no colostomy/ileostomy output. Plan for CT abd/pelvis today for assessment for abscess/leak         Problem list:     Severe sepsis (H)    Pneumoperitoneum    Colon perforation (H)    Free intraperitoneal air    Perforation of colon (H)    * No resolved hospital problems. *             Medications:   I have reviewed this patient's current medications              Physical Exam:   Vital Sign Ranges  Temperature Temp  Av.5  F (36.4  C)  Min: 93.9  F (34.4  C)  Max: 100.4  F (38  C)   Blood pressure Systolic (24hrs), Av , Min:76 , Max:157        Diastolic (24hrs), Av, Min:52, Max:91      Pulse Pulse  Av.4  Min: 66  Max: 148   Respirations Resp  Av.2  Min: 10  Max: 31   Pulse oximetry SpO2  Av.9 %  Min: 86 %  Max: 100 %         Intake/Output Summary (Last 24 hours) at 2020 1023  Last data filed at 2020 1000  Gross per 24 hour   Intake 9868.41 ml   Output 2915 ml   Net 6953.41 ml         General Appearance:   No acute distress   HEENT:   Trachea is midline  Endotrachael tube is present   Chest and Lungs:   Diffuse crackles bilaterally; nonlabored breathing on mechanical ventilation   Cardiovascular:   Regular rate and rhythm, no murmurs   Abdomen:   Soft, nondistended; midline dressings, both stomas are pink, no stool    :   Ferguson in place   Musculoskeletal:   Not assessed   Extremities and Skin:   Warm, well perfused; bilateral pitting edema present   Neuro:   Lightly sedated, moving all extremities. Does not track with eyes or follow commands   Drains and Tubes:   NG in place, GAB drain with yellow output   Intravascular Access and Device:   Lines present: triple lumen catheter (right internal jugular) and arterial line  (radial)            Data:   Labs:  All lab results reviewed past 24 hours    Imaging:  All imaging results reviewed past 24 hours      I, Carmencita Araiza MS4, served as a medical scribe for Dr. Gonzalez.    ATTENDING PHYSICIAN:    I reviewed the patient's history, obtained additional history, conducted an examination to confirm key findings and reviewed the studies and labs. I saw the patient with Carmencitaclarisa Araiza and I agree with her note above without any need for additional information.    CC Time: 45 min      Romeo Gonzalez MD  Pager [phone]

## 2020-09-24 NOTE — PROGRESS NOTES
[facility]  Infectious Disease Progress Note          Assessment and Plan:   IMPRESSION:   1.  A 54-year-old female without prior major infection or abdominal issues, admitted with major bowel perforation, now status post 3 operations, abdominal abscess, further leak, now with better source control.   2.  Ongoing septic shock , slow improvement  3 resp failure, vent      RECOMMENDATIONS:   1.  Abdominal cultures enterococcus, multiple anaerobes, gram-negative rods including a sensitive Pseudomonas.  Has had well covered  microbiology throughout including currently; however enterococcus probably better covered by penicillin and Zosyn covers everything including excellent VIC to the 2 gram-negative rods and all anaerobes covered.  No need for double anaerobe coverage.  Discontinue Flagyl.   continue micafungin for now, probably fluconazole eventually for the Candida albicans. Could do synergistic gent but if source control now Ok should not need, also just like best not vanco as renal risk significant although nl range creat trend up  2.  Suspect general sepsis and overall condition due to source control issues that hopefully have now been accomplished with multiple interventions. Slowly better , sedation and pressors weaning T down WBC to 15K  3 Still some sedation, not interactive but awake  4 CT recheck planned             Interval History:   Intubated and awake some sedation not interactive T Ok less pressors cxs same creat 1.0              Medications:       sodium chloride 0.9%  1,000 mL Intravenous Once     chlorhexidine  15 mL Mouth/Throat Q12H     hydrocortisone sodium succinate PF  50 mg Intravenous Q6H     iohexol  50 mL Oral Once     ipratropium - albuterol 0.5 mg/2.5 mg/3 mL  3 mL Nebulization 4x daily     lipids  250 mL Intravenous Q48H     micafungin  100 mg Intravenous Q24H     pantoprazole (PROTONIX) IV  40 mg Intravenous Daily     piperacillin-tazobactam  4.5 g Intravenous Q6H  "    sodium chloride (PF)  10 mL Intracatheter Q8H     sodium chloride (PF)  3 mL Intracatheter Q8H     thiamine  100 mg Intravenous Daily                  Physical Exam:   Blood pressure (!) 146/88, pulse 69, temperature 98.8  F (37.1  C), resp. rate 20, height 1.651 m (5' 5\"), weight 64.1 kg (141 lb 5 oz), last menstrual period 01/01/2015, SpO2 99 %.  Wt Readings from Last 2 Encounters:   09/24/20 64.1 kg (141 lb 5 oz)     Vital Signs with Ranges  Temp:  [96.8  F (36  C)-99  F (37.2  C)] 98.8  F (37.1  C)  Pulse:  [] 69  Resp:  [17-22] 20  BP: (146)/(88) 146/88  MAP:  [86 mmHg-128 mmHg] 107 mmHg  Arterial Line BP: (134-173)/() 151/75  FiO2 (%):  [40 %] 40 %  SpO2:  [98 %-100 %] 99 %    Constitutional: Intubated sedated   Lungs: Clear to auscultation bilaterally, no crackles or wheezing   Cardiovascular: Regular rate and rhythm, normal S1 and S2, and no murmur noted   Abdomen: Mild distended,stoma wd as noted by CR   Skin: No rashes, no cyanosis, no edema   Other:           Data:   All microbiology laboratory data reviewed.  Recent Labs   Lab Test 09/24/20  0404 09/23/20  0448 09/22/20  0445   WBC 15.4* 13.5* 21.9*   HGB 7.7* 7.0* 7.5*   HCT 21.6* 20.1* 21.6*   MCV 91 91 92    181 184     Recent Labs   Lab Test 09/24/20  0404 09/23/20  0448 09/22/20  1300   CR 1.00 1.04 0.95     No lab results found.  Recent Labs   Lab Test 09/17/20  0201 09/17/20  0155 09/17/20  0054 09/16/20  2200 09/10/20  1745 09/10/20  1730   CULT Heavy growth  Mixed aerobic and anaerobic fatimah  *  Heavy growth  Bacteroides uniformis  *  Heavy growth  Bacteroides ovatus/xylanisolvens  *  Susceptibility testing not routinely done  Moderate growth  Escherichia coli  Susceptibility testing done on previous specimen  *  Moderate growth  Pseudomonas aeruginosa  Susceptibility testing done on previous specimen  *  Light growth  Strain 2  Escherichia coli  Susceptibility testing done on previous specimen  *  Light " growth  Enterococcus avium  Susceptibility testing done on previous specimen  *  Light growth  Candida albicans / dubliniensis  Candida albicans and Candida dubliniensis are not routinely speciated  Susceptibility testing not routinely done  *  On day 2, isolated in broth only:  Anaerobic gram negative rods  See anaerobic report for identification  * Heavy growth  Mixed aerobic and anaerobic fatimah  *  Heavy growth  Bacteroides fragilis  Susceptibility testing not routinely done  *  Moderate growth  Escherichia coli  *  Moderate growth  Pseudomonas aeruginosa  *  Moderate growth  Strain 2  Escherichia coli  *  Light growth  Enterococcus avium  *  Light growth  Candida albicans / dubliniensis  Candida albicans and Candida dubliniensis are not routinely speciated  Susceptibility testing not routinely done  *  On day 2, isolated in broth only:  Anaerobic gram negative rods  See anaerobic report for identification  * No growth No growth No growth No growth

## 2020-09-24 NOTE — PROGRESS NOTES
[facility] ICU RESPIRATORY NOTE        Date of Admission: 9/10/2020    Date of Intubation (most recent): 9/10/20    Reason for Mechanical Ventilation: Airway Protection    Number of Days on Mechanical Ventilation: 15    Met Criteria for Spontaneous Breathing Trial: Yes    Significant Events Today: none     ABG Results:   Recent Labs   Lab 09/23/20  1350 09/19/20  1105 09/18/20  1432 09/18/20  1010 09/17/20  2340   PH 7.45 7.36 7.30* 7.24* 7.30*   PCO2 30* 36 38 47* 38   PO2 180* 102 109* 86 91   HCO3 21 20* 19* 20* 19*   O2PER 40%  --  Ventilator 60 40%       Current Vent Settings: Ventilation Mode: CMV/AC  (Continuous Mandatory Ventilation/ Assist Control)  FiO2 (%): 40 %  Rate Set (breaths/minute): 16 breaths/min  Tidal Volume Set (mL): 460 mL  PEEP (cm H2O): 5 cmH2O  Pressure Support (cm H2O): 5 cmH2O  Oxygen Concentration (%): 40 %  Resp: 22      Plan: Continue on the vent     Xavier Hansen RT

## 2020-09-25 NOTE — PROGRESS NOTES
[facility]  Infectious Disease Progress Note          Assessment and Plan:   IMPRESSION:   1.  A 54-year-old female without prior major infection or abdominal issues, admitted with major bowel perforation, now status post 3 operations, abdominal abscess, further leak, now with better source control.   2.  Ongoing septic shock , slow improvement  3 resp failure, vent      RECOMMENDATIONS:   1.  Abdominal cultures enterococcus, multiple anaerobes, gram-negative rods including a sensitive Pseudomonas.  Has had well covered  microbiology throughout including currently; however enterococcus probably better covered by penicillin and Zosyn covers everything including excellent VIC to the 2 gram-negative rods and all anaerobes covered.  No need for double anaerobe coverage.  Discontinue Flagyl.   continue micafungin for now, probably fluconazole eventually for the Candida albicans. Could do synergistic gent but if source control now Ok should not need, also just like best not vanco as renal risk significant although nl range creat trend up  2.  Suspect general sepsis and overall condition due to source control issues that hopefully have now been accomplished with multiple interventions. Slowly better , sedation and pressors weaning T down WBC to 15K  3 Some interactive   4 CT recheck no abscess             Interval History:   Intubated and awake some sedation some interactive T Ok off pressors cxs same creat 1.0 CT neg              Medications:       sodium chloride 0.9%  1,000 mL Intravenous Once     chlorhexidine  15 mL Mouth/Throat Q12H     hydrocortisone sodium succinate PF  50 mg Intravenous Q6H     ipratropium - albuterol 0.5 mg/2.5 mg/3 mL  3 mL Nebulization 4x daily     lipids  250 mL Intravenous Q48H     micafungin  100 mg Intravenous Q24H     pantoprazole (PROTONIX) IV  40 mg Intravenous Daily     piperacillin-tazobactam  4.5 g Intravenous Q6H     sodium chloride (PF)  10 mL Intracatheter  "Q8H     sodium chloride (PF)  3 mL Intracatheter Q8H     thiamine  100 mg Intravenous Daily                  Physical Exam:   Blood pressure (!) 148/83, pulse 87, temperature (P) 96.4  F (35.8  C), temperature source (P) Axillary, resp. rate 19, height 1.651 m (5' 5\"), weight 65.6 kg (144 lb 10 oz), last menstrual period 01/01/2015, SpO2 100 %.  Wt Readings from Last 2 Encounters:   09/25/20 65.6 kg (144 lb 10 oz)     Vital Signs with Ranges  Temp:  [96.4  F (35.8  C)-98.2  F (36.8  C)] (P) 96.4  F (35.8  C)  Pulse:  [] 87  Resp:  [16-22] 19  BP: (148)/(83) 148/83  MAP:  [92 mmHg-124 mmHg] 110 mmHg  Arterial Line BP: (132-170)/() 140/80  FiO2 (%):  [40 %] 40 %  SpO2:  [91 %-100 %] 100 %    Constitutional: Intubated sedated   Lungs: Clear to auscultation bilaterally, no crackles or wheezing   Cardiovascular: Regular rate and rhythm, normal S1 and S2, and no murmur noted   Abdomen: Mild distended,stoma wd as noted by CR   Skin: No rashes, no cyanosis, no edema   Other:           Data:   All microbiology laboratory data reviewed.  Recent Labs   Lab Test 09/25/20  0422 09/24/20  1720 09/24/20  0404   WBC 14.3* 13.1* 15.4*   HGB 7.5* 7.1* 7.7*   HCT 21.9* 20.9* 21.6*   MCV 92 92 91    196 216     Recent Labs   Lab Test 09/25/20  0422 09/24/20  0404 09/23/20  0448   CR 0.94 1.00 1.04     No lab results found.  Recent Labs   Lab Test 09/17/20  0201 09/17/20  0155 09/17/20  0054 09/16/20  2200 09/10/20  1745 09/10/20  1730   CULT Heavy growth  Mixed aerobic and anaerobic fatimah  *  Heavy growth  Bacteroides uniformis  *  Heavy growth  Bacteroides ovatus/xylanisolvens  *  Susceptibility testing not routinely done  Moderate growth  Escherichia coli  Susceptibility testing done on previous specimen  *  Moderate growth  Pseudomonas aeruginosa  Susceptibility testing done on previous specimen  *  Light growth  Strain 2  Escherichia coli  Susceptibility testing done on previous specimen  *  Light " growth  Enterococcus avium  Susceptibility testing done on previous specimen  *  Light growth  Candida albicans / dubliniensis  Candida albicans and Candida dubliniensis are not routinely speciated  Susceptibility testing not routinely done  *  On day 2, isolated in broth only:  Anaerobic gram negative rods  See anaerobic report for identification  * Heavy growth  Mixed aerobic and anaerobic fatimah  *  Heavy growth  Bacteroides fragilis  Susceptibility testing not routinely done  *  Moderate growth  Escherichia coli  *  Moderate growth  Pseudomonas aeruginosa  *  Moderate growth  Strain 2  Escherichia coli  *  Light growth  Enterococcus avium  *  Light growth  Candida albicans / dubliniensis  Candida albicans and Candida dubliniensis are not routinely speciated  Susceptibility testing not routinely done  *  On day 2, isolated in broth only:  Anaerobic gram negative rods  See anaerobic report for identification  * No growth No growth No growth No growth

## 2020-09-25 NOTE — PROGRESS NOTES
[facility] ICU RESPIRATORY NOTE        Date of Admission: 9/10/2020    Date of Intubation (most recent): 9/10/2020    Reason for Mechanical Ventilation: Airway Protection    Number of Days on Mechanical Ventilation: 16    Met Criteria for Spontaneous Breathing Trial: Yes        Significant Events Today: Pt had SBT done this shift. 5/5 20 minutes     ABG Results:   Recent Labs   Lab 09/23/20  1350 09/19/20  1105 09/18/20  1432   PH 7.45 7.36 7.30*   PCO2 30* 36 38   PO2 180* 102 109*   HCO3 21 20* 19*   O2PER 40%  --  Ventilator       Current Vent Settings: Ventilation Mode: CMV/AC  (Continuous Mandatory Ventilation/ Assist Control)  FiO2 (%): 40 %  Rate Set (breaths/minute): 16 breaths/min  Tidal Volume Set (mL): 460 mL  PEEP (cm H2O): 5 cmH2O  Pressure Support (cm H2O): 5 cmH2O  Oxygen Concentration (%): 40 %  Resp: 16        Plan: Will continue full ventilatory support for now and assess for weaning readiness daily.               Stephanie Larson, RT

## 2020-09-25 NOTE — PROGRESS NOTES
SPIRITUAL HEALTH SERVICES  SPIRITUAL ASSESSMENT Progress Note  [facility] ICU     REFERRAL SOURCE: Follow Up/Attempt    I shared a brief visit with patient's spouse in the hallway today, known to me from previous visits. Anibal shared he was doing okay today and shared he was trying to take things one day at a time. He welcomed me stopping by later in the day, I attempted twice but Anibal was not present during my attempts.     PLAN: I will attempt to visit with patient's family again next week.     [facility]    Phone: [phone]  Pager: [phone]

## 2020-09-25 NOTE — PLAN OF CARE
Neuro: Eyes open spontaneously, tracks at times, follows simple commands (able to shrug shoulders, wiggle toes, and squeeze hands on command). Restlessness significantly improved with versed. Dex and fentanyl for sedation. Pt unable to make her needs known.  CV: SR. SB in low 40's this AM shortly after versed when pt was asleep, resolved after staff woke pt. One 13 beat run SVT. AM lytes OK. Hydralazine x 1 for SBP > 160  Pulm: lungs mildly course, scant secretions  GI/: Scant serous/yellow output from ostomies. 950 mL GAB output. Pt tolerating TF, low residuals. TPN infusing. Bloody urine with sediment (no clots), hudson leaking requiring frequent chux changes beneath pt  Skin: weeping BUE, excoriated perineum, constantly moist gluteus   Lines: insulin gtt Algorithm 2, heparin gtt

## 2020-09-25 NOTE — PROGRESS NOTES
[facility]  PROGRESS NOTE    September 25, 2020  Post-op Day # 15/12/8    SUBJECTIVE:  Following commands. Failed SBT. No stoma function. CT A/P yesterday with SMV thrombus, no abscess.     OBJECTIVE:  Temp:  [96.8  F (36  C)-99  F (37.2  C)] 97.7  F (36.5  C)  Pulse:  [] 84  Resp:  [16-22] 19  BP: (148)/(83) 148/83  MAP:  [92 mmHg-135 mmHg] 124 mmHg  Arterial Line BP: (132-170)/() 133/123  FiO2 (%):  [40 %] 40 %  SpO2:  [91 %-100 %] 99 %    Intake/Output Summary (Last 24 hours) at 9/24/2020 0912  Last data filed at 9/24/2020 0600  Gross per 24 hour   Intake 3347.57 ml   Output 2670 ml   Net 677.57 ml       GENERAL:  Intubated, sedated. Ferguson with blood tinged urine, good output.   HEAD: Normocephalic atraumatic  SCLERA: Anicteric  EXTREMITIES: Warm and well perfused  ABDOMEN:  Soft, mildly distended. No guarding, rigidity, or peritoneal signs. Stomas pink and viable. No gas in bag, +sweat.   INCISION:  Wound vac in place. Abdominal drain with appliance - all serous drainage    LABS:  Lab Results   Component Value Date    WBC 15.4 09/24/2020     Lab Results   Component Value Date    HGB 7.7 09/24/2020     Lab Results   Component Value Date    HCT 21.6 09/24/2020     Lab Results   Component Value Date     09/24/2020     Last Basic Metabolic Panel:  Lab Results   Component Value Date     09/24/2020      Lab Results   Component Value Date    POTASSIUM 2.9 09/24/2020     Lab Results   Component Value Date    CHLORIDE 116 09/24/2020     Lab Results   Component Value Date    PARISH 7.7 09/24/2020     Lab Results   Component Value Date    CO2 21 09/24/2020     Lab Results   Component Value Date    BUN 70 09/24/2020     Lab Results   Component Value Date    CR 1.00 09/24/2020     Lab Results   Component Value Date     09/24/2020       ASSESSMENT/PLAN: 54 year old female s/p ex lap with right hemicolectomy on 9/10, followed by re-exploration with abdominal washout and anterior  resection on 9/13, and ex lap with washout and descending colon MF on 9/17. She remains intubated in the ICU, off pressors. Post-op ileus.      NPO. Continue TPN. TF at 10 until return of bowel function.   Heparin drip for SMV thrombus.   Continue GAB, incisional wound vac. Plan to change abdominal wound VAC Mon/Thursday with wound care.  Wean vent and sedation as able. SBT daily.   Appreciate ID assistance with abx.   Appreciate ICU assistance with cares.     For questions/paging, please contact the CRS office at [phone].    Zuleyka Andres MD  [facility]  Phone: [phone]

## 2020-09-25 NOTE — PLAN OF CARE
Pt. Remains on vent with settings unchanged this 12H shift, pt. Showing signs of agitation and increased HR/head bobbing which were relieved by prn versed dosed, fentanyl and precedex drips unchanged, skin continuous to be porous and weeping, small liquid output from stomas, pt. Mildly hypertensive that di respond down to hydralazine initially, metropolol added and given X1, BP elevated with extensive wound care at one point, eventually hydralazine dose did improve BP, good U/O through hudson that remained red in color - ICU team aware, no following noted, , CT with contrast performed and feedings restarted after results, heparin drip stated after CT, pt's  at bedside and was supportive and updated.

## 2020-09-25 NOTE — PROGRESS NOTES
Intensive Care Daily Note          Assessment and Plan:     Summary Statement:  Evonne Martel is a 54 year old female admitted on 9/10/2020 for septic shock due to cecal perforation and sigmoid colon perforation, likely secondary to perforated diverticulitis. She underwent exploratory laparotomy, right colectomy with end ileostomy, and transverse mucous fistula. She initially did well postoperatively, but developed tachycardia, tachypnea and hypotension. She was taken back to the OR on 9/13 and was found to have gross stool spillage in abdomen with at least 3 sigmoid perforations. She underwent washout and anterior resection performed with drain placement. The patient had very friable tissues in fascia closure, so the patient was placed on paralytics overnight (discontinued (9/14). She had worsening hypotension on 9/17, so CT was obtained and showed possible leak at the descending colon. She went back to the OR (9/17) and was found to have stool leaking from the staple line at the descending colon; washout and end colostomy was performed. She has since been weaned off all pressors; however, her creatinine has risen to 1.0 from a baseline around 0.4 and remains malnourished and intravascularly depleted despite infusions with albumin. My assessment and plan for this patient is as follows:    Neurology: Patient is receiving fentanyl and midazolam prn for sedation/analgesia. Patient is able to track with eyes and follow simple commands.  - Wean off sedation as tolerated     Cardiovascular/Hemodynamics: Presented with hypotension secondary to septic shock and hypovolemia, initially requiring multiple pressors and has slowly been weaned off all pressors. She is now hypertensive with SBP in 150-160s but remains intravascularly depleted  -Continue to monitor fluid status, albumin 25%  -Hydralazine prn for SBP >60 and HR <60  -Metoprolol 5 mg prn for SBP >160     Pulmonary: Mechanically ventilated for acute respiratory  failure. Now concern for acute lung injury vs pulmonary edema. Patient is receiving adequate oxygenation and ventilation with PEEP 5 and FiO2 40%  - Duonebs QID  - Albuterol PRN     GI and Nutrition: Perforated colon s/p ex lap, colectomy, and abdominal washout (9/10, re-exploration 9/13 and 9/17). Concern for malnutrition. Currently having no output from colostomy and ileostomy.  - Management per colorectal surgery, appreciate assistance  - NPO, TPN  - Hold tube feeds until return of bowel function per CRS  - NG to LIS  - Monitor GAB output  -GI prophylaxis: IV pantoprazole      Renal: Concern for intravascular depletion secondary to malnutrition and acute kidney injury. Cr 0.94 from 1.00 (~0.4 at admission).   - Continue infusion with albumin 25% for target of total protein >5.2     Hypernatremia and hyperchloremia  - D5W at 50ml/hr for 2 days to correct free water body deficit     Hypokalemia, hypophosphatemia, with concern for possible refeeding syndrome given history of alcohol use.  - replacement protocol     Infectious Disease: Septic shock secondary to perforated colon with feculent peritonitis. CXR on 9/17 shows diffuse bilateral infiltrates and consolidation of RLL. Intraoperative abscess cultures are positive for Pseudomonas, Enterococcus avium, E. coli and Bacteroides. Blood cultures remain NGTD.   - ID consulted, appreciate assistance  - Continue antibiotic regimen: Zosyn and micafungin     Hematology and Oncology: Acute normocytic anemia  - Hgb 7.5  (from 7.1)  - Continue to monitor Hgb, transfuse if Hgb <7    Leukocytosis secondary to septic shock from bowel perforations. 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 and possible minimal nonocclusive thrombus in portal vein  -Heparin gtt     Endocrinology: Stress-induced hyperglycemia  - Insulin per unit protocol    Stress-dosed steroids  - hydrocortisone 50 mg q6           Intensive Care: Central line: Central line present, needed and to be continued  Arterial line: Arterial line present, needed and to be continued  Ferguson catheter:  In place  NG tube: LIS  Drains: abdominal GAB drain  Restraint:Needed   Others: n/a   Top goals for today   Pressure support trial today  Wean off sedation as tolerated  Continue antibiotic regimen as above                  Key events/ Interval history - last 24 hours:    Patient had an brief hypotension this morning after receiving Versed, which resolved spontaneously when the patient was woken up. Hydralazine was given x1 for hypertension. She is able to open her eyes spontaneously, track with eye movements, and follow simple commands.         Problem list:     Severe sepsis (H)    Pneumoperitoneum    Colon perforation (H)    Free intraperitoneal air    Perforation of colon (H)    * No resolved hospital problems. *             Medications:   I have reviewed this patient's current medications              Physical Exam:   Vital Sign Ranges  Temperature Temp  Av.5  F (36.4  C)  Min: 93.9  F (34.4  C)  Max: 100.4  F (38  C)   Blood pressure Systolic (24hrs), Av , Min:76 , Max:157        Diastolic (24hrs), Av, Min:52, Max:91      Pulse Pulse  Av.4  Min: 66  Max: 148   Respirations Resp  Av.2  Min: 10  Max: 31   Pulse oximetry SpO2  Av.9 %  Min: 86 %  Max: 100 %         Intake/Output Summary (Last 24 hours) at 2020 1023  Last data filed at 2020 1000  Gross per 24 hour   Intake 9868.41 ml   Output 2915 ml   Net 6953.41 ml         General Appearance:   No acute distress   HEENT:   Trachea is midline  Endotrachael tube is present   Chest and Lungs:   Faint crackles bilaterally; nonlabored breathing on mechanical ventilation   Cardiovascular:   Tachycardic, regular rhythm, no murmurs   Abdomen:   Soft, nondistended; midline dressings, both stomas are pink, no stool    :   Ferguson in place, blood-tinged urine    Musculoskeletal:   Not assessed   Extremities and Skin:   Warm, well perfused; bilateral pitting edema present, improved   Neuro:   Lightly sedated, moves extremities spontaneously. Tracks with her eyes and follows simple commands   Drains and Tubes:   NG in place, GAB drain with yellow output   Intravascular Access and Device:   Lines present: triple lumen catheter (right internal jugular) and arterial line (radial)            Data:   Labs:  All lab results reviewed past 24 hours    Imaging:  All imaging results reviewed past 24 hours    CT abdomen/pelvis 9/24/2020  IMPRESSION:   1.  Nonocclusive thrombus in the superior mesenteric vein, possible  minimal nonocclusive thrombus in the portal vein.  2.  A few minimally generous small bowel loops are noted, however  contrast passes through these suggesting that this is ileus, an  earlier partial small bowel obstruction would be difficult to exclude.  3.  No abscess demonstrated      I, Carmencita Araiza MS4, served as a medical scribe for Dr. Gonzalez.    Attending attestation:    I managed the care of this patient and saw her with Carmencita Araiza. I agree with her scribe note as written.    CC Time: 40 min  Romeo Gonzalez MD  Pager [phone]

## 2020-09-25 NOTE — PROGRESS NOTES
[facility] ICU RESPIRATORY NOTE        Date of Admission: 9/10/2020    Date of Intubation (most recent): 9/10/20    Reason for Mechanical Ventilation: 16    Number of Days on Mechanical Ventilation: Airway protection  Met Criteria for Spontaneous Breathing Trial:Yes    Significant Events Today:none  ABG Results:   Recent Labs   Lab 09/23/20  1350 09/19/20  1105 09/18/20  1432 09/18/20  1010   PH 7.45 7.36 7.30* 7.24*   PCO2 30* 36 38 47*   PO2 180* 102 109* 86   HCO3 21 20* 19* 20*   O2PER 40%  --  Ventilator 60       Current Vent Settings: Ventilation Mode: CMV/AC  (Continuous Mandatory Ventilation/ Assist Control)  FiO2 (%): 40 %  Rate Set (breaths/minute): 16 breaths/min  Tidal Volume Set (mL): 460 mL  PEEP (cm H2O): 5 cmH2O  Pressure Support (cm H2O): 5 cmH2O  Oxygen Concentration (%): 40 %  Resp: 19      Plan: Continue on ventilatory support.    Tru Burris, RT

## 2020-09-26 NOTE — PROGRESS NOTES
[facility] ICU RESPIRATORY NOTE        Date of Admission: 9/10/2020    Date of Intubation (most recent): 9/10/20    Reason for Mechanical Ventilation: AW protection     Number of Days on Mechanical Ventilation: 17    Met Criteria for Spontaneous Breathing Trial: No    Reason for No Spontaneous Breathing Trial: per MD     Significant Events Today: none     ABG Results:   Recent Labs   Lab 09/23/20  1350 09/19/20  1105   PH 7.45 7.36   PCO2 30* 36   PO2 180* 102   HCO3 21 20*   O2PER 40%  --        Current Vent Settings: Ventilation Mode: CMV/AC  (Continuous Mandatory Ventilation/ Assist Control)  FiO2 (%): 40 %  Rate Set (breaths/minute): 16 breaths/min  Tidal Volume Set (mL): 460 mL  PEEP (cm H2O): 5 cmH2O  Pressure Support (cm H2O): 5 cmH2O  Oxygen Concentration (%): 40 %  Resp: 20      Skin Assessment: intact     Plan: continue full ventilatory support and assess for SBT    Katheryn Barrett, RT

## 2020-09-26 NOTE — PHARMACY
TPN formula evaluated based on today s labs results.    The following changes have been made:  Potassium: increased to 65 .    Pharmacy will continue to follow and adjust as appropriate.

## 2020-09-26 NOTE — PROGRESS NOTES
COLON [facility] RECTAL [facility]GERY  PROGRESS NOTE    September 26, 2020  Post-op Day # 16/13/9    SUBJECTIVE: Patient went into A. Fib yesterday. Transfused 1U PRBCs yesterday for hgb of 6.7.  Was started on heparin gtt for SMV thrombus on Thursday.  This am has blood from abdominal GAB drain - prior had been sero sang. Also bloody urine.    OBJECTIVE:  Temp:  [95.4  F (35.2  C)-97.7  F (36.5  C)] 97.7  F (36.5  C)  Pulse:  [] 105  Resp:  [16-20] 20  BP: (175-179)/() 175/95  MAP:  [76 mmHg-139 mmHg] 92 mmHg  Arterial Line BP: (107-182)/(60-99) 137/66  FiO2 (%):  [40 %] 40 %  SpO2:  [96 %-100 %] 99 %      Intake/Output Summary (Last 24 hours) at 9/26/2020 1106  Last data filed at 9/26/2020 0700  Gross per 24 hour   Intake 4740.05 ml   Output 4455 ml   Net 285.05 ml       GENERAL:  Intubated, sedated. Ferguson with blood tinged urine, good output.   HEAD: Normocephalic atraumatic  SCLERA: Anicteric  EXTREMITIES: Warm and well perfused  ABDOMEN:  Soft, mildly distended. No guarding, rigidity, or peritoneal signs. Stomas pink and viable. No gas in bag or stool in bag.   INCISION:  Wound vac in place. Abdominal drain with appliance - sang output from drain and around drain insertion site in abdominal wall.  Ferguson - hematuria, no blood clots.    LABS:  Lab Results   Component Value Date    WBC 23.2 09/26/2020     Lab Results   Component Value Date    RBC 2.87 09/26/2020     Lab Results   Component Value Date    HGB 8.8 09/26/2020     Lab Results   Component Value Date    HCT 25.7 09/26/2020     No components found for: MCT  Lab Results   Component Value Date    MCV 90 09/26/2020     Lab Results   Component Value Date    MCH 30.7 09/26/2020     Lab Results   Component Value Date    MCHC 34.2 09/26/2020     Lab Results   Component Value Date    RDW 16.4 09/26/2020     Lab Results   Component Value Date     09/26/2020     Last Comprehensive Metabolic Panel:  Sodium   Date Value Ref Range Status   09/26/2020 145 (H) 133 -  144 mmol/L Final     Potassium   Date Value Ref Range Status   09/26/2020 3.0 (L) 3.4 - 5.3 mmol/L Final     Chloride   Date Value Ref Range Status   09/26/2020 116 (H) 94 - 109 mmol/L Final     Carbon Dioxide   Date Value Ref Range Status   09/26/2020 23 20 - 32 mmol/L Final     Anion Gap   Date Value Ref Range Status   09/26/2020 6 3 - 14 mmol/L Final     Glucose   Date Value Ref Range Status   09/26/2020 112 (H) 70 - 99 mg/dL Final     Urea Nitrogen   Date Value Ref Range Status   09/26/2020 75 (H) 7 - 30 mg/dL Final     Creatinine   Date Value Ref Range Status   09/26/2020 0.87 0.52 - 1.04 mg/dL Final     GFR Estimate   Date Value Ref Range Status   09/26/2020 75 >60 mL/min/[1.73_m2] Final     Comment:     Non  GFR Calc  Starting 12/18/2018, serum creatinine based estimated GFR (eGFR) will be   calculated using the Chronic Kidney Disease Epidemiology Collaboration   (CKD-EPI) equation.       Calcium   Date Value Ref Range Status   09/26/2020 8.3 (L) 8.5 - 10.1 mg/dL Final           ASSESSMENT/PLAN: 54 year old female s/p ex lap with right hemicolectomy on 9/10, followed by re-exploration with abdominal washout and anterior resection on 9/13, and ex lap with washout and descending colon MF on 9/17. She remains intubated in the ICU, off pressors. Post-op ileus. New SMV thrombus on recent CT scan - started on heparin gtt but now with blood from abdominal drain.  Suspect bleeding from raw surfaces related to multiple surgeries related to heparin gtt.  No plans for return to the OR at this time unless HD unstable and ongoing bleeding.     - agree with serial hgbs  - would stop heparin for now and monitor.  Check INR and LFTs  - Monitor Leukocytosis - on broad spectrum ABX per ID.  If no improvement will get repeat CT scan.  - NPO. Continue TPN. TF at 10 until return of bowel function.   - Continue GAB, incisional wound vac. Plan to change abdominal wound VAC Mon/Thursday with wound care.  - Wean vent  and sedation as able. SBT daily.   - Appreciate ID assistance with abx.   - Appreciate ICU assistance with cares.     For questions/paging, please contact the CRS office at [phone].    Yvonne Hanson MD  [facility]  Phone: [phone]  Fax: [phone]

## 2020-09-26 NOTE — PROGRESS NOTES
Intensive Care Daily Note          Assessment and Plan:     Summary Statement:  Evonne Martel is a 54 year old female admitted on 9/10/2020 for septic shock due to cecal perforation and sigmoid colon perforation, likely secondary to perforated diverticulitis. She underwent exploratory laparotomy, right colectomy with end ileostomy, and transverse mucous fistula. She initially did well postoperatively, but developed tachycardia, tachypnea and hypotension. She was taken back to the OR on 9/13 and was found to have gross stool spillage in abdomen with at least 3 sigmoid perforations. She underwent washout and anterior resection performed with drain placement. The patient had very friable tissues in fascia closure, so the patient was placed on paralytics overnight (discontinued (9/14). She had worsening hypotension on 9/17, so CT was obtained and showed possible leak at the descending colon. She went back to the OR (9/17) and was found to have stool leaking from the staple line at the descending colon; washout and end colostomy was performed. She has since been weaned off all pressors;.     Today, increased bloody drainage from GAB with patient on heparin for partial SMV thrombosis.        Neurology: Patient is receiving fentanyl infusion for sedation/analgesia. Patient is able to track with eyes and follow simple commands.  - P: decrease Fentanyl to 75  Continue dexmedetomidine      Cardiovascular/Hemodynamics: Presented with hypotension secondary to septic shock and hypovolemia, initially requiring multiple pressors and has slowly been weaned off all pressors.     P: Decrease HC to 25 q 12   Lasix 20 mg once as patient is 17 kg above admit weight.      Pulmonary: Mechanically ventilated for acute respiratory failure. Day 13 on ventilator.  Patient is receiving adequate oxygenation and ventilation with PEEP 5 and FiO2 40%. Does ok on PS today    P: possible extubation tomorrow      GI and Nutrition: Perforated colon  s/p ex lap, colectomy, and abdominal washout (9/10, re-exploration 9/13 and 9/17).  Mild bili elevation probably cholestasis of sepsis   - Management per colorectal surgery, appreciate assistance  - NPO, TPN plus 10ml/hr feeding tube.   - Hold tube feeds until return of bowel function per CRS  - Monitor GAB output  -GI prophylaxis: IV pantoprazole   Stop iv heparin.      Renal: Concern for intravascular depletion secondary to malnutrition and acute kidney injury. Cr normal but BUN elevated.  .   -Has received 17 doses of iv albumin. Doesn't need anymore.     Hypernatremia and hyperchloremia--corrected.    -stop  D5W at 50ml/hr          Infectious Disease: Septic shock secondary to perforated colon with feculent peritonitis. CXR on 9/17 shows diffuse bilateral infiltrates and consolidation of RLL. Intraoperative abscess cultures are positive for Pseudomonas, Enterococcus avium, E. coli and Bacteroides. Blood cultures remain NGTD.   - ID consulted, appreciate assistance  - Continue antibiotic regimen: Zosyn and micafungin but change to fluconazole soon.       Hematology and Oncology: Acute normocytic anemia  - Hgb 7.6    - Continue to monitor Hgb, transfuse if Hgb <7    Leukocytosis secondary to septic shock from bowel perforations.   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 vein and possible minimal nonocclusive thrombus in portal vein  -Heparin gtt--stop      Endocrinology: Stress-induced hyperglycemia  - Insulin drip at 2 units per hour  P: lantus 10  plus high intensity sliding scale insulin     Stress-dosed steroids  -decrease hydrocortisone 25 mg q12          Intensive Care: Central line: Central line present, needed and to be continued  Arterial line: Arterial line present, needed and to be continued  Ferguson catheter:  In place  NG tube: LIS  Drains: abdominal GAB drain  Restraint:Needed   Others: n/a   Top goals for today   Decrease HC  Decrease fentanyl  Stop D5W  Stop insulin drip  Lasix 20 mg once                      Key events/ Interval history - last 24 hours:    Off pressors          General Appearance:   No acute distress, easily opens eyes .   HEENT:   Endotracheal tube ok    Chest and Lungs:   Clear ,  nonlabored breathing on mechanical ventilation   Cardiovascular:   , regular rhythm, no murmurs   Abdomen:   Soft, nondistended; midline dressings, both stomas are pink, no stool   GAB output bloody    :   Ferguson in place,      Extremities and Skin:   Warm, well perfused; 3+ bilateral pitting edema present, improved   Neuro:   Lightly sedated, moves extremities spontaneously. Tracks with her eyes and follows simple commands   Drains and Tubes:   NG in place, GAB drain with yellow output   Intravascular Access and Device:   Lines present: triple lumen catheter (right internal jugular) and arterial line (radial)            Data:   Labs:  All lab results reviewed past 24 hours    Imaging:  All imaging results reviewed past 24 hours    CT abdomen/pelvis 9/24/2020  IMPRESSION:   1.  Nonocclusive thrombus in the superior mesenteric vein, possible  minimal nonocclusive thrombus in the portal vein.  2.  A few minimally generous small bowel loops are noted, however  contrast passes through these suggesting that this is ileus, an  earlier partial small bowel obstruction would be difficult to exclude.  3.  No abscess demonstrated            35 minutes CC time for respiratory failure and bowel perforation

## 2020-09-26 NOTE — PROGRESS NOTES
[facility] ICU RESPIRATORY NOTE        Date of Admission: 9/10/2020    Date of Intubation (most recent): 9/10/2020    Reason for Mechanical Ventilation: Airway protection    Number of Days on Mechanical Ventilation: 17    Met Criteria for Spontaneous Breathing Trial: No    Reason for No Spontaneous Breathing Trial: per MD    Significant Events Today: None this shift.     ABG Results:   Recent Labs   Lab 09/23/20  1350   PH 7.45   PCO2 30*   PO2 180*   HCO3 21   O2PER 40%       Current Vent Settings: Ventilation Mode: CMV/AC  (Continuous Mandatory Ventilation/ Assist Control)  FiO2 (%): 40 %  Rate Set (breaths/minute): (S) 12 breaths/min (per md)  Tidal Volume Set (mL): 460 mL  PEEP (cm H2O): 5 cmH2O  Pressure Support (cm H2O): 5 cmH2O  Oxygen Concentration (%): 35 %  Resp: 20        Plan: Will continue full ventilatory support for now and assess for weaning readiness daily.               Stephanie Larson, RT

## 2020-09-27 NOTE — PROGRESS NOTES
Intensive Care Daily Note          Assessment and Plan:     Summary Statement:  Evonne Matrel is a 54 year old female admitted on 9/10/2020 for septic shock due to cecal perforation and sigmoid colon perforation, likely secondary to perforated diverticulitis. She underwent exploratory laparotomy, right colectomy with end ileostomy, and transverse mucous fistula. She initially did well postoperatively, but developed tachycardia, tachypnea and hypotension. She was taken back to the OR on 9/13 and was found to have gross stool spillage in abdomen with at least 3 sigmoid perforations. She underwent washout and anterior resection performed with drain placement. The patient had very friable tissues in fascia closure, so the patient was placed on paralytics overnight (discontinued (9/14). She had worsening hypotension on 9/17, so CT was obtained and showed possible leak at the descending colon. She went back to the OR (9/17) and was found to have stool leaking from the staple line at the descending colon; washout and end colostomy was performed. She has since been weaned off all pressors;.     Yesterday , increased bloody drainage from GAB with patient on heparin for partial SMV thrombosis so heparin stopped  Today:  Increased LOC, but increasing WBC       Neurology: Dexmedetomidine for sedation, RASS  0-1   - P: decrease Fentanyl to 50   Continue dexmedetomidine      Cardiovascular/Hemodynamics: septic shock and hypovolemia, initially requiring multiple pressors and has slowly been weaned off all pressors and now slightly hypertensive.     P:stop HC to 25 q 12   Lasix 20 mg q 12 as patient is 16 kg above admit weight.   Remove arterial line      Pulmonary: Mechanically ventilated for acute respiratory failure. Day 14 on ventilator.  Patient is receiving adequate oxygenation and ventilation with PEEP 5 and FiO2 40%. Does ok on PS today but will need CT scan so will await results of that before extubation           GI and  Nutrition: Perforated colon s/p ex lap, colectomy, and abdominal washout (9/10, re-exploration 9/13 and 9/17).  Mild bili elevation probably cholestasis of sepsis   - NPO, TPN plus 10ml/hr feeding tube.   GAB ouput decreased by 50% c/w Friday  -GI prophylaxis: IV pantoprazole   Off iv heparin.   Repeat abdomen pelvic CT with iv contrast with elevated WBC per CRS recs      Renal:  acute kidney injury. Cr normal but BUN elevated.  .   -Has received 17 doses of iv albumin. Doesn't need anymore.  P: lasix 20 iv q 12;  AM BMP        Infectious Disease: Septic shock secondary to perforated colon with feculent peritonitis. CXR on 9/17 shows diffuse bilateral infiltrates and consolidation of RLL. Intraoperative abscess cultures are positive for Pseudomonas, Enterococcus avium, E. coli and Bacteroides.   Afebrile but rising WBC again     - Continue antibiotic regimen: Zosyn and micafungin but change to fluconazole soon.       Hematology and Oncology: Acute normocytic anemia, now 7.1.   -P: transfuse 1 unit PRBC.     Leukocytosis secondary to septic shock from bowel perforations.     On last CT scan Nonocclusive thrombus in superior mesenteric vein and possible minimal nonocclusive thrombus in portal vein.  Less GAB bloody output but Hgb decreasing   -off Heparin gtt--     Endocrinology: Stress-induced hyperglycemia  P: lantus 10  plus high intensity sliding scale insulin , glucoses< 180     Stress-dosed steroids  -stop hydrocortisone          Intensive Care: Central line: Central line present, needed and to be continued  Arterial line: Arterial line present, --remove Ferguson catheter:  In place  NG tube:  10 m/hr   Drains: abdominal GAB drain  Restraint:Needed   Others: n/a   Top goals for today   Stop HC  Decrease fentanyl  Blood transfusion today   Lasix 20 mg bid  CT scan   PS trial   Remove arterial line                 Key events/ Interval history - last 24 hours:    Rising WBC and lower Hgb           General Appearance:    Sitting in chair.  easily opens eyes .weakly moves upper extremities.     HEENT:   Endotracheal tube ok    Chest and Lungs:   Clear ,  nonlabored breathing on mechanical ventilation, OK on PS 7   Cardiovascular:   , regular rhythm, no murmurs   Abdomen:   Soft, nondistended; midline dressings, both stomas are pink, no stool   GAB output bloody but less volume    :   Ferguson in place,      Extremities and Skin: icterus    Warm, well perfused; 3+ bilateral pitting edema present   Neuro:   moves extremities spontaneously. Tracks with her eyes and follows simple commands   Drains and Tubes:   NG in place, GAB drain with yellow output   Intravascular Access and Device:   Lines present: triple lumen catheter (right internal jugular) and arterial line (radial)            Data:   Labs:  All lab results reviewed past 24 hours    Imaging:  All imaging results reviewed past 24 hours      35 minutes CC time for respiratory failure, sepsis

## 2020-09-27 NOTE — PHARMACY
TPN formula evaluated based on today s labs results.    The following changes have been made:  Potassium: increased to 75 .    Pharmacy will continue to follow and adjust as appropriate.

## 2020-09-27 NOTE — PROGRESS NOTES
Pt was transported to/from CT on the Juliann vent.  PT was on Ventilation Mode: CMV/AC  (Continuous Mandatory Ventilation/ Assist Control)  FiO2 (%): 30 %  Rate Set (breaths/minute): 12 breaths/min  Tidal Volume Set (mL): 460 mL  PEEP (cm H2O): 5 cmH2O  Oxygen Concentration (%): 35 %  Resp: 17      SPO2 was 98%      Stephanie Larson, RT          Stephanie Larson, RT  9/27/2020

## 2020-09-27 NOTE — PROGRESS NOTES
[facility] ICU RESPIRATORY NOTE        Date of Admission: 9/10/2020    Date of Intubation (most recent): 9/10/2020    Reason for Mechanical Ventilation: AW Protection    Number of Days on Mechanical Ventilation: 18      Significant Events Today: Pt went for CT today    ABG Results:   Recent Labs   Lab 09/23/20  1350   PH 7.45   PCO2 30*   PO2 180*   HCO3 21   O2PER 40%       Current Vent Settings: Ventilation Mode: CMV/AC  (Continuous Mandatory Ventilation/ Assist Control)  FiO2 (%): 30 %  Rate Set (breaths/minute): 12 breaths/min  Tidal Volume Set (mL): 460 mL  PEEP (cm H2O): 5 cmH2O  Pressure Support (cm H2O): 5 cmH2O  Oxygen Concentration (%): 35 %  Resp: 16        Plan: Will continue full ventilatory support for now and assess for weaning readiness daily.               Stephanie Larson, RT

## 2020-09-27 NOTE — PLAN OF CARE
[facility] Intensive Care Unit   Nursing Note                                                       Neuro:  PERRL.  Moves upper extremities minimally.  Follows commands.  Cardiovascular:  RRR.  Hemodynamically stable off pressors.  Pulmonary:  Tolerating ventilator on fentanyl and precedex.  Oxygen saturation > 92  GI/:  Brisk, tea colored UOP.  Endocrine: Glucose well controlled.  Skin:  Intact except incisional areas.  Protective mepilex applied to sacrum.  Restraints:  Not in use or necessary.  AM labs noted; electrolytes replaced as indicated.  Continue to monitor closely.    Recent Labs   Lab 09/23/20  1350   PH 7.45   PCO2 30*   PO2 180*   HCO3 21   O2PER 40%       ROUTINE IP LABS (Last four results)  BMP  Recent Labs   Lab 09/26/20 1945 09/26/20  0455 09/25/20  1452 09/25/20  0422 09/24/20  0404   NA  --  145* 149* 148*  --  146*   POTASSIUM 3.3* 3.0* 3.5 3.5   < > 2.9*   CHLORIDE  --  116* 120* 120*  --  116*   PARISH  --  8.3* 8.4* 7.9*  --  7.7*   CO2  --  23 20 19*  --  21   BUN  --  75* 75* 69*  --  70*   CR  --  0.87 0.83 0.94  --  1.00   GLC  --  112* 156* 145*  --  121*    < > = values in this interval not displayed.     CBC  Recent Labs   Lab 09/27/20  0520 09/26/20 1940 09/26/20  1200 09/26/20 0455 09/25/20 1452 09/25/20  0422   WBC 28.2*  --   --  23.2* 13.6* 14.3*   RBC 2.73*  --   --  2.87* 2.10* 2.39*   HGB 8.5* 7.1* 7.6* 8.8* 6.7* 7.5*   HCT 24.3*  --   --  25.7* 19.5* 21.9*   MCV 89  --   --  90 93 92   MCH 31.1  --   --  30.7 31.9 31.4   MCHC 35.0  --   --  34.2 34.4 34.2   RDW 15.5*  --   --  16.4* 16.5* 16.2*     --   --  214 167 187     INR  Recent Labs   Lab 09/26/20  1200 09/25/20  0422 09/24/20  0404 09/23/20  0448   INR 1.59* 1.80* 1.73* 1.80*     Blood Glucose  Glucose (mg/dL)   Date Value   09/27/2020 163 (H)   09/27/2020 177 (H)   09/26/2020 119 (H)   09/26/2020 111 (H)   09/26/2020 126 (H)   09/26/2020 134 (H)       Intake/Output Summary (Last 24 hours) at  9/27/2020 0644  Last data filed at 9/27/2020 0600  Gross per 24 hour   Intake 3894.87 ml   Output 3890 ml   Net 4.87 ml       Daren Vargas MSN RN PHN CCRN  [facility]  Intensive Care Unit

## 2020-09-27 NOTE — PROGRESS NOTES
[facility] ICU RESPIRATORY NOTE        Date of Admission: 9/10/2020    Date of Intubation (most recent): 09/10/20    Reason for Mechanical Ventilation: Airway Protection    Number of Days on Mechanical Ventilation: 18      Significant Events Today: none overnight    ABG Results:   Recent Labs   Lab 09/23/20  1350   PH 7.45   PCO2 30*   PO2 180*   HCO3 21   O2PER 40%       Current Vent Settings: Ventilation Mode: CMV/AC  (Continuous Mandatory Ventilation/ Assist Control)  FiO2 (%): 30 %  Rate Set (breaths/minute): 12 breaths/min  Tidal Volume Set (mL): 460 mL  PEEP (cm H2O): 5 cmH2O  Oxygen Concentration (%): 35 %  Resp: 17      Plan: Continue vent support, daily wean assessment     Aimee Salazar, RT

## 2020-09-27 NOTE — PROGRESS NOTES
COLON & RECTAL SURGERY  PROGRESS NOTE    September 27, 2020  Post-op Day # 17/14/10    SUBJECTIVE: hgb slowly downtrending yesterday.  Did receive 1U PRBC yesterday.  Hgb 8.5 this am.  White count up to 28 this morning.  Afebrile but remains tachycardic.      OBJECTIVE:  Temp:  [96.8  F (36  C)-98.4  F (36.9  C)] 97.5  F (36.4  C)  Pulse:  [] 97  Resp:  [17] 17  BP: (102-152)/(66-92) 131/81  MAP:  [43 mmHg-221 mmHg] 104 mmHg  Arterial Line BP: ()/() 150/76  FiO2 (%):  [30 %-40 %] 30 %  SpO2:  [99 %-100 %] 100 %      Intake/Output Summary (Last 24 hours) at 9/26/2020 1106  Last data filed at 9/26/2020 0700  Gross per 24 hour   Intake 4740.05 ml   Output 4455 ml   Net 285.05 ml       GENERAL:  Intubated, sedated. Ferguson with blood tinged urine, good output.   HEAD: Normocephalic atraumatic  SCLERA: Anicteric  EXTREMITIES: Warm and well perfused  ABDOMEN:  Soft, mildly distended. No guarding, rigidity, or peritoneal signs. Stomas pink and viable. No gas in bag or stool in bag.   INCISION:  Wound vac in place. Abdominal drain with appliance - thinner sang output from drain   Ferguson - hematuria, no blood clots.    LABS:  Lab Results   Component Value Date    WBC 28.2 09/27/2020     Lab Results   Component Value Date    RBC 2.73 09/27/2020     Lab Results   Component Value Date    HGB 8.5 09/27/2020     Lab Results   Component Value Date    HCT 24.3 09/27/2020     No components found for: MCT  Lab Results   Component Value Date    MCV 89 09/27/2020     Lab Results   Component Value Date    MCH 31.1 09/27/2020     Lab Results   Component Value Date    MCHC 35.0 09/27/2020     Lab Results   Component Value Date    RDW 15.5 09/27/2020     Lab Results   Component Value Date     09/27/2020       Last Comprehensive Metabolic Panel:  Sodium   Date Value Ref Range Status   09/26/2020 145 (H) 133 - 144 mmol/L Final     Potassium   Date Value Ref Range Status   09/27/2020 3.3 (L) 3.4 - 5.3 mmol/L Final      Chloride   Date Value Ref Range Status   09/26/2020 116 (H) 94 - 109 mmol/L Final     Carbon Dioxide   Date Value Ref Range Status   09/26/2020 23 20 - 32 mmol/L Final     Anion Gap   Date Value Ref Range Status   09/26/2020 6 3 - 14 mmol/L Final     Glucose   Date Value Ref Range Status   09/26/2020 112 (H) 70 - 99 mg/dL Final     Urea Nitrogen   Date Value Ref Range Status   09/26/2020 75 (H) 7 - 30 mg/dL Final     Creatinine   Date Value Ref Range Status   09/26/2020 0.87 0.52 - 1.04 mg/dL Final     GFR Estimate   Date Value Ref Range Status   09/26/2020 75 >60 mL/min/[1.73_m2] Final     Comment:     Non  GFR Calc  Starting 12/18/2018, serum creatinine based estimated GFR (eGFR) will be   calculated using the Chronic Kidney Disease Epidemiology Collaboration   (CKD-EPI) equation.       Calcium   Date Value Ref Range Status   09/26/2020 8.3 (L) 8.5 - 10.1 mg/dL Final         ASSESSMENT/PLAN: 54 year old female s/p ex lap with right hemicolectomy on 9/10, followed by re-exploration with abdominal washout and anterior resection on 9/13, and ex lap with washout and descending colon MF on 9/17. She remains intubated in the ICU, off pressors. Post-op ileus. New SMV thrombus on recent CT scan - started on heparin gtt but now with blood from abdominal drain.  Suspect bleeding from raw surfaces related to multiple surgeries related to heparin gtt - improved with hold heparin gtt.  No plans for return to the OR at this time unless HD unstable and ongoing bleeding.  Increased leukocytosis.    -  CT scan a/p this am to eval for abscess, hematoma, SMV thrombus   - serial hgbs  - heparin gtt cont to hold pending CT results  - NPO. Continue TPN. TF at 10 until return of bowel function.   - Continue GAB, incisional wound vac. Plan to change abdominal wound VAC Mon/Thursday with wound care.  - Wean vent and sedation as able. SBT daily.   - Appreciate ID assistance with abx.   - Appreciate ICU assistance with  cares.     For questions/paging, please contact the CRS office at [phone].    Yvonne Hanson MD  [facility]  Phone: [phone]  Fax: [phone]

## 2020-09-28 NOTE — PROGRESS NOTES
COLON & RECTAL SURGERY  PROGRESS NOTE    September 27, 2020  Post-op Day # 18/15/11    SUBJECTIVE: 2U PRBC overnight for hypotension and increased drain output. On low dose levophed but pressures improved with blood transfusion. CT yesterday with abdominal wall hematoma, no change in SMV thrombus. Three-way hudson placed for bloody urine with clots irrigated, urine more clear this morning. Stoma functioning. TF at 10.     OBJECTIVE:  Temp:  [86.5  F (30.3  C)-99.9  F (37.7  C)] 99.3  F (37.4  C)  Pulse:  [] 68  Resp:  [16-22] 20  BP: ()/(40-92) 106/59  MAP:  [99 mmHg-116 mmHg] 99 mmHg  Arterial Line BP: (141-169)/(75-84) 141/75  FiO2 (%):  [35 %] 35 %  SpO2:  [100 %] 100 %      Intake/Output Summary (Last 24 hours) at 9/26/2020 1106  Last data filed at 9/26/2020 0700  Gross per 24 hour   Intake 4740.05 ml   Output 4455 ml   Net 285.05 ml       GENERAL:  Intubated, sedated.   HEAD: Normocephalic atraumatic  SCLERA: Anicteric  EXTREMITIES: Warm and well perfused  ABDOMEN:  Soft, mildly distended. Fullness at midline VAC. No guarding, rigidity, or peritoneal signs. Stomas pink and viable. Liquid stool in ileostomy.   INCISION:  Wound vac in place. Abdominal drain with appliance - blood tinged output from drain and around drain site  Hudson - clear urine in tubing, 3-way hudson in place with irrigations.     LABS:  Lab Results   Component Value Date    WBC 28.2 09/27/2020     Lab Results   Component Value Date    RBC 2.73 09/27/2020     Lab Results   Component Value Date    HGB 8.5 09/27/2020     Lab Results   Component Value Date    HCT 24.3 09/27/2020     No components found for: MCT  Lab Results   Component Value Date    MCV 89 09/27/2020     Lab Results   Component Value Date    MCH 31.1 09/27/2020     Lab Results   Component Value Date    MCHC 35.0 09/27/2020     Lab Results   Component Value Date    RDW 15.5 09/27/2020     Lab Results   Component Value Date     09/27/2020       Last Comprehensive  Metabolic Panel:  Sodium   Date Value Ref Range Status   09/28/2020 145 (H) 133 - 144 mmol/L Final     Potassium   Date Value Ref Range Status   09/28/2020 4.0 3.4 - 5.3 mmol/L Final     Chloride   Date Value Ref Range Status   09/28/2020 117 (H) 94 - 109 mmol/L Final     Carbon Dioxide   Date Value Ref Range Status   09/28/2020 21 20 - 32 mmol/L Final     Anion Gap   Date Value Ref Range Status   09/28/2020 7 3 - 14 mmol/L Final     Glucose   Date Value Ref Range Status   09/28/2020 130 (H) 70 - 99 mg/dL Final     Urea Nitrogen   Date Value Ref Range Status   09/28/2020 83 (H) 7 - 30 mg/dL Final     Creatinine   Date Value Ref Range Status   09/28/2020 1.06 (H) 0.52 - 1.04 mg/dL Final     GFR Estimate   Date Value Ref Range Status   09/28/2020 59 (L) >60 mL/min/[1.73_m2] Final     Comment:     Non  GFR Calc  Starting 12/18/2018, serum creatinine based estimated GFR (eGFR) will be   calculated using the Chronic Kidney Disease Epidemiology Collaboration   (CKD-EPI) equation.       Calcium   Date Value Ref Range Status   09/28/2020 7.9 (L) 8.5 - 10.1 mg/dL Final         ASSESSMENT/PLAN: 54 year old female s/p ex lap with right hemicolectomy on 9/10, followed by re-exploration with abdominal washout and anterior resection on 9/13, and ex lap with washout and descending colon MF on 9/17. She remains intubated in the ICU, off pressors. Post-op ileus. 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  Suspect bleeding from raw surfaces related to multiple surgeries and heparin gtt as well as abdominal wall hematoma seen on repeat CT on 9/27.      -TPN. Can increase tubefeeds slowly (10ml every 12hr) to goal if weaned off levophed and continues to have stoma function.  - heparin gtt held at this time. Serial Hgb. Correct medical coagulopathy as needed if continues to have hypotension and transfusion requirements. Will need repeat CT once stable from a  bleeding standpoint to assess SMV thrombus as no change on CT on 9/27  - Continue GAB, incisional wound vac. Plan to change abdominal wound VAC Mon/Thursday with wound care. Will try and coordinate wound VAC change with wound care today given abdominal wall hematoma.   - Wean vent and sedation as able. SBT daily.   - Appreciate ID assistance with abx.   - Appreciate ICU assistance with cares.     For questions/paging, please contact the CRS office at [phone].    Zuleyka Andres MD  [facility]  Phone: [phone]  Fax: [phone]

## 2020-09-28 NOTE — PLAN OF CARE
Neuro: Follow commands, opens eyes to voice   Pulm: LS coarse. On full vent support  CV: SR/ST. Hypertensive. 2-3+ dependent.   : Ferguson patent with good UOP.   GI: Ileostomy, colostomy, pelvic GAB patent. Stoma red and intact. On TPN and trickle TF. Some mucousy and green stools on ostomy bag  Extremities: localized movements  Lines: PICC on LUE.   Drain: Pelvic GAB drain  Family:  updated during the day.    K replaced, recheck at 0045 9/28.

## 2020-09-28 NOTE — PROVIDER NOTIFICATION
MD Notification    Notified Person: MD    Notified Person Name: Dr. Silva    Notification Date/Time: 9/28/2020 @ 0533    Notification Interaction:  Phone    Purpose of Notification: HGB, Hypotension and LUOP    Orders Received: 1 PRBC    Comments:

## 2020-09-28 NOTE — PROGRESS NOTES
[facility] ICU RESPIRATORY NOTE        Date of Admission: 9/10/2020    Date of Intubation (most recent): 9/10/2020    Reason for Mechanical Ventilation: Airway protection    Number of Days on Mechanical Ventilation: 19    Met Criteria for Spontaneous Breathing Trial: Yes    Significant Events Today: None  ABG Results:   Recent Labs   Lab 09/23/20  1350   PH 7.45   PCO2 30*   PO2 180*   HCO3 21   O2PER 40%       Current Vent Settings: Ventilation Mode: CMV/AC  (Continuous Mandatory Ventilation/ Assist Control)  FiO2 (%): 35 %  Rate Set (breaths/minute): 12 breaths/min  Tidal Volume Set (mL): 460 mL  PEEP (cm H2O): 5 cmH2O  Pressure Support (cm H2O): 5 cmH2O  Oxygen Concentration (%): 35 %  Resp: 18      Plan: Will continue full ventilatory support for now and assess for weaning readiness daily.       Tru Burris, RT

## 2020-09-28 NOTE — PROGRESS NOTES
Neuro: Follow commands.   Pulm: LS coarse. PS 5/5 for 30 mins, became tachycardia and hypertensive. On full vent support  CV: SR/ST. Hypertensive. 3+ dependent.   : Ferguson patent with good UOP.   GI: Ileostomy, colostomy, pelvic GAB patent. Stoma red and intact. On TPN and trickle TF. Some mucousy and green stools on ostomy bag  Extremities: localized movements  Lines: PICC on LUE.   Drain: Pelvic GAB drain  Family:  updated at bedside  Plan: PS and possible extubation if no OR/IR plan

## 2020-09-28 NOTE — PROVIDER NOTIFICATION
MD Notification    Notified Person: MD    Notified Person Name: Dr. Flores    Notification Date/Time: 9/28/2020 @0705    Notification Interaction: Face to face    Purpose of Notification: GAB output increase over one hour. 1 unit PRBC's infusing     Orders Received: MD to call general surgery    Comments:

## 2020-09-28 NOTE — PROGRESS NOTES
[facility]  Infectious Disease Progress Note          Assessment and Plan:   IMPRESSION:   1.  A 54-year-old female without prior major infection or abdominal issues, admitted with major bowel perforation, now status post 3 operations, abdominal abscess, further leak, now with better source control.   2.  Ongoing septic shock , slow improvement  3 resp failure, vent      RECOMMENDATIONS:   1.  Abdominal cultures enterococcus, multiple anaerobes, gram-negative rods including a sensitive Pseudomonas.  Has had well covered  microbiology throughout including currently; however enterococcus probably better covered by penicillin and Zosyn covers everything including excellent VIC to the 2 gram-negative rods and all anaerobes covered.  No need for double anaerobe coverage.  Discontinue Flagyl.   continue micafungin for now, probably fluconazole eventually for the Candida albicans. Could do synergistic gent but if source control now Ok should not need, also just like best not vanco as renal risk significant although nl range creat trend up  2.  Suspect general sepsis and overall condition due to source control issues that hopefully have now been accomplished with multiple interventions. Still vent,Slowly better , sedation and pressors weaning T down WBC still 20 K, procal 1.40  3 Some interactive   4 CT recheck no abscess             Interval History:   Intubated and awake some sedation some interactive T Ok off pressors cxs same creat 1.0 CT neg  procal 1.4              Medications:       chlorhexidine  15 mL Mouth/Throat Q12H     furosemide  20 mg Intravenous Q12H     insulin aspart  1-12 Units Subcutaneous Q4H     insulin glargine  12 Units Subcutaneous Daily     lipids  250 mL Intravenous Q48H     micafungin  100 mg Intravenous Q24H     norepinephrine         pantoprazole (PROTONIX) IV  40 mg Intravenous Daily     piperacillin-tazobactam  4.5 g Intravenous Q6H     sodium chloride (PF)  10 mL  "Intracatheter Q8H     sodium chloride (PF)  3 mL Intracatheter Q8H                  Physical Exam:   Blood pressure 114/57, pulse 57, temperature 99.3  F (37.4  C), temperature source Oral, resp. rate 16, height 1.651 m (5' 5\"), weight 61.8 kg (136 lb 3.9 oz), last menstrual period 01/01/2015, SpO2 100 %.  Wt Readings from Last 2 Encounters:   09/28/20 61.8 kg (136 lb 3.9 oz)     Vital Signs with Ranges  Temp:  [86.5  F (30.3  C)-99.9  F (37.7  C)] 99.3  F (37.4  C)  Pulse:  [] 57  Resp:  [16-22] 16  BP: ()/(40-92) 114/57  MAP:  [99 mmHg-106 mmHg] 99 mmHg  Arterial Line BP: (141-152)/(75-80) 141/75  FiO2 (%):  [35 %] 35 %  SpO2:  [100 %] 100 %    Constitutional: Intubated sedated   Lungs: Clear to auscultation bilaterally, no crackles or wheezing   Cardiovascular: Regular rate and rhythm, normal S1 and S2, and no murmur noted   Abdomen: Mild distended,stoma wd as noted by CR   Skin: No rashes, no cyanosis, no edema   Other:           Data:   All microbiology laboratory data reviewed.  Recent Labs   Lab Test 09/28/20  0723 09/28/20  0410 09/27/20  2000 09/27/20  0520   WBC 20.7* 19.8*  --  28.2*   HGB 8.6* 7.0* 7.8* 8.5*   HCT 25.2* 20.3*  --  24.3*   MCV 89 89  --  89   * 142*  --  157     Recent Labs   Lab Test 09/28/20  0410 09/26/20  0455 09/25/20  1452   CR 1.06* 0.87 0.83     No lab results found.  Recent Labs   Lab Test 09/17/20  0201 09/17/20  0155 09/17/20  0054 09/16/20  2200 09/10/20  1745 09/10/20  1730   CULT Heavy growth  Mixed aerobic and anaerobic fatimah  *  Heavy growth  Bacteroides uniformis  *  Heavy growth  Bacteroides ovatus/xylanisolvens  *  Susceptibility testing not routinely done  Moderate growth  Escherichia coli  Susceptibility testing done on previous specimen  *  Moderate growth  Pseudomonas aeruginosa  Susceptibility testing done on previous specimen  *  Light growth  Strain 2  Escherichia coli  Susceptibility testing done on previous specimen  *  Light " growth  Enterococcus avium  Susceptibility testing done on previous specimen  *  Light growth  Candida albicans / dubliniensis  Candida albicans and Candida dubliniensis are not routinely speciated  Susceptibility testing not routinely done  *  On day 2, isolated in broth only:  Anaerobic gram negative rods  See anaerobic report for identification  * Heavy growth  Mixed aerobic and anaerobic fatimah  *  Heavy growth  Bacteroides fragilis  Susceptibility testing not routinely done  *  Moderate growth  Escherichia coli  *  Moderate growth  Pseudomonas aeruginosa  *  Moderate growth  Strain 2  Escherichia coli  *  Light growth  Enterococcus avium  *  Light growth  Candida albicans / dubliniensis  Candida albicans and Candida dubliniensis are not routinely speciated  Susceptibility testing not routinely done  *  On day 2, isolated in broth only:  Anaerobic gram negative rods  See anaerobic report for identification  * No growth No growth No growth No growth

## 2020-09-28 NOTE — PROGRESS NOTES
CLINICAL NUTRITION SERVICES - REASSESSMENT NOTE      Future/Additional Recommendations:   Recommend increase TF as tolerated (along with gradual TPN taper) to eventual goal Impact Peptide 1.5 at 40 mL/hr = 1440 kcals (31 kcal/kg), 90 gm pro (2 gm/kg), 739 mL H20, 134 gm CHO, no fiber   TF    10 --> 20 --> 30 --> 40 mL/hr   TPN  65 --> 35 --> 25 --> off    Malnutrition:  (9/14)  % Weight Loss:  History not available   % Intake:  </= 50% for >/= 5 days (severe malnutrition)  Subcutaneous Fat Loss:  Orbital region severe depletion  Muscle Loss:  Temporal region severe depletion and Clavicle bone region severe depletion  Fluid Retention:  Moderate as above      Malnutrition Diagnosis: Severe malnutrition  In Context of:  Acute illness or injury  Environmental or social circumstances       EVALUATION OF PROGRESS TOWARD GOALS   Diet:  NPO    Nutrition Support:  Pt continues to receive TPN/Lipid and trickle TF as below:    Nutrition Support Parenteral:  Type of Access: PICC  Parenteral Frequency:  Continuous  Parenteral Regimen: D15 AA5 at 65 mL/hr + Lipid 250 mL every 48 hours   Total Parenteral Provisions: 1358 kcal (30 kcal/kg), 78 g protein (1.7 g/kg), 234 g CHO, 18% fat.    Nutrition Support Enteral:  Type of Feeding Tube:  NG  Enteral Frequency:  Continuous  Enteral Regimen:  Impact Peptide 1.5 at 10 mL/hr   Total Enteral Provisions: 360 kcals, 23 gm pro, 185 mL H20, 34 gm CHO, no fiber   Free Water Flush: 60 mL every 4 hrs    Total (TPN/Lipid + TF):  1718 kcals (37 kcal/kg and 107%% energy needs), 101 gm pro (2.2 gm/kg and 110% pro need).    Intake/Tolerance:    BGM:  170, 167, 138, 128, 104, 123, 85 - acceptable.  Pt on High Res sliding scale insulin + Lantus 12 Units per day.  BUN 83 (H)  Cr 1.06 (H) K 4.0 (NL, improved) --> Note Pharm increased K in TPN over the weekend.  I/O:  3496/4515.  Drains 1050, Stool 300.  Wt:  61.8 kg (up 16 kg since admit).  Pt with 3+ moderate generalized edema.  Lasix  "BID.      ASSESSED NUTRITION NEEDS:     Dosing Weight 45.9 kg   Estimated Energy Needs: 6876-3330 kcals (30-35 Kcal/Kg)  Justification: repletion, underweight and vented  Estimated Protein Needs: 68-92 grams protein (1.5-2 g pro/Kg)  Justification: repletion, post-op and hypercatabolism with critical illness      NEW FINDINGS:   9/23:  Begin TF    9/24:  Hold TF   9/24:  CT abd/pelvis   1.  Nonocclusive thrombus in the superior mesenteric vein, poss minimal nonocclusive thrombus in the portal vein.   2.  A few minimally generous small bowel loops are noted, however contrast passes through these suggesting that this is ileus, an earlier partial small bowel obstruction would be difficult to exclude.   3.  No abscess demonstrated.   9/25:  AXR = Nasogastric tube is present overlying the left upper quadrant (NG)   9/27:  Abd CT   1. Large anterior abdominal wall subcutaneous hematoma measures approx 10.0 x 4.6 x 15.2 cm in axial and CC dimensions, respectively, new as compared to 9/24/2020 exam.   2. Small mildly loculated hyperattenuating fluid in the cul-de-sac, new as compared to 9/24/2020 exam, could represent hemoperitoneum.   3. No significant change in the small amount of free fluid in the abdomen or pelvis.   4. Small to moderate bilateral pleural effusions and assoc basilar atelectasis/consolidation.   5. Generalized anasarca.   6. No significant change in the previously seen nonocclusive thrombus in the superior mesenteric vein as compared to 9/24/2020 exam.     Today, per colorectal surgery, \"Can increase tubefeeds slowly (10ml every 12hr) to goal if weaned off levophed and continues to have stoma function\".    Per RN in rounds, belly still open and plan takedown abdominal hematoma at noon.       Norepinephrine - for hypotenison  .  Previous Goals (9/22):   Pt will begin to transition from TPN --> TF in the next 24-48 hrs.  Evaluation: Met    Previous Nutrition Diagnosis (9/22):   Inadequate enteral nutrition " infusion related to no TF running and pressor off > 12 hrs as evidenced by TPN/Lipids meeting estimated needs  Evaluation: No change      CURRENT NUTRITION DIAGNOSIS  Inadequate enteral nutrition infusion related to low TF rate as evidenced by TF meeting only 25% estimated needs and continue PN reliance.    INTERVENTIONS  Recommendations / Nutrition Prescription  Recommend increase TF as tolerated (along with gradual TPN taper) to eventual goal Impact Peptide 1.5 at 40 mL/hr = 1440 kcals (31 kcal/kg), 90 gm pro (2 gm/kg), 739 mL H20, 134 gm CHO, no fiber   TF    10 --> 20 --> 30 --> 40 mL/hr   TPN  65 --> 35 --> 25 --> off       Implementation  Collaboration and Referral of Nutrition care - Pt was discussed during ICU interdisciplinary rounds this morning.  Pharmacist will continue the same TPN regimen for now.    Goals  Pt will transition from TPN --> goal TF in the next 48-72 hrs      MONITORING AND EVALUATION:  Progress towards goals will be monitored and evaluated per protocol and Practice Guidelines    Lauren Martinez, RD, LD, CNSC

## 2020-09-28 NOTE — PLAN OF CARE
Levo stopped when decreased to 0.01 mcg/kg/min and MAP continued to be > 65, next two 15 minute checks MAP decreased to 50s, Levo restarted and titrated until BP stable at 0.04 mcg/kg/min. Opens eyes spontaneously, minimal movements, withdraws to pain , precedex at 0.4. fentanyl at 50, CBI slow, yellow urine.   Wound VAC lost suction, dressing reinforced and now working appropriately. Moderate scott serous-sanguinous drainage 20/hour.   at the bedside, updated on pt condition. Moderate cloudy/white ETT secretions, afebrile. Abd slight distended with moderate hematoma mid abd.  Tari. TF, putting out bile mucus from illeostomy, minimal drainage mucus fistula.

## 2020-09-28 NOTE — PROGRESS NOTES
ICU NOTE    Results of abdomen CT     1. Large anterior abdominal wall subcutaneous hematoma measures approximately 10.0 x 4.6 x 15.2 cm in axial and CC dimensions, respectively, new as compared to 9/24/2020 exam.  2. Small mildly loculated hyperattenuating fluid in the cul-de-sac, new as compared to 9/24/2020 exam, could represent hemoperitoneum.  3. No significant change in the small amount of free fluid in the abdomen or pelvis.  4. Small to moderate bilateral pleural effusions and associated basilar atelectasis/consolidation.  5. Generalized anasarca.  6. No significant change in the previously seen nonocclusive thrombus in the superior mesenteric vein as compared to 9/24/2020 exam.    PLAN    - Follow up H/H  - Continue to hold heparin drip     Prem Silva MD  Pulmonary CC  9/27/2020 7:23 PM

## 2020-09-28 NOTE — PLAN OF CARE
After 2nd unit of blood, hgb recheck 10,  levo on all day low dose ar 0 03 mcg/kg/min.  Opens eyes spontaneously, minimal movements, withdraws to pain , precedex at 0.4. fentanyl at 50, CBI slow, yellow urine.  All pouches changed and wound vac per Surg and WOC.  Moderate scott serous-sanguinous drainage 20 -40 per hour.   at the bedside. Minimal ET secretions, afebrile. Abd slight distended with moderate hematoma mid abd.  Tari. TF, putting out bile mucus from illeostomy, minimal drainage mucus fistula.

## 2020-09-29 NOTE — PLAN OF CARE
Neuro: Follow some commands.Opens eyes spontaneously, minimal movements, withdraws to pain   CV: Sr to S gomez, HR 48-65, MAP >65  GI/: Abd, soft, mildly distended. Liquid stool in ileostomy. Ferguson in place with clear urine output on CBI, with yellow output. NPO TF @ 10 ml/h, free water 60 ml Q 4 H   Skin:  WV to abd midline incision intact.  GAB in place with, WV continuous suctioning.  RR: pt vented , tolerate vent well, Moderate cloudy/white ETT secretions.  IV: L PICC x 3., Precedex, Fentanyl ,Levophed continuous see MAR.

## 2020-09-29 NOTE — PROGRESS NOTES
Spontaneous Breathing Trial    Criteria met for SBT (Y/N):Yes    Settings:    PS: 7  PEEP: 5  FiO2: 25%    Measured Parameters:    RR:22  Tidal volume:335   RSBI: 39    Patient tolerance: Pt tolerated well. Ended designated trial per MD      Duration of SBT: 30 minutes.   Plan: RT will try SBT again tomorrow.    tSephanie Larson, RT

## 2020-09-29 NOTE — PROGRESS NOTES
[facility] ICU RESPIRATORY NOTE        Date of Admission: 9/10/2020    Date of Intubation (most recent): 9/10/2020    Reason for Mechanical Ventilation: Airway Protection     Number of Days on Mechanical Ventilation: 19    Met Criteria for Spontaneous Breathing Trial: Yes     Significant Events Today: none today's shift     ABG Results:   Recent Labs   Lab 09/23/20  1350   PH 7.45   PCO2 30*   PO2 180*   HCO3 21   O2PER 40%       Current Vent Settings: Ventilation Mode: CMV/AC  (Continuous Mandatory Ventilation/ Assist Control)  FiO2 (%): 35 %  Rate Set (breaths/minute): 12 breaths/min  Tidal Volume Set (mL): 460 mL  PEEP (cm H2O): 5 cmH2O  Pressure Support (cm H2O): 5 cmH2O  Oxygen Concentration (%): 35 %  Resp: 17      Plan: Continue on full ventilator support and will assess daily for weaning readiness.     Xavier Hansen, RT

## 2020-09-29 NOTE — PROGRESS NOTES
BRIEF NUTRITION NOTE:    Monitoring ability to transition from TPN --> TF as tolerated.  A full Nutrition Reassessment was completed 9/28.  See note for details.    NEW FINDINGS:  Norepinephrine off this morning.  Drains 825 mL, Stool 1285 mL.  Per Colorectal Surgery, may continue to increase enteral nutrition.    INTERVENTIONS:  Enteral Nutrition - Modify rate --> Will increase TF Impact Peptide 1.5 to 20 mL/hr = 720 kcals, 45 gm pro, 370 mL H20, 67 gm CHO, no fiber  Parenteral Nutrition - Modify rate --> Will decrease TPN D15 AA5 to 35 mL/hr + Lipids 250 mL every other day = 846 kcals, 42 gm pro, 126 gm CHO.  Total (TF + TPN/Lipid):  1566 kcals (34 kcal/kg), 87 gm pro (1.9 gm/kg).    Lauren Martinez, RD, LD, CNSC

## 2020-09-29 NOTE — PLAN OF CARE
Off levo this am, VSS, afebrile, abd clot removed per Surgery at bedside.  Wound vac off, wet to dry dsgs ordered BID.  Increased TF, fentanyl 50 mcg iv x2 given for bedside procedure.  Moderate urine output with CBI yellow color.  updated at the bedside.   Awake more, following commands.

## 2020-09-29 NOTE — PROGRESS NOTES
COLON & RECTAL SURGERY  PROGRESS NOTE    September 29, 2020    SUBJECTIVE:    Doing okay  No acute nursing concerns  No transfusion x 24  Hb stable    OBJECTIVE:  Temp:  [98.5  F (36.9  C)-99.9  F (37.7  C)] 98.9  F (37.2  C)  Pulse:  [50-82] 57  Resp:  [11-33] 16  BP: ()/(44-91) 125/68  FiO2 (%):  [25 %-35 %] 25 %  SpO2:  [98 %-100 %] 100 %    Intake/Output Summary (Last 24 hours) at 9/29/2020 0800  Last data filed at 9/29/2020 0600  Gross per 24 hour   Intake 2440.14 ml   Output 7140 ml   Net -4699.86 ml       GENERAL:  Intubated and sedated.   EXTREMITIES: Warm and well perfused, moderate edema peripherally and centrally  ABDOMEN:  Soft, appropriately tender, non-distended. No guarding, rigidity, or peritoneal signs.   INCISION:  Wound vac in place  Stomas: Pink. Brown/green output from ileostomy    LABS:  Lab Results   Component Value Date    WBC 20.1 09/29/2020     Lab Results   Component Value Date    HGB 10.5 09/29/2020     Lab Results   Component Value Date    HCT 30.1 09/29/2020     Lab Results   Component Value Date     09/29/2020     Last Basic Metabolic Panel:  Lab Results   Component Value Date     09/28/2020      Lab Results   Component Value Date    POTASSIUM 4.0 09/28/2020     Lab Results   Component Value Date    CHLORIDE 117 09/28/2020     Lab Results   Component Value Date    PARISH 7.9 09/28/2020     Lab Results   Component Value Date    CO2 21 09/28/2020     Lab Results   Component Value Date    BUN 83 09/28/2020     Lab Results   Component Value Date    CR 1.06 09/28/2020     Lab Results   Component Value Date     09/28/2020       ASSESSMENT/PLAN: Evonne Martel is a 55 year old female s/p ex lap with right hemicolectomy on 9/10, followed by re-exploration with abdominal washout and anterior resection on 9/13, and ex lap with washout and descending colon MF on 9/17. She remains intubated in the ICU, off pressors. Post-op ileus. New SMV thrombus on CT scan on 9/24 -  started on heparin gtt developed bleeding from abdominal drain.  Suspect bleeding from raw surfaces related to multiple surgeries and heparin gtt as well as abdominal wall hematoma seen on repeat CT on 9/27. The bleeding seems to have stopped with cessation of her heparin, and her Hb is stable    1) Continue parenteral nutrition  2) Continue to increase enteral nutrition  3) Vac change yesterday was okay.  4) Continue to monitor Hb  5) Continue to hold heparin for now  6) ICU to wean NE as tolerated    Will d/w Dr. Dmitry Chavez MD  CRS Fellow  9/29/2020   8:00 AM

## 2020-09-29 NOTE — PROGRESS NOTES
[facility]  Infectious Disease Progress Note          Assessment and Plan:   IMPRESSION:   1.  A 54-year-old female without prior major infection or abdominal issues, admitted with major bowel perforation, now status post 3 operations, abdominal abscess, further leak, now with better source control.   2.  Ongoing septic shock , slow improvement  3 resp failure, vent      RECOMMENDATIONS:   1.  Abdominal cultures enterococcus, multiple anaerobes, gram-negative rods including a sensitive Pseudomonas.  Has had well covered  microbiology throughout including currently; however enterococcus probably better covered by penicillin and Zosyn covers everything including excellent VIC to the 2 gram-negative rods and all anaerobes covered.  No need for double anaerobe coverage.  Discontinue Flagyl.   continue micafungin for now, probably fluconazole eventually for the Candida albicans. Could do synergistic gent but if source control now Ok should not need, also just like best not vanco as renal risk significant although nl range creat trend up  2.  Suspect general sepsis and overall condition due to source control issues that hopefully have now been accomplished with multiple interventions. Still vent,Slowly better , sedation and pressors weaning T down WBC still 20 K, procal 1.40  3 Some interactive and slow better   4 CT recheck no abscess             Interval History:   Intubated and awake some sedation some interactive T Ok off pressors cxs same creat 1.0 CT neg  procal 1.4              Medications:       chlorhexidine  15 mL Mouth/Throat Q12H     furosemide  20 mg Intravenous Q12H     insulin aspart  1-12 Units Subcutaneous Q4H     insulin glargine  12 Units Subcutaneous Daily     lipids  250 mL Intravenous Q48H     micafungin  100 mg Intravenous Q24H     pantoprazole (PROTONIX) IV  40 mg Intravenous Daily     piperacillin-tazobactam  4.5 g Intravenous Q6H     sodium chloride (PF)  10 mL  "Intracatheter Q8H     sodium chloride (PF)  3 mL Intracatheter Q8H                  Physical Exam:   Blood pressure (!) 194/89, pulse 63, temperature 98.1  F (36.7  C), temperature source Oral, resp. rate 16, height 1.651 m (5' 5\"), weight 62.3 kg (137 lb 5.6 oz), last menstrual period 01/01/2015, SpO2 100 %.  Wt Readings from Last 2 Encounters:   09/29/20 62.3 kg (137 lb 5.6 oz)     Vital Signs with Ranges  Temp:  [98.1  F (36.7  C)-99.9  F (37.7  C)] 98.1  F (36.7  C)  Pulse:  [50-82] 63  Resp:  [11-33] 16  BP: ()/(44-91) 194/89  FiO2 (%):  [25 %-35 %] 25 %  SpO2:  [98 %-100 %] 100 %    Constitutional: Intubated sedated   Lungs: Clear to auscultation bilaterally, no crackles or wheezing   Cardiovascular: Regular rate and rhythm, normal S1 and S2, and no murmur noted   Abdomen: Mild distended,stoma wd as noted by CR   Skin: No rashes, no cyanosis, no edema   Other:           Data:   All microbiology laboratory data reviewed.  Recent Labs   Lab Test 09/29/20  0444 09/28/20  1032 09/28/20  0723   WBC 20.1* 25.0* 20.7*   HGB 10.5* 10.6* 8.6*   HCT 30.1* 30.7* 25.2*   MCV 87 89 89    134* 139*     Recent Labs   Lab Test 09/28/20  0410 09/26/20  0455 09/25/20  1452   CR 1.06* 0.87 0.83     No lab results found.  Recent Labs   Lab Test 09/17/20  0201 09/17/20  0155 09/17/20  0054 09/16/20  2200 09/10/20  1745 09/10/20  1730   CULT Heavy growth  Mixed aerobic and anaerobic fatimah  *  Heavy growth  Bacteroides uniformis  *  Heavy growth  Bacteroides ovatus/xylanisolvens  *  Susceptibility testing not routinely done  Moderate growth  Escherichia coli  Susceptibility testing done on previous specimen  *  Moderate growth  Pseudomonas aeruginosa  Susceptibility testing done on previous specimen  *  Light growth  Strain 2  Escherichia coli  Susceptibility testing done on previous specimen  *  Light growth  Enterococcus avium  Susceptibility testing done on previous specimen  *  Light growth  Candida albicans " / dubliniensis  Candida albicans and Candida dubliniensis are not routinely speciated  Susceptibility testing not routinely done  *  On day 2, isolated in broth only:  Anaerobic gram negative rods  See anaerobic report for identification  * Heavy growth  Mixed aerobic and anaerobic fatimah  *  Heavy growth  Bacteroides fragilis  Susceptibility testing not routinely done  *  Moderate growth  Escherichia coli  *  Moderate growth  Pseudomonas aeruginosa  *  Moderate growth  Strain 2  Escherichia coli  *  Light growth  Enterococcus avium  *  Light growth  Candida albicans / dubliniensis  Candida albicans and Candida dubliniensis are not routinely speciated  Susceptibility testing not routinely done  *  On day 2, isolated in broth only:  Anaerobic gram negative rods  See anaerobic report for identification  * No growth No growth No growth No growth

## 2020-09-29 NOTE — PROGRESS NOTES
"SPIRITUAL HEALTH SERVICES  SPIRITUAL ASSESSMENT Progress Note  [facility] ICU     REFERRAL SOURCE: Follow Up    I shared a reflective visit with patient Evonne and her spouse, Anibal today at patient's bedside. Anibal shares he is doing better today and that today is better than yesterday. He shares that it has been difficult with \"how slow\" things have been but named gratitude for her improvement and that she is alert today. He shares today is her birthday and shared stories about how they have spent her birthdays in other years. He welcomed prayer, which I shared with both of them. I offered emotional support through reflective listening, validation of experiences and words of comfort and support.     PLAN: I will continue to follow during patient's ICU stay.     Celi Anton  Associate    Phone: [phone]  Pager: [phone]    "

## 2020-09-29 NOTE — PROGRESS NOTES
"[facility] Nurse Inpatient Ostomy Assessment     Assessment of New Ileostomy  Surgery date: 9-10-20 and 9-13-20  Surgeon:  Dr Morrison /    Procedure:     9-10-20:  Exploratory laparotomy, right colectomy with end ileostomy and transverse mucous fistula, rigid proctoscopy,                  drainage of pelvis (Suleman)     9-13-20: .  Reexploration of recent laparotomy: Abdominal washout; Anterior resection; Rigid proctoscopy with rectal stump leak                   Testing; Removal of abdominal drain and replacement of new pelvic drain (Dmitry)     9-17-20: EXPLORATORY LAPAROTOMY, ABDOMINAL WASHOUT, CREATION COLOSTOMY with incisional VAC (Fermin)    Related diagnosis:  Cecal perforation, sigmoid phlegmon and contained pelvic perforation.     [facility] Assessment & Physical Exam:        Current status: remains intubated and sedated        - Stomas viable and await return of full  Function  [facility] Photo: 9-28-20           Ileostomy: RLQ        Stoma size:  1 1/2\"    Stoma appearance:  Rounded, red, moist, protrudes, lumen @ apex    Mucocutaneous junction: intact with sutures    Peristomal complication(s): intact, no erythema    Abdominal assessment: mid-abd incision with soft bulging hematoma underlying incision. Small amt bloody drainage from mid-line incision. Assessed by [place]-rectal today and area outlined    Surgical site(s): incision well-approximated with staples, no ann marie-incision erythema    NG still in place? Yes    Output: Liquid green bile looking drainage    Pain: unable to determine, vented and sedated    Colostomy (mucous fistula) : LUQ  Stoma size:  1 1/2\"    Stoma appearance:  Rounded, red, moist, protrudes, lumen @ apex    Mucocutaneous junction: intact with sutures    Peristomal complication(s): intact, no erythema    NG still in place? Yes    Output:  Scant  amt of mucous output    Pain: unable to determine, vented and sedated    Midline incision:    Incision:  Long " mid-line  incison well approximated with staples                   No Ann Marie-incisional erythema scattered erosion along Rt superior margin                   Distal incision with small serous drainage from incisio. GAB site with bloody drainage                                                  No Purulent drainage noted.                 Md-abd incision with soft bulging hematoma underlying incision. Small amt bloody drainage from mid-line incision. Assessed by                    [place]-rectal today and area outlined       Incision VAC removed. Leakage from GAB site underminingVAC dressing     Incision cleaned with MicroKlenz     3M No Sting Skin prep to ann marie-incision  skin     Incision window framed with VAC drape     Black sponge strip  X 2 to incision line     Secured with VAC drape     Adaptor padded to @ distal aspect of incision     Dressing change secondary to continual oozing of serous drainage from distal incision and insertion site of GAB     VAC @ 125mm/hg cont suction on regular VAC  Therapy. No air leak noted     Mepilex border trimmed and placed around GAB in attempt to contain drainage from around GAB        Sponges Removed on 9-28-20     1. 2 x black sponge strips  From  incision     2. 1 x black sponge padding used  adaptor pad             Sponges applied on 9-28-20:      1. 2 x black granufoam strips over incision     2. 1 x black foam as padding for adaptor           RLQ GAB site      GAB tube intact and patent with stitch.       No ann marie-tube erythema, with drainage from insertion site      Output: Large bloody      Containment: [place] NI convext barrier to tube with high output pouch             Current pouching system : Brian NI convex to each stoma     Pouch last changed:  913-20 in OR; 9-15-20; 9-17; 9-21; 9-24; 9-28    Reason for pouch change today: stoma assessment; VAC dressing change         Learning and comprehension:   Pt vented and sedated.     Psychosocial:  TBD      WOC Plan:          Pouching product plan:  [place] NI convex 57mm with high output pouch to RLQ ilesotomy and drainable pouch to LUQ colostomy    Plan for incisional VAC change M-TH weekly        Pt support system on discharge:     WOC recommend home care?  TBD      WOC follow-up plan: TH      Objective Data:       Current Diet/Nutrition:   Orders Placed This Encounter      NPO for Medical/Clinical Reasons Except for: No Exceptions       Output:  I/O last 3 completed shifts:  In: 3637.15 [I.V.:818.71; NG/GT:450]  Out: 5725 [Urine:4030; Drains:1035; Stool:660]      Labs:   Recent Labs   Lab 09/28/20  1032  09/28/20  0410   ALBUMIN  --   --  1.8*   PREALB  --   --  17   HGB 10.6*   < > 7.0*   INR  --   --  1.39*   WBC 25.0*   < > 19.8*    < > = values in this interval not displayed.         Lacho Risk Assessment:   Sensory Perception: 2-->very limited  Moisture: 3-->occasionally moist  Activity: 1-->bedfast  Mobility: 1-->completely immobile  Nutrition: 3-->adequate  Friction and Shear: 2-->potential problem  Lacho Score: 12      WOC Interventions:       Patient's chart evaluated    Focus of today's visit: stoma assessment, prosthetic refitting; incisional VAC dressing change    Pouch:  Marion NI 57mm cut to fit convex with tape and high output pouch,     Participant(s) in teaching session today:  not present    Change made with ostomy management today: continue convexity  To both stoma sites     Supplies: In pt room     Preparation for discharge: No discharge preparations started    Registered for supply samples? TBD    Discussed plan of care with: Nursing     Ghazal Farrell, RN, CWOC Nurse

## 2020-09-29 NOTE — PROGRESS NOTES
[facility] ICU RESPIRATORY NOTE           Date of Admission: 9/10/2020     Date of Intubation (most recent): 9/10/2020     Reason for Mechanical Ventilation: Airway Protection      Number of Days on Mechanical Ventilation: 20     Met Criteria for Spontaneous Breathing Trial: Yes      Significant Events Today: none overnight.     ABG Results:       Recent Labs   Lab 09/23/20  1350   PH 7.45   PCO2 30*   PO2 180*   HCO3 21   O2PER 40%       Current Vent Settings: Ventilation Mode: CMV/AC  (Continuous Mandatory Ventilation/ Assist Control)  FiO2 (%): 35 %  Rate Set (breaths/minute): 12 breaths/min  Tidal Volume Set (mL): 460 mL  PEEP (cm H2O): 5 cmH2O  Pressure Support (cm H2O): 5 cmH2O  Oxygen Concentration (%): 35 %  Resp: 17        Plan: Continue on full ventilator support and will assess daily for weaning readiness.     Moshe Vick, RT

## 2020-09-29 NOTE — PROGRESS NOTES
Intensive Care Daily Note          Assessment and Plan:     Summary Statement:  Evonne Martel is a 54 year old female admitted on 9/10/2020 for septic shock due to cecal perforation and sigmoid colon perforation, likely secondary to perforated diverticulitis. She underwent exploratory laparotomy, right colectomy with end ileostomy, and transverse mucous fistula. She initially did well postoperatively, but developed tachycardia, tachypnea and hypotension. She was taken back to the OR on 9/13 and was found to have gross stool spillage in abdomen with at least 3 sigmoid perforations. She underwent washout and anterior resection performed with drain placement. The patient had very friable tissues in fascia closure, so the patient was placed on paralytics overnight (discontinued (9/14). She had worsening hypotension on 9/17, so CT was obtained and showed possible leak at the descending colon. She went back to the OR (9/17) and was found to have stool leaking from the staple line at the descending colon; washout and end colostomy was performed. She has since been weaned off all pressors    9/27, increased bloody drainage from GAB with patient on heparin for partial SMV thrombosis so heparin stopped    9/28: Drop in hemoglobin with abdominal wall hematoma. Wound consult placed by surgery    9/29: More alert today. Did well on spontaneous breathing trial. Off pressors.      Neurology: Dexmedetomidine for sedation, RASS  0-1   - P: decrease Fentanyl to 50   Continue dexmedetomidine      Cardiovascular/Hemodynamics: septic shock and hypovolemia, initially requiring multiple pressors and has slowly been weaned off all pressors. On low dose norepinephrine this morning, now off.    P:Albumin for very low total oncotic pressure.     Pulmonary: Mechanically ventilated for acute respiratory failure. Day 16 on ventilator.  Patient is receiving adequate oxygenation and ventilation with PEEP 5 and FiO2 40%. Does ok on PS today but  will need to be a little more awake     GI and Nutrition: Perforated colon s/p ex lap, colectomy, and abdominal washout (9/10, re-exploration 9/13 and 9/17).  Mild bili elevation probably cholestasis of sepsis   - NPO, TPN plus 10ml/hr feeding tube.   GAB ouput decreased by 50% c/w Friday  -GI prophylaxis: IV pantoprazole   Off iv heparin.        Renal:  acute kidney injury. Cr normal but BUN elevated.  .   -Has received 17 doses of iv albumin. Doesn't need anymore.  P: 50 g 25% albumin;  AM BMP        Infectious Disease: Septic shock secondary to perforated colon with feculent peritonitis. CXR on 9/17 shows diffuse bilateral infiltrates and consolidation of RLL. Intraoperative abscess cultures are positive for Pseudomonas, Enterococcus avium, E. coli and Bacteroides.   Afebrile     - Continue antibiotic regimen: Zosyn and micafungin but change to fluconazole soon.       Hematology and Oncology: Acute normocytic anemia, now 7.1.   -P: transfuse 1 unit PRBC.     Leukocytosis secondary to septic shock from bowel perforations.     On last CT scan Nonocclusive thrombus in superior mesenteric vein and possible minimal nonocclusive thrombus in portal vein.  Less GAB bloody output but Hgb decreasing   -off Heparin gtt--     Endocrinology: Stress-induced hyperglycemia  P: lantus 10  plus high intensity sliding scale insulin , glucoses< 180              Intensive Care: Central line: Central line present, needed and to be continued  NG tube:  10 m/hr   Drains: abdominal GAB drain  Restraint:Needed   Others: n/a   Top goals for today   Stop HC  Decrease fentanyl  Blood transfusion today   Lasix 20 mg bid  CT scan   PS trial-did well today                   Key events/ Interval history - last 24 hours:    Rising WBC and lower Hgb           General Appearance:   Sitting in bed.  easily opens eyes .weakly moves upper extremities.     HEENT:   Endotracheal tube ok    Chest and Lungs:   Clear ,  nonlabored breathing on mechanical  ventilation, OK on PS 7   Cardiovascular:   , regular rhythm, no murmurs   Abdomen:   Soft, nondistended; midline dressings, both stomas are pink, no stool   GAB output bloody but less volume    :   [place] in place,      Extremities and Skin: icterus    Warm, well perfused; 3+ bilateral pitting edema present   Neuro:   moves extremities spontaneously. Tracks with her eyes and follows simple commands   Drains and Tubes:   NG in place, GAB drain with yellow output   Intravascular Access and Device:   Lines present: triple lumen catheter (right internal jugular)             Data:   Labs:  All lab results reviewed past 24 hours    Imaging:  All imaging results reviewed past 24 hours      35 minutes CC time for respiratory failure, sepsis     Romeo Gonzalez MD  [facility] Intensivist Service

## 2020-09-29 NOTE — PHARMACY-CONSULT NOTE
TPN formula evaluated based on today s labs results.    The following changes have been made:  Protein: decreased  to 42 grams/day   Dextrose: decreased  to 126 grams/day      Pharmacy will continue to follow and adjust as appropriate.    Elizabeth Desir, PharmD, BCPS

## 2020-09-30 NOTE — PROGRESS NOTES
COLON & RECTAL SURGERY  PROGRESS NOTE    September 30, 2020    SUBJECTIVE:  More awake this morning, HGB stable. Remains off pressors. Stoma functioning. Tolerating SBT.     OBJECTIVE:  Temp:  [97.8  F (36.6  C)-99.2  F (37.3  C)] 97.8  F (36.6  C)  Pulse:  [48-81] 75  Resp:  [12-24] 23  BP: ()/() 142/78  FiO2 (%):  [25 %] 25 %  SpO2:  [98 %-100 %] 99 %    Intake/Output Summary (Last 24 hours) at 9/29/2020 0800  Last data filed at 9/29/2020 0600  Gross per 24 hour   Intake 2440.14 ml   Output 7140 ml   Net -4699.86 ml       GENERAL:  Intubated, responsive and following commands   EXTREMITIES: Warm and well perfused, moderate edema peripherally and centrally  ABDOMEN:  Soft, appropriately tender, non-distended. No guarding, rigidity, or peritoneal signs. Stomas pink and protuberant. Abdominal drain more serous.   INCISION:  Incision intact with staples and open in lower aspect, no active bleeding, packing changed on AM rounds.   Stomas: light brown-yellow liquid stool in stoma appliance     LABS:  Lab Results   Component Value Date    WBC 20.1 09/29/2020     Lab Results   Component Value Date    HGB 10.5 09/29/2020     Lab Results   Component Value Date    HCT 30.1 09/29/2020     Lab Results   Component Value Date     09/29/2020     Last Basic Metabolic Panel:  Lab Results   Component Value Date     09/28/2020      Lab Results   Component Value Date    POTASSIUM 4.0 09/28/2020     Lab Results   Component Value Date    CHLORIDE 117 09/28/2020     Lab Results   Component Value Date    PARISH 7.9 09/28/2020     Lab Results   Component Value Date    CO2 21 09/28/2020     Lab Results   Component Value Date    BUN 83 09/28/2020     Lab Results   Component Value Date    CR 1.06 09/28/2020     Lab Results   Component Value Date     09/28/2020       ASSESSMENT/PLAN: Evonne Martel is a 55 year old female s/p ex lap with right hemicolectomy on 9/10, followed by re-exploration with abdominal  washout and anterior resection on 9/13, and ex lap with washout and descending colon MF on 9/17. She remains intubated in the ICU, off pressors. Post-op ileus. 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  Suspect bleeding from raw surfaces related to multiple surgeries and heparin gtt as well as abdominal wall hematoma seen on repeat CT on 9/27. The bleeding seems to have stopped with cessation of her heparin, and her Hb is stable. Abdominal incision hematoma evacuated at bedside on 9/29.     Increase tube feeds slowly to goal - wound increase by 10ml every 12 hours as long as continuing to have stoma function. Can wean TPN accordingly.   Heparin drip held for SMV thrombus given bleeding. Hgb stable and no transfusion for past 48 hours. Will restart SQH ppx today and plan for repeat CT A/P to assess SMV thrombus tomorrow or Friday. If ongoing thrombus and tolerates ppx will plan to restart low dose heparin gtt.  WTD dressings to midline, continue abdominal GAB which has cleared since evacuation of abdominal wall hematoma.  SBT and possible extubation today per ICU team.  Appreciate ID assistance with abx.   Appreciate ICU assistance with cares.     For questions/paging, please contact the CRS office at [phone].    Zuleyka Andres MD  [facility]   Office: [phone]  9/30/2020   9:36 AM

## 2020-09-30 NOTE — PLAN OF CARE
VSS. Dex off. Remains on Fentanyl. Following simple commands. Continues CBI. Large output from Ileostomy. Abd dressing changed, small amount of drainage.

## 2020-09-30 NOTE — PLAN OF CARE
Vss, essentially unchanged. Continues to have large watery output per ileostomy, yellow with mucous occ. green streak.  Small amt. Bloody drainage with abd. Wet to dry Drsg change.Calm on Dex @ .4 opens eyes follows simple commands, minimal hand squeeze, slight contraction of lower extremeties.

## 2020-09-30 NOTE — PROGRESS NOTES
[facility] ICU RESPIRATORY NOTE        Date of Admission: 9/10/2020    Date of Intubation (most recent): 9/10/2020    Reason for Mechanical Ventilation: Airway protection     Number of Days on Mechanical Ventilation: 21    Met Criteria for Spontaneous Breathing Trial: Yes    Significant Events Today: none overnight     ABG Results:   Recent Labs   Lab 09/23/20  1350   PH 7.45   PCO2 30*   PO2 180*   HCO3 21   O2PER 40%       Current Vent Settings: Ventilation Mode: CMV/AC  (Continuous Mandatory Ventilation/ Assist Control)  FiO2 (%): 25 %  Rate Set (breaths/minute): 12 breaths/min  Tidal Volume Set (mL): 460 mL  PEEP (cm H2O): 5 cmH2O  Pressure Support (cm H2O): 7 cmH2O  Oxygen Concentration (%): 25 %  Resp: 15      Plan: Continue on full ventilator support and will assess daily for weaning readiness.     Xavier Hansen, RT

## 2020-09-30 NOTE — PLAN OF CARE
PS trial this am. Stopped due to tachycardic and tachypnea. Will plan for PS trial tomorrow. VS remain stable. TF and TPN adjusted. Following simple commands. Continues CBI.

## 2020-09-30 NOTE — PROGRESS NOTES
[facility]  Infectious Disease Progress Note          Assessment and Plan:   IMPRESSION:   1.  A 54-year-old female without prior major infection or abdominal issues, admitted with major bowel perforation, now status post 3 operations, abdominal abscess, further leak, now with better source control.   2.  Ongoing septic shock , slow improvement  3 resp failure, vent      RECOMMENDATIONS:   1.  Abdominal cultures enterococcus, multiple anaerobes, gram-negative rods including a sensitive Pseudomonas.  Has had well covered  microbiology throughout including currently; however enterococcus probably better covered by penicillin and Zosyn covers everything including excellent VIC to the 2 gram-negative rods and all anaerobes covered.  No need for double anaerobe coverage.  Discontinue Flagyl.   continue micafungin for now, probably fluconazole eventually for the Candida albicans. Could do synergistic gent but if source control now Ok should not need, also just like best not vanco as renal risk significant although nl range creat trend up  2.  Suspect general sepsis and overall condition due to source control issues that hopefully have now been accomplished with multiple interventions. Still vent,Slowly better , sedation and pressors weaning T down WBC still 15 K, recent procal 1.40  3 Some interactive and slow better   4 CT recheck had no abscess             Interval History:   Intubated and awake off sedation some interactive T Ok off pressors cxs same creat 1.0 CT neg  procal 1.4              Medications:       chlorhexidine  15 mL Mouth/Throat Q12H     furosemide  20 mg Intravenous Q12H     heparin ANTICOAGULANT  5,000 Units Subcutaneous Q8H     insulin aspart  1-12 Units Subcutaneous Q4H     insulin glargine  12 Units Subcutaneous Daily     lipids  250 mL Intravenous Q48H     micafungin  100 mg Intravenous Q24H     pantoprazole (PROTONIX) IV  40 mg Intravenous Daily     piperacillin-tazobactam   "4.5 g Intravenous Q6H     sodium chloride (PF)  10 mL Intracatheter Q8H     sodium chloride (PF)  3 mL Intracatheter Q8H                  Physical Exam:   Blood pressure (!) 142/78, pulse 75, temperature 97.8  F (36.6  C), temperature source Axillary, resp. rate 23, height 1.651 m (5' 5\"), weight 57.4 kg (126 lb 8.7 oz), last menstrual period 01/01/2015, SpO2 99 %.  Wt Readings from Last 2 Encounters:   09/30/20 57.4 kg (126 lb 8.7 oz)     Vital Signs with Ranges  Temp:  [97.8  F (36.6  C)-99.2  F (37.3  C)] 97.8  F (36.6  C)  Pulse:  [48-81] 75  Resp:  [12-24] 23  BP: ()/() 142/78  FiO2 (%):  [25 %] 25 %  SpO2:  [98 %-100 %] 99 %    Constitutional: Intubated sedated   Lungs: Clear to auscultation bilaterally, no crackles or wheezing   Cardiovascular: Regular rate and rhythm, normal S1 and S2, and no murmur noted   Abdomen: Mild distended,stoma wd as noted by CR   Skin: No rashes, no cyanosis, no edema   Other:           Data:   All microbiology laboratory data reviewed.  Recent Labs   Lab Test 09/30/20  0510 09/29/20  0444 09/28/20  1032   WBC 15.8* 20.1* 25.0*   HGB 10.0* 10.5* 10.6*   HCT 28.0* 30.1* 30.7*   MCV 89 87 89    164 134*     Recent Labs   Lab Test 09/28/20  0410 09/26/20  0455 09/25/20  1452   CR 1.06* 0.87 0.83     No lab results found.  Recent Labs   Lab Test 09/17/20  0201 09/17/20  0155 09/17/20  0054 09/16/20  2200 09/10/20  1745 09/10/20  1730   CULT Heavy growth  Mixed aerobic and anaerobic fatimah  *  Heavy growth  Bacteroides uniformis  *  Heavy growth  Bacteroides ovatus/xylanisolvens  *  Susceptibility testing not routinely done  Moderate growth  Escherichia coli  Susceptibility testing done on previous specimen  *  Moderate growth  Pseudomonas aeruginosa  Susceptibility testing done on previous specimen  *  Light growth  Strain 2  Escherichia coli  Susceptibility testing done on previous specimen  *  Light growth  Enterococcus avium  Susceptibility testing done on " previous specimen  *  Light growth  Candida albicans / dubliniensis  Candida albicans and Candida dubliniensis are not routinely speciated  Susceptibility testing not routinely done  *  On day 2, isolated in broth only:  Anaerobic gram negative rods  See anaerobic report for identification  * Heavy growth  Mixed aerobic and anaerobic fatimah  *  Heavy growth  Bacteroides fragilis  Susceptibility testing not routinely done  *  Moderate growth  Escherichia coli  *  Moderate growth  Pseudomonas aeruginosa  *  Moderate growth  Strain 2  Escherichia coli  *  Light growth  Enterococcus avium  *  Light growth  Candida albicans / dubliniensis  Candida albicans and Candida dubliniensis are not routinely speciated  Susceptibility testing not routinely done  *  On day 2, isolated in broth only:  Anaerobic gram negative rods  See anaerobic report for identification  * No growth No growth No growth No growth

## 2020-09-30 NOTE — PROGRESS NOTES
Patient was seen for a complete reassessment on 9/28/20 -- see note with this date for details.    Currently receiving TPN/Lipid + TF as follows:  Impact Peptide 1.5 at 20 mL/hr = 720 kcals, 45 gm pro, 370 mL H20, 67 gm CHO, no fiber   D15 AA5 to 35 mL/hr + Lipids 250 mL every other day = 846 kcals, 42 gm pro, 126 gm CHO.   Total (TF + TPN/Lipid):  1566 kcals (34 kcal/kg), 87 gm pro (1.9 gm/kg)    K 5.5 (H), Mg normal, Phos 5.4 (H)  -132 with High sliding scale insulin and Lantus 12 units daily   Drains 350 mL  Stool 2875 mL   I/O 3160/8825, wt 57.4 kg (up from admit) - IV Lasix     ASSESSED NEEDS:   Energy: 3223-1456 (30-35+) - repletion, underweight and vented   Protein: 68-92 grams protein (1.5-2 g pro/Kg) - repletion, post-op and hypercatabolism with critical illness     Patient discussed today during interdisciplinary bedside rounds - OK to increase TF by 10 mL slowly towards goal per surgery.    Orders written in Epic for the following:  Increase Impact Peptide 1.5 to 30 mL/hr:  1080 kcal, 68 g protein, 101 g CHO, no fiber, 554 mL H2O   Decrease TPN to D15 AA5 at 25 mL/hr, discontinue Lipid:  426 kcal, 30 g protein, 90 g CHO   Total = 1506 kcal (33 kcal/kg), 98 g protein (2.1 g/kg)     If above tolerated, will plan to increase TF to goal tomorrow and discontinue the TPN  Goal TF will be Impact Peptide 1.5 at 40 mL/hr = 1440 kcals (31 kcal/kg), 90 gm pro (2 gm/kg), 739 mL H20, 134 gm CHO, no fiber     Neli Trejo RD, LD, CNSC   Clinical Dietitian - [facility]

## 2020-09-30 NOTE — PHARMACY
TPN formula evaluated based on today s labs results.    The following changes have been made:    Rate down to 25ml/h, likely off tomorrow as TF gets to goal.   Protein: decreased  to 30g/day .  Dextrose: decreased  to 90gram/day .   Phosphorus: decreased  to 12mM/day (due to compounding limitations plus was high today)      Pharmacy will continue to follow and adjust as appropriate.    Theresa Batista, PharmD

## 2020-09-30 NOTE — PROGRESS NOTES
Intensive Care Daily Note          Assessment and Plan:     Summary Statement:  Evonne Martel is a 54 year old female admitted on 9/10/2020 for septic shock due to cecal perforation and sigmoid colon perforation, likely secondary to perforated diverticulitis. She underwent exploratory laparotomy, right colectomy with end ileostomy, and transverse mucous fistula. She initially did well postoperatively, but developed tachycardia, tachypnea and hypotension. She was taken back to the OR on 9/13 and was found to have gross stool spillage in abdomen with at least 3 sigmoid perforations. She underwent washout and anterior resection performed with drain placement. The patient had very friable tissues in fascia closure, so the patient was placed on paralytics overnight (discontinued (9/14). She had worsening hypotension on 9/17, so CT was obtained and showed possible leak at the descending colon. She went back to the OR (9/17) and was found to have stool leaking from the staple line at the descending colon; washout and end colostomy was performed. She has since been weaned off all pressors    9/27, increased bloody drainage from GAB with patient on heparin for partial SMV thrombosis so heparin stopped    9/28: Drop in hemoglobin with abdominal wall hematoma. Wound consult placed by surgery    9/29: More alert today. Did well on spontaneous breathing trial. Off pressors.    9/30: Did well on spontaneous breathing trial, but was done in midst of wound dressing changes.       Neurology: Dexmedetomidine for sedation, RASS  0-1     Continue dexmedetomidine      Cardiovascular/Hemodynamics: septic shock and hypovolemia, initially requiring multiple pressors and has slowly been weaned off all pressors.   P:Albumin for very low total oncotic pressure.     Pulmonary: Mechanically ventilated for acute respiratory failure. Day 16 on ventilator.  Patient is receiving adequate oxygenation and ventilation with PEEP 5 and FiO2 40%. Does  ok on PS today but will need to be a little more awake     GI and Nutrition: Perforated colon s/p ex lap, colectomy, and abdominal washout (9/10, re-exploration 9/13 and 9/17).  Mild bili elevation probably cholestasis of sepsis   - NPO, TPN plus 10ml/hr feeding tube.   GAB ouput decreased by 50% c/w Friday  -GI prophylaxis: IV pantoprazole   Off iv heparin.        Renal:  acute kidney injury. Cr normal but BUN elevated.  .   -Has received 17 doses of iv albumin. Doesn't need anymore.  P:  AM BMP        Infectious Disease: Septic shock secondary to perforated colon with feculent peritonitis. CXR on 9/17 shows diffuse bilateral infiltrates and consolidation of RLL. Intraoperative abscess cultures are positive for Pseudomonas, Enterococcus avium, E. coli and Bacteroides.   Afebrile     - Continue antibiotic regimen: Zosyn and micafungin but change to fluconazole soon.       Hematology and Oncology: Acute normocytic anemia, now 10   -P: monitor    Leukocytosis secondary to septic shock from bowel perforations.     On last CT scan Nonocclusive thrombus in superior mesenteric vein and possible minimal nonocclusive thrombus in portal vein.  Less GAB bloody output but Hgb decreasing   -off Heparin gtt--     Endocrinology: Stress-induced hyperglycemia  P: lantus 10  plus high intensity sliding scale insulin , glucoses< 180              Intensive Care: Central line: Central line present, needed and to be continued  NG tube:  10 m/hr   Drains: abdominal GAB drain  Restraint:Needed   Others: n/a   Top goals for today   Stop HC  Decrease fentanyl  Lasix 20 mg bid  PS trial-did well today                   Key events/ Interval history - last 24 hours:    Rising WBC and lower Hgb           General Appearance:   Sitting in bed.  easily opens eyes .weakly moves upper extremities.     HEENT:   Endotracheal tube ok    Chest and Lungs:   Clear ,  nonlabored breathing on mechanical ventilation, OK on PS 7   Cardiovascular:   , regular  rhythm, no murmurs   Abdomen:   Soft, nondistended; midline dressings, both stomas are pink, no stool   GAB output bloody but less volume    :   Ferguson in place,      Extremities and Skin: icterus    Warm, well perfused; 3+ bilateral pitting edema present   Neuro:   moves extremities spontaneously. Tracks with her eyes and follows simple commands   Drains and Tubes:   NG in place, GAB drain with yellow output   Intravascular Access and Device:   Lines present: triple lumen catheter (right internal jugular)             Data:   Labs:  All lab results reviewed past 24 hours    Imaging:  All imaging results reviewed past 24 hours      35 minutes CC time for respiratory failure, sepsis     Romeo Gonzalez MD  [facility] Intensivist Service

## 2020-10-01 NOTE — PROGRESS NOTES
CLINICAL NUTRITION SERVICES - REASSESSMENT NOTE      Recommendations Ordered by Registered Dietitian (RD): Add soluble fiber to see if ostomy OP improves --> NutriSource Fiber 1 pkt TID = 45 kcal and 9 g fiber    Malnutrition: (9/14)  % Weight Loss:  History not available   % Intake:  </= 50% for >/= 5 days (severe malnutrition)  Subcutaneous Fat Loss:  Orbital region severe depletion  Muscle Loss:  Temporal region severe depletion and Clavicle bone region severe depletion  Fluid Retention:  Moderate as above      Malnutrition Diagnosis: Severe malnutrition  In Context of:  Acute illness or injury  Environmental or social circumstances       EVALUATION OF PROGRESS TOWARD GOALS   Diet:  NPO    Nutrition Support:  Patient continues on a combination of TPN and TF as follows ~    Nutrition Support Parenteral:  Type of Access: PICC  Parenteral Frequency:  Continuous  Parenteral Regimen: D15 AA5 at 25 mL/hr, no Lipid  Total Parenteral Provisions: 426 kcal, 30 g protein, 90 g CHO      Nutrition Support Enteral:  Type of Feeding Tube: NG  Enteral Frequency:  Continuous  Enteral Regimen: Impact Peptide 1.5 at 30 mL/hr  Total Enteral Provisions: 1080 kcal, 68 g protein, 101 g CHO, no fiber, 554 mL H2O  Free Water Flush: 60 mL every 4 hours     Total (TPN + TF)= 1506 kcal (33 kcal/kg), 98 g protein (2.1 g/kg)      Intake/Tolerance:    K and Mg normal  Phos 5.9 (H) - Plan to remove from tonight's bag of TPN   BGM < 150 - High sliding scale insulin + Lantus 12 units daily   Stool 3150 mL - Per surgeon note has increased with advancement of TF --> Request made to either decrease TF, change formula, or add soluble fiber (we will plan to try soluble fiber)  I/O 2606/7580, wt 53 kg (up 4.1 kg from admit) - IV Lasix   Drains 205 mL       ASSESSED NUTRITION NEEDS:  Dosing Weight 45.9 kg   Estimated Energy Needs: 0978-9038 kcals (30-35 Kcal/Kg)  Justification: repletion, underweight and vented  Estimated Protein Needs: 68-92 grams  protein (1.5-2 g pro/Kg)  Justification: repletion, post-op and hypercatabolism with critical illness    Previous Goals (9/28):   Pt will transition from TPN --> goal TF in the next 48-72 hrs  Evaluation: Not met but currently in the process     Previous Nutrition Diagnosis (9/28):   Inadequate enteral nutrition infusion related to low TF rate as evidenced by TF meeting only 25% estimated needs and continue PN reliance  Evaluation: Improving      CURRENT NUTRITION DIAGNOSIS  Altered GI function (high ostomy output) related  to multiple surgeries, TF advancement as evidenced by > 3000 mL output yesterday     INTERVENTIONS  Recommendations / Nutrition Prescription  Continue above TPN + TF regimen for now  Add soluble fiber to see if ostomy OP improves --> NutriSource Fiber 1 pkt TID = 45 kcal and 9 g fiber   Total (TPN + TF + NutriSource Fiber) = 1551 kcal (34 kcal/kg), 98 g protein (2.1 g/kg), 9 g fiber    Implementation  Medical Food Supplement:  Fiber ordered as above.   Collaboration with other professionals:  Patient discussed today during interdisciplinary bedside rounds.  Also discussed plans for TPN and TF with Pharmacist and surgeon.    Goals  TPN + TF + NutriSource Fiber will meet % needs  Ostomy output will improve with addition of NutriSource Fiber     MONITORING AND EVALUATION:  Progress towards goals will be monitored and evaluated per protocol and Practice Guidelines    Neli Trjeo RD, LD, CNSC   Clinical Dietitian - [facility]

## 2020-10-01 NOTE — PHARMACY-CONSULT NOTE
TPN formula evaluated based on today s labs results.    The following changes have been made:    Phosphorus: removed.    Pharmacy will continue to follow and adjust as appropriate.    Elizabeth Desir, PharmD, BCPS

## 2020-10-01 NOTE — PLAN OF CARE
Vss, HR slightly elevated tonight with low grade temp. Ileostomy putting out large amt liquid yellow approx. 200-300cc q2hour. Abd. Drsg CDI, minimal bloody drainage .Drowsy but rousable, follows simple commands albeit it very weak. Lungs con't coarse throughout, secretions cloudy white per ETT. Uop excellent, CBI continues @ slow rate, urine is clear.

## 2020-10-01 NOTE — PROGRESS NOTES
Intubation Note    Date of intubation:10/1/2020  Time of intubation:1525  Location of intubation:ICU  Intubated by:Dr. Cordova  Size of ETT:7.0  Location (cm) at lip: ETT was initially secured at 22 cm at the lip, but was then pulled back 2 cm to 20 cm at the lip per physician order based on chest x-ray.    ETT placement confirmed by: EtCO2 color change and bilateral breath sounds present.   Comments:RT was called to bedside due to pt desatting. Upon arrival pt was on a cool aerosol mask with SpO2 around 70%. Pt was ventilated with ambu bag via mask until pt was intubated per MD. SpO2 increased into the upper 90's when pt was ventilated with ambu bag.  10/1/2020  Sara Renee RT

## 2020-10-01 NOTE — PROGRESS NOTES
Extubation Note    Successful completion of SBT (Yes or No):Yes  Extubation time:12:27 pm    Patient assessment:  Lung sounds:clear, diminished  Stridor Present (Yes or No): No  Patient tolerance: Pt had weak cough    Oxygen device:  Liter flow:10L  FiO2:40%  SpO2:96%    Plan:RT will continue to follow and monitor.    Stephanie Larson, RT

## 2020-10-01 NOTE — PROGRESS NOTES
Pt was transported to/from CT for abdomen   PT was on Ventilation Mode: CMV/AC  (Continuous Mandatory Ventilation/ Assist Control)  FiO2 (%): 20 %  Rate Set (breaths/minute): 12 breaths/min  Tidal Volume Set (mL): 460 mL  PEEP (cm H2O): 5 cmH2O  Pressure Support (cm H2O): 7 cmH2O  Oxygen Concentration (%): 25 %  Resp: 18    SPO2 was 96%    RT Will continue to monitor.       Stephanie Larson, RT  10/1/2020

## 2020-10-01 NOTE — PROCEDURES
Procedure:  Bedside bronchoscopy     Diagnosis:    1)  Abdominal catastrophe with multiple recent bowel perforations  2)  Acute hypoxic respiratory failure    Date:  10/01/20    Proceduralist:  Toro Cordova MD    Sedation/Paralysis:  8 mg Vecuronium, 4 mg Versed    Description:  Informed consent was obtained from .  A timeout was performed.  The patient was sedated with 4 mg Versed and then paralyzed with 8 mg Vecuronium.  A 7.0 ETT along with the suction and Glidescope were gathered and inspected.  Direct and video laryngoscopy were used to visualize the vocal cords.  The ETT was placed through the cords under direct vision and secured at 22 cm at the lip.  There was good color change of the capnographer on ventilation.  Bilateral breath sounds were auscultated on ventilation.  The patient tolerated the procedure without complications.  A post-procedure CXR revealed that the ETT was right at the willard so this was withdrawn back to 20 cm with subsequent good positioning on repeat CXR.        Toro Cordova MD on 10/1/2020 at 4:05 PM

## 2020-10-01 NOTE — PROGRESS NOTES
11a-3p    Follows commands. Extubated at 1230p. Unable to speak. Shallows breathing. LS coarse and wheezy. ST w/ WNL BPs. Ferguson patent with adequate UOP. CBI stopped at 1pm. Large output from ileostomy. GAB patent on abdomen. Dressings on abdomen changed by WOC. Palpable pulses on extremities. Localized purposeful movements on extremities. Slightly open buttock and blisters on inner thigh.     3pm increased WOB. Denies SOB, but shallow  Breathing and tachypnea. Denies pain. Able to answer some questions.     310pm started desating from low 90s to 50s. Became tachypnea. BP reserved. RT and intensivist at bedside. Emergent re-intubation per intensivist.  at bedside.

## 2020-10-01 NOTE — PROGRESS NOTES
COLON & RECTAL SURGERY  PROGRESS NOTE      SUBJECTIVE:  Only on fentanyl for sedation. Awake and responsive. Dressing just changed by nursing. High ileostomy output with tubefeed advancement. 3.4L up from 2.1L.      OBJECTIVE:  Temp:  [97.8  F (36.6  C)-100.1  F (37.8  C)] 99.5  F (37.5  C)  Pulse:  [] 96  Resp:  [12-28] 18  BP: (104-169)/() 104/86  FiO2 (%):  [20 %-25 %] 20 %  SpO2:  [97 %-100 %] 99 %    Intake/Output Summary (Last 24 hours) at 9/29/2020 0800  Last data filed at 9/29/2020 0600  Gross per 24 hour   Intake 2440.14 ml   Output 7140 ml   Net -4699.86 ml       GENERAL:  Intubated, responsive and following commands   EXTREMITIES: Warm and well perfused, moderate edema  ABDOMEN:  Soft, appropriately tender, non-distended. No guarding, rigidity, or peritoneal signs. Stomas pink and protuberant. Abdominal drain more serosang.   INCISION: midline dressing intact  Stomas: yellow liquid stool in stoma appliance     LABS:  Lab Results   Component Value Date    WBC 20.1 09/29/2020     Lab Results   Component Value Date    HGB 10.5 09/29/2020     Lab Results   Component Value Date    HCT 30.1 09/29/2020     Lab Results   Component Value Date     09/29/2020     Last Basic Metabolic Panel:  Lab Results   Component Value Date     09/28/2020      Lab Results   Component Value Date    POTASSIUM 4.0 09/28/2020     Lab Results   Component Value Date    CHLORIDE 117 09/28/2020     Lab Results   Component Value Date    PARISH 7.9 09/28/2020     Lab Results   Component Value Date    CO2 21 09/28/2020     Lab Results   Component Value Date    BUN 83 09/28/2020     Lab Results   Component Value Date    CR 1.06 09/28/2020     Lab Results   Component Value Date     09/28/2020       ASSESSMENT/PLAN: Evonne Martel is a 55 year old female s/p ex lap with right hemicolectomy on 9/10, followed by re-exploration with abdominal washout and anterior resection on 9/13, and ex lap with washout and  descending colon MF on 9/17. She remains intubated in the ICU, off pressors. Post-op ileus. 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  Suspect bleeding from raw surfaces related to multiple surgeries and heparin gtt as well as abdominal wall hematoma seen on repeat CT on 9/27. The bleeding seems to have stopped with cessation of her heparin, and her Hb is stable. Abdominal incision hematoma evacuated at bedside on 9/29.     May need to back off on tubefeeds or change formulation given high ileostomy output. Can also trial fiber supplementation to slow down ileostomy output. Adjust TPN accordingly.   Heparin drip held for SMV thrombus given bleeding. Hgb stable and no transfusion for past 72 hours. Continue SQH ppx. CT A/P to assess SMV thrombus today. If ongoing thrombus and tolerates ppx will plan to restart low dose heparin gtt.  WTD dressings to midline, continue abdominal GAB which has cleared since evacuation of abdominal wall hematoma.  SBT and possible extubation today per ICU team.  Appreciate ID assistance with abx.   Appreciate ICU assistance with cares.     For questions/paging, please contact the CRS office at [phone].    Zuleyka Andres MD  [facility]   Office: [phone]  9/30/2020   9:36 AM

## 2020-10-01 NOTE — PROGRESS NOTES
Interval Progress Note:    Called to patient's bedside for acute respiratory distress.  She was noted to have severe work of breathing and was saturating in the 60s.  She was bagged to a saturation of 100%.  The decision was made to intubate her.  Her  was updated at bedside.  Post-intubation she was hypotensive in the 70s-80s systolic requiring IVF bolus and some Phenylephrine.           Toro Cordova MD on 10/1/2020 at 4:02 PM

## 2020-10-01 NOTE — PROGRESS NOTES
[facility] ICU RESPIRATORY NOTE           Date of Admission: 9/10/2020     Date of Intubation (most recent): 9/10/2020     Reason for Mechanical Ventilation: Airway protection      Number of Days on Mechanical Ventilation: 22     Met Criteria for Spontaneous Breathing Trial: Yes     Significant Events Today: none overnight      ABG Results:       Recent Labs   Lab 09/23/20  1350   PH 7.45   PCO2 30*   PO2 180*   HCO3 21   O2PER 40%       Current Vent Settings: Ventilation Mode: CMV/AC  (Continuous Mandatory Ventilation/ Assist Control)  FiO2 (%): 25 %  Rate Set (breaths/minute): 12 breaths/min  Tidal Volume Set (mL): 460 mL  PEEP (cm H2O): 5 cmH2O  Pressure Support (cm H2O): 7 cmH2O  Oxygen Concentration (%): 25 %  Resp: 15        Plan: Continue on full ventilator support and will assess daily for weaning readiness.     Moshe Vick, RT

## 2020-10-02 PROBLEM — N17.9 ACUTE KIDNEY FAILURE, UNSPECIFIED (H): Status: ACTIVE | Noted: 2020-01-01

## 2020-10-02 PROBLEM — N17.0 ACUTE KIDNEY FAILURE WITH TUBULAR NECROSIS (H): Status: ACTIVE | Noted: 2020-01-01

## 2020-10-02 NOTE — PROGRESS NOTES
Patient was transported to main CT for drain placement. Pt was transported on the transport vent.  Ventilation Mode: CMV/AC  (Continuous Mandatory Ventilation/ Assist Control)  FiO2 (%): 30 %  Rate Set (breaths/minute): 20 breaths/min  Tidal Volume Set (mL): 460 mL  PEEP (cm H2O): 5 cmH2O  Pressure Support (cm H2O): 7 cmH2O  Oxygen Concentration (%): 30 %  Resp: 23    Patient tolerated the transport well.  10/2/2020  Sara Renee, RT

## 2020-10-02 NOTE — PROGRESS NOTES
Intensive Care Daily Note          Assessment and Plan:     Summary Statement:  Evonne Martel is a 54 year old female admitted on 9/10/2020 for septic shock due to cecal perforation and sigmoid colon perforation, likely secondary to perforated diverticulitis. She underwent exploratory laparotomy, right colectomy with end ileostomy, and transverse mucous fistula. She initially did well postoperatively, but developed tachycardia, tachypnea and hypotension. She was taken back to the OR on 9/13 and was found to have gross stool spillage in abdomen with at least 3 sigmoid perforations. She underwent washout and anterior resection performed with drain placement. The patient had very friable tissues in fascia closure, so the patient was placed on paralytics overnight (discontinued (9/14). She had worsening hypotension on 9/17, so CT was obtained and showed possible leak at the descending colon. She went back to the OR (9/17) and was found to have stool leaking from the staple line at the descending colon; washout and end colostomy was performed. She has since been weaned off all pressors    9/27, increased bloody drainage from GAB with patient on heparin for partial SMV thrombosis so heparin stopped    9/28: Drop in hemoglobin with abdominal wall hematoma. Wound consult placed by surgery    9/29: More alert today. Did well on spontaneous breathing trial. Off pressors.    9/30: Did well on spontaneous breathing trial, but was done in midst of wound dressing changes.     10/1: Did well on spontaneous breathing trial so was extubated at noon. Status post extubation is tenuous and she might required reintubation.    10/2: Required reintubation yesterday. Today went for another pelvic/abdominal drain. Stable. Likely will need tracheostomy to facilitate weaning.      Neurology: Dexmedetomidine for sedation, RASS  0-1     Continue dexmedetomidine      Cardiovascular/Hemodynamics: septic shock and hypovolemia, initially requiring  multiple pressors and has slowly been weaned off all pressors.   P:Albumin for very low total oncotic pressure.     Pulmonary:    GI and Nutrition: Perforated colon s/p ex lap, colectomy, and abdominal washout (9/10, re-exploration 9/13 and 9/17).  Mild bili elevation probably cholestasis of sepsis   - NPO, TPN plus 10ml/hr feeding tube.   Went for another drain per IR today  -GI prophylaxis: IV pantoprazole   Off iv heparin.        Renal:  acute kidney injury. Cr normal but BUN elevated.  .   -Has received 17 doses of iv albumin. Doesn't need anymore.  P:  AM BMP   Discussed with nephrology       Infectious Disease: Septic shock secondary to perforated colon with feculent peritonitis. CXR on 9/17 shows diffuse bilateral infiltrates and consolidation of RLL. Intraoperative abscess cultures are positive for Pseudomonas, Enterococcus avium, E. coli and Bacteroides.   Afebrile     - Continue antibiotic regimen: Zosyn and micafungin but change to fluconazole soon.       Hematology and Oncology: Acute normocytic anemia, now 10   -P: monitor    Leukocytosis secondary to septic shock from bowel perforations.     On last CT scan Nonocclusive thrombus in superior mesenteric vein and possible minimal nonocclusive thrombus in portal vein.  Less GAB bloody output but Hgb decreasing   -off Heparin gtt--     Endocrinology: Stress-induced hyperglycemia  P: lantus 10  plus high intensity sliding scale insulin , glucoses< 180              Intensive Care: Central line: Central line present, needed and to be continued  NG tube:  10 m/hr   Drains: abdominal GAB drain  Restraint:Needed   Others: n/a   Top goals for today   IR drain to be placed  PS none today                  Key events/ Interval history - last 24 hours:    No significant changes          General Appearance:   Sitting in bed.  easily opens eyes .weakly moves upper extremities.     HEENT:   Endotracheal tube ok    Chest and Lungs:   Clear ,  nonlabored breathing on  mechanical ventilation   Cardiovascular:   , regular rhythm, no murmurs   Abdomen:   Soft, nondistended; midline dressings, both stomas are pink, no stool   GAB output bloody but less volume    :   Ferguson in place,      Extremities and Skin: icterus    Warm, well perfused; 3+ bilateral pitting edema present   Neuro:   moves extremities spontaneously. Tracks with her eyes and follows simple commands   Drains and Tubes:   NG in place, GAB drain with yellow output   Intravascular Access and Device:   Lines present: triple lumen catheter (right internal jugular)             Data:   Labs:  All lab results reviewed past 24 hours    Imaging:  All imaging results reviewed past 24 hours      40 minutes CC time for respiratory failure, sepsis     Romeo Gonzalez MD  [facility] Intensivist Service

## 2020-10-02 NOTE — PROGRESS NOTES
Radiology Note:  Date: 2020   Patient name: Evonne Martel  MRN:[id number]  :  1965    Order has been received in radiology department for a pelvic drain placement. Imaging was reviewed and procedure approved by Dr Gutierrez. This will be done with CT guidance. Patient is currently intubated and sedated, so we will work with flying squad to coordinate timing for them to accompany her for procedure. She is on subcutaneous heparin 5000 units Q 8 hrs (last dose today 10/2 0551), Dr Gutierrez is ok to proceed.     Lab results are acceptable to proceed with procedure.   Recent Labs   Lab Test 10/02/20  0450 20  0410 20  0410      < > 142*   INR  --   --  1.39*    < > = values in this interval not displayed.     After discussing the procedure, risks, benefits, recovery and expectations with the patient's  Anibal at the patient's bedside, consent was obtained and is in the CT department.     Charito Chavarria PA-C  Interventional Radiology

## 2020-10-02 NOTE — PRE-PROCEDURE
GENERAL PRE-PROCEDURE:   Procedure:  Image guided pelvic abscess drain placement with sedation  Date/Time:  10/2/2020 10:03 AM    Written consent obtained?: Yes    Risks and benefits: Risks, benefits and alternatives were discussed    Consent given by:  Spouse  Patient states understanding of procedure being performed: Yes    Patient's understanding of procedure matches consent: Yes    Procedure consent matches procedure scheduled: Yes    : Sedation level: patient is currently intubated and sedated   Appropriately NPO:  Yes  ASA Class:  Class 3- Severe systemic disease, definite functional limitations  Mallampati  :  N/A- Alternate secured airway  Lungs:  Lungs clear with good breath sounds bilaterally and other (comment)  Lung exam comment:  Mechanical ventilation  Heart:  Normal heart sounds and rate  History & Physical reviewed:  History and physical reviewed and no updates needed  Statement of review:  I have reviewed the lab findings, diagnostic data, medications, and the plan for sedation

## 2020-10-02 NOTE — PROCEDURES
[facility]    Procedure: Imaging Procedure Note    Date/Time: 10/2/2020 2:14 PM  Performed by: Akila Gutierrez MD  Authorized by: Akila Gutierrez MD     UNIVERSAL PROTOCOL   Site Marked: Yes  Prior Images Obtained and Reviewed:  Yes  Required items: Required blood products, implants, devices and special equipment available    Patient identity confirmed:  Verbally with patient  Patient was reevaluated immediately before administering moderate or deep sedation or anesthesia  Confirmation Checklist:  Patient's identity using two indicators, relevant allergies, procedure was appropriate and matched the consent or emergent situation and correct equipment/implants were available  Time out: Immediately prior to the procedure a time out was called    Universal Protocol: the Joint Commission Universal Protocol was followed    Preparation: Patient was prepped and draped in usual sterile fashion           ANESTHESIA    Anesthesia: Local infiltration  Local Anesthetic:  Lidocaine 1% without epinephrine      SEDATION    Patient Sedated: Yes    Sedation Type:  Moderate (conscious) sedation  Sedation:  Midazolam  Vital signs: Vital signs monitored during sedation    See dictated procedure note for full details.  PROCEDURE   Patient Tolerance:  Patient tolerated the procedure well with no immediate complications  Describe Procedure: CT guided pelvic drain placement  Flush and aspirate the catheter with 10 ml of sterile saline 3 times/day  Catheter to bulb suction  Length of time physician/provider present for 1:1 monitoring during sedation: 45

## 2020-10-02 NOTE — PROGRESS NOTES
[facility] ICU RESPIRATORY NOTE        Date of Admission: 9/10/2020    Date of Intubation (most recent):10/1/2020    Reason for Mechanical Ventilation:Airway protection    Number of Days on Mechanical Ventilation:2    Met Criteria for Spontaneous Breathing Trial:No    Reason for No Spontaneous Breathing Trial:Per MD.    Significant Events Today:Pt was transported to main CT for a drain placement.    ABG Results:   Recent Labs   Lab 10/02/20  0450 10/01/20  2005 10/01/20  1657 10/01/20  1128   O2PER 30% 30% 60% 25%       Current Vent Settings: Ventilation Mode: CMV/AC  (Continuous Mandatory Ventilation/ Assist Control)  FiO2 (%): 30 %  Rate Set (breaths/minute): 20 breaths/min  Tidal Volume Set (mL): 460 mL  PEEP (cm H2O): 5 cmH2O  Pressure Support (cm H2O): 7 cmH2O  Oxygen Concentration (%): 30 %  Resp: 26    Plan:Will cont full vent support for now and will assess for weaning readiness daily.    Sara Renee, RT

## 2020-10-02 NOTE — PLAN OF CARE
Pt underwent GAB drain placement in right posterior pelvic region, titrated off of pressor, medicated with dilaudid  IVP for pain with success.  Pt appears more comfortable after pain medication given.  Pt responds to voice, weakly follows when able. Tolerating turns to left and back, pelvic drain prevents full turn to right side.   at bedside, emotional support given, questions answered.

## 2020-10-02 NOTE — PROGRESS NOTES
[facility]  PROGRESS NOTE    October 2, 2020    SUBJECTIVE: awake, but intubated.  at bedside. Reviewed CT scan findings from yesterday.     OBJECTIVE:  Temp:  [95.2  F (35.1  C)-100  F (37.8  C)] 99.1  F (37.3  C)  Pulse:  [] 101  Resp:  [9-45] 13  BP: ()/() 119/76  FiO2 (%):  [30 %-100 %] 30 %  SpO2:  [64 %-100 %] 100 %    Intake/Output Summary (Last 24 hours) at 10/2/2020 1028  Last data filed at 10/2/2020 1000  Gross per 24 hour   Intake 2435.37 ml   Output 5122 ml   Net -2686.63 ml       GENERAL:  Awake, winces to pain  HEAD: Normocephalic atraumatic  SCLERA: Anicteric  EXTREMITIES: Warm and well perfused  ABDOMEN:  Soft, appropriately tender, non-distended. No guarding, rigidity, or peritoneal signs. Stomas pink and protruding. Ileostomy with soft light brown stool output in high output bag.   INCISION: midline dressing intact    LABS:  Lab Results   Component Value Date    WBC 17.6 10/02/2020     Lab Results   Component Value Date    HGB 10.2 10/02/2020     Lab Results   Component Value Date    HCT 29.3 10/02/2020     Lab Results   Component Value Date     10/02/2020     Last Basic Metabolic Panel:  Lab Results   Component Value Date     10/02/2020      Lab Results   Component Value Date    POTASSIUM 5.2 10/02/2020     Lab Results   Component Value Date    CHLORIDE 111 10/02/2020     Lab Results   Component Value Date    PARISH 8.8 10/02/2020     Lab Results   Component Value Date    CO2 20 10/02/2020     Lab Results   Component Value Date    BUN 93 10/02/2020     Lab Results   Component Value Date    CR 1.66 10/02/2020     Lab Results   Component Value Date     10/02/2020       ASSESSMENT/PLAN: 55 year old female s/p ex lap with right hemicolectomy on 9/10, followed by re-exploration with abdominal washout and anterior resection on 9/13, and ex lap with washout and descending colon MF on 9/17. She remains intubated in the ICU, off pressors. Post-op ileus.  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  Suspect bleeding from raw surfaces related to multiple surgeries and heparin gtt as well as abdominal wall hematoma seen on repeat CT on 9/27. The bleeding seems to have stopped with cessation of her heparin, and her Hb is stable. Abdominal incision hematoma evacuated at bedside on 9/29.     1. Continue TPN/tube feeds and continue to monitor stoma output.   2. Plan for IR drain placement for pelvic abscess today  3. Heparin drip held for SMV thrombus given bleeding. Hgb stable and no transfusion for past >72 hours. CT A/P 10/1 with stable SMV thrombus. Plan to restart low dose heparin gtt after IR drainage today.   4. WTD dressings to midline, continue abdominal GAB which has cleared since evacuation of abdominal wall hematoma.  5.Appreciate ID assistance with abx.   6. Appreciate ICU assistance with cares.     Discussed with Dr. Fermin.     For questions/paging, please contact the [facility] office at [phone].    Tabitha Flores PA-C  [facility]  Phone: [phone]    CRS Staff.  Seen and examined with Tabitha Flores.  Agree with above.    Underwent IR procedure today.  Continues to have blanca stoma output. Tube feeds running.  Creatinine up a bit.  Getting TPN.    Will restart heparin gtt tonight.  Ok to start at MN given that she underwent a procedure at 2 pm.  Would recommend no bolus, low intensity given her recent bleeding history.  If she tolerates this, likely can increase intensity of indicated.    I performed a history and physical examination of the patient and discussed their management with the physician assistant. I reviewed the physician assistants note and agree with the documented findings and plan of care.     Mikey Fermin MD  [facility]  [phone] (office)  [phone] (pager)  [email or web address]

## 2020-10-02 NOTE — PHARMACY-CONSULT NOTE
TPN formula evaluated based on today s labs results.    The following changes have been made:    Protein: increased to 48 grams/day.  Dextrose: increased to 144 grams/day.  Potassium: decreased  to 30 mEq/day.    Calcium:  decreased to 6 mEq/day.   Magnesium: removed.    Pharmacy will continue to follow and adjust as appropriate.    Elizabeth Desir, PharmD, BCPS

## 2020-10-02 NOTE — PROGRESS NOTES
[facility] ICU RESPIRATORY NOTE           Date of Admission: 9/10/2020     Date of Intubation (most recent): 10/1/2020     Reason for Mechanical Ventilation: Airway protection      Number of Days on Mechanical Ventilation: 2     Met Criteria for Spontaneous Breathing Trial: Yes     Significant Events Today: none overnight     Ventilation Mode: CMV/AC  (Continuous Mandatory Ventilation/ Assist Control)  FiO2 (%): 30 %  Rate Set (breaths/minute): 20 breaths/min  Tidal Volume Set (mL): 460 mL  PEEP (cm H2O): 5 cmH2O  Pressure Support (cm H2O): 7 cmH2O  Oxygen Concentration (%): 30 %  Resp: 23    Recent Labs   Lab 10/02/20  0450 10/01/20  2005 10/01/20  1657 10/01/20  1128   O2PER 30% 30% 60% 25%       Plan: continue full vent support tonight

## 2020-10-02 NOTE — PROCEDURES
[facility]    Procedure: Imaging Procedure Note    Date/Time: 10/2/2020 1:44 PM  Performed by: Akila Gutierrez MD  Authorized by: Akila Gutierrez MD     UNIVERSAL PROTOCOL   Site Marked: Yes  Prior Images Obtained and Reviewed:  Yes  Required items: Required blood products, implants, devices and special equipment available    Patient identity confirmed:  Verbally with patient  Patient was reevaluated immediately before administering moderate or deep sedation or anesthesia  Confirmation Checklist:  Patient's identity using two indicators, relevant allergies, procedure was appropriate and matched the consent or emergent situation and correct equipment/implants were available  Time out: Immediately prior to the procedure a time out was called    Universal Protocol: the Joint Commission Universal Protocol was followed    Preparation: Patient was prepped and draped in usual sterile fashion          SEDATION    Patient Sedated: Yes    Sedation Type:  Moderate (conscious) sedation  Sedation:  Midazolam  Vital signs: Vital signs monitored during sedation    See dictated procedure note for full details.  PROCEDURE   Patient Tolerance:  Patient tolerated the procedure well with no immediate complications  Describe Procedure: CT-guided drainage catheter placement into midline pelvic fluid collection.  Catheter to bulb suction.  Flush and aspirate the catheter with 10 ml pf sterile saline 3 times a day  Monitor outputs.  Further catheter management per the primary service  Length of time physician/provider present for 1:1 monitoring during sedation: 45

## 2020-10-02 NOTE — PROGRESS NOTES
"[facility] Nurse Inpatient Ostomy Assessment     Assessment of New Ileostomy  Surgery date: 9-10-20 and 9-13-20  Surgeon:  Dr Morrison /    Procedure:     9-10-20:  Exploratory laparotomy, right colectomy with end ileostomy and transverse mucous fistula, rigid proctoscopy,                  drainage of pelvis (Suleman)     9-13-20: .  Reexploration of recent laparotomy: Abdominal washout; Anterior resection; Rigid proctoscopy with rectal stump leak                   Testing; Removal of abdominal drain and replacement of new pelvic drain (Midura)     9-17-20: EXPLORATORY LAPAROTOMY, ABDOMINAL WASHOUT, CREATION COLOSTOMY with incisional VAC (Fermin)    9-29-20: Three staples removed at inferior portion of incision where hematoma already decompressing. Clot removed along the length of the incision and the incision was irrigated with sterile saline. Wet-to-dry dressing with gauze applied at open portion. GAB dressing replaced.  VAC dressing to incision discontinued    Related diagnosis:  Cecal perforation, sigmoid phlegmon and contained pelvic perforation.     [facility] Assessment & Physical Exam:        Current status: remains intubated and sedated        - Stomas viable and await return of full  Function  [facility] Photo: 9-28-20           Ileostomy: RLQ        Stoma size:  1 1/2\"    Stoma appearance:  Rounded, red, moist, protrudes, lumen @ apex    Mucocutaneous junction: intact with sutures    Peristomal complication(s): intact, no erythema    Abdominal assessment: remanins distended and firm    Surgical site(s): Superior 3/4\" well approximated with staples, no ann marie-incision erythema,         no drainage. Distal incision open with bloody packing in incision.     NG still in place? Yes    Output: Large Liquid green/yellow ooking drainage    Pain: unable to determine, vented     Colostomy (mucous fistula) : LUQ  Stoma size:  1 1/2\"    Stoma appearance:  Rounded, red, moist, protrudes, lumen @ " apex    Mucocutaneous junction: intact with sutures    Peristomal complication(s): intact, no erythema    NG still in place? Yes    Output:  Scant  amt of mucous output    Pain: unable to determine, vented and sedated    Midline incision:    Incision:  Long mid-line  incison well approximated with staples                   No Ann Marie-incisional erythema scattered erosion along Rt superior margin                   Distal incision open with bloody packing following evacuation of hematoma. VAC discontinues on 9-29 by Viola-rectal                         site with  Scant bloody/clotted  drainage                                              No Purulent drainage noted.          RLQ GAB site      GAB tube intact and patent with stitch.       No ann marie-tube erythema, with small clotted drainage under pouch      Output: decreasing sero-sang drainage      Pouch removed to site and slited optifoam dressing to GAB tube             Current pouching system : [place] NI convex to each stoma     Pouch last changed:  913-20 in OR; 9-15-20; 9-17; 9-21; 9-24; 9-28; 10-1    Reason for pouch change today: stoma assessment;          Learning and comprehension:   Pt vented and sedated.     Psychosocial:  TBD      WOC Plan:         Pouching product plan:  [place] NI convex 57mm with high output pouch to RLQ ilesotomy and drainable pouch to LUQ colostomy    Plan for distal open incision:  Wet -> moist packing BID per colo-rectal         Pt support system on discharge:     WOC recommend home care?  TBD      WOC follow-up plan:  monday      Objective Data:       Current Diet/Nutrition:   Orders Placed This Encounter      NPO for Medical/Clinical Reasons Except for: No Exceptions       Output:  I/O last 3 completed shifts:  In: 2445.54 [I.V.:739.2; NG/GT:360]  Out: 7585 [Urine:4110; Drains:200; Stool:3275]      Labs:   Recent Labs   Lab 10/01/20  1134 10/01/20  0400 09/28/20  0410 09/28/20  0410   ALBUMIN 2.1*  --    < > 1.8*   PREALB  --    --   --  17   HGB  --  11.2*   < > 7.0*   INR  --   --   --  1.39*   WBC  --  20.2*   < > 19.8*    < > = values in this interval not displayed.         Lacho Risk Assessment:   Sensory Perception: 2-->very limited  Moisture: 3-->occasionally moist  Activity: 1-->bedfast  Mobility: 1-->completely immobile  Nutrition: 2-->probably inadequate  Friction and Shear: 2-->potential problem  Lacho Score: 11      WOC Interventions:       Patient's chart evaluated    Focus of today's visit: stoma assessment, prosthetic refitting;    Pouch:  Brian NI 57mm cut to fit convex with tape and high output pouch,     Participant(s) in teaching session today:  not present    Change made with ostomy management today: continue convexity  To both stoma sites     Supplies: In pt room     Preparation for discharge: No discharge preparations started    Registered for supply samples? TBD    Discussed plan of care with: Nursing     Ghazal Farrell RN, CWOC Nurse

## 2020-10-02 NOTE — PROGRESS NOTES
Chart reviewed, patient was seen for a complete reassessment yesterday 10/1/20 - see note with this date for details.    Current Epic order for TPN + TF as follows ~    Nutrition Support Parenteral:  Type of Access: PICC  Parenteral Frequency:  Continuous  Parenteral Regimen: D15 AA5 at 25 mL/hr, no Lipid  Total Parenteral Provisions: 426 kcal, 30 g protein, 90 g CHO          Nutrition Support Enteral:  Type of Feeding Tube: NG  Enteral Frequency:  Continuous  Enteral Regimen: Impact Peptide 1.5 at 30 mL/hr  Total Enteral Provisions: 1080 kcal, 68 g protein, 101 g CHO, no fiber, 554 mL H2O  Free Water Flush: 60 mL every 4 hours     D5 at 50 mL/hr started today = 60 g CHO, 204 kcal  Also receiving NutriSource Fiber 1 pkt TID = 45 kcal and 9 g fiber     Total (TPN + TF + D5 + NutriSource Fiber = 1755 kcal (38 kcal/kg), 98 g protein (2.1 g/kg), 9 g fiber     Mg 2.5 (H), Phos 6.7 (H) - Noted Phos was eliminated from TPN yesterday   BUN 93 (H), Cr 1.66 (H) - HONORIO  BGM < 150   Stool 3275 mL (higher than yesterday despite the addition of NutriSource Fiber)    I/O 2536/5442, wt 53.2 kg (up from admit) - IV Adonis    RN informed me that TF just turned off for planned pelvic abscess drain placement in IR     Of note patient was extubated yesterday and re-intubated < 4 hours later     ASSESSED NUTRITION NEEDS:  Dosing Weight 45.9 kg   Estimated Energy Needs: 1275-2041 kcals (30-35 Kcal/Kg)  Justification: repletion, underweight and vented  Estimated Protein Needs: 68-92 grams protein (1.5-2 g pro/Kg)  Justification: repletion, post-op and hypercatabolism with critical illness    Patient was discussed during interdisciplinary bedside rounds --> Per bedside RN, TF and NutriSource Fiber going right thru patient, likely very minimal absorption (high ostomy output).  She has been receiving a semi-elemental TF formula.  MD advised that TPN will be a key in patient's overall plan of care for the foreseeable future.    Therefore, plan  will be to increase TPN back to goal rate and run trickle TF as patient likely not absorbing much    Epic orders entered for the followin.  Once able to resume TF run Impact Peptide 1.5 at 10 mL/hr to provide:   360 kcals, 23 gm pro, 185 mL H20, 34 gm CHO, no fiber     2.  Increase TPN to 40 mL/hr tonight and then after 24 hours increase to goal of 55 mL/hr (D15 AA5) + Lipid 250 mL every 48 hours to provide:  1187 kcal (26 kcal/kg), 66 g protein (1.4 g/kg), 198 g CHO, 21% fat    3.  Discontinue the NutriSource Fiber     4.  Total (TF + TPN + Lipid + D5) = 1751 kcal (38 kcal/kg and 109% needs), 89 g protein (1.9 g/kg)    Neli Trejo RD, LD, [facility]   Clinical Dietitian - [facility]

## 2020-10-02 NOTE — CONSULTS
CM Initial Care Management Initial Consult    General Information:  Patient's communication style: spoken language (English or Bilingual)  Hearing Difficulty or Deaf: no Wear Glasses or Blind: yes  Cognitive/Neuro/Behavioral: .WDL except  Level of Consciousness: lethargic  Arousal Level: opens eyes spontaneously  Orientation: other (see comments)(intubated, appears alert to self/place)  Speech: endotracheal tube  Mood/Behavior: cooperative  Advance Care Planning: none on file    Living Environment:   People in home: spouse  Current living Arrangements: lives with spouse           Family/Social Support:  Care provided by: self   Provides care for:  Family if applicable  Description of Support System:            Lifestyle & Psychosocial Needs:        Socioeconomic History     Marital status:      Spouse name: Not on file     Number of children: Not on file     Years of education: Not on file     Highest education level: Not on file         Functional Status:  Prior to admission patient needed assistance:  none        Current Resources:   Skilled Home Care Services:  None previous  Community Resources: none   Equipment currently used at home:  none  Supplies currently used at home:  none    Employment:  Employment Status: yes  Financial/Environmental Concerns: prolonged hospital stay with prolonged recovery anticipated.    Values/Beliefs:  Spiritual, Cultural Beliefs, Scientologist Practices, Values that affect care: not assessed                Additional Information:  Patient will need Care Coordination to follow.     Rae Jenkins RN

## 2020-10-02 NOTE — PROGRESS NOTES
Intensive Care Daily Note          Assessment and Plan:     Summary Statement:  Evonne Martel is a 54 year old female admitted on 9/10/2020 for septic shock due to cecal perforation and sigmoid colon perforation, likely secondary to perforated diverticulitis. She underwent exploratory laparotomy, right colectomy with end ileostomy, and transverse mucous fistula. She initially did well postoperatively, but developed tachycardia, tachypnea and hypotension. She was taken back to the OR on 9/13 and was found to have gross stool spillage in abdomen with at least 3 sigmoid perforations. She underwent washout and anterior resection performed with drain placement. The patient had very friable tissues in fascia closure, so the patient was placed on paralytics overnight (discontinued (9/14). She had worsening hypotension on 9/17, so CT was obtained and showed possible leak at the descending colon. She went back to the OR (9/17) and was found to have stool leaking from the staple line at the descending colon; washout and end colostomy was performed. She has since been weaned off all pressors    9/27, increased bloody drainage from GAB with patient on heparin for partial SMV thrombosis so heparin stopped    9/28: Drop in hemoglobin with abdominal wall hematoma. Wound consult placed by surgery    9/29: More alert today. Did well on spontaneous breathing trial. Off pressors.    9/30: Did well on spontaneous breathing trial, but was done in midst of wound dressing changes.     10/1: Did well on spontaneous breathing trial so was extubated at noon. Status post extubation is tenuous and she might required reintubation.      Neurology: Dexmedetomidine for sedation, RASS  0-1     Continue dexmedetomidine      Cardiovascular/Hemodynamics: septic shock and hypovolemia, initially requiring multiple pressors and has slowly been weaned off all pressors.   P:Albumin for very low total oncotic pressure.     Pulmonary: Mechanically  ventilated for acute respiratory failure. Day 16 on ventilator.  Patient is receiving adequate oxygenation and ventilation with PEEP 5 and FiO2 40%. Did well on spontaneous breathing trial so will extubate.   GI and Nutrition: Perforated colon s/p ex lap, colectomy, and abdominal washout (9/10, re-exploration 9/13 and 9/17).  Mild bili elevation probably cholestasis of sepsis   - NPO, TPN plus 10ml/hr feeding tube.   GAB ouput decreased by 50% c/w Friday  -GI prophylaxis: IV pantoprazole   Off iv heparin.        Renal:  acute kidney injury. Cr normal but BUN elevated.  .   -Has received 17 doses of iv albumin. Doesn't need anymore.  P:  AM BMP        Infectious Disease: Septic shock secondary to perforated colon with feculent peritonitis. CXR on 9/17 shows diffuse bilateral infiltrates and consolidation of RLL. Intraoperative abscess cultures are positive for Pseudomonas, Enterococcus avium, E. coli and Bacteroides.   Afebrile     - Continue antibiotic regimen: Zosyn and micafungin but change to fluconazole soon.       Hematology and Oncology: Acute normocytic anemia, now 10   -P: monitor    Leukocytosis secondary to septic shock from bowel perforations.     On last CT scan Nonocclusive thrombus in superior mesenteric vein and possible minimal nonocclusive thrombus in portal vein.  Less GAB bloody output but Hgb decreasing   -off Heparin gtt--     Endocrinology: Stress-induced hyperglycemia  P: lantus 10  plus high intensity sliding scale insulin , glucoses< 180              Intensive Care: Central line: Central line present, needed and to be continued  NG tube:  10 m/hr   Drains: abdominal GAB drain  Restraint:Needed   Others: n/a   Top goals for today   Stop HC  Decrease fentanyl  Lasix 20 mg bid  PS trial-did well today                   Key events/ Interval history - last 24 hours:    No significant changes          General Appearance:   Sitting in bed.  easily opens eyes .weakly moves upper extremities.      HEENT:   Endotracheal tube ok    Chest and Lungs:   Clear ,  nonlabored breathing on mechanical ventilation, OK on PS 7   Cardiovascular:   , regular rhythm, no murmurs   Abdomen:   Soft, nondistended; midline dressings, both stomas are pink, no stool   GAB output bloody but less volume    :   Ferguson in place,      Extremities and Skin: icterus    Warm, well perfused; 3+ bilateral pitting edema present   Neuro:   moves extremities spontaneously. Tracks with her eyes and follows simple commands   Drains and Tubes:   NG in place, GAB drain with yellow output   Intravascular Access and Device:   Lines present: triple lumen catheter (right internal jugular)             Data:   Labs:  All lab results reviewed past 24 hours    Imaging:  All imaging results reviewed past 24 hours      35 minutes CC time for respiratory failure, sepsis     Romeo Gonzalez MD  [facility] Intensivist Service

## 2020-10-02 NOTE — PROGRESS NOTES
[facility]  Infectious Disease Progress Note          Assessment and Plan:   IMPRESSION:   1.  A 54-year-old female without prior major infection or abdominal issues, admitted with major bowel perforation, now status post 3 operations, abdominal abscess, further leak, now with better source control.   2.  Ongoing septic shock , slow improvement  3 resp failure, vent      RECOMMENDATIONS:   1.  Abdominal cultures enterococcus, multiple anaerobes, gram-negative rods including a sensitive Pseudomonas.  Has had well covered  microbiology throughout including currently; however enterococcus probably better covered by penicillin and Zosyn covers everything including excellent VIC to the 2 gram-negative rods and all anaerobes covered.  No need for double anaerobe coverage.  Discontinue Flagyl.   continue micafungin for now, probably fluconazole eventually for the Candida albicans. Could do synergistic gent but if source control now Ok should not need, also just like best not vanco as renal risk significant  2.  Suspect general sepsis and overall condition due to source control issues that hopefully have now been accomplished with multiple interventions. Still vent,Slowly better , sedation and pressors weaning T down WBC still up               Interval History:   Intubated   New drain after another collection in the Ct scan               Medications:       chlorhexidine  15 mL Mouth/Throat Q12H     fiber modular (NUTRISOURCE FIBER)  1 packet Per Feeding Tube TID     [Held by provider] furosemide  20 mg Intravenous Q12H     heparin ANTICOAGULANT  5,000 Units Subcutaneous Q8H     insulin aspart  1-12 Units Subcutaneous Q4H     [Held by provider] insulin glargine  8 Units Subcutaneous Daily     micafungin  100 mg Intravenous Q24H     pantoprazole (PROTONIX) IV  40 mg Intravenous Daily     piperacillin-tazobactam  3.375 g Intravenous Q6H     sodium chloride (PF)  10 mL Intracatheter Q8H     sodium  "chloride (PF)  3 mL Intracatheter Q8H                  Physical Exam:   Blood pressure 92/67, pulse 81, temperature 99.7  F (37.6  C), resp. rate 23, height 1.651 m (5' 5\"), weight 53.2 kg (117 lb 4.6 oz), last menstrual period 01/01/2015, SpO2 100 %.  Wt Readings from Last 2 Encounters:   10/02/20 53.2 kg (117 lb 4.6 oz)     Vital Signs with Ranges  Temp:  [95.2  F (35.1  C)-100  F (37.8  C)] 99.7  F (37.6  C)  Pulse:  [] 81  Resp:  [9-45] 23  BP: ()/() 92/67  FiO2 (%):  [30 %-100 %] 30 %  SpO2:  [64 %-100 %] 100 %    Constitutional: Intubated sedated   Lungs: Clear to auscultation bilaterally, no crackles or wheezing   Cardiovascular: Regular rate and rhythm, normal S1 and S2, and no murmur noted   Abdomen: Mild distended,stoma wd as noted by CR   Skin: No rashes, no cyanosis, no edema   Other:           Data:   All microbiology laboratory data reviewed.  Recent Labs   Lab Test 10/02/20  0450 10/01/20  0400 09/30/20  1223   WBC 17.6* 20.2* 19.2*   HGB 10.2* 11.2* 10.6*   HCT 29.3* 31.7* 31.0*   MCV 88 88 87    218 186     Recent Labs   Lab Test 10/02/20  0450 10/01/20  1134 10/01/20  0400   CR 1.66* 1.40* 1.34*     No lab results found.  Recent Labs   Lab Test 09/17/20  0201 09/17/20  0155 09/17/20  0054 09/16/20  2200 09/10/20  1745 09/10/20  1730   CULT Heavy growth  Mixed aerobic and anaerobic fatimah  *  Heavy growth  Bacteroides uniformis  *  Heavy growth  Bacteroides ovatus/xylanisolvens  *  Susceptibility testing not routinely done  Moderate growth  Escherichia coli  Susceptibility testing done on previous specimen  *  Moderate growth  Pseudomonas aeruginosa  Susceptibility testing done on previous specimen  *  Light growth  Strain 2  Escherichia coli  Susceptibility testing done on previous specimen  *  Light growth  Enterococcus avium  Susceptibility testing done on previous specimen  *  Light growth  Candida albicans / dubliniensis  Candida albicans and Leti dubliniensis " are not routinely speciated  Susceptibility testing not routinely done  *  On day 2, isolated in broth only:  Anaerobic gram negative rods  See anaerobic report for identification  * Heavy growth  Mixed aerobic and anaerobic fatimah  *  Heavy growth  Bacteroides fragilis  Susceptibility testing not routinely done  *  Moderate growth  Escherichia coli  *  Moderate growth  Pseudomonas aeruginosa  *  Moderate growth  Strain 2  Escherichia coli  *  Light growth  Enterococcus avium  *  Light growth  Candida albicans / dubliniensis  Candida albicans and Candida dubliniensis are not routinely speciated  Susceptibility testing not routinely done  *  On day 2, isolated in broth only:  Anaerobic gram negative rods  See anaerobic report for identification  * No growth No growth No growth No growth

## 2020-10-03 NOTE — CONSULTS
[facility]    Hematology / Oncology Consultation     Date of Admission:  9/10/2020    Assessment & Plan   Evonne Martel is a 55 year old female who was admitted on 9/10/2020. I was asked to see the patient for possible coagulopathy.    Assessment:  1. 55-year-old lady with previous history of alcohol abuse presented with abdominal pain, was found to have cecal perforation and peritonitis.   2. Patient had a complicated course requiring multiple surgeries from sepsis.  Has been hospitalized in ICU since 9/10/2020.   3. CT scan done on 9/24/2020 showed a SMV thrombus nonoccluding.  Was anticoagulated with heparin GTT.   Bloody drainage from GAB drain/ostomy on 9/27  requiring heparin to be held.   4    10/3/2020 bleeding again after restarting heparin.   Review of labs show creatinine elevated at 1.82, bilirubin elevated at 3.0 albumin 1.6.  White blood cell count is elevated at 18.8 hemoglobin 8.9 platelets 224.  Fibrinogen level was 529 heparin 10 a levels were 0.17.  Hemoglobin has dropped recently from 11.2-8.9.  Last INR was 1.39 on 9/28  Current medications include, micafungin Zosyn, heparin GTT is on hold.   Impression: GI bleed likely due to erosive mucosal lesions in duodenum, possible coagulopathy due to liver dysfunction, less likely DIC.     Plan:  1. Evaluation for coagulopathy: Please repeat PT/INR, APTT, peripheral smear.   2. Continue to hold heparin for now.   3. Increase Protonix to twice daily.   4. Ultrasound of liver to assess for cirrhosis  5. Consider Vitamin K for liver dysfunction.  5 to 10 mg 1 dose through NG tube.   6. Plan discussed with Dr. Gonzalez.       Kong Bowers MD   [facility]  Office:  [phone]    Code Status    Full Code    Reason for Consult   Reason for consult: Possible coagulopathy  Primary Care Physician   Physician No Ref-Primary    Chief Complaint     Bleeding from ostomy.     Patient is intubated unable to give history.  History obtained  from discussion with patient's primary team and review of medical records.     History of Present Illness   Evonne Martel is a 55 year old female who presents with abdominal pain and distention.  Patient presented to the ER on 9/10/2020 in the afternoon with complaints of abdominal pain and distention.  Past medical history was significant for alcohol abuse.  Her symptoms started about a week prior to hospital presentation.   Evaluation in the ER showed severe lab abnormalities including hyponatremia, pneumoperitoneum, severe sepsis.  Her abdominal examination was remarkable for large distended tympanic abdomen.  CT scan was remarkable for massive pneumoperitoneum sigmoid inflammation and extraluminal stool in the pelvis.  She was diagnosed with cecal perforation with sigmoid phlegmon and contained pelvic perforation.  Patient underwent an exploratory laparotomy right colectomy and an ileostomy and transverse mucous fistula and drainage of pelvis.  Postoperative phase was complicated by sepsis and shock.  Since then patient has been in ICU.    Patient needed a repeat surgery on 9/13/2020 as she developed feculent drainage from her pelvic drains as well as peritonitis.  She had a reexploration and abdominal washout with new drain.   9/17/2020: Patient evaluated again with worsening tachycardia and hypotension.  She was taken back to the OR for abdominal washout.  She was found to have stool leakage.  Again washout and and colostomy was performed.   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   9/27: Increased bloody discharge from GAB so heparin was stopped.   928 drop in hemoglobin and abdominal wall hematoma.   10/2 patient required intubation again.   10/3 bleeding from stoma again soon after restarting anticoagulation, heparin was stopped again.  EGD done today show Many non-bleeding superficial ulcers with mucosal slughing with no  stigmata of bleeding were found in the jejunum. The largest lesion was 10 mm in largest dimension.     Past Medical History   I have reviewed this patient's medical history and updated it with pertinent information if needed.   History reviewed. No pertinent past medical history.    Past Surgical History   I have reviewed this patient's surgical history and updated it with pertinent information if needed.  Past Surgical History:   Procedure Laterality Date     COLECTOMY WITH COLOSTOMY, COMBINED N/A 9/10/2020    Procedure: COLECTOMY, WITH COLOSTOMY CREATION;  Surgeon: Chery Morrison MD;  Location:  OR     ESOPHAGOSCOPY, GASTROSCOPY, DUODENOSCOPY (EGD), COMBINED N/A 10/3/2020    Preliminary Information:  Procedure: ESOPHAGOGASTRODUODENOSCOPY (EGD);  Surgeon: Delano Gastelum MD;  Location:  GI     KNEE SURGERY       LAPAROTOMY EXPLORATORY N/A 9/10/2020    Procedure: Exploratory laparotomy, right colectomy, ileostomy, transverse colon mucous fistula, pelvic drain placement, proctoscopy;  Surgeon: Chery Morrison MD;  Location:  OR     LAPAROTOMY EXPLORATORY N/A 9/13/2020    Procedure: LAPAROTOMY, ABDOMINAL WASHOUT;  Surgeon: Zuleyka Andres MD;  Location:  OR     LAPAROTOMY EXPLORATORY N/A 9/17/2020    Procedure: EXPLORATORY LAPAROTOMY, ABDOMINAL WASHOUT, CREATION COLOSTOMY;  Surgeon: Mikey Fermin MD;  Location:  OR     RESECT SMALL BOWEL WITHOUT OSTOMY N/A 9/13/2020    Procedure: ANTERIOR RESECTION WITH RIGID PROCTOSCOPY;  Surgeon: Zuleyka Andres MD;  Location:  OR       Prior to Admission Medications   None     Allergies   No Known Allergies    Social History   I have reviewed this patient's social history and updated it with pertinent information if needed. Evonne Martel  reports that she has been smoking cigarettes. She has smoked for the past 30.00 years. She has never used smokeless tobacco. She reports current alcohol use. She reports that she does not use  drugs.    Family History   I have reviewed this patient's family history and updated it with pertinent information if needed.   History reviewed. No pertinent family history.    Review of Systems   Review of systems unavailable as patient is intubated.   Physical Exam   Temp: 99.9  F (37.7  C) Temp src: Esophageal BP: 108/71 Pulse: 99   Resp: 19 SpO2: 100 % O2 Device: Mechanical Ventilator    Vital Signs with Ranges  Temp:  [99.3  F (37.4  C)-100.4  F (38  C)] 99.9  F (37.7  C)  Pulse:  [] 99  Resp:  [10-46] 19  BP: ()/(60-92) 108/71  FiO2 (%):  [30 %] 30 %  SpO2:  [98 %-100 %] 100 %  110 lbs 3.68 oz    Constitutional: Patient is currently intubated, minimally responsive.  Just had some sedation for EGD.   Eyes: Conjunctiva and pupils examined there is some pallor mild icterus   Respiratory: Patient is intubated transmitted breath sounds   cardiovascular: Mild tachycardia   GI: Abdomen is mildly distended.    Data   Labs reviewed and discussed with primary team

## 2020-10-03 NOTE — OR NURSING
EGD done in ICU with Dr. Gastelum.  Medications and monitoring per ICU RN.  No specimens obtained.  Patient tolerated procedure well.

## 2020-10-03 NOTE — PROGRESS NOTES
[facility] ICU RESPIRATORY NOTE    Date of Admission: 9/10/2020     Date of Intubation (most recent):10/1/2020     Reason for Mechanical Ventilation:Airway protection     Number of Days on Mechanical Ventilation:3     Met Criteria for Spontaneous Breathing Trial:No     Reason for No Spontaneous Breathing Trial:Per MD.      ABG Results:   Recent Labs   Lab 10/02/20  0450 10/01/20  2005 10/01/20  1657 10/01/20  1128   O2PER 30% 30% 60% 25%       Current Vent Settings: Ventilation Mode: CMV/AC  (Continuous Mandatory Ventilation/ Assist Control)  FiO2 (%): 30 %  Rate Set (breaths/minute): 20 breaths/min  Tidal Volume Set (mL): 460 mL  PEEP (cm H2O): 5 cmH2O  Oxygen Concentration (%): 30 %  Resp: 21      Plan: Continue full ventilator support and assess pt for daily weaning trial.    RT Andrew  10/3/2020

## 2020-10-03 NOTE — PROVIDER NOTIFICATION
MD Notification    Notified Person: MD    Notified Person Name: Dr. Ring    Notification Date/Time:10- @0639    Notification Interaction: Paged    Purpose of Notification: Hep 10a level of 0.17    Orders Received: Waiting for call back    Comments:

## 2020-10-03 NOTE — PROVIDER NOTIFICATION
MD Notification    Notified Person: MD    Notified Person Name: Dr. Ring    Notification Date/Time: 10- @ 0606    Notification Interaction: Paged/Phone    Purpose of Notification: Pink output from ileostomy this am. HGB 8.9 this am    Orders Received: Call MD with Hep 10a level    Comments:

## 2020-10-03 NOTE — PLAN OF CARE
Pt with bleeding from ileostomy site, heparin gtt stopped, upper gi performed, no areas of bleeding noted per provider.  Heparin gtt remains off per order.  Pt follows commands intermittently, medicated for pain with prn dilaudid, tolerating ventilator without continuous sedation.  Ileostomy site continues to drain brown/bile fluid in large amounts, pelvic GAB drain without any drainage, abdominal GAB drain with total of 60 ml this shift.   Spouse at bedside x 2 today, updated by MDs and nursing. Spouse plans to go over patients health situation with daughters this evening, anticipating moving forward with tracheostomy. Emotional support provided.

## 2020-10-03 NOTE — PROGRESS NOTES
"Colon & Rectal Surgery Progress Note             Interval History:   Heparin started at 12. Bloody stoma output starting 6 am. hgb down about 1 point.   Intubated. Not on pressors.                 Medications:   I have reviewed this patient's current medications               Physical Exam:   Blood pressure 124/81, pulse 93, temperature 99.5  F (37.5  C), temperature source Esophageal, resp. rate 20, height 1.651 m (5' 5\"), weight 50 kg (110 lb 3.7 oz), last menstrual period 01/01/2015, SpO2 99 %.    Intake/Output Summary (Last 24 hours) at 10/3/2020 0745  Last data filed at 10/3/2020 0600  Gross per 24 hour   Intake 2950.51 ml   Output 3332 ml   Net -381.49 ml     GEN:  Alert, intubated  ABD:  Soft. Clear urine. JPs with minimal bloody out put. Abdominal wound with old blood.         Data:        Lab Results   Component Value Date     10/03/2020    Lab Results   Component Value Date    CHLORIDE 114 10/03/2020    Lab Results   Component Value Date    BUN 95 10/03/2020      Lab Results   Component Value Date    POTASSIUM 4.3 10/03/2020    Lab Results   Component Value Date    CO2 20 10/03/2020    Lab Results   Component Value Date    CR 1.82 10/03/2020    CR 1.72 10/02/2020        Lab Results   Component Value Date    HGB 8.9 (L) 10/03/2020    HGB 10.2 (L) 10/02/2020     Lab Results   Component Value Date     10/03/2020     10/02/2020     Lab Results   Component Value Date    WBC 18.8 (H) 10/03/2020    WBC 17.6 (H) 10/02/2020            Assessment and Plan:   Discussed with ICU to hold heparin again as she had a considerable bleed last week that started with blood in the stoma. Known non-occlusive SMV thrombus. Agree with hematology consult to guide anticoagulation.  Rec GI consult.  Continue TPN, tube feeds.   ABX per ID       Alyssa Pike MD  [facility]  Office Phone # [phone]    "

## 2020-10-03 NOTE — PLAN OF CARE
Pt alert and responsive, shakes head yes or no for pain. Pt weak and deconditioned. Coarse lung sounds, clears with suctioning. GAB in right pelvis flushed x1. Heparin drip started at midnight. Ileostomy this am has a bile-pink tinged output. Colorectal Surgery paged. Re-paged with Hep 10a level. Pt being turned left and supine due to the stop-cock digging into right hip from GAB drain in pelvis.

## 2020-10-03 NOTE — PROGRESS NOTES
Intensive Care Daily Note          Assessment and Plan:     Summary Statement:  Evonne Martel is a 54 year old female admitted on 9/10/2020 for septic shock due to cecal perforation and sigmoid colon perforation, likely secondary to perforated diverticulitis. She underwent exploratory laparotomy, right colectomy with end ileostomy, and transverse mucous fistula. She initially did well postoperatively, but developed tachycardia, tachypnea and hypotension. She was taken back to the OR on 9/13 and was found to have gross stool spillage in abdomen with at least 3 sigmoid perforations. She underwent washout and anterior resection performed with drain placement. The patient had very friable tissues in fascia closure, so the patient was placed on paralytics overnight (discontinued (9/14). She had worsening hypotension on 9/17, so CT was obtained and showed possible leak at the descending colon. She went back to the OR (9/17) and was found to have stool leaking from the staple line at the descending colon; washout and end colostomy was performed. She has since been weaned off all pressors    9/27, increased bloody drainage from GAB with patient on heparin for partial SMV thrombosis so heparin stopped    9/28: Drop in hemoglobin with abdominal wall hematoma. Wound consult placed by surgery    9/29: More alert today. Did well on spontaneous breathing trial. Off pressors.    9/30: Did well on spontaneous breathing trial, but was done in midst of wound dressing changes.     10/1: Did well on spontaneous breathing trial so was extubated at noon. Status post extubation is tenuous and she might required reintubation.    10/2: Required reintubation yesterday. Today went for another pelvic/abdominal drain. Stable. Likely will need tracheostomy to facilitate weaning.    10/3: Continued deterioration in renal function but remains non-oliguric. Bleeding from stoma soon after anticoagulation.     Neurology: Dexmedetomidine for  sedation, RASS  0-1     Continue dexmedetomidine      Cardiovascular/Hemodynamics: septic shock and hypovolemia, initially requiring multiple pressors and has slowly been weaned off all pressors.   P:Albumin for very low total oncotic pressure.     Pulmonary: Mechanically ventilated for acute respiratory failure. Day 16 on ventilator.  Patient is receiving adequate oxygenation and ventilation with PEEP 5 and FiO2 40%. Too tenuous to consider extubation.   GI and Nutrition: Perforated colon s/p ex lap, colectomy, and abdominal washout (9/10, re-exploration 9/13 and 9/17).  Mild bili elevation probably cholestasis of sepsis   - NPO, TPN plus 10ml/hr feeding tube.   Went for another drain per IR today  -GI prophylaxis: IV pantoprazole   Off iv heparin as had stoma bleeding within 6 hours of        Renal:  acute kidney injury. Creatinine and BUN rising, though BUN plateaued.  .   -Has received 17 doses of iv albumin. Doesn't need anymore.  P:  AM BMP   Discussed with nephrology       Infectious Disease: Septic shock secondary to perforated colon with feculent peritonitis. CXR on 9/17 shows diffuse bilateral infiltrates and consolidation of RLL. Intraoperative abscess cultures are positive for Pseudomonas, Enterococcus avium, E. coli and Bacteroides.   Afebrile     - Continue antibiotic regimen: Zosyn and micafungin but change to fluconazole soon.       Hematology and Oncology: Acute normocytic anemia, now 8.9  -P: monitor    Leukocytosis secondary to septic shock from bowel perforations.     On last CT scan Nonocclusive thrombus in superior mesenteric vein and possible minimal nonocclusive thrombus in portal vein.  Less GAB bloody output but Hgb decreasing   -off Heparin gtt again as had ira blood from ostomy today     Endocrinology: Stress-induced hyperglycemia  P: lantus 10  plus high intensity sliding scale insulin , glucoses< 180              Intensive Care: Central line: Central line present, needed and to be  continued  NG tube:  10 m/hr   Drains: abdominal GAB drain  Restraint:Needed   Others: n/a   Top goals for today   IR drain placed  PS none today                  Key events/ Interval history - last 24 hours:    No significant changes          General Appearance:   Sitting in bed.  easily opens eyes .weakly moves upper extremities.     HEENT:   Endotracheal tube ok    Chest and Lungs:   Clear ,  nonlabored breathing on mechanical ventilation   Cardiovascular:   , regular rhythm, no murmurs   Abdomen:   Soft, nondistended; midline dressings, both stomas are pink, no stool   GAB output bloody but less volume    :   Ferguson in place,      Extremities and Skin: icterus    Warm, well perfused; 3+ bilateral pitting edema present   Neuro:   moves extremities spontaneously. Tracks with her eyes and follows simple commands   Drains and Tubes:   NG in place, GAB drain with yellow output   Intravascular Access and Device:   Lines present: triple lumen catheter (right internal jugular)             Data:   Labs:  All lab results reviewed past 24 hours    Imaging:  All imaging results reviewed past 24 hours      40 minutes CC time for respiratory failure, sepsis     Romeo Gonzalez MD  [facility] Intensivist Service

## 2020-10-04 NOTE — PROGRESS NOTES
[facility] ICU RESPIRATORY NOTE        Date of Admission: 9/10/2020     Date of Intubation (most recent):10/1/2020     Reason for Mechanical Ventilation:Airway protection     Number of Days on Mechanical Ventilation:4     Met Criteria for Spontaneous Breathing Trial:No     Reason for No Spontaneous Breathing Trial:Per MD.  ABG Results:   Recent Labs   Lab 10/02/20  0450 10/01/20  2005 10/01/20  1657 10/01/20  1128   O2PER 30% 30% 60% 25%       Current Vent Settings: Ventilation Mode: CMV/AC  (Continuous Mandatory Ventilation/ Assist Control)  FiO2 (%): 30 %  Rate Set (breaths/minute): 20 breaths/min  Tidal Volume Set (mL): 460 mL  PEEP (cm H2O): 5 cmH2O  Oxygen Concentration (%): 30 %  Resp: 19        Plan:Continue full support and assess in the morning for daily weaning trial.    Elijah Marin,   10/4/2020

## 2020-10-04 NOTE — PROGRESS NOTES
Heather anal/rectal skin tear noted.  Skin edges irregular.  Tissue pale pink without drainage.  New sacral mepilex applied to cover skin tears.  Coccyx with discolored skin/brown but blanches.

## 2020-10-04 NOTE — PROGRESS NOTES
[facility] ICU RESPIRATORY NOTE        Date of Admission: 9/10/2020    Date of Intubation (most recent): 10/01/2020    Reason for Mechanical Ventilation: Airway protection     Number of Days on Mechanical Ventilation: 4    Met Criteria for Spontaneous Breathing Trial: No     Reason for No Spontaneous Breathing Trial: Per MD    Significant Events Today: none overnight     ABG Results:   Recent Labs   Lab 10/02/20  0450 10/01/20  2005 10/01/20  1657 10/01/20  1128   O2PER 30% 30% 60% 25%       Current Vent Settings: Ventilation Mode: CMV/AC  (Continuous Mandatory Ventilation/ Assist Control)  FiO2 (%): 30 %  Rate Set (breaths/minute): 20 breaths/min  Tidal Volume Set (mL): 460 mL  PEEP (cm H2O): 5 cmH2O  Oxygen Concentration (%): 30 %  Resp: 21      Plan: Continue on full ventilator support.     Xavier Hansen, RT

## 2020-10-04 NOTE — PROGRESS NOTES
"Colon & Rectal Surgery Progress Note             Interval History:   Yesterday with bloody stoma output after starting heparin, this was stopped. She had an EGD with superficial ulceration in the Jejunum. hgb down trending 10.2 to 8.4 over the past 2 days.   Remains intubated.   Stoma with 2.4 of reddish thin output.  Drains serosang.                Medications:   I have reviewed this patient's current medications               Physical Exam:   Blood pressure 118/74, pulse 93, temperature 100.2  F (37.9  C), resp. rate 19, height 1.651 m (5' 5\"), weight 49 kg (108 lb 0.4 oz), last menstrual period 01/01/2015, SpO2 99 %.    Intake/Output Summary (Last 24 hours) at 10/4/2020 1143  Last data filed at 10/4/2020 1037  Gross per 24 hour   Intake 3137.75 ml   Output 4040 ml   Net -902.25 ml     GEN:  arousable  ABD:  Soft, stomas pink, wound covered         Data:        Lab Results   Component Value Date     10/04/2020    Lab Results   Component Value Date    CHLORIDE 116 10/04/2020    Lab Results   Component Value Date     10/04/2020      Lab Results   Component Value Date    POTASSIUM 3.4 10/04/2020    Lab Results   Component Value Date    CO2 19 10/04/2020    Lab Results   Component Value Date    CR 1.97 10/04/2020    CR 1.82 10/03/2020        Lab Results   Component Value Date    HGB 8.4 (L) 10/04/2020    HGB 8.9 (L) 10/03/2020     Lab Results   Component Value Date     10/04/2020     10/03/2020     Lab Results   Component Value Date    WBC 14.5 (H) 10/04/2020    WBC 18.8 (H) 10/03/2020            Assessment and Plan:   Hematology, GI workup appreciated.  Continue tpn, tube feeds, wound care, stoma care.  Sounds as if Trach being discussed for early this week.      Alyssa Pike MD  [facility]  Office Phone # [phone]    "

## 2020-10-04 NOTE — PROGRESS NOTES
[facility]    Hematology / Oncology Consultation     Date of Admission:  9/10/2020    Assessment & Plan   Evonne Martel is a 55 year old female who was admitted on 9/10/2020. I was asked to see the patient for possible coagulopathy.    Assessment:  1. 55-year-old lady with previous history of alcohol abuse presented with abdominal pain, was found to have cecal perforation and peritonitis.   2. Patient had a complicated course requiring multiple surgeries from sepsis.  Has been hospitalized in ICU since 9/10/2020.   3. CT scan done on 9/24/2020 showed a SMV thrombus nonoccluding.  Was anticoagulated with heparin GTT.   Bloody drainage from GAB drain/ostomy on 9/27  requiring heparin to be held.   4    10/3/2020 bleeding again after restarting heparin.   Review of labs show creatinine elevated at 1.82, bilirubin elevated at 3.0 albumin 1.6.  White blood cell count is elevated at 18.8 hemoglobin 8.9 platelets 224.  Fibrinogen level was 529 heparin 10 a levels were 0.17.  Hemoglobin has dropped recently from 11.2-8.9.  Last INR was 1.39 on 9/28  Current medications include, micafungin Zosyn, heparin GTT is on hold.   Impression: GI bleed likely due to erosive mucosal lesions in duodenum, possible coagulopathy due to liver dysfunction, less likely DIC.     Coags today INR 1.4 PTT 40, peripheral smear is pending.         Plan:  1. GI bleed likely secondary to duodenal erosions/ hepatic coagulopathy.   -     Plan: Vitamin K 5 mg through NG tube, may be repeated as needed.  Monitor PT/INR, APTT, peripheral smear is pending.   2.   Unfractionated heparin with low intensity anticoagulation could be started 24 hours after vitamin K.      2. Continue Protonix to twice daily.   3. Ultrasound of liver to assess for cirrhosis    4. Plan discussed with Dr. Gonzalez.       Kong Bowers MD   [facility]  Office:  [phone]    Code Status    Full Code    Reason for Consult   Reason for consult: Possible  coagulopathy  Primary Care Physician   Physician No Ref-Primary    Chief Complaint     Bleeding from ostomy.     Patient is intubated unable to give history.  History obtained from discussion with patient's primary team and review of medical records.     History of Present Illness   Evonne Martel is a 55 year old female who presents with abdominal pain and distention.  Patient presented to the ER on 9/10/2020 in the afternoon with complaints of abdominal pain and distention.  Past medical history was significant for alcohol abuse.  Her symptoms started about a week prior to hospital presentation.   Evaluation in the ER showed severe lab abnormalities including hyponatremia, pneumoperitoneum, severe sepsis.  Her abdominal examination was remarkable for large distended tympanic abdomen.  CT scan was remarkable for massive pneumoperitoneum sigmoid inflammation and extraluminal stool in the pelvis.  She was diagnosed with cecal perforation with sigmoid phlegmon and contained pelvic perforation.  Patient underwent an exploratory laparotomy right colectomy and an ileostomy and transverse mucous fistula and drainage of pelvis.  Postoperative phase was complicated by sepsis and shock.  Since then patient has been in ICU.    Patient needed a repeat surgery on 9/13/2020 as she developed feculent drainage from her pelvic drains as well as peritonitis.  She had a reexploration and abdominal washout with new drain.   9/17/2020: Patient evaluated again with worsening tachycardia and hypotension.  She was taken back to the OR for abdominal washout.  She was found to have stool leakage.  Again washout and and colostomy was performed.   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   9/27: Increased bloody discharge from GAB so heparin was stopped.   928 drop in hemoglobin and abdominal wall hematoma.   10/2 patient required intubation again.   10/3  bleeding from stoma again soon after restarting anticoagulation, heparin was stopped again.  EGD done today show Many non-bleeding superficial ulcers with mucosal slughing with no stigmata of bleeding were found in the jejunum. The largest lesion was 10 mm in largest dimension.     Interval history  Patient remains intubated and minimally responsive, had some pain medications earlier this morning so difficult to assess conscious level.       Past Medical History   I have reviewed this patient's medical history and updated it with pertinent information if needed.   History reviewed. No pertinent past medical history.    Past Surgical History   I have reviewed this patient's surgical history and updated it with pertinent information if needed.  Past Surgical History:   Procedure Laterality Date     COLECTOMY WITH COLOSTOMY, COMBINED N/A 9/10/2020    Procedure: COLECTOMY, WITH COLOSTOMY CREATION;  Surgeon: Chery Morrison MD;  Location:  OR     ESOPHAGOSCOPY, GASTROSCOPY, DUODENOSCOPY (EGD), COMBINED N/A 10/3/2020    Preliminary Information:  Procedure: ESOPHAGOGASTRODUODENOSCOPY (EGD);  Surgeon: Delano Gastelum MD;  Location:  GI     KNEE SURGERY       LAPAROTOMY EXPLORATORY N/A 9/10/2020    Procedure: Exploratory laparotomy, right colectomy, ileostomy, transverse colon mucous fistula, pelvic drain placement, proctoscopy;  Surgeon: Chery Morrison MD;  Location:  OR     LAPAROTOMY EXPLORATORY N/A 9/13/2020    Procedure: LAPAROTOMY, ABDOMINAL WASHOUT;  Surgeon: Zuleyka Andres MD;  Location:  OR     LAPAROTOMY EXPLORATORY N/A 9/17/2020    Procedure: EXPLORATORY LAPAROTOMY, ABDOMINAL WASHOUT, CREATION COLOSTOMY;  Surgeon: Mikey Fermin MD;  Location:  OR     RESECT SMALL BOWEL WITHOUT OSTOMY N/A 9/13/2020    Procedure: ANTERIOR RESECTION WITH RIGID PROCTOSCOPY;  Surgeon: Zuleyka Andres MD;  Location:  OR       Prior to Admission Medications   None     Allergies   No Known  Allergies    Social History   I have reviewed this patient's social history and updated it with pertinent information if needed. Evonne Martel  reports that she has been smoking cigarettes. She has smoked for the past 30.00 years. She has never used smokeless tobacco. She reports current alcohol use. She reports that she does not use drugs.    Family History   I have reviewed this patient's family history and updated it with pertinent information if needed.   History reviewed. No pertinent family history.    Review of Systems   Review of systems unavailable as patient is intubated.   Physical Exam   Temp: 100  F (37.8  C) Temp src: Bladder BP: 97/51 Pulse: 81   Resp: 19 SpO2: 100 % O2 Device: Mechanical Ventilator    Vital Signs with Ranges  Temp:  [99  F (37.2  C)-101.3  F (38.5  C)] 100  F (37.8  C)  Pulse:  [] 81  Resp:  [9-32] 19  BP: ()/(51-87) 97/51  FiO2 (%):  [30 %] 30 %  SpO2:  [96 %-100 %] 100 %  108 lbs .41 oz    Constitutional: Patient is currently intubated, minimally responsive.  Just had some pain medications earlier, was more responsive as per the nursing staff.   Eyes: Conjunctiva and pupils examined there is some pallor mild icterus   Respiratory: Patient is intubated transmitted breath sounds   cardiovascular: Mild tachycardia   GI: Abdomen is mildly distended.    Data   Labs reviewed and discussed with primary team

## 2020-10-04 NOTE — PLAN OF CARE
Neurologically alert, follows, scores CAM positive.   expresses concerns regarding patient's inability to recognize him as her .  Limited interaction.  Discussed delirium in the ICU, causes, contributing factors and treatment.  Rhythm sinus, blood pressure stable.  FiO2 30%, 5 peep, no CPAP trials today.  Large amount ileostomy output, red brown in am.  INR 1.5, given 5 mg Vit K per NGT.  Urine output adequate.  Minimal output per GAB drains.   at bedside, updated in person by RNAnel Gonzalez to call Ollie, this evening.

## 2020-10-04 NOTE — PHARMACY-CONSULT NOTE
TPN formula evaluated based on today s labs results.    The following changes have been made:  Potassium: increased to 40 mEq/day .      Pharmacy will continue to follow and adjust as appropriate.

## 2020-10-04 NOTE — PROGRESS NOTES
[facility]  Infectious Disease Progress Note          Assessment and Plan:   IMPRESSION:   1.  A 54-year-old female without prior major infection or abdominal issues, admitted with major bowel perforation, now status post 3 operations, abdominal abscess, further leak, now with better source control.   2.  Ongoing septic shock , slow improvement  3 resp failure, vent      RECOMMENDATIONS:   1.  Abdominal cultures enterococcus, multiple anaerobes, gram-negative rods including a sensitive Pseudomonas.  Has had well covered  microbiology throughout including currently; however enterococcus probably better covered by penicillin and Zosyn covers everything including excellent VIC to the 2 gram-negative rods and all anaerobes covered.  No need for double anaerobe coverage.  repeat drain cultures from the recent drain and collection showing yeast, on  micafungin. Could do synergistic gent but if source control now Ok should not need, also just like best not vanco as renal risk significant  2.  Suspect general sepsis and overall condition due to source control issues that hopefully have now been accomplished with multiple interventions. Still vent,Slowly better , sedation and pressors weaning T down WBC still up               Interval History:   Intubated   New drain after another collection in the Ct scan   Afebrile     Yeast in the culture noted               Medications:       chlorhexidine  15 mL Mouth/Throat Q12H     [Held by provider] furosemide  20 mg Intravenous Q12H     insulin aspart  1-12 Units Subcutaneous Q4H     [Held by provider] insulin glargine  8 Units Subcutaneous Daily     lipids  250 mL Intravenous Q48H     micafungin  100 mg Intravenous Q24H     pantoprazole (PROTONIX) IV  40 mg Intravenous BID     phytonadione  5 mg Oral or Feeding Tube Once     piperacillin-tazobactam  3.375 g Intravenous Q6H     sodium chloride (PF)  10 mL Intracatheter Q8H     sodium chloride (PF)  3 mL  "Intracatheter Q8H                  Physical Exam:   Blood pressure 97/51, pulse 81, temperature 100  F (37.8  C), resp. rate 19, height 1.651 m (5' 5\"), weight 49 kg (108 lb 0.4 oz), last menstrual period 01/01/2015, SpO2 100 %.  Wt Readings from Last 2 Encounters:   10/04/20 49 kg (108 lb 0.4 oz)     Vital Signs with Ranges  Temp:  [99  F (37.2  C)-101.3  F (38.5  C)] 100  F (37.8  C)  Pulse:  [] 81  Resp:  [9-32] 19  BP: ()/(51-87) 97/51  FiO2 (%):  [30 %] 30 %  SpO2:  [96 %-100 %] 100 %    Constitutional: Intubated sedated   Lungs: Clear to auscultation bilaterally, no crackles or wheezing   Cardiovascular: Regular rate and rhythm, normal S1 and S2, and no murmur noted   Abdomen: Mild distended,stoma wd as noted by CR   Skin: No rashes, no cyanosis, no edema   Other:           Data:   All microbiology laboratory data reviewed.  Recent Labs   Lab Test 10/04/20  0415 10/03/20  0555 10/02/20  0450   WBC 14.5* 18.8* 17.6*   HGB 8.4* 8.9* 10.2*   HCT 24.8* 25.7* 29.3*   MCV 89 88 88    224 242     Recent Labs   Lab Test 10/04/20  0415 10/03/20  0555 10/02/20  1223   CR 1.97* 1.82* 1.72*     No lab results found.  Recent Labs   Lab Test 10/02/20  1345 09/17/20  0201 09/17/20  0155 09/17/20  0054 09/16/20  2200 09/10/20  1745 09/10/20  1730   CULT Light growth  Yeast  *  Culture negative monitoring continues Heavy growth  Mixed aerobic and anaerobic fatimah  *  Heavy growth  Bacteroides uniformis  *  Heavy growth  Bacteroides ovatus/xylanisolvens  *  Susceptibility testing not routinely done  Moderate growth  Escherichia coli  Susceptibility testing done on previous specimen  *  Moderate growth  Pseudomonas aeruginosa  Susceptibility testing done on previous specimen  *  Light growth  Strain 2  Escherichia coli  Susceptibility testing done on previous specimen  *  Light growth  Enterococcus avium  Susceptibility testing done on previous specimen  *  Light growth  Candida albicans / " dubliniensis  Candida albicans and Candida dubliniensis are not routinely speciated  Susceptibility testing not routinely done  *  On day 2, isolated in broth only:  Anaerobic gram negative rods  See anaerobic report for identification  * Heavy growth  Mixed aerobic and anaerobic fatimah  *  Heavy growth  Bacteroides fragilis  Susceptibility testing not routinely done  *  Moderate growth  Escherichia coli  *  Moderate growth  Pseudomonas aeruginosa  *  Moderate growth  Strain 2  Escherichia coli  *  Light growth  Enterococcus avium  *  Light growth  Candida albicans / dubliniensis  Candida albicans and Candida dubliniensis are not routinely speciated  Susceptibility testing not routinely done  *  On day 2, isolated in broth only:  Anaerobic gram negative rods  See anaerobic report for identification  * No growth No growth No growth No growth

## 2020-10-04 NOTE — PLAN OF CARE
Pt  remains intubated, no sedation. Ileostomy output red this am after wet to dry dressing change to abdomen. Pt follows commands intermittently. Coarse lung sounds.

## 2020-10-05 NOTE — PROGRESS NOTES
[facility] ICU RESPIRATORY NOTE        Date of Admission: 9/10/2020     Date of Intubation (most recent): 10/01/2020     Reason for Mechanical Ventilation: Airway protection      Number of Days on Mechanical Ventilation: 5      Significant Events Today: PS trial 10/5 30% for about 5 minutes and pt became tachypneic.    ABG Results:   Recent Labs   Lab 10/02/20  0450 10/01/20  2005 10/01/20  1657 10/01/20  1128   O2PER 30% 30% 60% 25%       Current Vent Settings: Ventilation Mode: CMV/AC  (Continuous Mandatory Ventilation/ Assist Control)  FiO2 (%): 30 %  Rate Set (breaths/minute): 20 breaths/min  Tidal Volume Set (mL): 460 mL  PEEP (cm H2O): 5 cmH2O  Pressure Support (cm H2O): 5 cmH2O  Oxygen Concentration (%): 30 %  Resp: 26        Plan: Continue full ventilator support.    Elijah Marin, RT  10/5/2020

## 2020-10-05 NOTE — PROGRESS NOTES
"[facility]  Infectious Disease Progress Note          Assessment and Plan:   IMPRESSION:   1.  A 54-year-old female without prior major infection or abdominal issues, admitted with major bowel perforation, now status post 3 operations, abdominal abscess, further leak, now with better source control.   2.  Ongoing septic shock , slow improvement  3 resp failure, vent   4 Renal insuff, new     RECOMMENDATIONS:   1.  Abdominal cultures enterococcus, multiple anaerobes, gram-negative rods including a sensitive Pseudomonas.  Has had well covered  microbiology throughout ;  2 CT with new collection, drain in 10/2, not surprising candida main pathogen despite ongoing marilyn             Interval History:   Intubated and awake off sedation some interactive T Ok off pressors cxs same creat 1.0 CT new collection, drain in candida cx creat peaked 1.97 now 1.63              Medications:       chlorhexidine  15 mL Mouth/Throat Q12H     [Held by provider] furosemide  20 mg Intravenous Q12H     insulin aspart  1-12 Units Subcutaneous Q4H     [Held by provider] insulin glargine  8 Units Subcutaneous Daily     lipids  250 mL Intravenous Q48H     micafungin  100 mg Intravenous Q24H     pantoprazole (PROTONIX) IV  40 mg Intravenous BID     piperacillin-tazobactam  3.375 g Intravenous Q6H     sodium chloride (PF)  10 mL Intracatheter Q8H                  Physical Exam:   Blood pressure 102/66, pulse 101, temperature 100.4  F (38  C), temperature source Bladder, resp. rate 20, height 1.651 m (5' 5\"), weight 47 kg (103 lb 9.9 oz), last menstrual period 01/01/2015, SpO2 99 %.  Wt Readings from Last 2 Encounters:   10/05/20 47 kg (103 lb 9.9 oz)     Vital Signs with Ranges  Temp:  [99.7  F (37.6  C)-100.9  F (38.3  C)] 100.4  F (38  C)  Pulse:  [] 101  Resp:  [20-26] 20  BP: ()/(51-83) 102/66  FiO2 (%):  [30 %] 30 %  SpO2:  [98 %-100 %] 99 %    Constitutional: Intubated sedated   Lungs: Clear to " auscultation bilaterally, no crackles or wheezing   Cardiovascular: Regular rate and rhythm, normal S1 and S2, and no murmur noted   Abdomen: Mild distended,stoma wd as noted by CR   Skin: No rashes, no cyanosis, no edema   Other:           Data:   All microbiology laboratory data reviewed.  Recent Labs   Lab Test 10/05/20  0428 10/04/20  0415 10/03/20  0555   WBC 9.4 14.5* 18.8*   HGB 7.2* 8.4* 8.9*   HCT 21.7* 24.8* 25.7*   MCV 90 89 88    211 224     Recent Labs   Lab Test 10/05/20  0428 10/04/20  0415 10/03/20  0555   CR 1.63* 1.97* 1.82*     No lab results found.  Recent Labs   Lab Test 10/02/20  1345 09/17/20  0201 09/17/20  0155 09/17/20  0054 09/16/20  2200 09/10/20  1745 09/10/20  1730   CULT Light growth  Yeast  *  Culture negative monitoring continues Heavy growth  Mixed aerobic and anaerobic fatimah  *  Heavy growth  Bacteroides uniformis  *  Heavy growth  Bacteroides ovatus/xylanisolvens  *  Susceptibility testing not routinely done  Moderate growth  Escherichia coli  Susceptibility testing done on previous specimen  *  Moderate growth  Pseudomonas aeruginosa  Susceptibility testing done on previous specimen  *  Light growth  Strain 2  Escherichia coli  Susceptibility testing done on previous specimen  *  Light growth  Enterococcus avium  Susceptibility testing done on previous specimen  *  Light growth  Candida albicans / dubliniensis  Candida albicans and Candida dubliniensis are not routinely speciated  Susceptibility testing not routinely done  *  On day 2, isolated in broth only:  Anaerobic gram negative rods  See anaerobic report for identification  * Heavy growth  Mixed aerobic and anaerobic fatimah  *  Heavy growth  Bacteroides fragilis  Susceptibility testing not routinely done  *  Moderate growth  Escherichia coli  *  Moderate growth  Pseudomonas aeruginosa  *  Moderate growth  Strain 2  Escherichia coli  *  Light growth  Enterococcus avium  *  Light growth  Candida albicans  / dubliniensis  Candida albicans and Candida dubliniensis are not routinely speciated  Susceptibility testing not routinely done  *  On day 2, isolated in broth only:  Anaerobic gram negative rods  See anaerobic report for identification  * No growth No growth No growth No growth

## 2020-10-05 NOTE — PROGRESS NOTES
"Interventional Radiology Progress Note:  Inpatient at [facility]  Date: 2020   Patient name: Evonne Martel  MRN:[id number]  :  1965    History:  54-year-old female  admitted with major bowel perforation, now status post 3 operations, abdominal abscess. In ICU and intubated. Technically successful CT guided drain placement into the midline presacral fluid collection on 10/2. Minimal output.    Interval History: Drain was not connected to the GAB bulb during today's exam.  Flushed with 10 ml of saline after hooking up.  Unable to aspirate but flushed easily.     Physical Exam:   Vitals: /66   Pulse 101   Temp 100.4  F (38  C) (Bladder)   Resp 20   Ht 1.651 m (5' 5\")   Wt 47 kg (103 lb 9.9 oz)   LMP 2015 (Approximate)   SpO2 99%   BMI 17.24 kg/m     General: Stable. In no acute distress.  Pt intubated but slightly alert.    Drain: Dressing is C/D/I. Tube flushed but didn't aspirate with 10 ml.    Output by Drain (mL) 10/03/20 07 - 10/03/20 1459 10/03/20 1500 - 10/03/20 2259 10/03/20 2300 - 10/04/20 0659 10/04/20 0700 - 10/04/20 1459 10/04/20 1500 - 10/04/20 2259 10/04/20 2300 - 10/05/20 0659 10/05/20 0700 - 10/05/20 1010   Closed/Suction Drain 1 Right;Anterior;Midline Abdomen Bulb 24 Spanish 35 20 40 10 20 55    Closed/Suction Drain Right Buttock Bulb 0 0 10 5 5 5         Labs:  Recent Labs   Lab 10/05/20  0428 10/04/20  0415 10/03/20  0555   HGB 7.2* 8.4* 8.9*   WBC 9.4 14.5* 18.8*     Recent Labs   Lab 10/05/20  0428 10/04/20  0415 10/03/20  0555   CR 1.63* 1.97* 1.82*      Recent Labs   Lab 10/05/20  0428 10/04/20  0415 10/03/20  0555   PROTTOTAL 5.0* 5.5* 5.6*   ALBUMIN 1.1* 1.5* 1.6*   BILITOTAL 2.4* 3.6* 3.0*   ALKPHOS 200* 232* 210*   AST 52* 56* 49*   ALT 46 50 46       Cultures:  Recent Labs   Lab 10/02/20  1345   CULT Light growth  Yeast  *  Culture negative monitoring continues       Assessment: s/p placement of drain in to the midline presacral " fluid collection.     Plan:   -Drain was not hooked up today. Reattached and flushed with 10 ml of saline.  No resistance.  Will monitor an additional 24 hrs and consider CT sinogram tomorrow.    15 minutes were spent with patient during today's visit with greater than 50% of the time spent face to face with the patient, in reviewing medical record and images and in counseling and coordinating patient's care.    CHANDRA AveryC  Interventional Radiology

## 2020-10-05 NOTE — PROGRESS NOTES
"[facility] Nurse Inpatient Ostomy Assessment     Assessment of New Ileostomy  Surgery date: 9-10-20 and 9-13-20  Surgeon:  Dr Morrison /    Procedure:     9-10-20:  Exploratory laparotomy, right colectomy with end ileostomy and transverse mucous fistula, rigid proctoscopy,                  drainage of pelvis (Suleman)     9-13-20: .  Reexploration of recent laparotomy: Abdominal washout; Anterior resection; Rigid proctoscopy with rectal stump leak                   Testing; Removal of abdominal drain and replacement of new pelvic drain (Midura)     9-17-20: EXPLORATORY LAPAROTOMY, ABDOMINAL WASHOUT, CREATION COLOSTOMY with incisional VAC (Fermin)    9-29-20: Three staples removed at inferior portion of incision where hematoma already decompressing. Clot removed along the length of the incision and the incision was irrigated with sterile saline. Wet-to-dry dressing with gauze applied at open portion. GAB dressing replaced.  VAC dressing to incision discontinued    Related diagnosis:  Cecal perforation, sigmoid phlegmon and contained pelvic perforation.     [facility] Assessment & Physical Exam:        Current status: remains intubated and sedated        - Stomas viable. Ileostomy functioning with high output     Ileostomy: RLQ        Stoma size:  1 1/4\"    Stoma appearance:  Rounded, red, moist, protrudes, lumen @ apex    Mucocutaneous junction: intact with MCU separation from 12-3 o'clock     Peristomal complication(s): intact, no erythema    Abdominal assessment: remanins distended and firm    Surgical site(s): Superior 3/4\" well approximated with staples, no ann marie-incision erythema,         no drainage. Distal incision open with  packing in incision.     NG still in place? Yes    Output: Large Liquid green/yellow looking drainage    Pain: unable to determine    Colostomy (mucous fistula) : LUQ  Stoma size:  1 3/8\"    Stoma appearance:  Oval, red, moist, protrudes, lumen @ " apex    Mucocutaneous junction: intact with sutures    Peristomal complication(s): intact, no erythema    NG still in place? Yes    Output:  Scant  amt of mucous output    Pain: unable to determine,    Midline incision:    Incision:  Long mid-line  incison well approximated with staples                   No Heather-incisional erythema scattered erosion along Rt superior margin                   Distal incision open with bloody packing following evacuation of hematoma. VAC discontinues on 9-29 by [place]-rectal                         site with                                            No Purulent drainage noted.          RLQ GAB site      GBA tube intact and patent with stitch.       No heather-tube erythema       Output: decreasing  Deeper sero-sang drainage noted      Pouch removed to site and slited optifoam dressing to GAB tube             Current pouching system : [place] NI convex to each stoma     Pouch last changed:  913-20 in OR; 9-15-20; 9-17; 9-21; 9-24; 9-28; 10-1; 10-5    Reason for pouch change today: stoma assessment;          Learning and comprehension:   Pt vented more alert today     Psychosocial:  TBD      WOC Plan:         Pouching product plan:  Brian NI convex 57mm with high output pouch to RLQ ilesotomy and drainable pouch to LUQ colostomy    Plan for distal open incision:  Wet -> moist packing BID per colo-rectal         Pt support system on discharge:     WOC recommend home care?  TBD      WOC follow-up plan: Thursday       Objective Data:       Current Diet/Nutrition:   None       Output:  I/O last 3 completed shifts:  In: 4368.4 [I.V.:1380; NG/GT:440]  Out: 3965 [Urine:1275; Drains:110; Stool:2580]      Labs:   Recent Labs   Lab 10/05/20  1302 10/05/20  0428   ALBUMIN  --  1.1*   PREALB  --  7*   HGB 7.8* 7.2*   INR  --  1.71*   WBC  --  9.4         Lacho Risk Assessment:   Sensory Perception: 1-->completely limited  Moisture: 4-->rarely moist  Activity: 1-->bedfast  Mobility:  1-->completely immobile  Nutrition: 2-->probably inadequate  Friction and Shear: 1-->problem  Lacho Score: 10      WOC Interventions:       Patient's chart evaluated    Focus of today's visit: stoma assessment, prosthetic refitting;    Pouch:  Brian NI 57mm cut to fit convex with tape and high output pouch,     Participant(s) in teaching session today:  not present    Change made with ostomy management today: continue convexity  To both stoma sites     Supplies: In pt room     Preparation for discharge: No discharge preparations started    Registered for supply samples? TBD    Discussed plan of care with: Nursing     Ghazal Farrell, RN, CWOC Nurse

## 2020-10-05 NOTE — PLAN OF CARE
End of Shift Nursing Summary    Neuro:  opens eyes spontaneously.  In consistent with following commands.  PERRLA.  .  Pain: IV dilaudid an versed given for anxiety/discomfort..  CV:  SR/ST.  Hemodynamically stable.  Pulm: Remains on full vent support.  GI/: TPN/Lipids and trickle tube feed.  Flatus and stool output from ileostomy.  No longer bloody.  No output from colostomy.  Blood sugars stable.  No sliding scale insulin needed.  Ferguson catheter for strict I/O.  150 ml urine q 2 hours.  Kidney function better today.  Skin: Fragile, excoriated, jaundice.  Perineal, labial breakdown.  Moisture management guidelines applied.    Fever: 38 celsius.  WBC wnl.  Lines/drips: PICC.  Additional: Potassium, magnesium to be replaced.  Free water flushes will be increased and IVF changed for hypernatremia.  .     *See EPIC flowsheet for full nursing assessment findings

## 2020-10-05 NOTE — PROGRESS NOTES
[facility]  Gastroenterology Progress Note     Evonne Martel MRN# [id number]   YOB: 1965 Age: 55 year old          Assessment and Plan:    Interval History: Has no further bloody output from stoma. Intubated. Awake    Evonne Martel is a pleasant 55 year old female with history of alcoholism who presented with abdominal pain on 9/10, found to have septic shock due to cecal perforation and sigmoid colon perforation. Consequently had exploratory laparotomy with right colectomy and end ileostomy. Developed sepsis, and took to OR on 9/13, where noted to have stool spillage in the abdomen with additional sigmoid perforations and underwent washout and anterior resection with drain placement. She returned to the OR on 9/17 due to CT abdomen showing additional possible leak, and was found to have stool leaking from the stable line of the descending colon, and washout and end colostomy were performed. She has been found to have SMA and SMV thrombi, started on AC, complicated by bleeding. Subsequently AC has been stopped. GI consulted at on 10/3.      GI bleed    Anemia    Colon perforation (H)    Free intraperitoneal air  History as above  EGD on 10/3 noted duodenal ulceration without varices.  Bleeding from stoma has stopped. Stool watery and brown. No melena  -- On TPN  -- Continue on IV Protonix 40 BID  -- Appreciate colorectal help and if further lower GI bleed concerns will defer to them for recommendations given complicated course  -- Serial hemoglobin    Other problems:    Severe sepsis (H)    Pneumoperitoneum    Acute kidney failure with tubular necrosis (H)                Interval History:   Intubated and sedated              Review of Systems:   C: NEGATIVE for fever, chills, change in weight  E/M: NEGATIVE for ear, mouth and throat problems  R: NEGATIVE for significant cough or SOB  CV: NEGATIVE for chest pain, palpitations or peripheral edema             Medications:    I have reviewed this patient's current medications    chlorhexidine  15 mL Mouth/Throat Q12H     [Held by provider] furosemide  20 mg Intravenous Q12H     insulin aspart  1-12 Units Subcutaneous Q4H     lipids  250 mL Intravenous Q48H     micafungin  100 mg Intravenous Q24H     pantoprazole (PROTONIX) IV  40 mg Intravenous BID     piperacillin-tazobactam  3.375 g Intravenous Q6H     sodium chloride (PF)  10 mL Intracatheter Q8H                  Physical Exam:   Vitals were reviewed  Vital Signs with Ranges  Temp:  [99.7  F (37.6  C)-100.9  F (38.3  C)] 99.9  F (37.7  C)  Pulse:  [] 85  Resp:  [20-26] 21  BP: ()/(58-83) 96/60  FiO2 (%):  [30 %] 30 %  SpO2:  [97 %-100 %] 100 %  I/O last 3 completed shifts:  In: 4161.8 [I.V.:1400; NG/GT:395]  Out: 3685 [Urine:1155; Drains:100; Stool:2430]  Constitutional: alert and no distress   Cardiovascular: negative, PMI normal. No lifts, heaves, or thrills. RRR. No murmurs, clicks gallops or rub  Respiratory: negative, Percussion normal. Good diaphragmatic excursion. Lungs clear  Head: Normocephalic. No masses, lesions, tenderness or abnormalities  Neck: Neck supple. No adenopathy. Thyroid symmetric, normal size,, Carotids without bruits.  Abdomen: Abdomen soft, tender. BS normal. No masses, organomegaly           Data:   I reviewed the patient's new clinical lab test results.   Recent Labs   Lab Test 10/05/20  0428 10/04/20  0415 10/03/20  0555 09/28/20 0410 09/28/20 0410   WBC 9.4 14.5* 18.8*   < > 19.8*   HGB 7.2* 8.4* 8.9*   < > 7.0*   MCV 90 89 88   < > 89    211 224   < > 142*   INR 1.71* 1.40*  --   --  1.39*    < > = values in this interval not displayed.     Recent Labs   Lab Test 10/05/20  0428 10/04/20  0415 10/03/20  0555   POTASSIUM 2.8* 3.4 4.3   CHLORIDE 122* 116* 114*   CO2 14* 19* 20   BUN 84* 101* 95*   ANIONGAP 16* 11 10     Recent Labs   Lab Test 10/05/20  0428 10/04/20  0415 10/03/20  0555 09/10/20  1911 09/10/20  1911 09/10/20  1538    ALBUMIN 1.1* 1.5* 1.6*   < >  --  2.9*   BILITOTAL 2.4* 3.6* 3.0*   < >  --  1.0   ALT 46 50 46   < >  --  17   AST 52* 56* 49*   < >  --  39   PROTEIN  --   --   --   --  10*  --    LIPASE  --   --   --   --   --  48*    < > = values in this interval not displayed.       I reviewed the patient's new imaging results.    All laboratory data reviewed  All imaging studies reviewed by me.    Charito Phelps PA-C,  10/5/2020  [facility]  Office : [phone]  Cell: [phone] (Dr. Gastelum)  Cell: [phone] (Charito Phelps PA-C)

## 2020-10-05 NOTE — PROGRESS NOTES
Critical Care Progress Note  10/05/2020    Name: Evonne Martel MRN#: [id number]   Age: 55 year old YOB: 1965     [facility] Day# 25           Problem List:   Active Problems:    Severe sepsis (H)    Pneumoperitoneum    Colon perforation (H)    Free intraperitoneal air    Perforation of colon (H)    Acute kidney failure with tubular necrosis (H)    Acute kidney failure, unspecified (H)           Summary/Hospital Course:   Ms. Martel is a 55 year old woman with a history of alcoholism who presented with abdominal pain on 9/10, found to have septic shock due to cecal perforation and sigmoid colon perforation. She underwent exploratory laparotomy with right colectomy and end ileostomy. Initially, she did well post-operatively, but unfortunately developed worsening sepsis, and returned to the OR on 9/13, where she was found to have gross stool spillage in the abdomen with additional sigmoid perforations. She underwent washout and anterior resection with drain placement. She returned to the OR again on 9/17 after CT abdomen showed additional possible leak, and she was found to have stool leaking from the stable line of the descending colon, and washout and end colostomy were performed. Subsequently, she has weaned off pressors. She has been found to have SMA and SMV thrombi, but attempts at anticoagulation with heparin have been short lived due to bleeding. She underwent endoscopy by GI which found duodenal ulcerations, but no varices. She was able to be extubated on 10/1, but unfortunately required reintubation on 10/2, and will require tracheostomy.     Assessment and plan :     Evonne Martel is a 55 year old female admitted on 9/10/2020 for sepsis due to cecal and sigmoid perforations, s/p colectomy and multiple repeat washouts for additional perforations, with course complicated by HONORIO and hypoxic respiratory failure as well as GI bleed.   I have personally reviewed the daily labs, imaging studies,  cultures and discussed the case with referring physician and consulting physicians.   My assessment and plan by system for this patient is as follows:    Neurology/Psychiatry:   1. ICU sedation  2. Analgesia  Plan  -Precedex, wean as able.   -dilaudid PRN    Cardiovascular:   1. Resolved septic shock: off pressors   Plan  -monitor     Pulmonary/Ventilator Management:   1. Acute hypoxic respiratory failure  2. Failed spontaneous breathing trials, failed extubation on 10/1  Plan  - Wean vent as possible  - PST as able, failed again today.   - Likely needs tracheostomy as well due to deconditioning. Will discuss with thoracic.     GI/Nutrition :   1. Perforated colon s/p ex lap, colectomy, and abdominal washout (9/10, re-exploration on 9/13 and 9/17).   2. Mild bilirubin elevation, AST elevation  3. Severe protein calorie malnutrition  4. Stress ulcer prophylaxis   5. GI bleeding with duodenal ulcerations on endoscopy   Plan  -TPN  -protonix for GI Ppx, giving BID given GI bleed  -appreciate colorectal management   -appreciate GI assistance as well  -trend hepatic panel   -drains in place, including midline presacral fluid collection, which may need CT sinogram tomorrow. Appreciate IR assistance     Renal/Fluids/Electrolytes:   1. Acute kidney injury, probable ATN: Cr improving today.   2. Hypernatremia   3. Hypokalemia  Plan  - Follow BMP  - Nephrology following, appreciate assistance   - monitor function and electrolytes as needed with replacement per ICU protocols.  - generally avoid nephrotoxic agents such as NSAID, IV contrast unless specifically required  - adjust medications as needed for renal clearance  - follow I/O's as appropriate.  - Free water with D5 at 80/hr  - replace potassium     Infectious Disease:   1. Septic shock secondary to perforated colon with feculent peritonitis, intraoperative cultures positive for pseudomonas, enterococcus, e coli, and bacteroides   2. Possible RLL pneumonia noted on  9/17  Plan  - Continue zosyn and micafungin, may be able to change to fluconazole soon  - ID following, appreciate assistance    Endocrine:   1. Stress induced hyperglycemia  Plan  - ICU insulin protocol, goal sugar <180    Hematology/Oncology:   1. Acute normocytic anemia  2. Leukocytosis secondary to septic shock.   3. Nonocclusive thrombus in SMV and minimal nonocclusive thrombus in portal vein   4. Bleeding from ostomy  Plan  - Hematology consulted, appreciate assistance  - Holding heparin currently given bleeding, may restart tomorrow if Hgb stabilizes given drop today  - Follow Hgb, transfuse if Hgb< 7, recheck this afternoon.   - antibiotics as above.   - Vit K given yesterday     MSK/Rheum:  1. deconditioning  Plan  - PT/OT    IV/Access:   1. Venous access - PICC, required for TPN  2. Arterial access - none  3. GAB drains in midline abdomen, right buttock  Plan  - central access required and necessary    ICU Prophylaxis:   1. DVT: mechanical  2. VAP: HOB 30 degrees, chlorhexidine rinse  3. Stress Ulcer: PPI  4. Restraints: Nonviolent soft two point restraints required and necessary for patient safety and continued cares and good effect as patient continues to pull at necessary lines, tubes despite education and distraction. Will readdress daily.   5. Wound care - per unit routine   6. Feeding - TPN  7. Family Update: not yet completed.   8. Disposition - remain in ICU    Key goals for next 24 hours:   1. Will consult thoracic for trach.   2.  Continue antibiotics.   3.  Monitor for ongoing bleeding, continue to hold heparin today (both with Hgb drop and for trach tomorrow).         Interim History/Overnight Events:   Potassium replaced. Free water increased and fluids changed due to hypernatremia. Not following commands for me this morning.          Key Medications:       chlorhexidine  15 mL Mouth/Throat Q12H     [Held by provider] furosemide  20 mg Intravenous Q12H     insulin aspart  1-12 Units  Subcutaneous Q4H     [Held by provider] insulin glargine  8 Units Subcutaneous Daily     lipids  250 mL Intravenous Q48H     micafungin  100 mg Intravenous Q24H     pantoprazole (PROTONIX) IV  40 mg Intravenous BID     piperacillin-tazobactam  3.375 g Intravenous Q6H     sodium chloride (PF)  10 mL Intracatheter Q8H       dexmedetomidine Stopped (10/01/20 2311)     dextrose       dextrose 5% and 0.9% NaCl Stopped (10/05/20 0816)     D5W 50 mL/hr at 10/05/20 0816     norepinephrine Stopped (09/29/20 0810)     parenteral nutrition - ADULT compounded formula 55 mL/hr at 10/04/20 2016     phenylephrine Stopped (10/02/20 0831)     sodium chloride 0.9% (bag)                 Physical Examination:   Temp:  [99.7  F (37.6  C)-100.9  F (38.3  C)] 100.4  F (38  C)  Pulse:  [] 107  Resp:  [20-26] 25  BP: ()/(51-83) 115/69  FiO2 (%):  [30 %] 30 %  SpO2:  [98 %-100 %] 99 %    Intake/Output Summary (Last 24 hours) at 10/5/2020 0839  Last data filed at 10/5/2020 0816  Gross per 24 hour   Intake 4406.73 ml   Output 3685 ml   Net 721.73 ml     Wt Readings from Last 4 Encounters:   10/05/20 47 kg (103 lb 9.9 oz)     BP - Mean:  [] 88  Ventilation Mode: CMV/AC  (Continuous Mandatory Ventilation/ Assist Control)  FiO2 (%): 30 %  Rate Set (breaths/minute): 20 breaths/min  Tidal Volume Set (mL): 460 mL  PEEP (cm H2O): 5 cmH2O  Oxygen Concentration (%): 30 %  Resp: 25    Recent Labs   Lab 10/02/20  0450 10/01/20  2005 10/01/20  1657 10/01/20  1128   O2PER 30% 30% 60% 25%     GEN: no acute distress   HEENT: head ncat, sclera anicteric, trachea midline   PULM: unlabored synchronous with vent, rhonchi on right, clear on left.   CV/COR: RRR, normal S1 and S2. No gallop, rub, nor murmur  ABD: soft, tender. Non distended. Ileostomy in place. Colostomy pink. No stool in bag. Abdominal drain in place. Midline incision.   MSK/EXT:  Significant 3+ LE edema. WWP.  NEURO: grossly intact  SKIN: no obvious rash. Midline abdominal  incision.   LINES: GAB drains in place with serosanguinous drainage.          Data:   All data and imaging reviewed.     ROUTINE ICU LABS (Last four results)  CMP  Recent Labs   Lab 10/05/20  0428 10/04/20  0415 10/03/20  0555 10/02/20  1223 10/02/20  0450 10/01/20  0400 10/01/20  0400   * 146* 144 143 141   < >  --    POTASSIUM 2.8* 3.4 4.3 5.4* 5.2   < > 4.0   CHLORIDE 122* 116* 114* 113* 111*   < >  --    CO2 14* 19* 20 20 20   < >  --    ANIONGAP 16* 11 10 10 10   < >  --    * 148* 142* 142* 140*   < >  --    BUN 84* 101* 95* 95* 93*   < >  --    CR 1.63* 1.97* 1.82* 1.72* 1.66*   < > 1.34*   GFRESTIMATED 35* 28* 31* 33* 34*   < > 44*   GFRESTBLACK 40* 32* 35* 38* 40*   < > 51*   PARISH 6.8* 8.1* 8.2* 9.0 8.8   < >  --    MAG 1.6 2.4*  --   --  2.5*  --  2.1   PHOS 2.9 4.2  --   --  6.7*  --  5.9*   PROTTOTAL 5.0* 5.5* 5.6*  --  5.7*   < >  --    ALBUMIN 1.1* 1.5* 1.6*  --  1.8*   < >  --    BILITOTAL 2.4* 3.6* 3.0*  --  3.5*   < >  --    ALKPHOS 200* 232* 210*  --  204*   < >  --    AST 52* 56* 49*  --  50*   < >  --    ALT 46 50 46  --  47   < >  --     < > = values in this interval not displayed.     CBC  Recent Labs   Lab 10/05/20  0428 10/04/20  0415 10/03/20  0555 10/02/20  0450   WBC 9.4 14.5* 18.8* 17.6*   RBC 2.42* 2.79* 2.93* 3.35*   HGB 7.2* 8.4* 8.9* 10.2*   HCT 21.7* 24.8* 25.7* 29.3*   MCV 90 89 88 88   MCH 29.8 30.1 30.4 30.4   MCHC 33.2 33.9 34.6 34.8   RDW 17.0* 16.8* 16.6* 16.1*    211 224 242     INR  Recent Labs   Lab 10/05/20  0428 10/04/20  0415   INR 1.71* 1.40*     Arterial Blood Gas  Recent Labs   Lab 10/02/20  0450 10/01/20  2005 10/01/20  1657 10/01/20  1128   O2PER 30% 30% 60% 25%       All cultures:  Recent Labs   Lab 10/02/20  1345   CULT Light growth  Yeast  *  Culture negative monitoring continues     No results found for this or any previous visit (from the past 24 hour(s)).              Billing: This patient is critically ill: Yes. Total critical care time today  45 min.    Romeo Josue MD    Department of Medicine, Division of Pulmonary, Allergy, Critical Care, and Sleep Medicine  Pager: [phone]

## 2020-10-05 NOTE — PROGRESS NOTES
Patient intermittently following commands. Extremely weak. Tele SR/ST. Up to chair with ceiling lift. Large output from illeostomy.  here this AM - supportive and concerned about patient. he had to work - plans to return this afternoon. Adequate urine output. Frequent turn/reposition.

## 2020-10-05 NOTE — PROGRESS NOTES
Progress Note          Assessment and Plan:New actions/orders today (10/05/2020) are underlined.     Evonne aMrtel is a 55 year old female who was admitted on 9/10/2020.    Coagulopathy with SMV thrombus  Prolonged INR  Alcohol use disorder  GI bleeding  - History of alcohol abuse presented with abdominal pain, was found to have cecal perforation and peritonitis.   - Patient had a complicated course requiring multiple surgeries from sepsis.  Has been hospitalized in ICU since 9/10/2020.   - CT scan done on 9/24/2020 showed a SMV thrombus nonoccluding.  Was anticoagulated with IV heparin   - Bloody drainage from GAB drain/ostomy on 9/27 requiring heparin to be held.   - 10/3/2020 bleeding again after restarting heparin.   - EGD 10/3 non-bleeding jejunal ulcers with no stigmata of bleeding, ? Small bowel mucosal ischemia  - Fibrinogen level was 529  - INR 1.71--vitamin k 5 mg given 10/4 when INR 1.4  - Peripheral smear pending  - Continue to hold IV heparin until bleeding stops. Consider resuming lower intensity anticoagulation after 24 hours of no further bleeding.     Fam SALAS, CNP  [facility]  [phone] (office), [phone] (cell)        Interval History:     Intubated. Eyes open.              Review of Systems:     The 5 point Review of Systems is negative other than noted in the HPI             Medications:   Scheduled Medications    chlorhexidine  15 mL Mouth/Throat Q12H     [Held by provider] furosemide  20 mg Intravenous Q12H     insulin aspart  1-12 Units Subcutaneous Q4H     lipids  250 mL Intravenous Q48H     micafungin  100 mg Intravenous Q24H     pantoprazole (PROTONIX) IV  40 mg Intravenous BID     piperacillin-tazobactam  3.375 g Intravenous Q6H     sodium chloride (PF)  10 mL Intracatheter Q8H     PRN Medications  albuterol, artificial tears ophthalmic solution, dextrose, glucose **OR** dextrose **OR** glucagon, HEParin, HYDROmorphone, lidocaine 4%, lidocaine (buffered or  "not buffered), magnesium sulfate, magnesium sulfate, metoprolol, miconazole, midazolam, naloxone, ondansetron **OR** ondansetron, potassium chloride, potassium chloride with lidocaine, potassium chloride, potassium chloride, potassium chloride, potassium phosphate (KPHOS) in D5W IV, potassium phosphate (KPHOS) in D5W IV, potassium phosphate (KPHOS) in D5W IV, prochlorperazine **OR** prochlorperazine **OR** prochlorperazine, sodium chloride (PF)               Physical Exam:   Vitals were reviewed  Blood pressure 100/63, pulse 86, temperature 99.7  F (37.6  C), resp. rate 19, height 1.651 m (5' 5\"), weight 47 kg (103 lb 9.9 oz), last menstrual period 01/01/2015, SpO2 100 %.  Wt Readings from Last 4 Encounters:   10/05/20 47 kg (103 lb 9.9 oz)       I/O last 3 completed shifts:  In: 4161.8 [I.V.:1400; NG/GT:395]  Out: 3685 [Urine:1155; Drains:100; Stool:2430]    Constitutional: Awake, no apparent distress            Data:   All laboratory data and imaging studies reviewed.    CMP  Recent Labs   Lab 10/05/20  1302 10/05/20  0428 10/04/20  0415 10/03/20  0555 10/02/20  1223 10/02/20  0450 10/01/20  0400 10/01/20  0400   NA  --  152* 146* 144 143 141   < >  --    POTASSIUM 4.7 2.8* 3.4 4.3 5.4* 5.2   < > 4.0   CHLORIDE  --  122* 116* 114* 113* 111*   < >  --    CO2  --  14* 19* 20 20 20   < >  --    ANIONGAP  --  16* 11 10 10 10   < >  --    GLC  --  172* 148* 142* 142* 140*   < >  --    BUN  --  84* 101* 95* 95* 93*   < >  --    CR  --  1.63* 1.97* 1.82* 1.72* 1.66*   < > 1.34*   GFRESTIMATED  --  35* 28* 31* 33* 34*   < > 44*   GFRESTBLACK  --  40* 32* 35* 38* 40*   < > 51*   PARISH  --  6.8* 8.1* 8.2* 9.0 8.8   < >  --    MAG  --  1.6 2.4*  --   --  2.5*  --  2.1   PHOS  --  2.9 4.2  --   --  6.7*  --  5.9*   PROTTOTAL  --  5.0* 5.5* 5.6*  --  5.7*   < >  --    ALBUMIN  --  1.1* 1.5* 1.6*  --  1.8*   < >  --    BILITOTAL  --  2.4* 3.6* 3.0*  --  3.5*   < >  --    ALKPHOS  --  200* 232* 210*  --  204*   < >  --    AST  --  " 52* 56* 49*  --  50*   < >  --    ALT  --  46 50 46  --  47   < >  --     < > = values in this interval not displayed.     CBC  Recent Labs   Lab 10/05/20  1302 10/05/20  0428 10/04/20  0415 10/03/20  0555 10/02/20  0450   WBC  --  9.4 14.5* 18.8* 17.6*   RBC  --  2.42* 2.79* 2.93* 3.35*   HGB 7.8* 7.2* 8.4* 8.9* 10.2*   HCT  --  21.7* 24.8* 25.7* 29.3*   MCV  --  90 89 88 88   MCH  --  29.8 30.1 30.4 30.4   MCHC  --  33.2 33.9 34.6 34.8   RDW  --  17.0* 16.8* 16.6* 16.1*   PLT  --  195 211 224 242     INR  Recent Labs   Lab 10/05/20  0428 10/04/20  0415   INR 1.71* 1.40*

## 2020-10-05 NOTE — PROGRESS NOTES
Intensive Care Daily Note          Assessment and Plan:     Summary Statement:  Evonne Martel is a 54 year old female admitted on 9/10/2020 for septic shock due to cecal perforation and sigmoid colon perforation, likely secondary to perforated diverticulitis. She underwent exploratory laparotomy, right colectomy with end ileostomy, and transverse mucous fistula. She initially did well postoperatively, but developed tachycardia, tachypnea and hypotension. She was taken back to the OR on 9/13 and was found to have gross stool spillage in abdomen with at least 3 sigmoid perforations. She underwent washout and anterior resection performed with drain placement. The patient had very friable tissues in fascia closure, so the patient was placed on paralytics overnight (discontinued (9/14). She had worsening hypotension on 9/17, so CT was obtained and showed possible leak at the descending colon. She went back to the OR (9/17) and was found to have stool leaking from the staple line at the descending colon; washout and end colostomy was performed. She has since been weaned off all pressors    9/27, increased bloody drainage from GAB with patient on heparin for partial SMV thrombosis so heparin stopped    9/28: Drop in hemoglobin with abdominal wall hematoma. Wound consult placed by surgery    9/29: More alert today. Did well on spontaneous breathing trial. Off pressors.    9/30: Did well on spontaneous breathing trial, but was done in midst of wound dressing changes.     10/1: Did well on spontaneous breathing trial so was extubated at noon. Status post extubation is tenuous and she might required reintubation.    10/2: Required reintubation yesterday. Today went for another pelvic/abdominal drain. Stable. Likely will need tracheostomy to facilitate weaning.    10/3: Continued deterioration in renal function but remains non-oliguric. Bleeding from stoma soon after anticoagulation.     10/4: No further bleeding. Delirium  noted. Gave Vitamin K for coagulopathy. Continued slow increase in BUN/creatinine.    Neurology: Dexmedetomidine for sedation, RASS  0-1     Continue dexmedetomidine      Cardiovascular/Hemodynamics: septic shock and hypovolemia, initially requiring multiple pressors and has slowly been weaned off all pressors.   P:Albumin for very low total oncotic pressure.     Pulmonary: Mechanically ventilated for acute respiratory failure. Day 16 on ventilator.  Patient is receiving adequate oxygenation and ventilation with PEEP 5 and FiO2 40%. Too tenuous to consider extubation. Likely will need tracheostomy this week.   GI and Nutrition: Perforated colon s/p ex lap, colectomy, and abdominal washout (9/10, re-exploration 9/13 and 9/17).  Mild bili elevation probably cholestasis of sepsis   - NPO, TPN plus 10ml/hr feeding tube.   Went for another drain per IR today  -GI prophylaxis: IV pantoprazole   Off iv heparin as had stoma bleeding within 6 hours of        Renal:  acute kidney injury. Creatinine and BUN rising, likely ATN.  -Has received 17 doses of iv albumin. Doesn't need anymore.  P:  AM BMP   Discussed with nephrology       Infectious Disease: Septic shock secondary to perforated colon with feculent peritonitis. CXR on 9/17 shows diffuse bilateral infiltrates and consolidation of RLL. Intraoperative abscess cultures are positive for Pseudomonas, Enterococcus avium, E. coli and Bacteroides.   Afebrile     - Continue antibiotic regimen: Zosyn and micafungin but change to fluconazole soon.       Hematology and Oncology: Acute normocytic anemia, now 8.4  -P: monitor    Leukocytosis secondary to septic shock from bowel perforations.     On last CT scan Nonocclusive thrombus in superior mesenteric vein and possible minimal nonocclusive thrombus in portal vein.  Less GAB bloody output but Hgb decreasing   -off Heparin gtt again as had ira blood from ostomy today     Endocrinology: Stress-induced hyperglycemia  P: lantus 10   plus high intensity sliding scale insulin , glucoses< 180              Intensive Care: Central line: Central line present, needed and to be continued  NG tube:  10 m/hr   Drains: abdominal GAB drain  Restraint:Needed   Others: n/a   Top goals for today   IR drain placed  PS none today                  Key events/ Interval history - last 24 hours:    No significant changes. More awake, but delirious.          General Appearance:   Sitting in bed.  easily opens eyes .weakly moves upper extremities.     HEENT:   Endotracheal tube ok    Chest and Lungs:   Clear ,  nonlabored breathing on mechanical ventilation   Cardiovascular:   , regular rhythm, no murmurs   Abdomen:   Soft, nondistended; midline dressings, both stomas are pink, no stool   GAB output bloody but less volume    :   Ferguson in place,      Extremities and Skin: icterus    Warm, well perfused; 3+ bilateral pitting edema present   Neuro:   moves extremities spontaneously. Tracks with her eyes and follows simple commands   Drains and Tubes:   NG in place, GAB drain with yellow output   Intravascular Access and Device:   Lines present: triple lumen catheter (right internal jugular)             Data:   Labs:  All lab results reviewed past 24 hours    Imaging:  All imaging results reviewed past 24 hours      40 minutes CC time for respiratory failure, sepsis     Romeo Gonzalez MD  [facility] Intensivist Service

## 2020-10-05 NOTE — PROGRESS NOTES
Pending sale to [facility] ICU RESPIRATORY NOTE        Date of Admission: 9/10/2020    Date of Intubation (most recent): 10/01/2020    Reason for Mechanical Ventilation: Airway protection     Number of Days on Mechanical Ventilation: 4    Met Criteria for Spontaneous Breathing Trial: No   Reason for No Spontaneous Breathing Trial: Per MD     Significant Events Today: none during evening shift     ABG Results:   Recent Labs   Lab 10/02/20  0450 10/01/20  2005 10/01/20  1657 10/01/20  1128   O2PER 30% 30% 60% 25%       Current Vent Settings: Ventilation Mode: CMV/AC  (Continuous Mandatory Ventilation/ Assist Control)  FiO2 (%): 30 %  Rate Set (breaths/minute): 20 breaths/min  Tidal Volume Set (mL): 460 mL  PEEP (cm H2O): 5 cmH2O  Oxygen Concentration (%): 30 %  Resp: 24      Plan: Continue on full ventilator support    Xavier Hansen, RT

## 2020-10-05 NOTE — PROVIDER NOTIFICATION
MD Notification    Notified Person: MD    Notified Person Name:  Dr. Yoder, Intensivist    Notification Date/Time:  10/5/20, 0584    Notification Interaction:  telephone    Purpose of Notification:  Abnormal am labs.    Orders Received:  She will review and change IVF, increase free water TF flush.      Comments:  Also mentioned that Oral Vit K was given for INR 1.4 10/4/20 and INR is 1.7 today.

## 2020-10-05 NOTE — CONSULTS
[facility]  Gastroenterology Consultation         Evonne Martel  [address] [place] [place] [address]  55 year old female    Admission Date/Time: 9/10/2020  Primary Care Provider: No Ref-Primary, Physician  Referring / Attending Physician: Dr. Sahu    We were asked to see the patient in consultation by Dr. Dr. Sahu for evaluation of recurrent bouts of GI bleed.      CC: Recurrent bouts of GI bleed    HPI:  Evonne Martel is a 55 year old female who was admitted on September 10 with septic shock cecal perforation sigmoid colon perforation likely perforated diverticulitis patient has had multiple surgeries abdominal hematoma requiring debridement patient has right colectomy and ileostomy transverse mucous fistula patient had leakage requiring cleaning of the abdomen.  Patient was just recently extubated ended up reintubating again patient has been putting out bloody drainage in the GAB drain patient was found to have SMV thrombosis for which patient was started on a heparin patient has been attempted anticoagulation withBleeding with every attempt.  Patient has been progressively deteriorating patient is developing deterioration of renal functions.  Patient is currently on ventilator patient has been getting small amount of tube feeding and TPN.  GI was asked to evaluate for possible peptic ulcer disease or source of bleed with anticoagulation.  Patient is currently intubated all of patient's information was obtained from discussing with the intensive care team and reviewing her chart.    ROS: A comprehensive ten point review of systems was negative aside from those in mentioned in the HPI.      PAST MED HX:  I have reviewed this patient's medical history and updated it with pertinent information if needed.   History reviewed. No pertinent past medical history.    MEDICATIONS:   None       ALLERGIES: No Known Allergies    SOCIAL HISTORY:  Social History     Tobacco Use     Smoking status:  Current Every Day Smoker     Years: 30.00     Types: Cigarettes     Smokeless tobacco: Never Used   Substance Use Topics     Alcohol use: Yes     Comment: three drinks     Drug use: Never       FAMILY HISTORY:  History reviewed. No pertinent family history.    PHYSICAL EXAM:   General patient is intubated hemodynamically stable  Vital Signs with Ranges  Temp: 99.9  F (37.7  C) Temp src: Bladder BP: 125/64 Pulse: 96   Resp: 24 SpO2: 99 % O2 Device: Mechanical Ventilator    I/O last 3 completed shifts:  In: 3338 [I.V.:1550; NG/GT:395]  Out: 3665 [Urine:1280; Drains:85; Stool:2300]    Constitutional: healthy, alert and no distress   Cardiovascular: negative, PMI normal. No lifts, heaves, or thrills. RRR. No murmurs, clicks gallops or rub  Respiratory: negative, Percussion normal. Good diaphragmatic excursion. Lungs clear          ADDITIONAL COMMENTS:   I reviewed the patient's new clinical lab test results.   Recent Labs   Lab Test 10/04/20  0415 10/03/20  0555 10/02/20  0450 09/28/20  0410 09/28/20  0410 09/26/20  1200 09/26/20  1200   WBC 14.5* 18.8* 17.6*   < > 19.8*   < >  --    HGB 8.4* 8.9* 10.2*   < > 7.0*   < > 7.6*   MCV 89 88 88   < > 89   < >  --     224 242   < > 142*   < >  --    INR 1.40*  --   --   --  1.39*  --  1.59*    < > = values in this interval not displayed.     Recent Labs   Lab Test 10/04/20  0415 10/03/20  0555 10/02/20  1223   POTASSIUM 3.4 4.3 5.4*   CHLORIDE 116* 114* 113*   CO2 19* 20 20   * 95* 95*   ANIONGAP 11 10 10     Recent Labs   Lab Test 10/04/20  0415 10/03/20  0555 10/02/20  0450 09/10/20  1911 09/10/20  1911 09/10/20  1538   ALBUMIN 1.5* 1.6* 1.8*   < >  --  2.9*   BILITOTAL 3.6* 3.0* 3.5*   < >  --  1.0   ALT 50 46 47   < >  --  17   AST 56* 49* 50*   < >  --  39   PROTEIN  --   --   --   --  10*  --    LIPASE  --   --   --   --   --  48*    < > = values in this interval not displayed.       I reviewed the patient's new imaging results.        CONSULTATION  ASSESSMENT AND PLAN:    Active Problems:    Severe sepsis (H)    Assessment: Unfortunate 55-year-old female with complicated protracted recent hospitalization due to multiple GI surgeries and sepsis along with perforations.  Patient currently has ileostomy patient is putting out bloody drainage through ileostomy patient has been attempted on heparin due to her SMV clot however patient has not been able to tolerate anticoagulation patient's findings are concerning for possible ulcer disease leading to GI blood loss.  At this point plan is to proceed with upper GI endoscopy.  Possible differential was discussed in detail.  I will recommend to continue on current treatment plan with IV Protonix TPN hold tube feeding for now till endoscopy.  Will discuss further treatment plan depending on endoscopic findings.           Delano Gastelum MD, FACP  [facility].  Office: [phone]  Cell : [phone]

## 2020-10-05 NOTE — PHARMACY-CONSULT NOTE
TPN formula evaluated based on today s labs results.    The following changes have been made:    Potassium: increased to 50 mEq .  Magnesium: added  3 mEq  Phosphorus: added 7.5 mEq      Pharmacy will continue to follow and adjust as appropriate.

## 2020-10-05 NOTE — ADDENDUM NOTE
Addendum  created 10/05/20 0812 by Usman Wallace MD    Attestation recorded in Intraprocedure, Intraprocedure Attestations filed, Intraprocedure Staff edited

## 2020-10-05 NOTE — PROGRESS NOTES
COLON & RECTAL SURGERY  PROGRESS NOTE    October 5, 2020    SUBJECTIVE:  Patient intubated, opens eyes to voice. Xnhp=755.9 overnight. Still with bloody stoma output. Hgb=7.4.     OBJECTIVE:  Temp:  [99.7  F (37.6  C)-100.9  F (38.3  C)] 100.4  F (38  C)  Pulse:  [] 101  Resp:  [20-26] 20  BP: ()/(51-83) 102/66  FiO2 (%):  [30 %] 30 %  SpO2:  [98 %-100 %] 99 %    Intake/Output Summary (Last 24 hours) at 10/5/2020 1108  Last data filed at 10/5/2020 1100  Gross per 24 hour   Intake 4018.4 ml   Output 3765 ml   Net 253.4 ml       GENERAL:  Intubated, opens eyes to voice  HEAD: Normocephalic atraumatic  SCLERA: Anicteric  EXTREMITIES: Warm and well perfused  ABDOMEN:  Soft, appropriately tender, non-distended. No guarding, rigidity, or peritoneal signs. Ileostomy with blood-tinged vxsqva=9006mh/24hr. Colostomy pink, no stool or gas in bag. Abdominal drain with serosang output=85ml/24hr. Buttock drain with 15ml/24hr sanguinous output.   INCISION:  C/d/i, packed midline wound with serosang drainage, did not change.     LABS:  Lab Results   Component Value Date    WBC 9.4 10/05/2020     Lab Results   Component Value Date    HGB 7.2 10/05/2020     Lab Results   Component Value Date    HCT 21.7 10/05/2020     Lab Results   Component Value Date     10/05/2020     Last Basic Metabolic Panel:  Lab Results   Component Value Date     10/05/2020      Lab Results   Component Value Date    POTASSIUM 2.8 10/05/2020     Lab Results   Component Value Date    CHLORIDE 122 10/05/2020     Lab Results   Component Value Date    PARISH 6.8 10/05/2020     Lab Results   Component Value Date    CO2 14 10/05/2020     Lab Results   Component Value Date    BUN 84 10/05/2020     Lab Results   Component Value Date    CR 1.63 10/05/2020     Lab Results   Component Value Date     10/05/2020       ASSESSMENT/PLAN: 55 year old female s/p ex lap with right hemicolectomy on 9/10, followed by re-exploration with abdominal  washout and anterior resection on 9/13, and ex lap with washout and descending colon MF on 9/17. She remains intubated in the ICU, off pressors. Post-op ileus. 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  Suspect bleeding from raw surfaces related to multiple surgeries and heparin gtt as well as abdominal wall hematoma seen on repeat CT on 9/27. The bleeding seems to have stopped with cessation of her heparin, and her Hb is stable. Abdominal incision hematoma evacuated at bedside on 9/29. IR placed drain in pelvic fluid collection on 10/2, heparin also started later that day with increased bleeding from stoma site, on hold now. Underwent EGD over weekend with evidence of duodenal ulcer, started on PPI. GI and hem/onc now following. Continues with some bloody ileostomy output.      1. Continue TPN/tube feeds and continue to monitor stoma output.   2. Hematology, GI workup appreciated.  3. Continue wound care  4. WOCN following, appreciate assistance  5. Trach being discussed for early this week  6. Continue PPI and holding heparin for now. Consider repeat scan early this week to assess thrombus  7. Discussed with Dr Hanson       For questions/paging, please contact the CRS office at [phone].    Sonia Treadwell PA-C  [facility]  Phone: [phone]    [facility]  I performed a history and physical examination of the patient and discussed their management with the physician assistant. I reviewed the physician assistants note and agree with the documented findings and plan of care.     No events overnight.  Low grade fevers.  Stoma output clearing, drain with thin serosang drainage.  Hgb down to 7.4.  White count now 9.4.  Possible trach tomorrow.    Exam:  Awakens to voice  abd soft, tender to palpation  Right sided stoma pink with liquid stool out, no blood  Left mucous fistula without output  Buttock drain with  thin serosang drainage, midline abdominal drain with thin more serous drainage    Plan:  Cont TPN/TFs  Appreciate Heme and GI Workup  Would continue to hold heparin gtt   Agree with trach   Appreciate excellent ICU cares    Yvonne Hanson MD  [facility]  Office Phone # [phone].

## 2020-10-06 NOTE — OP NOTE
DATE OF PROCEDURE: October 6, 2020      SURGEON:  Paul Live MD   FIRST ASSIST: Sabrina Ventura PA-C      PREOPERATIVE DIAGNOSIS:  Respiratory failure.       POSTOPERATIVE DIAGNOSIS:  Respiratory failure.       PROCEDURE:  Tracheostomy with Shiley #6 cuffed nonfenestrated tube.       ANESTHESIA:  General.       INDICATIONS:  Patient has respiratory failure and will require prolonged mechanical ventilation.       DESCRIPTION OF PROCEDURE:  The patient was brought to the operating room on the ICU bed.  Under general anesthesia, the patient's neck was extended.  Neck and upper chest were prepared and draped in the usual fashion using ChloraPrep.  A transverse incision was made approximately 2 cm above the sternal notch.  Dissection was carried down to the midline of the strap muscle.  The inferior aspect of the isthmus of the thyroid was divided.  Hemostasis was excellent.  The second ring of the trachea was clearly identified, and some sutures of 2-0 Prolene were placed around the second ring laterally on each side.  A longitudinal tracheotomy was made and dilated.  The endotracheal tube was pulled just above the tracheotomy, and a Shiley #6 cuffed nonfenestrated tube was advanced without any difficulty.  The cuff was inflated.  Ventilation was excellent.  Hemostasis was again verified and was excellent.  Incision was closed with interrupted 3-0 nylon suture on the skin along the tracheostomy.  A dressing was applied, and the tracheostomy was secured around the patient's neck.       The patient was returned to ICU in satisfactory condition.           PAUL LIVE MD

## 2020-10-06 NOTE — PROGRESS NOTES
"Interventional Radiology Progress Note:  Inpatient at [facility]  Date: 2020   Patient name: Evonne Martel  MRN:[id number]  :  1965    History:  54-year-old female  admitted with major bowel perforation, now status post 3 operations, abdominal abscess. In ICU and intubated. Technically successful CT guided drain placement into the midline presacral fluid collection on 10/2. Minimal output.    Interval History: Bloody discharge in the GAB bulb. Flushed with 10 ml of saline. Unable to aspirate. Pt to have trach placed today.     Physical Exam:   Vitals: /76   Pulse 113   Temp (P) 99.9  F (37.7  C) (Bladder)   Resp 24   Ht 1.651 m (5' 5\")   Wt 49.8 kg (109 lb 12.6 oz)   LMP 2015 (Approximate)   SpO2 95%   BMI 18.27 kg/m     General: Stable. In no acute distress.  Pt intubated but alert.  Son present    Drain: Dressing is C/D/I.  Output by Drain (mL) 10/04/20 0700 - 10/04/20 1459 10/04/20 1500 - 10/04/20 2259 10/04/20 2300 - 10/05/20 0659 10/05/20 0700 - 10/05/20 1459 10/05/20 1500 - 10/05/20 2259 10/05/20 2300 - 10/06/20 0659 10/06/20 0700 - 10/06/20 1059   Closed/Suction Drain 1 Right;Anterior;Midline Abdomen Bulb 24 Malian 10 20 55 20 50 105 40   Closed/Suction Drain Right Buttock Bulb 5 5 5 5 10 5 0        Labs:  Recent Labs   Lab 10/06/20  0540 10/05/20  1302 10/05/20  0428 10/04/20  0415   HGB 7.7* 7.8* 7.2* 8.4*   WBC 10.7  --  9.4 14.5*     Recent Labs   Lab 10/06/20  0540 10/05/20  0428 10/04/20  0415   CR 2.05* 1.63* 1.97*      Recent Labs   Lab 10/06/20  0540 10/05/20  0428 10/04/20  0415   PROTTOTAL 5.5* 5.0* 5.5*   ALBUMIN 1.3* 1.1* 1.5*   BILITOTAL 3.3* 2.4* 3.6*   ALKPHOS 261* 200* 232*   AST 68* 52* 56*   ALT 58* 46 50     WBC   Date Value Ref Range Status   10/06/2020 10.7 4.0 - 11.0 10e9/L Final       Cultures:  Recent Labs   Lab 10/02/20  1345   CULT Light growth  Candida albicans / dubliniensis  Candida albicans and Candida dubliniensis are not " routinely speciated  Susceptibility testing not routinely done  *  Culture negative monitoring continues       Assessment: s/p placement of drain in to the midline presacral fluid collection.     Plan:   Flushed with 10 ml of saline.  No resistance.  Will monitor an additional 24 hrs and consider CT Abd/Pelvis with sinogram tomorrow. Discussed case with Dr. Andres who agrees with the plan.  Pt had a fever so she would also like to eval for a new abscess. Pt being extubated today so we will hold off until tomorrow.     15 minutes were spent with patient during today's visit with greater than 50% of the time spent face to face with the patient, in reviewing medical record and images and in counseling and coordinating patient's care.    CHANDRA AveryC  Interventional Radiology

## 2020-10-06 NOTE — PROVIDER NOTIFICATION
Prescriber Notification Note    The pharmacist has communicated with this patient's provider regarding a concern or therapy recommendation.    Notified Person: Dr. Josue  Date/Time of Notification: 10/6 16:45  Interaction: Face to face  Concern/Recommendation: Clarified resuming heparin drip. Start heparin drip 1900 this evening, low intensity protocol, no boluses.

## 2020-10-06 NOTE — ANESTHESIA POSTPROCEDURE EVALUATION
Patient: Evonne Martel    Procedure(s):  CREATION, TRACHEOSTOMY    Diagnosis:Respiratory failure (H) [J96.90]  Diagnosis Additional Information: No value filed.    Anesthesia Type:  General    Note:  Anesthesia Post Evaluation    Patient location during evaluation: PACU  Patient participation: Able to fully participate in evaluation  Level of consciousness: awake  Pain management: adequate  Airway patency: patent  Cardiovascular status: acceptable  Respiratory status: acceptable  Hydration status: acceptable  PONV: none     Anesthetic complications: None          Last vitals:  Vitals:    10/06/20 1330 10/06/20 1345 10/06/20 1400   BP: 111/61 117/64 122/62   Pulse: 102 99 97   Resp: 21 22 25   Temp: 37  C (98.6  F) 37  C (98.6  F) 36.8  C (98.2  F)   SpO2: 100% 100% 100%         Electronically Signed By: Usman Wallace MD  October 6, 2020  2:39 PM

## 2020-10-06 NOTE — PROGRESS NOTES
[facility] ICU RESPIRATORY NOTE        Date of Admission: 9/10/2020    Date of Intubation (most recent): 10/01/2020    Reason for Mechanical Ventilation:Airways protection    Number of Days on Mechanical Ventilation: 6    Met Criteria for Spontaneous Breathing Trial: Yes      Significant Events Today: None    ABG Results:   Recent Labs   Lab 10/02/20  0450 10/01/20  2005 10/01/20  1657 10/01/20  1128   O2PER 30% 30% 60% 25%       Current Vent Settings: Ventilation Mode: CMV/AC  (Continuous Mandatory Ventilation/ Assist Control)  FiO2 (%): 30 %  Rate Set (breaths/minute): 20 breaths/min  Tidal Volume Set (mL): 460 mL  PEEP (cm H2O): 5 cmH2O  Pressure Support (cm H2O): 5 cmH2O  Oxygen Concentration (%): 30 %  Resp: 23          Plan:Continue full ventilator support.      Tru Burris, RT

## 2020-10-06 NOTE — PROGRESS NOTES
TF paused and flushed at 0000. Trach consent signed and in front of paper chart. Pt condition stable through out the night. Resp: LS coarse , thick scrt oral and ETT. CV: ST, decreased cap refil. Edematous flanks ( dependant ). Tmax 38.6. GI: 1lt + out of ileostomy, scant out of colostomy/mucus fistula. GAB's continue to drain. : adequate urine output. csccrn

## 2020-10-06 NOTE — PROGRESS NOTES
"CLINICAL NUTRITION SERVICES - REASSESSMENT NOTE      Future/Additional Recommendations: Once able to resume TF, recommend Impact Peptide 1.5 at 10 mL/hr as patient was previously receiving    Malnutrition: (9/14)  % Weight Loss:  History not available   % Intake:  </= 50% for >/= 5 days (severe malnutrition)  Subcutaneous Fat Loss:  Orbital region severe depletion  Muscle Loss:  Temporal region severe depletion and Clavicle bone region severe depletion  Fluid Retention:  Moderate as above      Malnutrition Diagnosis: Severe malnutrition  In Context of:  Acute illness or injury  Environmental or social circumstances       EVALUATION OF PROGRESS TOWARD GOALS   Diet:  NPO on vent     Nutrition Support:  Patient continues on TPN + TF as follows (TF currently on hold due to Trach placement today) ~    Nutrition Support Parenteral:  Type of Access:  PICC  Parenteral Frequency:  Continuous  Parenteral Regimen:  D15 AA5 at 55 mL/hr + Lipid 250 mL every 48 hours  Total Parenteral Provisions: 1187 kcal (26 kcal/kg), 66 g protein (1.4 g/kg), 198 g CHO, 21% fat      Nutrition Support Enteral:  Type of Feeding Tube: NG  Enteral Frequency:  Continuous  Enteral Regimen: Impact Peptide 1.5 at 10 mL/hr  Total Enteral Provisions: 360 kcal, 23 g protein, 185 mL H2O, 34 g CHO, no fiber   Free Water Flush: 100 mL every 4 hours     Also receiving D5 at 80 mL/hr= 96 g CHO, 326 kcal   Total (TPN + TF + D5) = 1873 kcal (41 kcal/kg and 117% needs), 89 g protein (1.9 g/kg)      Intake/Tolerance:    BUN 99 (H), Cr 2.05 (H) - HONORIO  LFTs elevated   BGM < 150 - High sliding scale insulin   Stool 2650 mL, drains 195 mL  I/O 4260/4145, wt 49.8 kg (up 3.9 kg from admit) - IV Lasix     Patient had a fiber modular added to TF on 10/1 but did not slow diarrhea and was therefore discontinued.  Discussions in rounds have been around the fact that patient may not be absorbing much TF (RN has described everything as \"going right through her\") -- consensus " with Intensivists has been to provide nutrition through the TPN/Lipid + D5 IVF and run the TF at a low rate and assume she may not absorb it       ASSESSED NUTRITION NEEDS:  Dosing Weight 45.9 kg   Estimated Energy Needs: 8303-4757 kcals (30-35 Kcal/Kg)  Justification: repletion, underweight and vented  Estimated Protein Needs: 68-92 grams protein (1.5-2 g pro/Kg)  Justification: repletion, post-op and hypercatabolism with critical illness      NEW FINDINGS:   10/1:  Extubated --> Re-intubated < 4 hours later   10/2:  Pelvic abscess drain placement in IR   10/3:  EGD= Many non-bleeding superficial ulcers with mucosal slughing with no stigmata of bleeding were found in the jejunum. The largest lesion was 10 mm in largest dimension.   10/6:  Tracheostomy with Shiley #6 cuffed nonfenestrated tube     Previous Goals (10/1):   TPN + TF + NutriSource Fiber will meet % needs - Not met.    Ostomy output will improve with addition of NutriSource Fiber - Not met.     Previous Nutrition Diagnosis (10/1):   Altered GI function (high ostomy output) related  to multiple surgeries, TF advancement as evidenced by > 3000 mL output yesterday  Evaluation: Improving    CURRENT NUTRITION DIAGNOSIS  Altered GI function related to multiple abdominal surgeries as evidenced by high ostomy output, need for TPN     INTERVENTIONS  Recommendations / Nutrition Prescription  Continue TPN at 55 mL/hr as above  Once able to resume TF, recommend Impact Peptide 1.5 at 10 mL/hr as above     Implementation  Collaboration and Referral of Nutrition care:  Patient discussed today during interdisciplinary bedside rounds.  Also discussed TPN with Pharmacist - no changes today.    Goals  TPN/Lipid + TF + D5 IVF will meet % needs     MONITORING AND EVALUATION:  Progress towards goals will be monitored and evaluated per protocol and Practice Guidelines    Neli Trejo RD, LD, CNSC   Clinical Dietitian - [facility]

## 2020-10-06 NOTE — PROGRESS NOTES
COLON & RECTAL SURGERY  PROGRESS NOTE        October 6, 2020    SUBJECTIVE:    Intubated.  Open eyes to voice  T max - 101.1  Bloody outputs have improved.  Hb now stable.    OBJECTIVE:  Temp:  [99.3  F (37.4  C)-101.1  F (38.4  C)] 100.2  F (37.9  C)  Pulse:  [] 97  Resp:  [19-31] 25  BP: ()/(53-84) 118/76  FiO2 (%):  [30 %] 30 %  SpO2:  [97 %-100 %] 99 %    Intake/Output Summary (Last 24 hours) at 10/6/2020 0743  Last data filed at 10/6/2020 0600  Gross per 24 hour   Intake 4260.6 ml   Output 4145 ml   Net 115.6 ml       GENERAL:  Intubated. Opens eyes to voice  EXTREMITIES: Warm and well perfused, moderate edema   ABDOMEN:  Soft, appropriately tender, non-distended. No guarding, rigidity, or peritoneal signs.   INCISION:  Clean, minor drainage from midline  Drains- Non-bloody  Stoma- Healthy, non-bloody output    LABS:  Lab Results   Component Value Date    WBC 10.7 10/06/2020     Lab Results   Component Value Date    HGB 7.7 10/06/2020     Lab Results   Component Value Date    HCT 23.1 10/06/2020     Lab Results   Component Value Date     10/06/2020     Last Basic Metabolic Panel:  Lab Results   Component Value Date     10/06/2020      Lab Results   Component Value Date    POTASSIUM 4.4 10/06/2020     Lab Results   Component Value Date    CHLORIDE 114 10/06/2020     Lab Results   Component Value Date    PARISH 8.3 10/06/2020     Lab Results   Component Value Date    CO2 14 10/06/2020     Lab Results   Component Value Date    BUN 99 10/06/2020     Lab Results   Component Value Date    CR 2.05 10/06/2020     Lab Results   Component Value Date     10/06/2020       ASSESSMENT/PLAN: Evonne Martel is a 55 year old female s/p ex lap with right hemicolectomy on 9/10, followed by re-exploration with abdominal washout and anterior resection on 9/13, and ex lap with washout and descending colon MF on 9/17. She remains intubated in the ICU, off pressors. Post-op ileus. New SMV thrombus on CT  scan on 9/24 - started on heparin gtt developed bleeding from abdominal drain.  Suspect bleeding from raw surfaces related to multiple surgeries and heparin gtt as well as abdominal wall hematoma seen on repeat CT on 9/27. The bleeding seems to have stopped with cessation of her heparin, and her Hb is stable. Abdominal incision hematoma evacuated at bedside on 9/29. IR placed drain in pelvic fluid collection on 10/2, heparin also started later that day with increased bleeding from stoma site, on hold now. Underwent EGD over weekend with evidence of duodenal ulcer, started on PPI. GI and hem/onc now following. Bleeding seems to have ceased.    1) Continue enteral and parental feeds  2) Monitor fevers- if persists consider CT abdomen at some point this week  3) Trach today as per ICU  4) GI and heme following- thanks.    Will d/w Dr. Dmitry Chavez MD  CRS fellow  10/6/2020   7:43 AM

## 2020-10-06 NOTE — PROGRESS NOTES
"[facility]  Infectious Disease Progress Note          Assessment and Plan:   IMPRESSION:   1.  A 54-year-old female without prior major infection or abdominal issues, admitted with major bowel perforation, now status post 3 operations, abdominal abscess, further leak, now with better source control.   2.  Ongoing septic shock , slow improvement  3 resp failure, vent   4 Renal insuff, new     RECOMMENDATIONS:   1.  Abdominal cultures enterococcus, multiple anaerobes, gram-negative rods including a sensitive Pseudomonas.  Has had well covered  microbiology throughout ;  2 CT with new collection, drain in 10/2, not surprising candida main pathogen despite ongoing marilyn, is C albicans only  Continue zosyn, adjust dose to renal fcti             Interval History:   Intubated and awake off sedation some interactive T Ok off pressors cxs same creat 1.0 CT new collection, drain in candida cx creat peaked 1.97 now 2.05 trach planned              Medications:       chlorhexidine  15 mL Mouth/Throat Q12H     [Held by provider] furosemide  20 mg Intravenous Q12H     insulin aspart  1-12 Units Subcutaneous Q4H     lipids  250 mL Intravenous Q48H     micafungin  100 mg Intravenous Q24H     pantoprazole (PROTONIX) IV  40 mg Intravenous BID     piperacillin-tazobactam  3.375 g Intravenous Q6H     sodium chloride (PF)  10 mL Intracatheter Q8H                  Physical Exam:   Blood pressure 117/78, pulse 101, temperature 100.4  F (38  C), temperature source Bladder, resp. rate 23, height 1.651 m (5' 5\"), weight 49.8 kg (109 lb 12.6 oz), last menstrual period 01/01/2015, SpO2 98 %.  Wt Readings from Last 2 Encounters:   10/06/20 49.8 kg (109 lb 12.6 oz)     Vital Signs with Ranges  Temp:  [99.3  F (37.4  C)-101.1  F (38.4  C)] 100.4  F (38  C)  Pulse:  [] 101  Resp:  [19-31] 23  BP: ()/(53-84) 117/78  FiO2 (%):  [30 %] 30 %  SpO2:  [97 %-100 %] 98 %    Constitutional: Intubated sedated   Lungs: Clear " to auscultation bilaterally, no crackles or wheezing   Cardiovascular: Regular rate and rhythm, normal S1 and S2, and no murmur noted   Abdomen: Mild distended,stoma wd as noted by CR   Skin: No rashes, no cyanosis, no edema   Other:           Data:   All microbiology laboratory data reviewed.  Recent Labs   Lab Test 10/06/20  0540 10/05/20  1302 10/05/20  0428 10/04/20  0415   WBC 10.7  --  9.4 14.5*   HGB 7.7* 7.8* 7.2* 8.4*   HCT 23.1*  --  21.7* 24.8*   MCV 90  --  90 89     --  195 211     Recent Labs   Lab Test 10/06/20  0540 10/05/20  0428 10/04/20  0415   CR 2.05* 1.63* 1.97*     No lab results found.  Recent Labs   Lab Test 10/02/20  1345 09/17/20  0201 09/17/20  0155 09/17/20  0054 09/16/20  2200 09/10/20  1745 09/10/20  1730   CULT Light growth  Candida albicans / dubliniensis  Candida albicans and Candida dubliniensis are not routinely speciated  Susceptibility testing not routinely done  *  Culture negative monitoring continues Heavy growth  Mixed aerobic and anaerobic fatimah  *  Heavy growth  Bacteroides uniformis  *  Heavy growth  Bacteroides ovatus/xylanisolvens  *  Susceptibility testing not routinely done  Moderate growth  Escherichia coli  Susceptibility testing done on previous specimen  *  Moderate growth  Pseudomonas aeruginosa  Susceptibility testing done on previous specimen  *  Light growth  Strain 2  Escherichia coli  Susceptibility testing done on previous specimen  *  Light growth  Enterococcus avium  Susceptibility testing done on previous specimen  *  Light growth  Candida albicans / dubliniensis  Candida albicans and Candida dubliniensis are not routinely speciated  Susceptibility testing not routinely done  *  On day 2, isolated in broth only:  Anaerobic gram negative rods  See anaerobic report for identification  * Heavy growth  Mixed aerobic and anaerobic fatimah  *  Heavy growth  Bacteroides fragilis  Susceptibility testing not routinely done  *  Moderate  growth  Escherichia coli  *  Moderate growth  Pseudomonas aeruginosa  *  Moderate growth  Strain 2  Escherichia coli  *  Light growth  Enterococcus avium  *  Light growth  Candida albicans / dubliniensis  Candida albicans and Candida dubliniensis are not routinely speciated  Susceptibility testing not routinely done  *  On day 2, isolated in broth only:  Anaerobic gram negative rods  See anaerobic report for identification  * No growth No growth No growth No growth

## 2020-10-06 NOTE — PROGRESS NOTES
Patient vital signs stable. Patient went at 1230 to surgery for tracheostomy, tolerated well. Trach site clean and intact. Neuro's unchanged, hand grasp and toe wiggle intermittently. Patient spouse at bedside for visit and update.

## 2020-10-06 NOTE — PROGRESS NOTES
[facility]  PROGRESS NOTE    October 6, 2020    SUBJECTIVE:  Remains intubated. Responsive to questions. Appears comfortable. Plan for trach today.    OBJECTIVE:  Temp:  [99.3  F (37.4  C)-101.1  F (38.4  C)] 100  F (37.8  C)  Pulse:  [] 110  Resp:  [19-31] 25  BP: ()/(53-84) 125/70  FiO2 (%):  [30 %] 30 %  SpO2:  [97 %-100 %] 98 %    Intake/Output Summary (Last 24 hours) at 10/5/2020 1108  Last data filed at 10/5/2020 1100  Gross per 24 hour   Intake 4018.4 ml   Output 3765 ml   Net 253.4 ml       GENERAL:  Intubated, opens eyes to voice, follows commands   HEAD: Normocephalic atraumatic  SCLERA: Anicteric  EXTREMITIES: Warm and well perfused  ABDOMEN:  Soft, appropriately tender, non-distended. No guarding, rigidity, or peritoneal signs. Ileostomy with blood-tinged output (2.6L). Colostomy pink, no stool or gas in bag. Abdominal drain with serosang output, Buttock drain with serosang output.   INCISION:  Intact with staples, packed midline wound with mepilex dressing    LABS:  Lab Results   Component Value Date    WBC 9.4 10/05/2020     Lab Results   Component Value Date    HGB 7.2 10/05/2020     Lab Results   Component Value Date    HCT 21.7 10/05/2020     Lab Results   Component Value Date     10/05/2020     Last Basic Metabolic Panel:  Lab Results   Component Value Date     10/05/2020      Lab Results   Component Value Date    POTASSIUM 2.8 10/05/2020     Lab Results   Component Value Date    CHLORIDE 122 10/05/2020     Lab Results   Component Value Date    PARISH 6.8 10/05/2020     Lab Results   Component Value Date    CO2 14 10/05/2020     Lab Results   Component Value Date    BUN 84 10/05/2020     Lab Results   Component Value Date    CR 1.63 10/05/2020     Lab Results   Component Value Date     10/05/2020       ASSESSMENT/PLAN: 55 year old female s/p ex lap with right hemicolectomy on 9/10, followed by re-exploration with abdominal washout and anterior resection on  9/13, and ex lap with washout and descending colon MF on 9/17. She remains intubated in the ICU, off pressors. Post-op ileus. 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  Suspect bleeding from raw surfaces related to multiple surgeries and heparin gtt as well as abdominal wall hematoma seen on repeat CT on 9/27. The bleeding seems to have stopped with cessation of her heparin, and her Hb is stable. Abdominal incision hematoma evacuated at bedside on 9/29. IR placed drain in pelvic fluid collection on 10/2, heparin also started later that day with increased bleeding from stoma site, on hold now. Underwent EGD 10/3 with evidence of duodenal ulcer and possible SB ischemia started on PPI. GI and hem/onc now following. Continues with some bloody ileostomy output.      Continue TPN. TF on hold for trach today. Ok to restart post-procedure. She is likely to require g-tube or G-J tube placement for long term feeding access as well. Continue PPI for ulcers and UGIB.   Heparin remains on hold. She has failed two trials of anticoagulation for SMV thrombus. Stable on CT on 10/1. Will likely need repeat imaging this week to evaluate. Appreciate hematology assistance with anticoagulation management.   Fevers overnight. Fever work-up.  IR plan for sinogram today vs tomorrow for pelvic fluid collection.  Abx per ID for abdominal collections. Appreciate their assistance.    Appreciate ICU assistance with cares.   WOCN for stoma cares and midline wound.     For questions/paging, please contact the CRS office at [phone].    Zuleyka Andres MD  [facility]  Phone: [phone]

## 2020-10-06 NOTE — PROGRESS NOTES
[facility] Hematology Progress Note     Primary Oncologist/Hematologist:            Assessment and Plan:   Evonne Martel is a 55 year old female who was admitted on 9/10/2020.     Coagulopathy with SMV thrombus  Prolonged INR  Alcohol use disorder  GI bleeding  - History of alcohol abuse presented with abdominal pain, was found to have cecal perforation and peritonitis.   - Patient had a complicated course requiring multiple surgeries from sepsis.  Has been hospitalized in ICU since 9/10/2020.   - CT scan done on 9/24/2020 showed a SMV thrombus nonoccluding.  Was anticoagulated with IV heparin   - Bloody drainage from GAB drain/ostomy on 9/27 requiring heparin to be held.   - 10/3/2020 bleeding again after restarting heparin.   - EGD 10/3 non-bleeding jejunal ulcers with no stigmata of bleeding, ? Small bowel mucosal ischemia  - Fibrinogen level was 529  - INR 1.71--vitamin k 5 mg given 10/4 when INR 1.4  - Peripheral smear shows rare spherocytes  - Continue to hold IV heparin until bleeding stops. Note plans for trach today. Consider resuming lower intensity anticoagulation after 24 hours of no further bleeding and after trach.               Interval History:   Eyes open. Does not respond to my voice. No family present              Review of Systems:   The 5 point Review of Systems is negative other than noted in the HPI             Medications:   Scheduled Medications    chlorhexidine  15 mL Mouth/Throat Q12H     [Held by provider] furosemide  20 mg Intravenous Q12H     insulin aspart  1-12 Units Subcutaneous Q4H     lipids  250 mL Intravenous Q48H     micafungin  100 mg Intravenous Q24H     pantoprazole (PROTONIX) IV  40 mg Intravenous BID     piperacillin-tazobactam  3.375 g Intravenous Q6H     sodium chloride (PF)  10 mL Intracatheter Q8H     PRN Medications  albuterol, artificial tears ophthalmic solution, dextrose, glucose **OR** dextrose **OR** glucagon, HEParin, HYDROmorphone, lidocaine 4%,  "lidocaine (buffered or not buffered), magnesium sulfate, magnesium sulfate, metoprolol, miconazole, midazolam, naloxone, ondansetron **OR** ondansetron, potassium chloride, potassium chloride with lidocaine, potassium chloride, potassium chloride, potassium chloride, potassium phosphate (KPHOS) in D5W IV, potassium phosphate (KPHOS) in D5W IV, potassium phosphate (KPHOS) in D5W IV, prochlorperazine **OR** prochlorperazine **OR** prochlorperazine, sodium chloride (PF)               Physical Exam:   Vitals were reviewed  Blood pressure 121/68, pulse 103, temperature 99.5  F (37.5  C), temperature source Bladder, resp. rate 26, height 1.651 m (5' 5\"), weight 49.8 kg (109 lb 12.6 oz), last menstrual period 01/01/2015, SpO2 100 %.  Wt Readings from Last 4 Encounters:   10/06/20 49.8 kg (109 lb 12.6 oz)       I/O last 3 completed shifts:  In: 4260.6 [I.V.:2110; NG/GT:559]  Out: 4145 [Urine:1300; Drains:195; Stool:2650]                    Data:   All laboratory data and imaging studies reviewed.    CMP  Recent Labs   Lab 10/06/20  0540 10/05/20  1302 10/05/20  0428 10/04/20  0415 10/03/20  0555 10/02/20  0450 10/02/20  0450 10/01/20  0400 10/01/20  0400     --  152* 146* 144   < > 141   < >  --    POTASSIUM 4.4 4.7 2.8* 3.4 4.3   < > 5.2   < > 4.0   CHLORIDE 114*  --  122* 116* 114*   < > 111*   < >  --    CO2 14*  --  14* 19* 20   < > 20   < >  --    ANIONGAP 10  --  16* 11 10   < > 10   < >  --    *  --  172* 148* 142*   < > 140*   < >  --    BUN 99*  --  84* 101* 95*   < > 93*   < >  --    CR 2.05*  --  1.63* 1.97* 1.82*   < > 1.66*   < > 1.34*   GFRESTIMATED 26*  --  35* 28* 31*   < > 34*   < > 44*   GFRESTBLACK 31*  --  40* 32* 35*   < > 40*   < > 51*   PARISH 8.3*  --  6.8* 8.1* 8.2*   < > 8.8   < >  --    MAG  --   --  1.6 2.4*  --   --  2.5*  --  2.1   PHOS  --   --  2.9 4.2  --   --  6.7*  --  5.9*   PROTTOTAL 5.5*  --  5.0* 5.5* 5.6*  --  5.7*   < >  --    ALBUMIN 1.3*  --  1.1* 1.5* 1.6*  --  1.8*   < " >  --    BILITOTAL 3.3*  --  2.4* 3.6* 3.0*  --  3.5*   < >  --    ALKPHOS 261*  --  200* 232* 210*  --  204*   < >  --    AST 68*  --  52* 56* 49*  --  50*   < >  --    ALT 58*  --  46 50 46  --  47   < >  --     < > = values in this interval not displayed.     CBC  Recent Labs   Lab 10/06/20  0540 10/05/20  1302 10/05/20  0428 10/04/20  0415 10/03/20  0555   WBC 10.7  --  9.4 14.5* 18.8*   RBC 2.57*  --  2.42* 2.79* 2.93*   HGB 7.7* 7.8* 7.2* 8.4* 8.9*   HCT 23.1*  --  21.7* 24.8* 25.7*   MCV 90  --  90 89 88   MCH 30.0  --  29.8 30.1 30.4   MCHC 33.3  --  33.2 33.9 34.6   RDW 17.1*  --  17.0* 16.8* 16.6*     --  195 211 224     INR  Recent Labs   Lab 10/06/20  0540 10/05/20  0428 10/04/20  0415   INR 1.55* 1.71* 1.40*           Abdulaziz Saravia CNP  Nurse Practitioner  [facility]  [phone]

## 2020-10-06 NOTE — ANESTHESIA CARE TRANSFER NOTE
Patient: Evonne Martel    Procedure(s):  CREATION, TRACHEOSTOMY    Diagnosis: Respiratory failure (H) [J96.90]  Diagnosis Additional Information: No value filed.    Anesthesia Type:   General     Note:  Airway :Tracheostomy  Patient transferred to:ICU  Comments: Anesthesia Care Note    Patient: Evonne Martel    Transferred to: ICU    Patient vital signs: Stable    Airway: Trach    VSS. PIV patent. No change in dentition. Report given to SAMMY Allen CRNA   10/6/2020  ICU Handoff: Call for PAUSE to initiate/utilize ICU HANDOFF, Identified Patient, Identified Responsible Provider, Reviewed the Pertinent Medical History, Discussed Surgical Course, Reviewed Intra-OP Anesthesia Management and Issues during Anesthesia, Set Expectations for Post Procedure Period and Allowed Opportunity for Questions and Acknowledgement of Understanding      Vitals: (Last set prior to Anesthesia Care Transfer)    CRNA VITALS  10/6/2020 1217 - 10/6/2020 1255      10/6/2020             NIBP:  99/49    NIBP Mean:  66                Electronically Signed By: MARITZA Silverio CRNA  October 6, 2020  12:55 PM

## 2020-10-06 NOTE — ANESTHESIA PREPROCEDURE EVALUATION
Anesthesia Pre-Procedure Evaluation    Patient: Evonne Martel   MRN: [id number] : 1965          Preoperative Diagnosis: Respiratory failure (H) [J96.90]    Procedure(s):  CREATION, TRACHEOSTOMY    History reviewed. No pertinent past medical history.  Past Surgical History:   Procedure Laterality Date     COLECTOMY WITH COLOSTOMY, COMBINED N/A 9/10/2020    Procedure: COLECTOMY, WITH COLOSTOMY CREATION;  Surgeon: Chery Morrison MD;  Location:  OR     ESOPHAGOSCOPY, GASTROSCOPY, DUODENOSCOPY (EGD), COMBINED N/A 10/3/2020    Procedure: ESOPHAGOGASTRODUODENOSCOPY (EGD);  Surgeon: Delano Gastelum MD;  Location:  GI     KNEE SURGERY       LAPAROTOMY EXPLORATORY N/A 9/10/2020    Procedure: Exploratory laparotomy, right colectomy, ileostomy, transverse colon mucous fistula, pelvic drain placement, proctoscopy;  Surgeon: Chery Morrison MD;  Location:  OR     LAPAROTOMY EXPLORATORY N/A 2020    Procedure: LAPAROTOMY, ABDOMINAL WASHOUT;  Surgeon: Zuleyka Andres MD;  Location:  OR     LAPAROTOMY EXPLORATORY N/A 2020    Procedure: EXPLORATORY LAPAROTOMY, ABDOMINAL WASHOUT, CREATION COLOSTOMY;  Surgeon: Mikey Fermin MD;  Location:  OR     RESECT SMALL BOWEL WITHOUT OSTOMY N/A 2020    Procedure: ANTERIOR RESECTION WITH RIGID PROCTOSCOPY;  Surgeon: Zuleyka Andres MD;  Location:  OR       Anesthesia Evaluation     . Pt has had prior anesthetic.     No history of anesthetic complications          ROS/MED HX    ENT/Pulmonary:     (+)tobacco use, Current use , . .    Neurologic:       Cardiovascular: Comment: Off pressors        METS/Exercise Tolerance:  >4 METS   Hematologic:         Musculoskeletal:         GI/Hepatic: Comment: Admitted to ICU for septic shock after multiple bowel perforations requiring exploratory laparotomy right colectomy with end ileostomy and mucous fistula.  She has been to the operating room multiple times for washouts and for  "perforations.  S/p CT guided drain placements.    (+) Other GI/Hepatic Bowel peforation      Renal/Genitourinary:     (+) chronic renal disease, type: ARF,       Endo:         Psychiatric: Comment: EtOH use        Infectious Disease: Comment: Septic shock likely abdominal source on micafungin, vancomycin, meropenem, flagyl  (+) Recent Fever, Other Infectious Disease Sepsis      Malignancy:         Other:                          Physical Exam      Airway   Comment: intubated    Dental     Cardiovascular   Rhythm and rate: regular and abnormal      Pulmonary    breath sounds clear to auscultation            Lab Results   Component Value Date    WBC 10.7 10/06/2020    HGB 7.7 (L) 10/06/2020    HCT 23.1 (L) 10/06/2020     10/06/2020    CRP 65.4 (H) 09/17/2020     10/06/2020    POTASSIUM 4.4 10/06/2020    CHLORIDE 114 (H) 10/06/2020    CO2 14 (L) 10/06/2020    BUN 99 (H) 10/06/2020    CR 2.05 (H) 10/06/2020     (H) 10/06/2020    PARISH 8.3 (L) 10/06/2020    PHOS 2.9 10/05/2020    MAG 1.6 10/05/2020    ALBUMIN 1.3 (L) 10/06/2020    PROTTOTAL 5.5 (L) 10/06/2020    ALT 58 (H) 10/06/2020    AST 68 (H) 10/06/2020    ALKPHOS 261 (H) 10/06/2020    BILITOTAL 3.3 (H) 10/06/2020    LIPASE 48 (L) 09/10/2020    PTT 42 (H) 10/06/2020    INR 1.55 (H) 10/06/2020    FIBR 529 (H) 10/03/2020       Preop Vitals  BP Readings from Last 3 Encounters:   10/06/20 121/68    Pulse Readings from Last 3 Encounters:   10/06/20 103      Resp Readings from Last 3 Encounters:   10/06/20 26    SpO2 Readings from Last 3 Encounters:   10/06/20 100%      Temp Readings from Last 1 Encounters:   10/06/20 37.5  C (99.5  F) (Bladder)    Ht Readings from Last 1 Encounters:   09/10/20 1.651 m (5' 5\")      Wt Readings from Last 1 Encounters:   10/06/20 49.8 kg (109 lb 12.6 oz)    Estimated body mass index is 18.27 kg/m  as calculated from the following:    Height as of this encounter: 1.651 m (5' 5\").    Weight as of this encounter: 49.8 kg (109 " lb 12.6 oz).       Anesthesia Plan      History & Physical Review  History and physical reviewed and following examination; no interval change.    ASA Status:  4 .        Plan for General with Propofol induction.            Postoperative Care  Postoperative pain management:  IV analgesics.      Consents  Anesthetic plan, risks, benefits and alternatives discussed with:  Patient..                 Usman Wallace MD

## 2020-10-06 NOTE — PROGRESS NOTES
Critical Care Progress Note  10/06/2020    Name: Evonne Martel MRN#: [id number]   Age: 55 year old YOB: 1965     [facility] Day# 26           Problem List:   Active Problems:    Severe sepsis (H)    Pneumoperitoneum    Colon perforation (H)    Free intraperitoneal air    Perforation of colon (H)    Acute kidney failure with tubular necrosis (H)    Acute kidney failure, unspecified (H)           Summary/Hospital Course:   Ms. Martel is a 55 year old woman with a history of alcoholism who presented with abdominal pain on 9/10, found to have septic shock due to cecal perforation and sigmoid colon perforation. She underwent exploratory laparotomy with right colectomy and end ileostomy. Initially, she did well post-operatively, but unfortunately developed worsening sepsis, and returned to the OR on 9/13, where she was found to have gross stool spillage in the abdomen with additional sigmoid perforations. She underwent washout and anterior resection with drain placement. She returned to the OR again on 9/17 after CT abdomen showed additional possible leak, and she was found to have stool leaking from the stable line of the descending colon, and washout and end colostomy were performed. Subsequently, she has weaned off pressors. She has been found to have SMA and SMV thrombi, but attempts at anticoagulation with heparin have been short lived due to bleeding. She underwent endoscopy by GI which found duodenal ulcerations, but no varices. She was able to be extubated on 10/1, but unfortunately required reintubation on 10/2, and will undergo tracheostomy today.     Assessment and plan :     Evonne Martel is a 55 year old female admitted on 9/10/2020 for sepsis due to cecal and sigmoid perforations, s/p colectomy and multiple repeat washouts for additional perforations, with course complicated by HONORIO and hypoxic respiratory failure as well as GI bleed.   I have personally reviewed the daily labs, imaging  studies, cultures and discussed the case with referring physician and consulting physicians.   My assessment and plan by system for this patient is as follows:    Neurology/Psychiatry:   1. ICU sedation  2. Analgesia  Plan  -Precedex, wean as able.   -dilaudid PRN    Cardiovascular:   1. Resolved septic shock: off pressors   Plan  -monitor     Pulmonary/Ventilator Management:   1. Acute hypoxic respiratory failure  2. Failed spontaneous breathing trials, failed extubation on 10/1  Plan  - Wean vent as possible  - Trach today at noon.     GI/Nutrition :   1. Perforated colon s/p ex lap, colectomy, and abdominal washout (9/10, re-exploration on 9/13 and 9/17).   2. Worsening elevation of liver function tests.   3. Severe protein calorie malnutrition  4. Stress ulcer prophylaxis   5. GI bleeding with duodenal ulcerations on endoscopy   Plan  -TPN  -enteral feeds on hold for trach, resume at trickle afterward. Will eventually need PEG when cleared by surgery.   -protonix for GI Ppx, giving BID given GI bleed  -appreciate colorectal management   -appreciate GI assistance as well  -trend hepatic panel, may need repeat RUQ US pending course.   -drains in place, including midline presacral fluid collection, which may need CT sinogram today. Appreciate IR assistance     Renal/Fluids/Electrolytes:   1. Acute kidney injury, probable ATN: Cr worse  2. Hypernatremia: resolved  3. Hypokalemia: resolved  Plan  - Follow BMP  - Nephrology following, appreciate assistance   - monitor function and electrolytes as needed with replacement per ICU protocols.  - generally avoid nephrotoxic agents such as NSAID, IV contrast unless specifically required  - adjust medications as needed for renal clearance  - follow I/O's as appropriate.  - Free water with D5 at 80/hr  - replace potassium     Infectious Disease:   1. Septic shock secondary to perforated colon with feculent peritonitis, intraoperative cultures positive for pseudomonas,  enterococcus, e coli, and bacteroides   2. Possible RLL pneumonia noted on 9/17  Plan  - Continue zosyn and micafungin, may be able to change to fluconazole soon  - ID following, appreciate assistance    Endocrine:   1. Stress induced hyperglycemia  Plan  - ICU insulin protocol, goal sugar <180    Hematology/Oncology:   1. Acute normocytic anemia  2. Leukocytosis secondary to septic shock, improved  3. Nonocclusive thrombus in SMV and minimal nonocclusive thrombus in portal vein   4. Bleeding from ostomy  Plan  - Hematology consulted, appreciate assistance  - Holding heparin currently, restart after trach.   - Follow Hgb, transfuse if Hgb  - antibiotics as above.     MSK/Rheum:  1. deconditioning  Plan  - PT/OT    IV/Access:   1. Venous access - PICC, required for TPN  2. Arterial access - none  3. GAB drains in midline abdomen, right buttock  Plan  - central access required and necessary    ICU Prophylaxis:   1. DVT: mechanical  2. VAP: HOB 30 degrees, chlorhexidine rinse  3. Stress Ulcer: PPI  4. Restraints: Nonviolent soft two point restraints required and necessary for patient safety and continued cares and good effect as patient continues to pull at necessary lines, tubes despite education and distraction. Will readdress daily.   5. Wound care - per unit routine   6. Feeding - TPN  7. Family Update: not yet completed. Will talk to  this afternoon at bedside.   8. Disposition - remain in ICU    Key goals for next 24 hours:   1. Trach today  2.  Continue antibiotics.   3. Likely resume heparin after trach today.         Interim History/Overnight Events:   Stable overnight. No issues. This morning, intermittently following commands. Trach planned for today.          Key Medications:       chlorhexidine  15 mL Mouth/Throat Q12H     [Held by provider] furosemide  20 mg Intravenous Q12H     insulin aspart  1-12 Units Subcutaneous Q4H     lipids  250 mL Intravenous Q48H     micafungin  100 mg Intravenous Q24H      pantoprazole (PROTONIX) IV  40 mg Intravenous BID     piperacillin-tazobactam  3.375 g Intravenous Q6H     sodium chloride (PF)  10 mL Intracatheter Q8H       dexmedetomidine Stopped (10/01/20 2311)     dextrose       D5W 80 mL/hr at 10/06/20 0743     parenteral nutrition - ADULT compounded formula       parenteral nutrition - ADULT compounded formula 55 mL/hr at 10/05/20 2034     sodium chloride 0.9% (bag)                 Physical Examination:   Temp:  [99.3  F (37.4  C)-101.1  F (38.4  C)] 99.9  F (37.7  C)  Pulse:  [] 113  Resp:  [19-31] 24  BP: ()/(53-84) 123/76  FiO2 (%):  [30 %] (P) 30 %  SpO2:  [95 %-100 %] 95 %    Intake/Output Summary (Last 24 hours) at 10/5/2020 0839  Last data filed at 10/5/2020 0816  Gross per 24 hour   Intake 4406.73 ml   Output 3685 ml   Net 721.73 ml     Wt Readings from Last 4 Encounters:   10/06/20 49.8 kg (109 lb 12.6 oz)     BP - Mean:  [66-95] 93  Ventilation Mode: CMV/AC  (Continuous Mandatory Ventilation/ Assist Control)  FiO2 (%): 30 %  Rate Set (breaths/minute): 20 breaths/min  Tidal Volume Set (mL): 460 mL  PEEP (cm H2O): 5 cmH2O  Pressure Support (cm H2O): 5 cmH2O  Oxygen Concentration (%): 30 %  Peak Inspiratory Pressure (cm H2O) (Drager Kaia): 28  Resp: 24    Recent Labs   Lab 10/02/20  0450 10/01/20  2005 10/01/20  1657 10/01/20  1128   O2PER 30% 30% 60% 25%     GEN: no acute distress   HEENT: head ncat, sclera anicteric, trachea midline   PULM: unlabored synchronous with vent, CTAB.   CV/COR: tachycardic, normal rhythm, normal S1 and S2. No gallop, rub, nor murmur  ABD: soft, tender. Non distended. Ileostomy in place with brown liquid output. Mucous fistula bag empty. Ileostomy pink. Abdominal drain in place. Midline incision.   MSK/EXT:  Significant 3+ LE edema. WWP.  NEURO: grossly intact  SKIN: no obvious rash. Midline abdominal incision.   LINES: GAB drains in place with serosanguinous drainage.          Data:   All data and imaging reviewed.      ROUTINE ICU LABS (Last four results)  CMP  Recent Labs   Lab 10/06/20  0540 10/05/20  1302 10/05/20  0428 10/04/20  0415 10/03/20  0555 10/02/20  0450 10/02/20  0450 10/01/20  0400 10/01/20  0400     --  152* 146* 144   < > 141   < >  --    POTASSIUM 4.4 4.7 2.8* 3.4 4.3   < > 5.2   < > 4.0   CHLORIDE 114*  --  122* 116* 114*   < > 111*   < >  --    CO2 14*  --  14* 19* 20   < > 20   < >  --    ANIONGAP 10  --  16* 11 10   < > 10   < >  --    *  --  172* 148* 142*   < > 140*   < >  --    BUN 99*  --  84* 101* 95*   < > 93*   < >  --    CR 2.05*  --  1.63* 1.97* 1.82*   < > 1.66*   < > 1.34*   GFRESTIMATED 26*  --  35* 28* 31*   < > 34*   < > 44*   GFRESTBLACK 31*  --  40* 32* 35*   < > 40*   < > 51*   PARISH 8.3*  --  6.8* 8.1* 8.2*   < > 8.8   < >  --    MAG  --   --  1.6 2.4*  --   --  2.5*  --  2.1   PHOS  --   --  2.9 4.2  --   --  6.7*  --  5.9*   PROTTOTAL 5.5*  --  5.0* 5.5* 5.6*  --  5.7*   < >  --    ALBUMIN 1.3*  --  1.1* 1.5* 1.6*  --  1.8*   < >  --    BILITOTAL 3.3*  --  2.4* 3.6* 3.0*  --  3.5*   < >  --    ALKPHOS 261*  --  200* 232* 210*  --  204*   < >  --    AST 68*  --  52* 56* 49*  --  50*   < >  --    ALT 58*  --  46 50 46  --  47   < >  --     < > = values in this interval not displayed.     CBC  Recent Labs   Lab 10/06/20  0540 10/05/20  1302 10/05/20  0428 10/04/20  0415 10/03/20  0555   WBC 10.7  --  9.4 14.5* 18.8*   RBC 2.57*  --  2.42* 2.79* 2.93*   HGB 7.7* 7.8* 7.2* 8.4* 8.9*   HCT 23.1*  --  21.7* 24.8* 25.7*   MCV 90  --  90 89 88   MCH 30.0  --  29.8 30.1 30.4   MCHC 33.3  --  33.2 33.9 34.6   RDW 17.1*  --  17.0* 16.8* 16.6*     --  195 211 224     INR  Recent Labs   Lab 10/06/20  0540 10/05/20  0428 10/04/20  0415   INR 1.55* 1.71* 1.40*     Arterial Blood Gas  Recent Labs   Lab 10/02/20  0450 10/01/20  2005 10/01/20  1657 10/01/20  1128   O2PER 30% 30% 60% 25%       All cultures:  Recent Labs   Lab 10/02/20  1345   CULT Light growth  Candida albicans /  dubliniensis  Candida albicans and Candida dubliniensis are not routinely speciated  Susceptibility testing not routinely done  *  Culture negative monitoring continues     No results found for this or any previous visit (from the past 24 hour(s)).              Billing: This patient is critically ill: Yes. Total critical care time today 40 min.    Romeo Josue MD    Department of Medicine, Division of Pulmonary, Allergy, Critical Care, and Sleep Medicine  Pager: [phone]

## 2020-10-07 NOTE — PROGRESS NOTES
"[facility]  Infectious Disease Progress Note          Assessment and Plan:   IMPRESSION:   1.  A 54-year-old female without prior major infection or abdominal issues, admitted with major bowel perforation, now status post 3 operations, abdominal abscess, further leak, now with better source control.   2.  Ongoing septic shock , slow improvement  3 resp failure, vent   4 Renal insuff, new     RECOMMENDATIONS:   1.  Abdominal cultures enterococcus, multiple anaerobes, gram-negative rods including a sensitive Pseudomonas.  Has had well covered  microbiology throughout ;  2 CT with new collection, drain in 10/2, not surprising candida main pathogen despite ongoing marilyn, is C albicans only  Continue zosyn, adjust dose to renal fct             Interval History:   Trach done on vent and awake off sedation some interactive T Ok off pressors cxs same creat 1.0 CT new collection, drain in candida cx creat 2.12 trach Ok  Output drains 30-60 ml daily              Medications:       chlorhexidine  15 mL Mouth/Throat Q12H     [Held by provider] furosemide  20 mg Intravenous Q12H     insulin aspart  1-12 Units Subcutaneous Q4H     lipids  250 mL Intravenous Q48H     micafungin  100 mg Intravenous Q24H     pantoprazole (PROTONIX) IV  40 mg Intravenous BID     piperacillin-tazobactam  3.375 g Intravenous Q6H     sodium chloride (PF)  10 mL Intracatheter Q8H                  Physical Exam:   Blood pressure 121/66, pulse 110, temperature 100.9  F (38.3  C), resp. rate (!) 32, height 1.651 m (5' 5\"), weight 49.9 kg (110 lb 0.2 oz), last menstrual period 01/01/2015, SpO2 98 %.  Wt Readings from Last 2 Encounters:   10/07/20 49.9 kg (110 lb 0.2 oz)     Vital Signs with Ranges  Temp:  [98.2  F (36.8  C)-101.1  F (38.4  C)] 100.9  F (38.3  C)  Pulse:  [] 110  Resp:  [15-32] 32  BP: ()/(45-80) 121/66  FiO2 (%):  [25 %-30 %] 25 %  SpO2:  [95 %-100 %] 98 %    Constitutional: Intubated sedated trach   Lungs: " Clear to auscultation bilaterally, no crackles or wheezing   Cardiovascular: Regular rate and rhythm, normal S1 and S2, and no murmur noted   Abdomen: Mild distended,stoma wd as noted by CR   Skin: No rashes, no cyanosis, sl edema   Other:           Data:   All microbiology laboratory data reviewed.  Recent Labs   Lab Test 10/07/20  0402 10/06/20  1735 10/06/20  0540   WBC 12.8* 12.7* 10.7   HGB 9.0* 6.8* 7.7*   HCT 26.4* 20.9* 23.1*   MCV 88 91 90    210 217     Recent Labs   Lab Test 10/07/20  0402 10/07/20  0017 10/06/20  0540   CR 2.12* 2.06* 2.05*     No lab results found.  Recent Labs   Lab Test 10/02/20  1345 09/17/20  0201 09/17/20  0155 09/17/20  0054 09/16/20  2200 09/10/20  1745 09/10/20  1730   CULT Light growth  Candida albicans / dubliniensis  Candida albicans and Candida dubliniensis are not routinely speciated  Susceptibility testing not routinely done  *  Culture negative monitoring continues Heavy growth  Mixed aerobic and anaerobic fatimah  *  Heavy growth  Bacteroides uniformis  *  Heavy growth  Bacteroides ovatus/xylanisolvens  *  Susceptibility testing not routinely done  Moderate growth  Escherichia coli  Susceptibility testing done on previous specimen  *  Moderate growth  Pseudomonas aeruginosa  Susceptibility testing done on previous specimen  *  Light growth  Strain 2  Escherichia coli  Susceptibility testing done on previous specimen  *  Light growth  Enterococcus avium  Susceptibility testing done on previous specimen  *  Light growth  Candida albicans / dubliniensis  Candida albicans and Candida dubliniensis are not routinely speciated  Susceptibility testing not routinely done  *  On day 2, isolated in broth only:  Anaerobic gram negative rods  See anaerobic report for identification  * Heavy growth  Mixed aerobic and anaerobic fatimah  *  Heavy growth  Bacteroides fragilis  Susceptibility testing not routinely done  *  Moderate growth  Escherichia coli  *  Moderate  growth  Pseudomonas aeruginosa  *  Moderate growth  Strain 2  Escherichia coli  *  Light growth  Enterococcus avium  *  Light growth  Candida albicans / dubliniensis  Candida albicans and Candida dubliniensis are not routinely speciated  Susceptibility testing not routinely done  *  On day 2, isolated in broth only:  Anaerobic gram negative rods  See anaerobic report for identification  * No growth No growth No growth No growth

## 2020-10-07 NOTE — PROGRESS NOTES
Transported PT on transport vent to CT, airway was maintained and pt tolerated well.      Veena Villegas - RRT

## 2020-10-07 NOTE — PROCEDURES
Diagnosis:    1) Cardiac arrest  2) Abdominal catastrophe with multiple recent bowel perforations       Procedure:  Left femoral arterial line placement    Date:  10/07/20    Description:  Due to the emergent situation, consent was implied.  The left groin was prepped and draped in a sterile fashion and was anesthetized with Lidocaine.  Under US guidance, the left femoral artery was cannulated using Seldinger technique.  The catheter had pulsatile flow and transduced a good arterial waveform.  The catheter was secured with suture.  The patient tolerated the procedure without complications.       Toro Cordova MD on 10/7/2020 at 2:38 PM

## 2020-10-07 NOTE — PROGRESS NOTES
SPIRITUAL HEALTH SERVICES  SPIRITUAL ASSESSMENT Progress Note  [facility] ICU     REFERRAL SOURCE: Follow Up/Code    I spent time with patient's  Anibal at patient's bedside today after patient coded in the afternoon. The family is known to me from previous visits. Anibal was understandably very distressed, shocked and grieving his wife's condition and prognosis. I spent time with him at Evonne's bedside, offering emotional support, reflective listening and words of comfort. Fr. Ribeiro also came and offered the sacrament of anointing, per family's request.     PLAN: [facility] will continue to follow and remain available.     Celi Anton  Associate    Phone: [phone]  Pager: [phone]

## 2020-10-07 NOTE — PROCEDURES
Procedure:  Bedside bronchoscopy     Diagnosis:    1) Cardiac arrest  2) Abdominal catastrophe with multiple recent bowel perforations       Date:  10/07/20    Description:  Due to the emergent situation, consent was implied.  A timeout was performed.  The ventilator was placed on a set rate and the FiO2 was turned up to 100%.  A flexible bronchoscope was inserted through the tracheostomy tube into the trachea.  The airways were inspected to the level of the subsegmental bronchi bilaterally.  No obvious secretions were noted.  The patient tolerated the procedure without complications.      Toro Cordova MD on 10/7/2020 at 2:39 PM

## 2020-10-07 NOTE — PROGRESS NOTES
[facility] Hematology Progress Note     Primary Oncologist/Hematologist:            Assessment and Plan:     Evonne Martel is a 55 year old female who was admitted on 9/10/2020.     Coagulopathy with SMV thrombus  Prolonged INR  Alcohol use disorder  GI bleeding  - History of alcohol abuse presented with abdominal pain, was found to have cecal perforation and peritonitis.   - Patient had a complicated course requiring multiple surgeries from sepsis.  Has been hospitalized in ICU since 9/10/2020.   - CT scan done on 9/24/2020 showed a SMV thrombus nonoccluding.  Was anticoagulated with IV heparin   - Bloody drainage from GAB drain/ostomy on 9/27 requiring heparin to be held.   - 10/3/2020 bleeding again after restarting heparin.   - EGD 10/3 non-bleeding jejunal ulcers with no stigmata of bleeding, ? Small bowel mucosal ischemia  - Fibrinogen level was 529  - INR 1.71--vitamin k 5 mg given 10/4 when INR 1.4  - Peripheral smear shows rare spherocytes raising the question of hemolysis.  - hemolysis panel shows GLORIA positive at 3+.  LDH only mildly elevated.  Haptoglobin elevated.  Pt has an appropriate response to PRBC last esa with hgb 9 this morning.  Discussed with Dr. Lee.  Given labs, this does not appear consistent with an autoimmune process.   - Trach yesterday.   - Heparin remains on hold due to increased serosanguinous drainage  - Once bleeding resolves, anticipate lower intensity anticoagulation.              Interval History:                 Review of Systems:   The 5 point Review of Systems is negative other than noted in the HPI             Medications:   Scheduled Medications    chlorhexidine  15 mL Mouth/Throat Q12H     [Held by provider] furosemide  20 mg Intravenous Q12H     insulin aspart  1-12 Units Subcutaneous Q4H     lipids  250 mL Intravenous Q48H     micafungin  100 mg Intravenous Q24H     pantoprazole (PROTONIX) IV  40 mg Intravenous BID     piperacillin-tazobactam  3.375 g  "Intravenous Q6H     sodium chloride (PF)  10 mL Intracatheter Q8H     PRN Medications  albuterol, artificial tears ophthalmic solution, dextrose, glucose **OR** dextrose **OR** glucagon, HYDROmorphone, lidocaine 4%, lidocaine (buffered or not buffered), magnesium sulfate, magnesium sulfate, metoprolol, miconazole, midazolam, naloxone, ondansetron **OR** ondansetron, potassium chloride, potassium chloride with lidocaine, potassium chloride, potassium chloride, potassium chloride, potassium phosphate (KPHOS) in D5W IV, potassium phosphate (KPHOS) in D5W IV, potassium phosphate (KPHOS) in D5W IV, prochlorperazine **OR** prochlorperazine **OR** prochlorperazine, sodium chloride (PF)               Physical Exam:   Vitals were reviewed  Blood pressure 112/72, pulse 115, temperature 100.6  F (38.1  C), resp. rate (!) 47, height 1.651 m (5' 5\"), weight 49.9 kg (110 lb 0.2 oz), last menstrual period 01/01/2015, SpO2 98 %.  Wt Readings from Last 4 Encounters:   10/07/20 49.9 kg (110 lb 0.2 oz)       I/O last 3 completed shifts:  In: 4315.23 [I.V.:2240; NG/GT:400]  Out: 3171 [Urine:975; Drains:61; Stool:2135]                    Data:   All laboratory data and imaging studies reviewed.    CMP  Recent Labs   Lab 10/07/20  0402 10/07/20  0017 10/06/20  0540 10/05/20  1302 10/05/20  0428 10/04/20  0415 10/02/20  0450 10/02/20  0450   *  --  138  --  152* 146*   < > 141   POTASSIUM 4.2  --  4.4 4.7 2.8* 3.4   < > 5.2   CHLORIDE 106  --  114*  --  122* 116*   < > 111*   CO2 12*  --  14*  --  14* 19*   < > 20   ANIONGAP 13  --  10  --  16* 11   < > 10   *  --  110*  --  172* 148*   < > 140*   *  --  99*  --  84* 101*   < > 93*   CR 2.12* 2.06* 2.05*  --  1.63* 1.97*   < > 1.66*   GFRESTIMATED 25* 26* 26*  --  35* 28*   < > 34*   GFRESTBLACK 29* 30* 31*  --  40* 32*   < > 40*   PARISH 7.9*  --  8.3*  --  6.8* 8.1*   < > 8.8   MAG 2.3  --   --   --  1.6 2.4*  --  2.5*   PHOS 2.6  --   --   --  2.9 4.2  --  6.7* "   PROTTOTAL 5.6*  --  5.5*  --  5.0* 5.5*   < > 5.7*   ALBUMIN 1.2*  --  1.3*  --  1.1* 1.5*   < > 1.8*   BILITOTAL 3.1*  --  3.3*  --  2.4* 3.6*   < > 3.5*   ALKPHOS 268*  --  261*  --  200* 232*   < > 204*   AST 72*  --  68*  --  52* 56*   < > 50*   ALT 61*  --  58*  --  46 50   < > 47    < > = values in this interval not displayed.     CBC  Recent Labs   Lab 10/07/20  0402 10/06/20  1735 10/06/20  0540 10/05/20  1302 10/05/20  0428   WBC 12.8* 12.7* 10.7  --  9.4   RBC 3.00* 2.31* 2.57*  --  2.42*   HGB 9.0* 6.8* 7.7* 7.8* 7.2*   HCT 26.4* 20.9* 23.1*  --  21.7*   MCV 88 91 90  --  90   MCH 30.0 29.4 30.0  --  29.8   MCHC 34.1 32.5 33.3  --  33.2   RDW 16.4* 17.4* 17.1*  --  17.0*    210 217  --  195     INR  Recent Labs   Lab 10/06/20  1735 10/06/20  0540 10/05/20  0428 10/04/20  0415   INR 1.50* 1.55* 1.71* 1.40*           Abdulaziz Saravia CNP  Nurse Practitioner  [facility]  [phone]

## 2020-10-07 NOTE — PROGRESS NOTES
Critical Care Progress Note  10/07/2020    Name: Evonne Martel MRN#: [id number]   Age: 55 year old YOB: 1965     [facility] Day# 27           Problem List:   Active Problems:    Severe sepsis (H)    Pneumoperitoneum    Colon perforation (H)    Free intraperitoneal air    Perforation of colon (H)    Acute kidney failure with tubular necrosis (H)    Acute kidney failure, unspecified (H)           Summary/Hospital Course:   Ms. Martel is a 55 year old woman with a history of alcoholism who presented with abdominal pain on 9/10, found to have septic shock due to cecal perforation and sigmoid colon perforation. She underwent exploratory laparotomy with right colectomy and end ileostomy. Initially, she did well post-operatively, but unfortunately developed worsening sepsis, and returned to the OR on 9/13, where she was found to have gross stool spillage in the abdomen with additional sigmoid perforations. She underwent washout and anterior resection with drain placement. She returned to the OR again on 9/17 after CT abdomen showed additional possible leak, and she was found to have stool leaking from the stable line of the descending colon, and washout and end colostomy were performed. Subsequently, she has weaned off pressors. She has been found to have SMA and SMV thrombi, but attempts at anticoagulation with heparin have been short lived due to bleeding. She underwent endoscopy by GI which found duodenal ulcerations, but no varices. She was able to be extubated on 10/1, but unfortunately required reintubation on 10/2, tracheostomy placed 10/6.     Assessment and plan :     Evonne Martel is a 55 year old female admitted on 9/10/2020 for sepsis due to cecal and sigmoid perforations, s/p colectomy and multiple repeat washouts for additional perforations, with course complicated by HONORIO and hypoxic respiratory failure as well as GI bleed.   I have personally reviewed the daily labs, imaging studies,  cultures and discussed the case with referring physician and consulting physicians.   My assessment and plan by system for this patient is as follows:    Neurology/Psychiatry:   1. ICU sedation  2. Analgesia  Plan  -Precedex, wean as able.   -dilaudid PRN    Cardiovascular:   1. Resolved septic shock: off pressors   Plan  -monitor     Pulmonary/Ventilator Management:   1. Acute hypoxic respiratory failure, resolving, s/p trach on 10/6.   2. Failed spontaneous breathing trials, failed extubation on 10/1  Plan  - Wean vent as possible  -PST daily for trach weaning    GI/Nutrition :   1. Perforated colon s/p ex lap, colectomy, and abdominal washout (9/10, re-exploration on 9/13 and 9/17).   2. Elevation of liver function tests: stable today  3. Severe protein calorie malnutrition  4. Stress ulcer prophylaxis   5. GI bleeding with duodenal ulcerations on endoscopy   Plan  -TPN  -enteral feeds on hold for trach, resume at trickle afterward. Will eventually need PEG when cleared by surgery.   -protonix for GI Pox, giving BID given GI bleed  -appreciate colorectal management   -appreciate GI assistance as well  -trend hepatic panel, may need repeat RUQ US pending course.   -drains in place, including midline presacral fluid collection, which may need CT sinogram today. Appreciate IR assistance     Renal/Fluids/Electrolytes:   1. Acute kidney injury, probable ATN: Cr worse at 2.12 today. . More acidotic with bicarb of 12.   2. Hyponatremia: 131 today.   3. Hypokalemia: resolved  4. Metabolic acidosis: appears to be worsening. AG 13. May be due to bicarb losses from HONORIO.   Plan  - Follow BMP  - Nephrology consulted, appreciate assistance   - monitor function and electrolytes as needed with replacement per ICU protocols.  - generally avoid nephrotoxic agents such as NSAID, IV contrast unless specifically required  - adjust medications as needed for renal clearance  - follow I/O's as appropriate.  - Stop free water and  follow sodium   - replace potassium   - check lactate and VBG this AM.     Infectious Disease:   1. Septic shock secondary to perforated colon with feculent peritonitis, intraoperative cultures positive for pseudomonas, enterococcus, e coli, and bacteroides   2. Possible RLL pneumonia noted on 9/17  3. Ongoing fevers  Plan  - Continue zosyn and micafungin, may be able to change to fluconazole soon  - ID following, appreciate assistance  - repeat cultures today given ongoing fevers    Endocrine:   1. Stress induced hyperglycemia  Plan  - ICU insulin protocol, goal sugar <180    Hematology/Oncology:   1. Acute normocytic anemia  2. Leukocytosis secondary to septic shock, improved  3. Nonocclusive thrombus in SMV and minimal nonocclusive thrombus in portal vein   4. Bleeding from ostomy  Plan  - Hematology consulted, appreciate assistance  - Will hold heparin again today given increased serosanguinous drainage  - Follow Hgb, transfuse if Hgb <7  - antibiotics as above.     MSK/Rheum:  1. deconditioning  Plan  - PT/OT    IV/Access:   1. Venous access - PICC, required for TPN  2. Arterial access - none  3. GAB drains in midline abdomen, right buttock  Plan  - central access required and necessary    ICU Prophylaxis:   1. DVT: mechanical  2. VAP: HOB 30 degrees, chlorhexidine rinse  3. Stress Ulcer: PPI  4. Restraints: Nonviolent soft two point restraints required and necessary for patient safety and continued cares and good effect as patient continues to pull at necessary lines, tubes despite education and distraction. Will readdress daily.   5. Wound care - per unit routine   6. Feeding - TPN  7. Family Update: not yet completed. Will talk to  this afternoon at bedside.   8. Disposition - remain in ICU    Key goals for next 24 hours:   1. Work up of worsening creatinine and acidosis  2. Repeat cultures  3. PST if able  4. Consult nephrology.       Interim History/Overnight Events:   Trach completed yesterday. Hgb  dropped, so heparin was not started. Given 1 unit PRBC for Hgb 6.8. Repeat Hgb 9. Febrile again this AM. Sedated this AM, not following commands. Noted to have increased GAB drainage today.        Key Medications:       chlorhexidine  15 mL Mouth/Throat Q12H     [Held by provider] furosemide  20 mg Intravenous Q12H     insulin aspart  1-12 Units Subcutaneous Q4H     lipids  250 mL Intravenous Q48H     micafungin  100 mg Intravenous Q24H     pantoprazole (PROTONIX) IV  40 mg Intravenous BID     piperacillin-tazobactam  3.375 g Intravenous Q6H     sodium chloride (PF)  10 mL Intracatheter Q8H       dexmedetomidine Stopped (10/01/20 2311)     dextrose       D5W 80 mL/hr at 10/06/20 2001     parenteral nutrition - ADULT compounded formula 55 mL/hr at 10/06/20 2004     sodium chloride 0.9% (bag)                 Physical Examination:   Temp:  [98.2  F (36.8  C)-101.1  F (38.4  C)] 101.1  F (38.4  C)  Pulse:  [] 108  Resp:  [15-30] 29  BP: ()/(45-80) 119/64  FiO2 (%):  [25 %-30 %] 25 %  SpO2:  [95 %-100 %] 98 %    Intake/Output Summary (Last 24 hours) at 10/5/2020 0839  Last data filed at 10/5/2020 0816  Gross per 24 hour   Intake 4406.73 ml   Output 3685 ml   Net 721.73 ml     Wt Readings from Last 4 Encounters:   10/07/20 49.9 kg (110 lb 0.2 oz)     BP - Mean:  [62-95] 90  Ventilation Mode: CMV/AC  (Continuous Mandatory Ventilation/ Assist Control)  FiO2 (%): 25 %  Rate Set (breaths/minute): 20 breaths/min  Tidal Volume Set (mL): 460 mL  PEEP (cm H2O): 5 cmH2O  Pressure Support (cm H2O): 5 cmH2O  Oxygen Concentration (%): 25 %  Peak Inspiratory Pressure (cm H2O) (Drager Kaia): 28  Resp: 29    Recent Labs   Lab 10/02/20  0450 10/01/20  2005 10/01/20  1657 10/01/20  1128   O2PER 30% 30% 60% 25%     GEN: no acute distress, sedated  HEENT: head ncat, sclera anicteric, trachea midline. Trach in place.   PULM: unlabored synchronous with vent, crackles at left base, otherwise clear.   CV/COR: tachycardic, normal  rhythm, normal S1 and S2. No gallop, rub, nor murmur  ABD: soft, tender. Non distended. Ileostomy in place with brown liquid output with some serosanguinous output as well. Mucous fistula bag with scant mucous present. Ileostomy pink. Abdominal drain in place with some serosanguinous drainage. Midline incision.   MSK/EXT:  Significant 3+ LE edema. WWP.  NEURO: grossly intact  SKIN: no obvious rash. Midline abdominal incision.   LINES: GAB drains in place with serosanguinous drainage.          Data:   All data and imaging reviewed.     ROUTINE ICU LABS (Last four results)  CMP  Recent Labs   Lab 10/07/20  0402 10/07/20  0017 10/06/20  0540 10/05/20  1302 10/05/20  0428 10/04/20  0415 10/02/20  0450 10/02/20  0450   *  --  138  --  152* 146*   < > 141   POTASSIUM 4.2  --  4.4 4.7 2.8* 3.4   < > 5.2   CHLORIDE 106  --  114*  --  122* 116*   < > 111*   CO2 12*  --  14*  --  14* 19*   < > 20   ANIONGAP 13  --  10  --  16* 11   < > 10   *  --  110*  --  172* 148*   < > 140*   *  --  99*  --  84* 101*   < > 93*   CR 2.12* 2.06* 2.05*  --  1.63* 1.97*   < > 1.66*   GFRESTIMATED 25* 26* 26*  --  35* 28*   < > 34*   GFRESTBLACK 29* 30* 31*  --  40* 32*   < > 40*   PARISH 7.9*  --  8.3*  --  6.8* 8.1*   < > 8.8   MAG 2.3  --   --   --  1.6 2.4*  --  2.5*   PHOS 2.6  --   --   --  2.9 4.2  --  6.7*   PROTTOTAL 5.6*  --  5.5*  --  5.0* 5.5*   < > 5.7*   ALBUMIN 1.2*  --  1.3*  --  1.1* 1.5*   < > 1.8*   BILITOTAL 3.1*  --  3.3*  --  2.4* 3.6*   < > 3.5*   ALKPHOS 268*  --  261*  --  200* 232*   < > 204*   AST 72*  --  68*  --  52* 56*   < > 50*   ALT 61*  --  58*  --  46 50   < > 47    < > = values in this interval not displayed.     CBC  Recent Labs   Lab 10/07/20  0402 10/06/20  1735 10/06/20  0540 10/05/20  1302 10/05/20  0428   WBC 12.8* 12.7* 10.7  --  9.4   RBC 3.00* 2.31* 2.57*  --  2.42*   HGB 9.0* 6.8* 7.7* 7.8* 7.2*   HCT 26.4* 20.9* 23.1*  --  21.7*   MCV 88 91 90  --  90   MCH 30.0 29.4 30.0  --  29.8    MCHC 34.1 32.5 33.3  --  33.2   RDW 16.4* 17.4* 17.1*  --  17.0*    210 217  --  195     INR  Recent Labs   Lab 10/06/20  1735 10/06/20  0540 10/05/20  0428 10/04/20  0415   INR 1.50* 1.55* 1.71* 1.40*     Arterial Blood Gas  Recent Labs   Lab 10/02/20  0450 10/01/20  2005 10/01/20  1657 10/01/20  1128   O2PER 30% 30% 60% 25%       All cultures:  Recent Labs   Lab 10/02/20  1345   CULT Light growth  Candida albicans / dubliniensis  Candida albicans and Candida dubliniensis are not routinely speciated  Susceptibility testing not routinely done  *  Culture negative monitoring continues     No results found for this or any previous visit (from the past 24 hour(s)).              Billing: This patient is critically ill: Yes. Total critical care time today 40 min.    Romeo Josue MD    Department of Medicine, Division of Pulmonary, Allergy, Critical Care, and Sleep Medicine  Pager: [phone]

## 2020-10-07 NOTE — PROGRESS NOTES
THORACIC SURGERY    Tracheostomy ok no bleeding    NAZ SOLORZANO MD [facility] ONCOLOGY THORACIC SURGERY  CELL:  [phone]  OFFICE: [phone]

## 2020-10-07 NOTE — CONSULTS
RENAL CONSULTATION NOTE    REFERRING MD:  Romeo Josue MD    REASON FOR CONSULTATION:  Worsening kidney function and acidosis    HPI:  55 y.o unfortunate woman who was admitted on 9/11 with abdominal pain. She was found to have perforated sigmoid colon. She has a very complicated hospitalization. She an ex-lap with right hemicolectomy. She returned to the OR on 9/13 and 9/17 for leakage. Her course is now complicated with acute kidney injury and worsening lactic acidosis. She had ~ 10 mintues code today per report. Most recent labs during code showed a Scr of 2 mg/dl, which is similar to yesterday and potassium level of 5.8 and alactic acidosis. Currently, she is on max Epi and Levophed. Sodium bicarb is running at 50 ml/hr. Per report, she had 1 liter of fluid during code.     ROS:  A complete review of systems was performed and is negative except as noted above.    PMH:  History reviewed. No pertinent past medical history.    PSH:    Past Surgical History:   Procedure Laterality Date     COLECTOMY WITH COLOSTOMY, COMBINED N/A 9/10/2020    Procedure: COLECTOMY, WITH COLOSTOMY CREATION;  Surgeon: Chery Morrison MD;  Location:  OR     ESOPHAGOSCOPY, GASTROSCOPY, DUODENOSCOPY (EGD), COMBINED N/A 10/3/2020    Procedure: ESOPHAGOGASTRODUODENOSCOPY (EGD);  Surgeon: Delano Gastelum MD;  Location:  GI     KNEE SURGERY       LAPAROTOMY EXPLORATORY N/A 9/10/2020    Procedure: Exploratory laparotomy, right colectomy, ileostomy, transverse colon mucous fistula, pelvic drain placement, proctoscopy;  Surgeon: Chery Morrison MD;  Location:  OR     LAPAROTOMY EXPLORATORY N/A 9/13/2020    Procedure: LAPAROTOMY, ABDOMINAL WASHOUT;  Surgeon: Zuleyka Andres MD;  Location:  OR     LAPAROTOMY EXPLORATORY N/A 9/17/2020    Procedure: EXPLORATORY LAPAROTOMY, ABDOMINAL WASHOUT, CREATION COLOSTOMY;  Surgeon: Mikey Fermin MD;  Location:  OR     RESECT SMALL BOWEL WITHOUT OSTOMY N/A 9/13/2020     Procedure: ANTERIOR RESECTION WITH RIGID PROCTOSCOPY;  Surgeon: Zuleyka Andres MD;  Location:  OR     TRACHEOSTOMY N/A 10/6/2020    Procedure: CREATION, TRACHEOSTOMY;  Surgeon: Paul Live MD;  Location:  OR       MEDICATIONS:      chlorhexidine  15 mL Mouth/Throat Q12H     [Held by provider] furosemide  20 mg Intravenous Q12H     insulin aspart  1-12 Units Subcutaneous Q4H     lipids  250 mL Intravenous Q48H     micafungin  100 mg Intravenous Q24H     pantoprazole (PROTONIX) IV  40 mg Intravenous BID     piperacillin-tazobactam  3.375 g Intravenous Q6H     sodium chloride (PF)  10 mL Intracatheter Q8H       ALLERGIES:    Allergies as of 09/10/2020     (No Known Allergies)       FH:  History reviewed. No pertinent family history.    SH:    Social History     Socioeconomic History     Marital status:      Spouse name: Not on file     Number of children: Not on file     Years of education: Not on file     Highest education level: Not on file   Occupational History     Not on file   Social Needs     Financial resource strain: Not on file     Food insecurity     Worry: Not on file     Inability: Not on file     Transportation needs     Medical: Not on file     Non-medical: Not on file   Tobacco Use     Smoking status: Current Every Day Smoker     Years: 30.00     Types: Cigarettes     Smokeless tobacco: Never Used   Substance and Sexual Activity     Alcohol use: Yes     Comment: three drinks     Drug use: Never     Sexual activity: Not on file   Lifestyle     Physical activity     Days per week: Not on file     Minutes per session: Not on file     Stress: Not on file   Relationships     Social connections     Talks on phone: Not on file     Gets together: Not on file     Attends [employer] service: Not on file     Active member of club or organization: Not on file     Attends meetings of clubs or organizations: Not on file     Relationship status: Not on file     Intimate partner violence  "    Fear of current or ex partner: Not on file     Emotionally abused: Not on file     Physically abused: Not on file     Forced sexual activity: Not on file   Other Topics Concern     Parent/sibling w/ CABG, MI or angioplasty before 65F 55M? Not Asked   Social History Narrative     Not on file       PHYSICAL EXAM:    BP 97/60   Pulse 89   Temp 100.8  F (38.2  C)   Resp 24   Ht 1.651 m (5' 5\")   Wt 49.9 kg (110 lb 0.2 oz)   LMP 01/01/2015 (Approximate)   SpO2 98%   BMI 18.31 kg/m    GENERAL: Critically ill.   HEENT:  Eyes are close.   CV: RRR, tach, no murmurs, no clicks, gallops, or rubs.   RESP: No wheezes or crackles.   GI: Abdomen distended, soft, drains in place.   MUSCULOSKELETAL: extremities legs are without muscle mass. Has edema from third-spacing but not terrible.   SKIN: no suspicious lesions or rashes.   NEURO:  Intubated and sedated.   PSYCH: unable to evaluate  LYMPH: No palpable ant/post cervical   +hudson cath and rectal tube    LABS:      CBC RESULTS:     Recent Labs   Lab 10/07/20  0402 10/06/20  1735 10/06/20  0540 10/05/20  1302 10/05/20  0428 10/04/20  0415 10/03/20  0555   WBC 12.8* 12.7* 10.7  --  9.4 14.5* 18.8*   RBC 3.00* 2.31* 2.57*  --  2.42* 2.79* 2.93*   HGB 9.0* 6.8* 7.7* 7.8* 7.2* 8.4* 8.9*   HCT 26.4* 20.9* 23.1*  --  21.7* 24.8* 25.7*    210 217  --  195 211 224       BMP RESULTS:  Recent Labs   Lab 10/07/20  0402 10/07/20  0017 10/06/20  0540 10/05/20  1302 10/05/20  0428 10/04/20  0415 10/03/20  0555 10/02/20  1223   *  --  138  --  152* 146* 144 143   POTASSIUM 4.2  --  4.4 4.7 2.8* 3.4 4.3 5.4*   CHLORIDE 106  --  114*  --  122* 116* 114* 113*   CO2 12*  --  14*  --  14* 19* 20 20   *  --  99*  --  84* 101* 95* 95*   CR 2.12* 2.06* 2.05*  --  1.63* 1.97* 1.82* 1.72*   *  --  110*  --  172* 148* 142* 142*   PARISH 7.9*  --  8.3*  --  6.8* 8.1* 8.2* 9.0       INR  Recent Labs   Lab 10/06/20  1735 10/06/20  0540 10/05/20  0428 10/04/20  0415   INR " 1.50* 1.55* 1.71* 1.40*        DIAGNOSTICS:  Reviewed    A/P:  55 y.o woman with prolong and complicated hospitalization for perforated colon, now decline again from shock, consulted for HONORIO.     # Patient with a baseline serum creatinine of ~ 0.5 mg/dl.     # Acute and subacute kidney injury: This is due to recurrent septic shock. Scr of 2 is high for her given that she acevedo snot have much muscle mass and baseline Scr of ~ 0.5 mg/dl.     # Recurrent septic shock:    -back on EPI ane NE after code   -Keep MAP >65   -IV abs per ID    # Lactic acidosis 2/2 to septic shock: Need to r/o ischemic bowel.     # Perforated bowel and complicated with leakage and peritonitis.     # FEN: Mild hyperkalemia during shock. Patient has edema from 3-rd spacing but but does not seem too bad.     # Severe malnutrition with an albumin of 1:     # Respiratory failure: Remain intubated. CXR showed worsening patchy opacities.     # Cardiopulmonary arrest today.     Plan:  # 25 grams of 5% albumin q12 hrs x 6 ddose  # Start NS at 125 ml/hr  # Continue bicarb gtt  # CMP and LA ordered for 1800    I discussed the plan with Dr. Rand. Will see how she does in the next couple hours. If her condition declines and needs renal replacement therapy, then will initiate CRRT. I spent 60+ minutes on this case. Please call with any questions or concerns. Thank you.     Patient is critically ill and her condition is guarded.     Ananda Zuleta MD  [facility] - Nephrology  Office Phone: [phone]  Pager: [phone]

## 2020-10-07 NOTE — PROGRESS NOTES
[facility] ICU RESPIRATORY NOTE        Date of Admission: 9/10/2020    Date of Intubation (most recent): 10/01/2020    Reason for Mechanical Ventilation: Airway Protection     Number of Days on Mechanical Ventilation: 7    Met Criteria for Spontaneous Breathing Trial: Yes    Significant Events Today: Code blue was initiated. pt noted to go into V-tach and then asystole. Ambu Bag the pt with peep valve the entire time of the code.    ABG Results:   Recent Labs   Lab 10/07/20  1540 10/07/20  1435 10/07/20  1404 10/02/20  0450 10/01/20  2005 10/01/20  1657   PH 7.38 7.43 6.97*  --   --   --    PCO2 29* 34* 33*  --   --   --    PO2 192* 409* 407*  --   --   --    HCO3 17* 23 8*  --   --   --    O2PER  --  100%  --  30% 30% 60%       Current Vent Settings: Ventilation Mode: CMV/AC  (Continuous Mandatory Ventilation/ Assist Control)  FiO2 (%): 100 %  Rate Set (breaths/minute): 25 breaths/min  Tidal Volume Set (mL): 460 mL  PEEP (cm H2O): 5 cmH2O  Pressure Support (cm H2O): 5 cmH2O  Oxygen Concentration (%): 25 %  Peak Inspiratory Pressure (cm H2O) (Drager Kaia): 28  Resp: (!) 123      Plan: Pt continues on full ventilator support.     Xavier Hansen, RT

## 2020-10-07 NOTE — PROGRESS NOTES
Radiology Note:  Date: 2020   Patient name: Evonne Martel  MRN:[id number]  :  1965    Chart reviewed.Outputs from IR drain decreased to minimal in the last few days, patient has developed fevers.Concern for new abscess development?   Output by Drain (mL) 10/05/20 0700 - 10/05/20 1459 10/05/20 1500 - 10/05/20 2259 10/05/20 2300 - 10/06/20 0659 10/06/20 0700 - 10/06/20 1459 10/06/20 1500 - 10/06/20 2259 10/06/20 2300 - 10/07/20 0659 10/07/20 0700 - 10/07/20 1038   Closed/Suction Drain 1 Right;Anterior;Midline Abdomen Bulb 24 [place] 20 50 105 40  20 30   Closed/Suction Drain Right Buttock Bulb 5 10 5 0 0 1 0     Discussed with radiologist Dr Gutierrez. Will plan for CT abdomen/pelvis now to evaluate current drain position and potential resolution of pelvic fluid collection. Unable to use IV contrast due to her poor renal function so do not expect to be able to visualize or evaluate SMV thrombus well. Potentially IR sinogram later for possible reposition/upsize drain if needed, depending on results.    Discussed with JOSE Scott.    Charito Chavarria PA-C  Interventional Radiology

## 2020-10-07 NOTE — PROCEDURES
[facility] Procedure Note      FAST (Focused Assessment with Sonography for Trauma):     PROCEDURE: PERFORMED BY: Dr. Toro Cordova MD    INDICATIONS/SYMPTOM:  Cardiac arrest and shock    PROBE: Low frequency convex probe    BODY LOCATION: The ultrasound was performed in the abdominal and chest areas.    FINDINGS:   Abdomen:  No evidence of blood in hepatorenal or splenorenal areas, IVC not collapsible on inspiration  Chest:  Cardiac activity and contractility hyperdynamic, no obvious right heart strain, no obvious pericardial effusion      INTERPRETATION:   No obvious cause for patient's shock on chest and abdominal FAST    Toro Cordova MD on 10/7/2020 at 2:57 PM

## 2020-10-07 NOTE — PROGRESS NOTES
BRIEF NUTRITION NOTE:    Monitoring TPN adequacy.  A full Nutrition Reassessment was completed 10/6.  See note for details.    NEW FINDINGS:  10/6:  Tracheostomy   D5 IVF at 50 mL/hr discontinued today.  Na 131 (L) --> Pharm to add Na back into TPN.   (H)  Cr 2.12 (H) - Worsening HONORIO.   (H).  TF was resumed yesterday Impact Peptide 1.5 at 10 mL/hr = 360 kcal, 23 g protein, 185 mL H2O, 34 g CHO, no fiber   Stool:  2135 mL yest.  Questionable absorption of TF.    REASSESSED NUTRITION NEEDS:  (Modified energy range higher)  Dosing Weight 45.9 kg   Estimated Energy Needs: 3791-6398 kcals (35-40 Kcal/Kg)  Justification: repletion, underweight and trached  Estimated Protein Needs: 68-93 grams protein (1.5-2 g pro/Kg)  Justification: repletion, post-op and hypercatabolism with critical illness       INTERVENTION:  Parenteral Nutrition - Modify composition and rate --> Will change next bag TPN to D20 AA4.5 at 65 mL/hr + Lipids 250 mL every other day = 1592 kcals (35 kcal/kg), 70 gm pro (1.5 gm/kg), 312 gm CHO, 16% fat.  If you also count the energy & pro from TF, Total (TF + TPN/Lipids):  1950 kcals (43 kcal/kg and 106% energy needs), 93 gm pro (2 gm/kg).    Lauren Martinez, RD, LD, CNSC

## 2020-10-07 NOTE — PROGRESS NOTES
Patient had cardiac arrest at about 1330. Cardiac rhythm and blood pressure re gained. Patient on epi, levophed, bicarb and magnesium drips per order. Multiple critical labs all reported to Dr Flores. Patient spouse called and he came to bedside. Patient spouse and two daughter had a conference with Dr Flores.

## 2020-10-07 NOTE — PROGRESS NOTES
COLON & RECTAL SURGERY  PROGRESS NOTE    October 7, 2020    SUBJECTIVE:  Trach placement yesterday. Vvio=393.1 over past 24 hours. Minimal output from IR drain, possible sinogram today. Hgb=6.8 requiring 1 unit PRBCs, recheck=9.     OBJECTIVE:  Temp:  [98.2  F (36.8  C)-101.1  F (38.4  C)] 100.9  F (38.3  C)  Pulse:  [] 110  Resp:  [15-32] 32  BP: ()/(45-80) 121/66  FiO2 (%):  [25 %-30 %] 25 %  SpO2:  [95 %-100 %] 98 %    Intake/Output Summary (Last 24 hours) at 10/7/2020 1012  Last data filed at 10/7/2020 0900  Gross per 24 hour   Intake 4231.03 ml   Output 3361 ml   Net 870.03 ml       GENERAL:  Trach in place, sedated, does not follow with eyes today  HEAD: Normocephalic atraumatic  SCLERA: Anicteric  EXTREMITIES: Warm and well perfused  ABDOMEN:  Soft, appropriately tender, non-distended. No guarding, rigidity, or peritoneal signs. Mucus fistula with yellow/mucus output. Ileostomy with green slightly red-tinged xuvmvz=2212zh/24hr. Jurgen drain with mostly serous drainage=61ml/24hr.   INCISION:  C/d/i, some sanguinous drainage to midline packing site.     LABS:  Lab Results   Component Value Date    WBC 12.8 10/07/2020     Lab Results   Component Value Date    HGB 9.0 10/07/2020     Lab Results   Component Value Date    HCT 26.4 10/07/2020     Lab Results   Component Value Date     10/07/2020     Last Basic Metabolic Panel:  Lab Results   Component Value Date     10/07/2020      Lab Results   Component Value Date    POTASSIUM 4.2 10/07/2020     Lab Results   Component Value Date    CHLORIDE 106 10/07/2020     Lab Results   Component Value Date    PARISH 7.9 10/07/2020     Lab Results   Component Value Date    CO2 12 10/07/2020     Lab Results   Component Value Date     10/07/2020     Lab Results   Component Value Date    CR 2.12 10/07/2020     Lab Results   Component Value Date     10/07/2020       ASSESSMENT/PLAN: 55 year old female s/p ex lap with right hemicolectomy on 9/10,  followed by re-exploration with abdominal washout and anterior resection on 9/13, and ex lap with washout and descending colon MF on 9/17. She remains intubated in the ICU, off pressors. Post-op ileus. 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  Suspect bleeding from raw surfaces related to multiple surgeries and heparin gtt as well as abdominal wall hematoma seen on repeat CT on 9/27. The bleeding seems to have stopped with cessation of her heparin, and her Hb is stable. Abdominal incision hematoma evacuated at bedside on 9/29. IR placed drain in pelvic fluid collection on 10/2, heparin also started later that day with increased bleeding from stoma site, on hold now. Underwent EGD 10/3 with evidence of duodenal ulcer and possible SB ischemia started on PPI. GI and hem/onc now following. Continues with some bloody ileostomy output.      Continue TPN. TF per IR given sinogram today. She is likely to require g-tube or G-J tube placement for long term feeding access as well. Continue PPI for ulcers and UGIB.   Heparin restarted? 1700 yesterday but on hold now after Hgb 6.8. Received 1 unit PRBCs with recheck Hgb=9. SMV thrombus stable on CT on 10/1. Will discuss with IR possibility of assessing thrombus and possible new abscess with sinogram.   Appreciate hematology assistance with anticoagulation management.   IR plan for sinogram today for pelvic fluid collection.  Agree with Nephrology consult given worsening kidney function  Abx per ID for abdominal collections. Appreciate their assistance.    Appreciate ICU assistance with cares.   WOCN for stoma cares and midline wound      For questions/paging, please contact the CRS office at [phone].    Sonia Treadwell PA-C  [facility]  Phone: [phone]

## 2020-10-07 NOTE — PROGRESS NOTES
Interval Progress Note:    Patient noted to go into V-tach and then asystole while in chair.  Code blue initiated at 1339.  Patient received 3 rounds of CPR.  Her EKG showed evidence of Torsades alonzo Pointes.  She was given 2 doses of Epi, 3 amps of bicarb, 1 amp of CaCl, 2g of Mg, two shocks of 120 and 150 J.  She achieved ROSC at 1345.  She was initiated on Levophed and Epi gtt's as well as a bicarb gtt due to persistent shock and acidosis.  She underwent a CXR as well as a FAST which didn't show any obvious cause for her arrest or shock.  She had a left femoral arterial line placed.  She had a quick bronch which didn't show any mucous plug.  Lab w/u was notable for a K of 5.8 which was reversed with insulin and D50 and lactate of 7.6.  ABG revealed a pH 6.97, PCO2 33, bicarb 8, BD 22.9.  General surgery and  were updated.    Toro Cordova MD on 10/7/2020 at 3:14 PM               Addendum:  Discussed with  at bedside.  Plan is to see how she settles out by tomorrow afternoon to decide further course of action.  Will continue current support for now but patient is now DNR.      Toro Cordova MD on 10/7/2020 at 4:20 PM

## 2020-10-07 NOTE — PLAN OF CARE
Trached. LS coarse. Neuros unchanged, intermittently follows. JPs with minimal output. >1L from ileostomy. Adequate UOP. 1u PRBCs given, hgb 9.0. Continue to monitor.

## 2020-10-08 NOTE — PROVIDER NOTIFICATION
Critical lab: Lactic 4.3    Intensivist notified    No new orders at this time. Continue to monitor.

## 2020-10-08 NOTE — PROGRESS NOTES
"[facility]  Infectious Disease Progress Note          Assessment and Plan:   IMPRESSION:   1.  A 54-year-old female without prior major infection or abdominal issues, admitted with major bowel perforation, now status post 3 operations, abdominal abscess, further leak, now with better source control.   2.  Ongoing septic shock , slow improvement  3 resp failure, vent   4 Renal insuff,  5 10/7 cardiac arrest      RECOMMENDATIONS:   1.  Abdominal cultures enterococcus, multiple anaerobes, gram-negative rods including a sensitive Pseudomonas.  Has had well covered  microbiology throughout ;  2 CT with new collection, drain in 10/2, not surprising candida main pathogen despite ongoing marilyn, is C albicans only  On zosyn,marilyn adjust dose to renal fct  Cardiac arrest with multiple assocated lab and exam changes, WBC up, LGF, BC done vanco dose given  LOC discussions etc             Interval History:   Trach done on vent events last PM noted, neuro worse, lactate and other labs noted LGF, BC pending vanco added              Medications:       chlorhexidine  15 mL Mouth/Throat Q12H     [Held by provider] furosemide  20 mg Intravenous Q12H     insulin aspart  1-12 Units Subcutaneous Q4H     lipids  250 mL Intravenous Q48H     micafungin  100 mg Intravenous Q24H     pantoprazole (PROTONIX) IV  40 mg Intravenous BID     piperacillin-tazobactam  3.375 g Intravenous Q6H     sodium chloride (PF)  10 mL Intracatheter Q8H     vancomycin place juarez - receiving intermittent dosing  1 each Intravenous See Admin Instructions                  Physical Exam:   Blood pressure 128/70, pulse 82, temperature 99.1  F (37.3  C), resp. rate 25, height 1.651 m (5' 5\"), weight 49.7 kg (109 lb 9.1 oz), last menstrual period 01/01/2015, SpO2 100 %.  Wt Readings from Last 2 Encounters:   10/08/20 49.7 kg (109 lb 9.1 oz)     Vital Signs with Ranges  Temp:  [99  F (37.2  C)-101.1  F (38.4  C)] 99.1  F (37.3  C)  Pulse:  " [] 82  Resp:  [17-30] 25  BP: ()/(28-82) 128/70  MAP:  [57 mmHg-100 mmHg] 66 mmHg  Arterial Line BP: ()/(44-92) 108/49  FiO2 (%):  [25 %-100 %] 40 %  SpO2:  [77 %-100 %] 100 %    Constitutional: Intubated trach neuro worse post arrest   Lungs: Clear to auscultation bilaterally, no crackles or wheezing   Cardiovascular: Regular rate and rhythm, normal S1 and S2, and no murmur noted   Abdomen: Mild distended,stoma wd as noted by CR   Skin: No rashes, no cyanosis, sl edema   Other:           Data:   All microbiology laboratory data reviewed.  Recent Labs   Lab Test 10/08/20  0444 10/07/20  1755 10/07/20  1540   WBC 21.4* 24.1* 18.2*   HGB 7.4* 9.3* 9.5*   HCT 21.3* 26.1* 27.9*   MCV 85 86 87    278 267     Recent Labs   Lab Test 10/08/20  0444 10/07/20  2200 10/07/20  1755   CR 2.06* 2.14* 2.12*     No lab results found.  Recent Labs   Lab Test 10/07/20  1057 10/07/20  0941 10/02/20  1345 09/17/20  0201 09/17/20  0155 09/17/20  0054 09/16/20  2200 09/10/20  1745 09/10/20  1730   CULT No growth after 16 hours No growth after 17 hours Light growth  Leti albicans / dubliniensis  Candida albicans and Candida dubliniensis are not routinely speciated  Susceptibility testing not routinely done  *  Culture negative monitoring continues Heavy growth  Mixed aerobic and anaerobic fatimah  *  Heavy growth  Bacteroides uniformis  *  Heavy growth  Bacteroides ovatus/xylanisolvens  *  Susceptibility testing not routinely done  Moderate growth  Escherichia coli  Susceptibility testing done on previous specimen  *  Moderate growth  Pseudomonas aeruginosa  Susceptibility testing done on previous specimen  *  Light growth  Strain 2  Escherichia coli  Susceptibility testing done on previous specimen  *  Light growth  Enterococcus avium  Susceptibility testing done on previous specimen  *  Light growth  Candida albicans / dubliniensis  Candida albicans and Candida dubliniensis are not routinely  speciated  Susceptibility testing not routinely done  *  On day 2, isolated in broth only:  Anaerobic gram negative rods  See anaerobic report for identification  * Heavy growth  Mixed aerobic and anaerobic fatimah  *  Heavy growth  Bacteroides fragilis  Susceptibility testing not routinely done  *  Moderate growth  Escherichia coli  *  Moderate growth  Pseudomonas aeruginosa  *  Moderate growth  Strain 2  Escherichia coli  *  Light growth  Enterococcus avium  *  Light growth  Candida albicans / dubliniensis  Candida albicans and Candida dubliniensis are not routinely speciated  Susceptibility testing not routinely done  *  On day 2, isolated in broth only:  Anaerobic gram negative rods  See anaerobic report for identification  * No growth No growth No growth No growth

## 2020-10-08 NOTE — PLAN OF CARE
Remains trached on full vent support. Unresponsive, does not withdraw from pain, pupils fixed. Levo for BP support. TPN. Ileostomy >1L output. Moderate output from abdominal GAB, scant from buttock GAB. Low UOP via hudson, intensivist aware. Albumin given for high lactic - most recent lactic 3.4. Potassium 3.3, replacement initiated. Continue to monitor.

## 2020-10-08 NOTE — PROGRESS NOTES
COLON & RECTAL SURGERY  PROGRESS NOTE      SUBJECTIVE:  Minimal neuro exam off sedation after cardiac arrest yesterday. Off all sedation and unresponsive. Lactate improved overnight. Off epi. Abdomen more distended.     OBJECTIVE:  Temp:  [99  F (37.2  C)-101.1  F (38.4  C)] 99  F (37.2  C)  Pulse:  [] 82  Resp:  [17-30] 30  BP: ()/(28-82) 128/70  MAP:  [57 mmHg-100 mmHg] 74 mmHg  Arterial Line BP: ()/(44-92) 114/55  FiO2 (%):  [25 %-100 %] 40 %  SpO2:  [77 %-100 %] 100 %    Intake/Output Summary (Last 24 hours) at 10/7/2020 1012  Last data filed at 10/7/2020 0900  Gross per 24 hour   Intake 4231.03 ml   Output 3361 ml   Net 870.03 ml       GENERAL:  Trach in place, unresponsive   EXTREMITIES: edematous, perfused  ABDOMEN:  Soft, more distended. Mucus fistula more pale with yellow/mucus output. Ileostomy pink with brown liquid stool. JPs serosang  INCISION: intact with staples, packing in place    LABS:  Lab Results   Component Value Date    WBC 12.8 10/07/2020     Lab Results   Component Value Date    HGB 9.0 10/07/2020     Lab Results   Component Value Date    HCT 26.4 10/07/2020     Lab Results   Component Value Date     10/07/2020     Last Basic Metabolic Panel:  Lab Results   Component Value Date     10/07/2020      Lab Results   Component Value Date    POTASSIUM 4.2 10/07/2020     Lab Results   Component Value Date    CHLORIDE 106 10/07/2020     Lab Results   Component Value Date    PARISH 7.9 10/07/2020     Lab Results   Component Value Date    CO2 12 10/07/2020     Lab Results   Component Value Date     10/07/2020     Lab Results   Component Value Date    CR 2.12 10/07/2020     Lab Results   Component Value Date     10/07/2020       ASSESSMENT/PLAN: 55 year old female s/p ex lap with right hemicolectomy on 9/10, followed by re-exploration with abdominal washout and anterior resection on 9/13, and ex lap with washout and descending colon MF on 9/17. She remains  intubated in the ICU, off pressors. Post-op ileus. 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  Suspect bleeding from raw surfaces related to multiple surgeries and heparin gtt as well as abdominal wall hematoma seen on repeat CT on 9/27. The bleeding seems to have stopped with cessation of her heparin, and her Hb is stable. Abdominal incision hematoma evacuated at bedside on 9/29. IR placed drain in pelvic fluid collection on 10/2, heparin also started later that day with increased bleeding from stoma site, so it was stopped. Underwent EGD 10/3 with evidence of duodenal ulcer and possible SB ischemia started on PPI. GI and hem/onc now following. 10/7 with VTach arrest and concern for torsades alonzo pointes. Since arrest, has had minimal neuro exam. Weaned off some pressors but remains on high dose levophed.      Recommend family discussion regarding goals of care given poor neuro exam. Lactate is improved this morning and pressors were weaned but she remains on high dose levophed. For now, plan to continue supportive cares with TPN, electrolyte replacements, abx, vent support and pressors as needed. I will not be on site today but myself or someone from my team can be available as needed.       For questions/paging, please contact the CRS office at [phone].    Zuleyka Andres MD  [facility]  Phone: [phone]

## 2020-10-08 NOTE — PROGRESS NOTES
[facility] ICU RESPIRATORY NOTE           Date of Admission: 9/10/2020     Date of Intubation (most recent): 10/01/2020     Reason for Mechanical Ventilation: Airway Protection      Number of Days on Mechanical Ventilation: 8     Met Criteria for Spontaneous Breathing Trial: not this shift     Significant Events Today: none overnight.            Recent Labs   Lab 10/07/20  1540 10/07/20  1435 10/07/20  1404 10/02/20  0450 10/01/20  2005 10/01/20  1657   PH 7.38 7.43 6.97*  --   --   --    PCO2 29* 34* 33*  --   --   --    PO2 192* 409* 407*  --   --   --    HCO3 17* 23 8*  --   --   --    O2PER  --  100%  --  30% 30% 60%       Current Vent Settings: Ventilation Mode: CMV/AC  (Continuous Mandatory Ventilation/ Assist Control)  FiO2 (%): 100 %  Rate Set (breaths/minute): 25 breaths/min  Tidal Volume Set (mL): 460 mL  PEEP (cm H2O): 5 cmH2O  Pressure Support (cm H2O): 5 cmH2O  Oxygen Concentration (%): 25 %  Peak Inspiratory Pressure (cm H2O) (Drager Kaia): 28  Resp: (!) 123        Plan: Pt continues on full ventilator support.     Moshe Vick, RT

## 2020-10-08 NOTE — PROGRESS NOTES
Pt remains on full ventilatory support, coarse BS, 100% O2,  is with pt, will be making comfort care determination sometime today. Will continue to monitor.    Veena Villegas - RRT

## 2020-10-08 NOTE — PHARMACY-CONSULT NOTE
TPN formula evaluated based on today s labs results.    The following changes have been made:    Potassium: increased to 60 mEq/day.  Magnesium: removed.    Pharmacy will continue to follow and adjust as appropriate.    Elizabeth Desir, PharmD, BCPS

## 2020-10-08 NOTE — PROGRESS NOTES
"SPIRITUAL HEALTH SERVICES  SPIRITUAL ASSESSMENT Progress Note  [facility] ICU     REFERRAL SOURCE: Unit  referral and staff consult    Spent time with Anibal engaging in stories about Evonne's life and relationships. Anibal shared that he \"doesn't want to be at this point\" but he also \"knows what she wants, having talked about this together many times.\"   Their 2 daughters are coming, as are other family members be with Evonne in anticipation of moving to comfort cares.  Evonne was raised in the [employer] and attended [employer].   Father Quintin was contacted to bring the sacrament of anointing.    I offered spiritual and emotional support through reflective listening that affirmed emotions, experience, and meaning. Offered assurance through prayer which incorporated conversational themes.    PLAN: [facility] remains available for support as needed.    Rossy Stinson  Associate   Pager [phone]  Phone [phone]    "

## 2020-10-08 NOTE — PROGRESS NOTES
Code blue initiated on pt. ambu bag pt with peep valve for duration of code. Pt placed back on vent following code. Will continue to monitor.    Veena Villegas - RRT

## 2020-10-08 NOTE — PROGRESS NOTES
SNa increased from 131 to 141.  His pH is now at 7.5  Serum bicarb up to 20  K improved.   Scr is stable.     Plan:  # Change NS to 1/2 NS   # Stop bicarb gtt  # CMP and lactic order for 2300

## 2020-10-08 NOTE — PROGRESS NOTES
Renal Medicine Progress Note            Assessment/Plan:     55 y.o woman with prolong and complicated hospitalization for perforated colon, coded on 10/7, consulted for HONORIO.      # Patient with a baseline serum creatinine of ~ 0.5 mg/dl.      # Acute and subacute kidney injury: This is due to recurrent septic shock. Scr of 2 is high for her given that she does not have much muscle mass and baseline Scr of ~ 0.5 mg/dl. Scr is about the same. She is oliguric.      # Recurrent septic shock: Improved.                -down to NE                -Keep MAP >65               -IV abs per ID     # Lactic acidosis 2/2 to septic shock: Improved.      # Perforated bowel and complicated with leakage and peritonitis.      # FEN:  Hypokalemia. Edema due to 3rd-spacing. Acidosis improved.      # Severe malnutrition with an albumin of 1:      # Respiratory failure: Remain intubated. CXR showed worsening patchy opacities.      # Cardiopulmonary arrest on 10/7. Coded ~ 10 minutes.      Plan:  # Would do 25 grams of 5% albumin q12 hrs x 6 doses  # Can stop NS  # Replete K    I discussed the situation with her  at the bedside including possible need for dialysis in the future if she declines. I advised him that she would be a very poor candidate for dialysis.         Interval History:     Low grade fever. BP seems okay. FIO2 down to 40%. Lactic acidosis improved. Down to NE at 0.26 mcg. 1/2 NS at 125 ml/hr. + 2.7 liters last 24 hrs. On micafungin and vancomycin.           Medications and Allergies:       chlorhexidine  15 mL Mouth/Throat Q12H     [Held by provider] furosemide  20 mg Intravenous Q12H     insulin aspart  1-12 Units Subcutaneous Q4H     lipids  250 mL Intravenous Q48H     micafungin  100 mg Intravenous Q24H     pantoprazole (PROTONIX) IV  40 mg Intravenous BID     piperacillin-tazobactam  3.375 g Intravenous Q6H     sodium chloride (PF)  10 mL Intracatheter Q8H     vancomycin place juarez - receiving intermittent  "dosing  1 each Intravenous See Admin Instructions      No Known Allergies         Physical Exam:   Vitals were reviewed   , Blood pressure 128/70, pulse 82, temperature 99.1  F (37.3  C), resp. rate 25, height 1.651 m (5' 5\"), weight 49.7 kg (109 lb 9.1 oz), last menstrual period 01/01/2015, SpO2 100 %.    Wt Readings from Last 3 Encounters:   10/08/20 49.7 kg (109 lb 9.1 oz)       Intake/Output Summary (Last 24 hours) at 10/8/2020 1054  Last data filed at 10/8/2020 0900  Gross per 24 hour   Intake 5603.88 ml   Output 2418 ml   Net 3185.88 ml     GENERAL: Critically ill.   HEENT:   eyes with upper ceja gaze  CV: RRR, tach, no murmurs, no clicks, gallops, or rubs.   RESP: No wheezes or crackles.   GI: Abdomen distended, soft, drains in place.   MUSCULOSKELETAL: extremities legs are without muscle mass. Has edema from third-spacing but not terrible.   SKIN: no suspicious lesions or rashes.   +hudson cath and rectal tube         Data:     CBC RESULTS:     Recent Labs   Lab 10/08/20  0444 10/07/20  1755 10/07/20  1540 10/07/20  1350 10/07/20  0402 10/06/20  1735   WBC 21.4* 24.1* 18.2* 10.3 12.8* 12.7*   RBC 2.51* 3.05* 3.20* 3.32* 3.00* 2.31*   HGB 7.4* 9.3* 9.5* 10.0* 9.0* 6.8*   HCT 21.3* 26.1* 27.9* 30.1* 26.4* 20.9*    278 267 106* 239 210       Basic Metabolic Panel:  Recent Labs   Lab 10/08/20  0444 10/07/20  2200 10/07/20  1755 10/07/20  1540 10/07/20  1350 10/07/20  0402    139 141 140 134 131*   POTASSIUM 3.3* 3.5 3.4 3.4 5.8* 4.2   CHLORIDE 102 104 106 108 108 106   CO2 20 16* 20 19* 13* 12*   BUN 93* 95* 100* 101* 102* 100*   CR 2.06* 2.14* 2.12* 2.09* 2.13* 2.12*   * 189* 70 120* 120* 127*   PARISH 8.1* 8.1* 8.2* 9.1 14.0* 7.9*       INR  Recent Labs   Lab 10/07/20  1350 10/06/20  1735 10/06/20  0540 10/05/20  0428   INR 2.22* 1.50* 1.55* 1.71*      Attestation:   I have reviewed today's relevant vital signs, notes, medications, labs and imaging.    Ananda Zuleta MD  [facility] - " Nephrology  Office phone :[phone]  Pager: [phone]

## 2020-10-08 NOTE — PROGRESS NOTES
Hematology chart check    Events of yesterday noted.   Curtains closed in room.  is in with patient.   Discussed with RN. Moving toward comfort measures but no final decision has been made.  Please contact us if we can be of further assistance.     Abdulaziz Saravia  Nurse Practitioner  [facility]  [phone]

## 2020-10-08 NOTE — PROGRESS NOTES
Critical Care Progress Note  10/08/2020    Name: Evonne Martel MRN#: [id number]   Age: 55 year old YOB: 1965     \[facility] [place] [facility]\"" Day# 28           Problem List:   Active Problems:    Severe sepsis (H)    Pneumoperitoneum    Colon perforation (H)    Free intraperitoneal air    Perforation of colon (H)    Acute kidney failure with tubular necrosis (H)    Acute kidney failure, unspecified (H)           Summary/Hospital Course:   Ms. Martel is a 55 year old woman with a history of alcoholism who presented with abdominal pain on 9/10, found to have septic shock due to cecal perforation and sigmoid colon perforation. She underwent exploratory laparotomy with right colectomy and end ileostomy. Initially, she did well post-operatively, but unfortunately developed worsening sepsis, and returned to the OR on 9/13, where she was found to have gross stool spillage in the abdomen with additional sigmoid perforations. She underwent washout and anterior resection with drain placement. She returned to the OR again on 9/17 after CT abdomen showed additional possible leak, and she was found to have stool leaking from the stable line of the descending colon, and washout and end colostomy were performed. Subsequently, she has weaned off pressors. She has been found to have SMA and SMV thrombi, but attempts at anticoagulation with heparin have been short lived due to bleeding. She underwent endoscopy by GI which found duodenal ulcerations, but no varices. She was able to be extubated on 10/1, but unfortunately required reintubation on 10/2, tracheostomy placed 10/6. Unfortunately, on 10/7 in the afternoon, she went into [place]. CPR and shocks were given, and she was started on magnesium as well as levophed and epinephrine. It was thought that she may have had a low flow state leading to bowel ischemia and elevated lactate with severe acidosis. Despite high quality CPR, her neurologic status worsened after code,  with decreased movement, and upward fixed gaze. She was made DNR. Today, goals of care conversations have been ongoing.     Assessment and plan :     Evonne Martel is a 55 year old female admitted on 9/10/2020 for sepsis due to cecal and sigmoid perforations thought to be secondary to diverticulitis, s/p colectomy and multiple repeat washouts for additional perforations, with course complicated by HONORIO and hypoxic respiratory failure as well as GI bleed, and now VT arrest on 10/7.   I have personally reviewed the daily labs, imaging studies, cultures and discussed the case with referring physician and consulting physicians.   My assessment and plan by system for this patient is as follows:    Neurology/Psychiatry:   1. ICU sedation  2. Analgesia  3. Concern for hypoxic brain injury (due to VF arrest)  Plan  -Off all sedation  -monitor mental status and neuro exams.     Cardiovascular:   1. Cardiogenic shock, possible septic shock: in setting of code   Plan  -Continue norepinephrine, wean as able  -weaned off of epinephrine currently     Pulmonary/Ventilator Management:   1. Acute hypoxic respiratory failure, s/p trach on 10/6.   2. Failed spontaneous breathing trials, failed extubation on 10/1  3. Hemoptysis noted after code, likely secondary to compressions.   Plan  - Wean vent as possible  - PST daily for trach weaning    GI/Nutrition :   1. Perforated colon (from diverticulitis) s/p ex lap, colectomy, and abdominal washout (9/10, re-exploration on 9/13 and 9/17).   2. Elevation of liver function tests: stable today  3. Severe protein calorie malnutrition  4. Stress ulcer prophylaxis   5. GI bleeding with duodenal ulcerations on endoscopy   6. Concern for ongoing bowel ischemia leading to code/elevated lactate.   Plan  -TPN  -enteral feeds  -protonix for GI Pox, giving BID given GI bleed  -appreciate colorectal management   -appreciate GI assistance as well  -drains in place, including midline presacral fluid  collection    Renal/Fluids/Electrolytes:   1. Acute kidney injury, probable ATN: Cr stable at 2.06 today. BUN 93.  2. Hyponatremia: resolved, 136 today.   3. Hypokalemia: 3.3 today   4. Metabolic acidosis: improved today, Bicarb now 20, but received significant bicarb yesterday   Plan  - Follow BMP  - Nephrology consulted, appreciate assistance   - if continuing full cares, would start 25 g of 5% albumen q12h for 6 doses (per nephro).   - stop NS  - monitor function and electrolytes as needed with replacement per ICU protocols.  - generally avoid nephrotoxic agents such as NSAID, IV contrast unless specifically required  - adjust medications as needed for renal clearance  - follow I/O's as appropriate.    Infectious Disease:   1. Septic shock secondary to perforated colon with feculent peritonitis, intraoperative cultures positive for pseudomonas, enterococcus, e coli, and bacteroides   2. Possible RLL pneumonia noted on 9/17  3. Ongoing fevers  Plan  - Continue zosyn and micafungin, as well as vanco in setting of code.   - ID following, appreciate assistance  - repeat cultures today given ongoing fevers    Endocrine:   1. Stress induced hyperglycemia  Plan  - ICU insulin protocol, goal sugar <180    Hematology/Oncology:   1. Acute normocytic anemia  2. Leukocytosis secondary to septic shock, improved  3. Nonocclusive thrombus in SMV and minimal nonocclusive thrombus in portal vein   4. Bleeding from ostomy  5. Bleeding in endotracheal tube.   Plan  - Hematology consulted, appreciate assistance  - Will hold heparin again today given increased blood and possible change in GOC.  - Follow Hgb, transfuse if Hgb <7  - antibiotics as above.     MSK/Rheum:  1. deconditioning  Plan  - PT/OT    IV/Access:   1. Venous access - PICC, required for TPN  2. Arterial access - none  3. GAB drains in midline abdomen, right buttock  Plan  - central access required and necessary    ICU Prophylaxis:   1. DVT: mechanical  2. VAP: HOB 30  degrees, chlorhexidine rinse  3. Stress Ulcer: PPI  4. Restraints: Nonviolent soft two point restraints required and necessary for patient safety and continued cares and good effect as patient continues to pull at necessary lines, tubes despite education and distraction. Will readdress daily.   5. Wound care - per unit routine   6. Feeding - TPN  7. Family Update: Discussed case with  at bedside multiple times today  8. Disposition - remain in ICU    Key goals for next 24 hours:   1. Continue goals of care discussions  2. Wean pressors as able  3. Monitor neuro status      Interim History/Overnight Events:   Code for VF arrest last afternoon, with poor neuro status afterward, as well as increased pressor requirements. Stable today, but not much improvement. Continues to have fixed gaze and limited reflexes off sedation.  at bedside, discussed 24 hr events, possible paths forward. He is considering transition to comfort care.        Key Medications:       chlorhexidine  15 mL Mouth/Throat Q12H     [Held by provider] furosemide  20 mg Intravenous Q12H     insulin aspart  1-12 Units Subcutaneous Q4H     lipids  250 mL Intravenous Q48H     micafungin  100 mg Intravenous Q24H     pantoprazole (PROTONIX) IV  40 mg Intravenous BID     piperacillin-tazobactam  3.375 g Intravenous Q6H     sodium chloride (PF)  10 mL Intracatheter Q8H     vancomycin place juarez - receiving intermittent dosing  1 each Intravenous See Admin Instructions       dexmedetomidine Stopped (10/01/20 2311)     dextrose       EPINEPHrine IV infusion ADULT Stopped (10/08/20 0036)     magnesium sulfate Stopped (10/08/20 0254)     norepinephrine 0.24 mcg/kg/min (10/08/20 0804)     parenteral nutrition - ADULT compounded formula       parenteral nutrition - ADULT compounded formula 65 mL/hr at 10/08/20 0744     NaCl 125 mL/hr at 10/08/20 1153     sodium chloride 0.9% (bag)                 Physical Examination:   Temp:  [99  F (37.2  C)-100.6   F (38.1  C)] 99.3  F (37.4  C)  Pulse:  [] 85  Resp:  [17-30] 25  BP: ()/(46-82) 128/70  MAP:  [57 mmHg-100 mmHg] 80 mmHg  Arterial Line BP: ()/(44-92) 126/60  FiO2 (%):  [40 %-60 %] 40 %  SpO2:  [93 %-100 %] 100 %    Intake/Output Summary (Last 24 hours) at 10/5/2020 0839  Last data filed at 10/5/2020 0816  Gross per 24 hour   Intake 4406.73 ml   Output 3685 ml   Net 721.73 ml     Wt Readings from Last 4 Encounters:   10/08/20 49.7 kg (109 lb 9.1 oz)     Arterial Line BP: ()/(44-92) 126/60  MAP:  [57 mmHg-100 mmHg] 80 mmHg  BP - Mean:  [] 94  Ventilation Mode: CMV/AC  (Continuous Mandatory Ventilation/ Assist Control)  FiO2 (%): 40 %  Rate Set (breaths/minute): 25 breaths/min  Tidal Volume Set (mL): 400 mL  PEEP (cm H2O): 5 cmH2O  Oxygen Concentration (%): 40 %  Resp: 25    Recent Labs   Lab 10/08/20  0444 10/07/20  2200 10/07/20  1756 10/07/20  1540 10/07/20  1435 10/02/20  0450 10/02/20  0450   PH 7.50* 7.47* 7.50* 7.38 7.43   < >  --    PCO2 25* 22* 26* 29* 34*   < >  --    PO2 150* 127* 170* 192* 409*   < >  --    HCO3 19* 16* 20* 17* 23   < >  --    O2PER 40% Canceled, Test credited  40%  --   --  100%  --  30%    < > = values in this interval not displayed.     GEN: no acute distress, Off sedation, not responsive.   HEENT: head ncat, sclera anicteric, trachea midline. Trach in place.   PULM: unlabored synchronous with vent, CTAB. Bloody discharge in ETT.   CV/COR: tachycardic, normal rhythm, normal S1 and S2. No gallop, rub, nor murmur  ABD: soft, tender. Non distended. Ileostomy in place with brown liquid output with some serosanguinous output as well. Mucous fistula bag with scant mucous present. Ileostomy pink. Abdominal drain in place with some serosanguinous drainage. Midline incision.    MSK/EXT:  Significant 3+ LE edema. WWP.  NEURO: not on sedation, not responsive. Weak cough. Upward fixed gaze, non-responsive pupils. Not withdrawing to pain. No purposeful movement  snoted.   SKIN: no obvious rash. Midline abdominal incision.   LINES: GAB drains in place with serosanguinous drainage.          Data:   All data and imaging reviewed.     ROUTINE ICU LABS (Last four results)  CMP  Recent Labs   Lab 10/08/20  0444 10/07/20  2200 10/07/20  1755 10/07/20  1540 10/07/20  0402 10/07/20  0402 10/05/20  0428 10/05/20  0428 10/04/20  0415    139 141 140   < > 131*   < > 152* 146*   POTASSIUM 3.3* 3.5 3.4 3.4   < > 4.2   < > 2.8* 3.4   CHLORIDE 102 104 106 108   < > 106   < > 122* 116*   CO2 20 16* 20 19*   < > 12*   < > 14* 19*   ANIONGAP 14 19* 15* 13   < > 13   < > 16* 11   * 189* 70 120*   < > 127*   < > 172* 148*   BUN 93* 95* 100* 101*   < > 100*   < > 84* 101*   CR 2.06* 2.14* 2.12* 2.09*   < > 2.12*   < > 1.63* 1.97*   GFRESTIMATED 26* 25* 25* 26*   < > 25*   < > 35* 28*   GFRESTBLACK 30* 29* 29* 30*   < > 29*   < > 40* 32*   PARISH 8.1* 8.1* 8.2* 9.1   < > 7.9*   < > 6.8* 8.1*   MAG 6.0* 4.8* 3.2*  --   --  2.3  --  1.6 2.4*   PHOS  --   --  2.9  --   --  2.6  --  2.9 4.2   PROTTOTAL 5.0* 4.7* 4.9* 5.2*   < > 5.6*   < > 5.0* 5.5*   ALBUMIN 1.6* 1.0* 1.1* 1.1*   < > 1.2*   < > 1.1* 1.5*   BILITOTAL 3.9* 3.4* 4.1* 3.6*   < > 3.1*   < > 2.4* 3.6*   ALKPHOS 290* 325* 355* 355*   < > 268*   < > 200* 232*   * 178* 188* 188*   < > 72*   < > 52* 56*   ALT 88* 99* 102* 96*   < > 61*   < > 46 50    < > = values in this interval not displayed.     CBC  Recent Labs   Lab 10/08/20  0444 10/07/20  1755 10/07/20  1540 10/07/20  1350   WBC 21.4* 24.1* 18.2* 10.3   RBC 2.51* 3.05* 3.20* 3.32*   HGB 7.4* 9.3* 9.5* 10.0*   HCT 21.3* 26.1* 27.9* 30.1*   MCV 85 86 87 91   MCH 29.5 30.5 29.7 30.1   MCHC 34.7 35.6 34.1 33.2   RDW 16.4* 16.5* 16.7* 17.0*    278 267 106*     INR  Recent Labs   Lab 10/07/20  1350 10/06/20  1735 10/06/20  0540 10/05/20  0428   INR 2.22* 1.50* 1.55* 1.71*     Arterial Blood Gas  Recent Labs   Lab 10/08/20  0444 10/07/20  2200 10/07/20  1756  10/07/20  1540 10/07/20  1435 10/02/20  0450 10/02/20  0450   PH 7.50* 7.47* 7.50* 7.38 7.43   < >  --    PCO2 25* 22* 26* 29* 34*   < >  --    PO2 150* 127* 170* 192* 409*   < >  --    HCO3 19* 16* 20* 17* 23   < >  --    O2PER 40% Canceled, Test credited  40%  --   --  100%  --  30%    < > = values in this interval not displayed.       All cultures:  Recent Labs   Lab 10/07/20  1057 10/07/20  0941 10/02/20  1345   CULT No growth after 16 hours No growth after 17 hours Light growth  Leti albicans / dubliniensis  Candida albicans and Candida dubliniensis are not routinely speciated  Susceptibility testing not routinely done  *  Culture negative monitoring continues     Recent Results (from the past 24 hour(s))   XR Chest Port 1 View    Narrative    CHEST ONE VIEW PORTABLE  10/7/2020 2:19 PM     HISTORY: Vtach/CPR.    COMPARISON: October 1, 2020      Impression    IMPRESSION: Patient has a new tracheostomy tube, replacing  endotracheal tube. Left subclavian central venous catheter again noted  with tip in the proximal SVC. Patchy pulmonary opacities are noted  throughout both lungs, worsened since previous exam. No pneumothorax  or obvious pleural effusion. Recommend close surveillance until  resolution as presumption is pneumonia.    VAL JUNG MD                 Billing: This patient is critically ill: Yes. Total critical care time today 60 min.    Romeo Josue MD    Department of Medicine, Division of Pulmonary, Allergy, Critical Care, and Sleep Medicine  Pager: [phone]     Addendum:     Updated  again after he had time to think. He is moving toward comfort care, daughters and others coming to say goodbye. We discussed comfort care process and next steps. I also checked an ABG which showed respiratory alkalosis, and slowed her vent rate slightly.      will tell us when he is ready to initiate comfort care order set tonight. Orders signed and held.      Additional 30 minutes of critical care time spent on patient care today, for total of 90 min today.     Romeo Joseu MD

## 2020-10-08 NOTE — PLAN OF CARE
Family spending time at bedside saying goodbye, planning for comfort care measures after visitors leave.  Spouse updated by MD and Nursing, emotional support provided. Spiritual health at bedside for support.

## 2020-10-09 VITALS
SYSTOLIC BLOOD PRESSURE: 128 MMHG | BODY MASS INDEX: 18.26 KG/M2 | HEIGHT: 65 IN | TEMPERATURE: 97.9 F | WEIGHT: 109.57 LBS | DIASTOLIC BLOOD PRESSURE: 70 MMHG | OXYGEN SATURATION: 81 %

## 2020-10-09 LAB
BACTERIA SPEC CULT: ABNORMAL
BACTERIA SPEC CULT: NORMAL
Lab: NORMAL
SPECIMEN SOURCE: ABNORMAL
SPECIMEN SOURCE: NORMAL

## 2020-10-09 ASSESSMENT — ACTIVITIES OF DAILY LIVING (ADL): ADLS_ACUITY_SCORE: 19

## 2020-10-09 NOTE — DISCHARGE SUMMARY
"                                      [facility] Discharge Summary    Evonne Martel MRN# [id number]   YOB: 1965 Age: 55 year old     Date of Admission:  9/10/2020  Date of Discharge:  10/9/2020  3:44 AM  Admitting Physician:  Romeo Gonzalez MD  Discharge Physician:  Romeo Josue MD  Discharging Service:  Critical Care     Home clinic: unknown  Primary Provider: No Ref-Primary, Physician          Admission Diagnoses:   Pneumoperitoneum [K66.8]  Hyponatremia [E87.1]  Severe sepsis (H) [A41.9, R65.20]          Discharge Diagnosis:     Patient Active Problem List   Diagnosis     Severe sepsis (H)     Pneumoperitoneum     Colon perforation (H)     Free intraperitoneal air     Perforation of colon (H)     Acute kidney failure with tubular necrosis (H)     Acute kidney failure, unspecified (H)                Discharge Disposition:     Patient  during this admission           Condition on Discharge:     Discharge condition:    Discharge vitals: Blood pressure 128/70, pulse (!) 0, temperature 97.9  F (36.6  C), resp. rate (!) 0, height 1.651 m (5' 5\"), weight 49.7 kg (109 lb 9.1 oz), last menstrual period 2015, SpO2 (!) 81 %.     Code status on discharge: DNR / DNI, comfort care           Procedures / Labs / Imaging:   Invasive procedures:   9/10/20 Laparotomy with washout, right hemicolectomy, placement of pelvic drain, end ileostomy, transverse colon mucous fistula   20: Reexploration laparotomy, abdominal washout, anterior resection, rigid proctoscopy with rectal stump leak testing, removal of abdominal drain and replacement of new pelvic drain.   20: exploratory laparotomy, abdominal washout, end descending colon mucous fistula creation, Staple line leak repair  10/2/20 CT guided pelvic drain placement into fluid collection, uncomplicated.   10/3/20 Upper endoscopy: Normal esophagus. Normal stomach. Normal duodenal bulb, first portion of the duodenum, " second portion of the duodenum and third portion of the duodenum. Non-bleeding jejunal ulcers with no stigmata of bleeding. Likely small bowel mucosal ischemia.  10/6/20 Tracheostomy creation        Medications Prior to Admission:     No medications prior to admission.          Discharge Medications:   There are no discharge medications for this patient.          Consultations:     Consultation also recieved from gastroenterology, infectious disease, nephrology, heme/onc, colorectal surgery, thoracic surgery              Brief History of Illness and hospital course   Ms. Martel is a 55 year old woman with a history of alcoholism who presented with abdominal pain on 9/10, found to have septic shock due to cecal perforation and sigmoid colon perforation likely due to diverticulitis. She underwent exploratory laparotomy with right colectomy and end ileostomy. Initially, she did well post-operatively, but unfortunately developed worsening sepsis, and returned to the OR on 9/13, where she was found to have gross stool spillage in the abdomen with additional sigmoid perforations. She underwent washout and anterior resection with drain placement. She returned to the OR again on 9/17 after CT abdomen showed additional possible leak, and she was found to have stool leaking from the stable line of the descending colon, and washout and end colostomy were performed. Subsequently, she has weaned off pressors. She has been found to have SMA and SMV thrombi, but attempts at anticoagulation with heparin have been short lived due to bleeding. She underwent endoscopy by GI which found duodenal ulcerations, but no varices. She was able to be extubated on 10/1, but unfortunately required reintubation on 10/2, tracheostomy placed 10/6 due to ongoing weakness and failure of pressure support trials. Unfortunately, on 10/7 in the afternoon, she went into [place] Point[place]. CPR and shocks were given, and she was started on magnesium  "as well as levophed and epinephrine. It was thought that she may have had a low flow state leading to bowel ischemia and elevated lactate with severe acidosis. Despite high quality CPR, her neurologic status worsened after code, with decreased movement, and upward fixed gaze. She was made DNR. After family was able to visit, she was subsequently changed to comfort care only, and was compassionately extubated. She passed away on 10/8/20 at 2315.          Significant Results:   9/10/20 CT abd/pelvis: 1.  Large pneumoperitoneum concerning for hollow viscus perforation. The site of the perforation is likely in the sigmoid colon, likely secondary to perforated diverticulitis. Moderate sized collection of fecal material adjacent to the sigmoid colon. 2.  Diffuse wall thickening of the small bowel and remainder of the colon may be secondary to \"shock bowel\" as can be seen after hypovolemic shock. Flattened IVC.  Less likely, these findings may be related to diffuse enterocolitis.  9/13/20 CT chest/abd/pelvis PE study: 1.  No pulmonary emboli.   2.  New small bilateral pleural effusions and bibasilar atelectasis.  Small ground glass focus in the left upper lobe could represent  infection or aspiration. 3.  Diffuse mild small bowel dilatation presumably postoperative adynamic ileus. Marked circumferential small bowel wall thickening and colonic wall thickening is presumably postoperative edema. 4.  Moderate sized collection containing predominantly gas and small amount of fluid in the left low pelvis with a surgical drain within  It. 5.  Small to moderate ascites. Small residual pneumoperitoneum,  presumably related to adjacent surgery. 6.  Fluid within the esophagus and mild distention of the stomach with gas and fluid.  9/16/20 CT chest/abd/pelvis: 1. Moderately extensive patchy opacities scattered within both lungs, new since 9/13/2020. These are nonspecific, but likely infectious or inflammatory in etiology.  2. Large " right pleural effusion and moderate-sized left pleural effusion, increased in size. 3. An endotracheal tube is present with distal tip just entering the left main bronchus. 4. Several foci of extraluminal gas scattered within the abdomen and pelvis are within normal limits for the recent surgery. There is a relatively prominent 4 cm collection of gas and stool-like material in the lateral aspect of the left hemipelvis near   a site of surgery in the distal descending/proximal sigmoid colon. There is also stranding throughout the intraperitoneal fat. These findings are at least moderately suspicious for a colonic leak and associated peritonitis. If clinically relevant, a  colonic evaluation using water-soluble contrast could be considered for further evaluation. 5. Nonspecific moderate diffuse wall thickening of mildly distended fluid-filled small bowel in the mid abdomen. There is also prominent stranding in the mesenteric fat and elsewhere in the intraperitoneal fat. These findings could relate to enteritis. Bowel ischemia cannot be excluded.  9/24/20 CT abd/pelvis: 1.  Nonocclusive thrombus in the superior mesenteric vein, possible minimal nonocclusive thrombus in the portal vein. 2.  A few minimally generous small bowel loops are noted, however  contrast passes through these suggesting that this is ileus, an earlier partial small bowel obstruction would be difficult to exclude. 3.  No abscess demonstrated.  9/27/20 CT abd/pelvis:  1. Large anterior abdominal wall subcutaneous hematoma measures approximately 10.0 x 4.6 x 15.2 cm in axial and CC dimensions, respectively, new as compared to 9/24/2020 exam.  2. Small mildly loculated hyperattenuating fluid in the cul-de-sac, new as compared to 9/24/2020 exam, could represent hemoperitoneum. 3. No significant change in the small amount of free fluid in the abdomen or pelvis. 4. Small to moderate bilateral pleural effusions and associated basilar  atelectasis/consolidation. 5. Generalized anasarca. 6. No significant change in the previously seen nonocclusive thrombus in the superior mesenteric vein as compared to 9/24/2020 exam.  10/1/20 CTV abd/pelvis: 1. Stable to perhaps minimal improvement in SMV thrombus since the comparison study. 2. Fluid collection in the pelvis now demonstrates an enhancing capsule, this may indicate an abscess in the pouch of Vidal measuring 6.5 x 4.5 cm. Other fluid collections do not demonstrate convincing encapsulation but additional infected fluid collections would be difficult to exclude.  10/2/20 CT drain placement: Technically successful CT guided drain placement into the midline presacral fluid collection.  10/7/20 CT abd/pelvis: 1.  Slight decreased size of the pelvic fluid collection/hematoma with a drainage catheter along its superior and anterior aspect. Consider a follow-up for drain repositioning in IR. 2.  Slight increase in small ascites. No definite new fluid collections in the abdomen and pelvis on noncontrast CT. 3.  Diffusely edematous loops of small bowel, likely not significantly changed since 10/1/2020 although evaluation is slightly limited on this noncontrast CT. No small bowel pneumatosis. 4.  Resolved right pleural effusion and decreased small left pleural effusion.          Pending Results:     None        Romeo Josue MD    Department of Medicine, Division of Pulmonary, Allergy, Critical Care, and Sleep Medicine  Pager: [phone]

## 2020-10-09 NOTE — PROGRESS NOTES
Comfort care initiated per family request at . Extubated and meds discontinued. Pain meds as needed/per family request. Patient  . Pronounced by Dr. Valladares. Family denied autopsy. Uncertain of  home, will call ANS when determined. Body taken by security.

## 2020-10-09 NOTE — PROGRESS NOTES
SPIRITUAL HEALTH SERVICES Progress Note  [facility] Responded to on-call page for EOL prayer ritual for family  Two daughter, spouse, and son(by phone). Pt to be extubated soon, family will remain  SH provided presence, listening, and EOL prayer service  and will remain available         Anastasiia Milligan  Chaplain Resident

## 2020-10-13 LAB
BACTERIA SPEC CULT: NO GROWTH
BACTERIA SPEC CULT: NO GROWTH
SPECIMEN SOURCE: NORMAL
SPECIMEN SOURCE: NORMAL

## 2020-10-19 ASSESSMENT — ENCOUNTER SYMPTOMS: FEVER: 1

## 2021-07-26 NOTE — PLAN OF CARE
OT/PT: per discussion with RN during ICU rds, patient not appropriate for therapy today. Will reschedule for tomorrow- 09/17/2020.    What Type Of Note Output Would You Prefer (Optional)?: Standard Output How Severe Are Your Spot(S)?: moderate Have Your Spot(S) Been Treated In The Past?: has not been treated Hpi Title: Evaluation of Skin Lesions

## 2022-05-05 NOTE — PLAN OF CARE
Shift 4252-2875: VSS. Neuro: Does not follow; eyes remain non-reactive. Pulm: Lungs: clear throughout, mechanically ventilated, minimal secretions. GI/: NG to LIS; Rectal tube in place: no output; Ostomy in place: small output; Ferguson in place: WDL output. Access: A-line, R internal jugular CVC x 3 lumens, PIV x1; Propofol gtt, dex gtt, fentanyl gtt, insulin gtt, levo gtt, vaso gtt, 5% dex, NS for MIVF, NS for TKO. Skin: Abdominal midline incision: Dried drainage; GAB drain: large output; skin weeps in spots.    New:  - [place]-synephrine gtt turned off; levo gtt titrated down  - AM K+ replaced; AM phos value in process of being replaced at change of shift.  - At 0535 pt's BP suddenly dropped 60's/40's w/ MAP mid-high 50's, VS since resolved-- please see my progress note for details.   no concerns

## 2025-05-15 NOTE — PROVIDER NOTIFICATION
MD Notification    Notified Person: MD    Notified Person Name:  Dr. Yoder, ICU Intensivist    Notification Date/Time:  9/23/20, 0570    Notification Interaction:  phone    Purpose of Notification:  Several abnormal labs.    Orders Received:  Give albumin now, 25% 50g.      Comments:  Remaining labs can be addressed by Dr. Gonzalez who has kisha following pt.       weakness

## (undated) DEVICE — GLOVE PROTEXIS W/NEU-THERA 6.5  2D73TE65

## (undated) DEVICE — STPL LINEAR CUT 75MM TLC75

## (undated) DEVICE — SUCTION CANISTER MEDIVAC LINER 3000ML W/LID 65651-530

## (undated) DEVICE — GLOVE PROTEXIS POWDER FREE 6.0 ORTHOPEDIC 2D73ET60

## (undated) DEVICE — SU VICRYL 0 TIE 12X18" J906G

## (undated) DEVICE — STPL RELOAD CONTOUR CUT CVD THICK  CR40G

## (undated) DEVICE — ESU GROUND PAD UNIVERSAL W/O CORD

## (undated) DEVICE — DRAPE LEGGINGS 8421

## (undated) DEVICE — SU PROLENE 2-0 RB-1DA 36" 8559H

## (undated) DEVICE — SU MONOCRYL 4-0 PS-2 18" UND Y496G

## (undated) DEVICE — GLOVE PROTEXIS W/NEU-THERA 6.0  2D73TE60

## (undated) DEVICE — TUBE SMOKE EVAC 7/8"X10 (STERILE)

## (undated) DEVICE — RETR RING LONE STAR 14.1X14.1CM 3307G

## (undated) DEVICE — SOL NACL 0.9% IRRIG 1000ML BOTTLE 2F7124

## (undated) DEVICE — ESU LIGASURE IMPACT OPEN SEALER/DVDR CVD LG JAW LF4418

## (undated) DEVICE — SU PDS II 3-0 SH 27" Z316H

## (undated) DEVICE — BNDG ABDOMINAL BINDER 9X30-45" 79-89070

## (undated) DEVICE — ESU ELEC NDL 6" COATED/INSULATED E1551-6

## (undated) DEVICE — DRAPE BREAST/CHEST 29420

## (undated) DEVICE — DRSG PREVENA NEGATIVE PRESSURE 20CM WND PRE1001US

## (undated) DEVICE — MANIFOLD NEPTUNE 4 PORT 700-20

## (undated) DEVICE — STPL SKIN PRXM+SS 35MM PMR35

## (undated) DEVICE — GLOVE PROTEXIS MICRO 6.5  2D73PM65

## (undated) DEVICE — SU PDS II 1 CT MONOFIL Z353H

## (undated) DEVICE — DRSG TELFA ISLAND 4X10"

## (undated) DEVICE — SU VICRYL 2-0 TIE 12X18" J905T

## (undated) DEVICE — Device

## (undated) DEVICE — DRAPE SHEET REV FOLD 3/4 9349

## (undated) DEVICE — SU VICRYL 2-0 SH 27" J317H

## (undated) DEVICE — DRSG DRAIN 4X4" 7086

## (undated) DEVICE — STPL SKIN 35W 6.9MM  PXW35

## (undated) DEVICE — LINEN TOWEL PACK X5 5464

## (undated) DEVICE — PACK MINOR SBA15MIFSE

## (undated) DEVICE — SU PDS II 0 CTX 60" Z990G

## (undated) DEVICE — PREP CHLORAPREP 26ML TINTED ORANGE  260815

## (undated) DEVICE — GOWN IMPERVIOUS ZONED LG

## (undated) DEVICE — SU VICRYL 3-0 TIE 12X18" J904T

## (undated) DEVICE — TUBING SUCTION MEDI-VAC SOFT 3/16"X20' N520A

## (undated) DEVICE — ESU ELEC BLADE HEX-LOCKING 2.5" E1450X

## (undated) DEVICE — SUCTION MINISQUAIR SMOKE EVAC CAPTURE DEVICE SQ20012-01

## (undated) DEVICE — SU VICRYL 3-0 SH 27" J316H

## (undated) DEVICE — LIGHT HANDLE X2

## (undated) DEVICE — GLOVE PROTEXIS W/NEU-THERA 7.5  2D73TE75

## (undated) DEVICE — ESU PENCIL W/HOLSTER E2350H

## (undated) DEVICE — NDL 18GA 1.5" 305196

## (undated) DEVICE — CATH MALECOT 30FR LATEX 86030

## (undated) DEVICE — ESU ELEC BLADE 6" COATED/INSULATED E1455-6

## (undated) DEVICE — ESU LIGASURE REPBROC IMPCT OPN SLR/DVDR CVD LG JAW 36 LF4318

## (undated) DEVICE — PEN MARKING SKIN

## (undated) DEVICE — SU PROLENE 2-0 V-7 36" 8977H

## (undated) DEVICE — PACK MAJOR SBA15MAFSI

## (undated) DEVICE — GLOVE PROTEXIS BLUE W/NEU-THERA 7.5  2D73EB75

## (undated) DEVICE — COVER CLAMP FABRIC RADIOPAQUE 9.5X150MM 072002PBX

## (undated) DEVICE — DRAIN JACKSON PRATT RESERVOIR 100ML SU130-1305

## (undated) DEVICE — GOWN IMPERVIOUS SPECIALTY XL/XLONG 39049

## (undated) DEVICE — SYR 10ML FINGER CONTROL W/O NDL 309695

## (undated) DEVICE — PREP SKIN SCRUB TRAY 4461A

## (undated) DEVICE — DRAIN JACKSON PRATT CHANNEL 19FR ROUND HUBLESS SIL JP-2230

## (undated) DEVICE — SU ETHILON 3-0 FS-1 18" 669H

## (undated) DEVICE — SYR 10ML SLIP TIP W/O NDL

## (undated) DEVICE — LINEN GOWN OVERSIZE 5408

## (undated) DEVICE — DRAPE IOBAN INCISE 23X17" 6650EZ

## (undated) DEVICE — NDL 22GA 1.5"

## (undated) DEVICE — STPL CONTOUR CUT CVD GREEN CS40G

## (undated) DEVICE — TUBING IRRIG CYSTO/BLADDER SET 81" LF 2C4040

## (undated) DEVICE — SU VICRYL 3-0 SH CR 8X18" J774

## (undated) DEVICE — STPL RELOAD LINEAR CUT 75MM TCR75

## (undated) DEVICE — DRAPE LAP W/ARMBOARD 29410

## (undated) DEVICE — JELLY LUBRICATING SURGILUBE 4OZ TUBE

## (undated) DEVICE — SPONGE LAP 18X18" X8435

## (undated) DEVICE — SUCTION TIP POOLE K770

## (undated) DEVICE — ESU ELEC BLADE 2.75" COATED/INSULATED E1455

## (undated) DEVICE — BLADE KNIFE SURG 10 371110

## (undated) DEVICE — PREP DURAPREP 26ML APL 8630

## (undated) DEVICE — RETR ELASTIC STAYS LONE STAR SHARP 5MM 8/PACK 3311-8G

## (undated) DEVICE — BLADE CLIPPER 4406

## (undated) DEVICE — ESU ELEC BLADE 6" COATED E1450-6

## (undated) DEVICE — SOL WATER IRRIG 1000ML BOTTLE 2F7114

## (undated) DEVICE — PREP CHLORAPREP W/ORANGE TINT 10.5ML 930715

## (undated) DEVICE — SU SILK 4-0 TF CR 8X18" C084D

## (undated) DEVICE — GLOVE PROTEXIS BLUE W/NEU-THERA 6.5  2D73EB65

## (undated) DEVICE — DRAPE MINOR PROCEDURE LAP 29496

## (undated) RX ORDER — ALBUMIN, HUMAN INJ 5% 5 %
SOLUTION INTRAVENOUS
Status: DISPENSED
Start: 2020-01-01

## (undated) RX ORDER — LIDOCAINE HYDROCHLORIDE 20 MG/ML
INJECTION, SOLUTION EPIDURAL; INFILTRATION; INTRACAUDAL; PERINEURAL
Status: DISPENSED
Start: 2020-01-01

## (undated) RX ORDER — ATROPA BELLADONNA AND OPIUM 16.2; 3 MG/1; MG/1
SUPPOSITORY RECTAL
Status: DISPENSED
Start: 2020-01-01

## (undated) RX ORDER — ONDANSETRON 2 MG/ML
INJECTION INTRAMUSCULAR; INTRAVENOUS
Status: DISPENSED
Start: 2020-01-01

## (undated) RX ORDER — HYDROMORPHONE HYDROCHLORIDE 1 MG/ML
INJECTION, SOLUTION INTRAMUSCULAR; INTRAVENOUS; SUBCUTANEOUS
Status: DISPENSED
Start: 2020-01-01

## (undated) RX ORDER — VASOPRESSIN 20 U/ML
INJECTION PARENTERAL
Status: DISPENSED
Start: 2020-01-01

## (undated) RX ORDER — FENTANYL CITRATE 50 UG/ML
INJECTION, SOLUTION INTRAMUSCULAR; INTRAVENOUS
Status: DISPENSED
Start: 2020-01-01

## (undated) RX ORDER — PROPOFOL 10 MG/ML
INJECTION, EMULSION INTRAVENOUS
Status: DISPENSED
Start: 2020-01-01

## (undated) RX ORDER — METHYLPREDNISOLONE SODIUM SUCCINATE 125 MG/2ML
INJECTION, POWDER, LYOPHILIZED, FOR SOLUTION INTRAMUSCULAR; INTRAVENOUS
Status: DISPENSED
Start: 2020-01-01